# Patient Record
Sex: MALE | Race: BLACK OR AFRICAN AMERICAN | NOT HISPANIC OR LATINO | Employment: OTHER | URBAN - METROPOLITAN AREA
[De-identification: names, ages, dates, MRNs, and addresses within clinical notes are randomized per-mention and may not be internally consistent; named-entity substitution may affect disease eponyms.]

---

## 2017-01-10 ENCOUNTER — OFFICE VISIT (OUTPATIENT)
Dept: PALLIATIVE MEDICINE | Facility: CLINIC | Age: 57
End: 2017-01-10
Payer: COMMERCIAL

## 2017-01-10 VITALS
DIASTOLIC BLOOD PRESSURE: 82 MMHG | OXYGEN SATURATION: 98 % | RESPIRATION RATE: 16 BRPM | SYSTOLIC BLOOD PRESSURE: 111 MMHG | HEART RATE: 60 BPM

## 2017-01-10 DIAGNOSIS — G89.4 CHRONIC PAIN SYNDROME: Primary | Chronic | ICD-10-CM

## 2017-01-10 DIAGNOSIS — M70.62 TROCHANTERIC BURSITIS OF LEFT HIP: ICD-10-CM

## 2017-01-10 DIAGNOSIS — M79.10 MYALGIA: ICD-10-CM

## 2017-01-10 DIAGNOSIS — M79.18 MYOFASCIAL PAIN: ICD-10-CM

## 2017-01-10 PROCEDURE — 20552 NJX 1/MLT TRIGGER POINT 1/2: CPT | Mod: 59 | Performed by: NURSE PRACTITIONER

## 2017-01-10 PROCEDURE — 20610 DRAIN/INJ JOINT/BURSA W/O US: CPT | Mod: LT | Performed by: NURSE PRACTITIONER

## 2017-01-10 ASSESSMENT — PAIN SCALES - GENERAL: PAINLEVEL: MODERATE PAIN (5)

## 2017-01-10 NOTE — MR AVS SNAPSHOT
After Visit Summary   1/10/2017    Jeanmarie Mclean    MRN: [id number]           Patient Information     Date Of Birth          1960        Visit Information        Provider Department      1/10/2017 9:30 AM Maureen Avendaño APRN CNP [facility]        Today's Diagnoses     Chronic pain syndrome    -  1     Myalgia         Myofascial pain         Trochanteric bursitis of left hip           Care Instructions    [facility]   Post Procedure Instructions    Today you had:  trigger point injections to left gluteus yolis    Medications used:  lidocaine   bupivicaine      Bursal injection left hip  Medication used DepoMedrol Lidocaine/ Bupivacaine     Go to the emergency room if you develop any shortness of breath    Monitor the injection sites for signs and symptoms of infection-fever, chills, redness, swelling, warmth, or drainage to areas.    You may have soreness at injection sites for up to 24 hours.    You may apply ice to the painful areas to help minimize the discomfort of the needle pokes.    Do not apply heat to sites for at least 12 hours.    You may use anti-inflammatory medications or Tylenol for pain control if necessary    Nurse line: [phone]  After hours provider line: [phone]  Appointment line: [phone]    Follow up:  Repeat TPI's 2-4 weeks prn   Recommend  Physical Therapy to address deconditioning (hip extensors, buttocks). Discussed HEP   Await results of bursal injection to consider repeat S I joint injection vs hip imaging   reviewed HEP         Nurse Triage line:  [phone]   Call this number with any questions or concerns. You may leave a detailed message anytime. Calls are typically returned Monday through Friday between 8 AM and 4:30 PM. We usually get back to you within 2 business days depending on the issue/request.       Medication refills:    For non-narcotic medications, call your pharmacy directly to  request a refill. The pharmacy will contact the [facility] for authorization. Please allow 3-4 days for these refills to be processed.     For narcotic refills, call the nurse triage line or send a [email or web address] message. Please contact us 7-10 days before your refill is due. The message MUST include the name of the specific medication(s) requested and how you would like to receive the prescription(s). The options are as follows:    Pain Clinic staff can mail the prescription to your pharmacy. Please tell us the name of the pharmacy.    You may pick the prescription up at the Pain Clinic (tell us the location) or during a clinic visit with your pain provider    Pain Clinic staff can deliver the prescription to the [place] pharmacy in the clinic building. Please tell us the location.      Scheduling number: [phone].  Call this number to schedule or change appointments.    We believe regular attendance is key to your success in our program.    Any time you are unable to keep your appointment we ask that you call us at least 24 hours in advance to let us know. This will allow us to offer the appointment time to another patient.             Follow-ups after your visit        Who to contact     If you have questions or need follow up information about today's clinic visit or your schedule please contact [facility] directly at [phone].  Normal or non-critical lab and imaging results will be communicated to you by MyChart, letter or phone within 4 business days after the clinic has received the results. If you do not hear from us within 7 days, please contact the clinic through [employer] or phone. If you have a critical or abnormal lab result, we will notify you by phone as soon as possible.  Submit refill requests through [email or web address] or call your pharmacy and they will forward the refill request to us. Please allow 3 business days for your refill to be completed.          Additional  Information About Your Visit        [employer] Information     [employer] gives you secure access to your electronic health record. If you see a primary care provider, you can also send messages to your care team and make appointments. If you have questions, please call your primary care clinic.  If you do not have a primary care provider, please call [phone] and they will assist you.        Care EveryWhere ID     This is your Care EveryWhere ID. This could be used by other organizations to access your [facility] medical records  [id number]        Your Vitals Were     Pulse Respirations Pulse Oximetry             60 16 98%          Blood Pressure from Last 3 Encounters:   01/10/17 111/82   12/13/16 140/78   11/17/16 118/74    Weight from Last 3 Encounters:   10/11/16 68.584 kg (151 lb 3.2 oz)   09/21/16 69.4 kg (153 lb)   05/31/16 67.132 kg (148 lb)              We Performed the Following     DRAIN/INJECT LARGE JOINT/BURSA     INJECTION SNGL/MULT TRIGGER POINT, 1 OR 2 MUSCLES     NO CHARGE LOS        Primary Care Provider Office Phone # Fax #    Milton Iglesias [phone]       [facility] [address]  [place] [address]        Thank you!     Thank you for choosing [facility]  for your care. Our goal is always to provide you with excellent care. Hearing back from our patients is one way we can continue to improve our services. Please take a few minutes to complete the written survey that you may receive in the mail after your visit with us. Thank you!             Your Updated Medication List - Protect others around you: Learn how to safely use, store and throw away your medicines at www.disposemymeds.org.          This list is accurate as of: 1/10/17 10:38 AM.  Always use your most recent med list.                   Brand Name Dispense Instructions for use    aspirin 81 MG tablet      Take by mouth daily       cycloSPORINE 0.05 % ophthalmic emulsion    RESTASIS     1 Box    Place 1 drop into both eyes 2 times daily       IBU-200 PO      Take 2 tablets by mouth as needed       menthol 5 % Pads    ICY HOT    30 patch    Apply 1 patch topically every 8 hours as needed for muscle soreness May cut to fit       oxyCODONE 5 MG IR tablet    ROXICODONE     Take 1 tablet by mouth 3 times daily.       pregabalin 75 MG capsule    LYRICA    270 capsule    Take 1 capsule (75 mg) by mouth 3 times daily for fibromyalgia, or as directed by your pain clinic/previous prescriber.       QUEtiapine 200 MG tablet    SEROquel     Take 100 mg by mouth daily. weening       sildenafil 100 MG cap/tab    REVATIO/VIAGRA    10 tablet    Take 1/4 - 1 tablet, as directed, 1-3 hours before intimacy. Maximum 1 dose per 24 hours.       valACYclovir 500 MG tablet    VALTREX    6 tablet    Take 1 tablet (500 mg) by mouth 2 times daily for 3 days for outbreak.       vitamin D 1000 UNITS capsule      Take 1 capsule by mouth 2 times daily.       XANAX PO

## 2017-01-10 NOTE — NURSING NOTE
"Chief Complaint   Patient presents with     Pain       Initial /82 mmHg  Pulse 60  Resp 16  SpO2 98% Estimated body mass index is 25.94 kg/(m^2) as calculated from the following:    Height as of 10/11/16: 1.626 m (5' 4\").    Weight as of 10/11/16: 68.584 kg (151 lb 3.2 oz).  BP completed using cuff size: regular    Ernie Alexis MA  [facility]      "

## 2017-01-10 NOTE — PATIENT INSTRUCTIONS
[facility]   Post Procedure Instructions    Today you had:  trigger point injections to left gluteus yolis    Medications used:  lidocaine   bupivicaine      Bursal injection left hip  Medication used DepoMedrol Lidocaine/ Bupivacaine     Go to the emergency room if you develop any shortness of breath    Monitor the injection sites for signs and symptoms of infection-fever, chills, redness, swelling, warmth, or drainage to areas.    You may have soreness at injection sites for up to 24 hours.    You may apply ice to the painful areas to help minimize the discomfort of the needle pokes.    Do not apply heat to sites for at least 12 hours.    You may use anti-inflammatory medications or Tylenol for pain control if necessary    Nurse line: [phone]  After hours provider line: [phone]  Appointment line: [phone]    Follow up:  Repeat TPI's 2-4 weeks prn   Recommend  Physical Therapy to address deconditioning (hip extensors, buttocks). Discussed HEP   Await results of bursal injection to consider repeat S I joint injection vs hip imaging   reviewed HEP         Nurse Triage line:  [phone]   Call this number with any questions or concerns. You may leave a detailed message anytime. Calls are typically returned Monday through Friday between 8 AM and 4:30 PM. We usually get back to you within 2 business days depending on the issue/request.       Medication refills:    For non-narcotic medications, call your pharmacy directly to request a refill. The pharmacy will contact the [facility] for authorization. Please allow 3-4 days for these refills to be processed.     For narcotic refills, call the nurse triage line or send a [employer] message. Please contact us 7-10 days before your refill is due. The message MUST include the name of the specific medication(s) requested and how you would like to receive the prescription(s). The options are as follows:    Pain Clinic staff can  mail the prescription to your pharmacy. Please tell us the name of the pharmacy.    You may pick the prescription up at the Pain Clinic (tell us the location) or during a clinic visit with your pain provider    Pain Clinic staff can deliver the prescription to the [place] pharmacy in the clinic building. Please tell us the location.      Scheduling number: [phone].  Call this number to schedule or change appointments.    We believe regular attendance is key to your success in our program.    Any time you are unable to keep your appointment we ask that you call us at least 24 hours in advance to let us know. This will allow us to offer the appointment time to another patient.

## 2017-01-10 NOTE — PROGRESS NOTES
"Pre-Procedure:  Patient's Name and Date of Birth verified:  YES  Verified By:   [facility] (initials)  Diagnosis:     Chronic pain syndrome  Myalgia  Myofascial pain  Trochanteric bursitis of left hip  Interval History:  Here for TPi's low back, bilaterally, left hip pain.  C/o pain left gluteal and left hip  Walking more and this aggravates Pain . Son here today. Unable to do Physical Therapy as disabled son does not have day care and can not be unattended during Physical Therapy. He is too distruptive for patient to have son in room.   S/p bilateral S I joint  4/18/16 with 85 % relief of pain in low back.   Pain is tender, aching, gnawing, penetrating.   Rating 5/10   Aggravating factors: walking too much,cold   Relieving factors: TPI's, rest,  S I joint  Injections ( 09/26/2016 and 04/18/16), hip bursal injection  Quality of life:  Increase stress with disabled son's behavior and other\" stress\"  7/28/15 pelvic xrays mild superior joint space narrowing, bilateral joint space left>R S I joint.   PE: /82 mmHg  Pulse 60  Resp 16  SpO2 98%  MSK:  TTP left gluteus yolis at posterior hip insertion, along sacral ridge  TTP over left hip bursa  Gait normal   Psyche: pleasant cooperative and alert some  cognitive slowing, mood/ affect depressed   Complications and/or Adverse Effects of Last Treatment:  None , last visit 12/13/16   Site Marking and Verification:  Site selection (based on symptoms and condition:  YES  Verified by: [facility] (initials)  Patient identification verified by provider: YES  Verified by: [facility]  (initials)  Pause for the Cause:  YES  Verified by: [facility] (initials)   Procedure Note:  Discussion of the procedure, risks, benefits and alternatives was held.  Informed consent was given by patient:  YES.    Type of Procedure Performed: Trigger Points, joint injections, gluteus yolis at posterior insertion and sacral ridge  Procedure Description:      Note:  Position prone   Cleansed  gluteus yolis   with " ChloraPrep 2% with 70 % alcohol    Trigger points marked ( 8) total      Trigger point injection:  Medication: bupivacaine 0.25 % 5 cc lidocaine 1% 5 cc   Volume each site 1-2 cc, Total volume 10 cc     Bursal injection left hip   bupivacaine 0.25 % 4 ccDepoMedrol 1 cc 40 mg  Total volume 5 cc  Pre pain score: 5/10, Post procedure pain score 2/10  Maureen Avendaño R.N.,B.C., C.A.N.P.      Post-Procedure:  Complication/Adverse Effects: none     Management:   See avs,   Repeat TPI's 2-4 weeks prn   Declined Physical Therapy to address deconditioning (hip extensors ASIS pain and gluteal ). Discussed HEP   Await results of bursal injection to consider repeat S I joint injection vs hip imaging   reviewed HEP   **OK to overbook TPI's **  Signature:  Maureen Nescopeck-Ceferino, CNP    [facility]      Time spent: 15 minute procedure related care  10 minutes with chart/medication  review

## 2017-01-11 ENCOUNTER — TELEPHONE (OUTPATIENT)
Dept: FAMILY MEDICINE | Facility: CLINIC | Age: 57
End: 2017-01-11

## 2017-01-11 NOTE — TELEPHONE ENCOUNTER
Reason for Call:  Other prescription    Detailed comments: Patient needs to sign the Advanced Directives and wants to do this as soon as possible.    Phone Number Patient can be reached at: Home number on file [phone] (home)    Best Time: any    Can we leave a detailed message on this number? YES    Call taken on 1/11/2017 at 3:47 PM by Brandi Love

## 2017-01-12 ENCOUNTER — TELEPHONE (OUTPATIENT)
Dept: FAMILY MEDICINE | Facility: CLINIC | Age: 57
End: 2017-01-12

## 2017-01-12 NOTE — TELEPHONE ENCOUNTER
Fe, Care Coordinator for [facility], [phone], [id number] called to make appointment with Radha for Notary for Health Care Directive.    Please call Fe today.    Thank you,    Fartun Ramos

## 2017-01-12 NOTE — TELEPHONE ENCOUNTER
Called [facility] and left a msg that yesterday I was not in the clinic in the afternoon. Today I am leaving in 2 hrs & tomorrow I will not in the clinic.  Elyse,CMA

## 2017-01-12 NOTE — TELEPHONE ENCOUNTER
Patient calling asking if he could make an appointment on Thursday the 19th, please call as soon as possible, him to make and appointment. Patient wants a call, and can leave a message for a time on Thursday the 19th, but need a call back as soon as possible to set this up.    Please call and if necessary leave a detailed message on what time, [phone], if he does not answer.

## 2017-01-26 ENCOUNTER — ALLIED HEALTH/NURSE VISIT (OUTPATIENT)
Dept: NURSING | Facility: CLINIC | Age: 57
End: 2017-01-26
Payer: COMMERCIAL

## 2017-01-26 DIAGNOSIS — Z71.89 ADVANCE CARE PLANNING: Primary | ICD-10-CM

## 2017-01-26 PROCEDURE — 99207 ZZC NO CHARGE LOS: CPT

## 2017-02-02 ENCOUNTER — TELEPHONE (OUTPATIENT)
Dept: FAMILY MEDICINE | Facility: CLINIC | Age: 57
End: 2017-02-02

## 2017-02-02 ENCOUNTER — ALLIED HEALTH/NURSE VISIT (OUTPATIENT)
Dept: NURSING | Facility: CLINIC | Age: 57
End: 2017-02-02
Payer: COMMERCIAL

## 2017-02-02 DIAGNOSIS — Z71.89 ADVANCE CARE PLANNING: Primary | ICD-10-CM

## 2017-02-02 PROCEDURE — 99207 ZZC NO CHARGE NURSE ONLY: CPT

## 2017-02-02 NOTE — TELEPHONE ENCOUNTER
Pt has checked in today to be seen by Advance care directive facilitator at 1pm.   However staff cannot find the pt in the clinic.  Left a msg for pt be on the 2nd floor or inform .  An,CMA

## 2017-02-13 ENCOUNTER — MYC MEDICAL ADVICE (OUTPATIENT)
Dept: FAMILY MEDICINE | Facility: CLINIC | Age: 57
End: 2017-02-13

## 2017-02-14 ENCOUNTER — MYC MEDICAL ADVICE (OUTPATIENT)
Dept: FAMILY MEDICINE | Facility: CLINIC | Age: 57
End: 2017-02-14

## 2017-02-14 NOTE — TELEPHONE ENCOUNTER
Jeanmarie Mclean to MD Dr. Harris Bardales,     I'm rescinding my request that I made to you, to help me get a copy of all of my medical visits since 2014. I realized I could glean them from my chart myself. Thank you anyway for your anticipatory cooperation.

## 2017-02-14 NOTE — TELEPHONE ENCOUNTER
Other encounter closed per patient request.    Debbi Milian RN, [facility] Triage

## 2017-03-02 ENCOUNTER — MEDICAL CORRESPONDENCE (OUTPATIENT)
Dept: HEALTH INFORMATION MANAGEMENT | Facility: CLINIC | Age: 57
End: 2017-03-02

## 2017-03-21 ENCOUNTER — TELEPHONE (OUTPATIENT)
Dept: PALLIATIVE MEDICINE | Facility: CLINIC | Age: 57
End: 2017-03-21

## 2017-03-21 ENCOUNTER — OFFICE VISIT (OUTPATIENT)
Dept: PALLIATIVE MEDICINE | Facility: CLINIC | Age: 57
End: 2017-03-21
Payer: COMMERCIAL

## 2017-03-21 VITALS — HEART RATE: 50 BPM | SYSTOLIC BLOOD PRESSURE: 117 MMHG | DIASTOLIC BLOOD PRESSURE: 69 MMHG | OXYGEN SATURATION: 99 %

## 2017-03-21 DIAGNOSIS — M53.3 SI (SACROILIAC) JOINT DYSFUNCTION: ICD-10-CM

## 2017-03-21 DIAGNOSIS — G89.4 CHRONIC PAIN SYNDROME: Chronic | ICD-10-CM

## 2017-03-21 DIAGNOSIS — M79.18 MYOFASCIAL PAIN: ICD-10-CM

## 2017-03-21 DIAGNOSIS — M79.10 MYALGIA: Primary | ICD-10-CM

## 2017-03-21 PROCEDURE — 20553 NJX 1/MLT TRIGGER POINTS 3/>: CPT | Performed by: NURSE PRACTITIONER

## 2017-03-21 ASSESSMENT — PAIN SCALES - GENERAL: PAINLEVEL: SEVERE PAIN (7)

## 2017-03-21 NOTE — TELEPHONE ENCOUNTER
Pre-screening Questions for Radiology Injections:    Injection to be done at which interventional clinic site? [facility]    Procedure ordered by Dr. Avendaño    Procedure ordered? Bilateral SI Joint Injection    What insurance would patient like us to bill for this procedure? [employer]      Worker's comp-Any injection DO NOT SCHEDULE and route to Kym Fisher.      [employer] insurance - For SI joint injections, DO NOT SCHEDULE and route Kym Fisher.      [employer]- MBB's must be scheduled at LEAST two weeks apart      [employer] - Any injection besides hip/shoulder/knee joint DO NOT SCHEDULE and route to Kym Fisher. She will obtain PA and call pt back to schedule procedure or notify pt of denial.     Any chance of pregnancy? NO   If YES, do NOT schedule and route to RN pool    Is an  needed? No     Patient has a drive home? (mandatory) YES: OK     Is patient taking any blood thinners (plavix, coumadin, jantoven, warfarin, heparin, pradaxa or dabigatran )? No   If hold needed, do NOT schedule, route to RN pool     Is patient taking any aspirin products? Yes - Pt takes 81 mg daily; instructed to hold 0 day(s) prior to procedure.      If more than 325mg/day do NOT schedule; route to RN pool     For CERVICAL procedures, hold all aspirin products for 6 days.      Does the patient have a bleeding or clotting disorder? No     If yes, okay to schedule AND route to RN nurse pool    **For any patients with platelet count <100, must be forwarded to provider**    Is patient diabetic?  No  If YES, have them bring their glucometer.    Does patient have an active infection or treated for one within the past week? No     Is patient currently taking any antibiotics?  No     For patients on chronic, preventative, or prophylactic antibiotics, procedures may be scheduled.     For patients on antibiotics for active or recent infection:    Griselda Luna, Darrion King-antibiotic course must  have been completed for 4 days    Deng Burden-antibiotic course must have been completed for 7 days    Is patient currently taking any steroid medications? (i.e. Prednisone, Medrol)  No     For patients on steroid medications:    Griselda Luna [place], [place]-steroid course must have been completed for 4 days    Deng Burden-steroid course must have been completed for 7 days    Reviewed with patient:  If you are started on any steroids or antibiotics between now and your appointment, you must contact us because it may affect our ability to perform your procedure.  Yes    Is patient actively being treated for cancer or immunocompromised, including the spleen having been removed? No    If YES, do NOT schedule and route to RN pool     **For Dr. Henry patients without spleens should have the chart sent to her**    Are you able to get on and off an exam table with minimal or no assistance? Yes  If NO, do NOT schedule and route to RN pool    Are you able to roll over and lay on your stomach with minimal or no assistance? Yes  If NO, do NOT schedule and route to RN pool     Any allergies to contrast dye, iodine, shellfish, or numbing and steroid medications? No  If YES, route to RN pool AND add allergy information to appointment notes    Allergies: Risperidone; Effexor [venlafaxine hydrochloride]; Ambien; Ambien [zolpidem tartrate]; Buspirone; Celexa [citalopram]; Duloxetine; Septra [bactrim]; Seroquel [quetiapine]; and Ultram [tramadol hcl]        Has the patient had a flu shot or any other vaccinations within 7 days before or after the procedure.  No       Does patient have an MRI/CT?  YES: 9/2016  (SI joint, hip injections, lumbar sympathetic blocks, and stellate ganglion blocks do not require an MRI)    Was the MRI done w/in the last 3 years?  Yes    Was MRI done at [facility]? Yes      If not, where was it done? N/A       If MRI was not done at [facility], [facility] or [facility] do NOT  schedule and route to nursing.  If pt has an imaging disc, the injection may be scheduled but pt has to bring disc to appt. If they show up w/out disc the injection cannot be done    Reminders (please tell patient if applicable):       Instructed pt to arrive 30 minutes early for IV start if this is for a cervical procedure, ALL sympathetic (stellate ganglion, hypogastric, or lumbar sympathetic block) and all sedation procedures (RFA, spinal cord stimulation trials).  Not Applicable    -IVs are not routinely placed for [place] and [place] cervical case       If NPO for sedation, informed patient that it is okay to take medications with sips of water (except if they are to hold blood thinners).  Not Applicable   *DO take blood pressure medication if it is prescribed*      If this is for a cervical JOSE R, informed patient that aspirin needs to be held for 6 days.   Not Applicable      For all patients not having spinal cord stimulator (SCS) trials or radiofrequency ablations (RFAs), informed patient:  IV sedation is not provided for this procedure.  If you feel that an oral anti-anxiety medication is needed, you can discuss this further with your referring provider or primary care provider.  The Pain Clinic provider will discuss specifics of what the procedure includes at your appointment.  Most procedures last 10-20 minutes.  We use numbing medications to help with any discomfort during the procedure.  NO      Do not schedule procedures requiring IV placement in the first appointment of the day or first appointment after lunch.       For patients 85 or older we recommend having an adult stay w/ them for the remainder of the day.       Does the patient have any questions?  NO  Margot Samaniego  [facility]

## 2017-03-21 NOTE — NURSING NOTE
"No chief complaint on file.      Initial /69  Pulse 50  SpO2 99% Estimated body mass index is 25.95 kg/(m^2) as calculated from the following:    Height as of 10/11/16: 1.626 m (5' 4\").    Weight as of 10/11/16: 68.6 kg (151 lb 3.2 oz).  Medication Reconciliation: complete    "

## 2017-03-21 NOTE — PROGRESS NOTES
"Pre-Procedure:  Patient's Name and Date of Birth verified:  YES  Verified By:   [facility] (initials)  Diagnosis:     Myalgia  Myofascial pain  SI (sacroiliac) joint dysfunction  Interval History:  Here for TPi's low back, hips bilaterally.  Wondering if he can get injections in [place] as these really help.  Walking running aggravates pain . Son here today.   Unable to do Physical Therapy as disabled son does not have day care and can not be unattended during Physical Therapy.   He is too distruptive for patient to have son in room. Working on adult day care for him.  S/p bilateral S I joint  4/18/16 and 9/26/16 with 85 % relief of pain in low back.   [facility] , on 1/2 dose of prescribed Seroquel as higher doses produce metal taste in mouth and headache.  Still reports auditory hallucinations, but better on antipsychotic.  He denies suicidal thoughts and feel safe at home.   Stated to nurse care team for son is working on Adult day care options.    Pain is tender, aching, gnawing, penetrating.   Rating 7/10   Aggravating factors: walking too much,cold   Relieving factors: TPI's, rest,  S I joint  Injections, hip bursal injection  Quality of life:  Increase stress with disabled son's behavior and other\" stress\"  7/28/15 pelvic xrays mild superior joint space narrowing, bilateral joint space left>R S I joint.     PE: /69  Pulse 50  SpO2 99%  MSK:  TTP left gluteus mredius at posterior hip insertion, lattisimus dorsi   TTP over bilateral  hip bursa  Gait normal   Psyche: pleasant cooperative and alert some  cognitive slowing, mood/ affect depressed, smiling more today.    Complications and/or Adverse Effects of Last Treatment:  None , last visit 01/10/17   Site Marking and Verification:  Site selection (based on symptoms and condition:  YES  Verified by: [facility] (initials)  Patient identification verified by provider: " "YES  Verified by: [facility]  (initials)  Pause for the Cause:  YES  Verified by: [facility] (initials)   Procedure Note:  Discussion of the procedure, risks, benefits and alternatives was held.  Informed consent was given by patient:  YES.    Type of Procedure Performed: Trigger Points, joint injections, gluteus yolis at posterior insertion and sacral ridge  Procedure Description:      Note:  Position prone   Cleansed  gluteus medius and lumbar latissimus dorsi    with ChloraPrep 2% with 70 % alcohol    Trigger points marked ( 5) total      Trigger point injection:  Medication: bupivacaine 0.25 % 5 cc lidocaine 1% 5 cc   Volume each site 1-2 cc, Total volume 10 cc  Pain pre procedure 7/10, post procedure  7/10 \" but feeling better   Maureen Avendaño R.N.,B.C., C.A.N.P.      Post-Procedure:  Complication/Adverse Effects: none     Management:   See avs,   Repeat TPI's 2-4 weeks prn    Discussed HEP   Avoid running    repeat S I joint injection bilaterally   reviewed HEP   **OK to overbook TPI's **    Signature:  Maureen Avendaño CNP    [facility]        Time spent: 15 minute procedure related care  10 minutes with chart/medication  review     "

## 2017-03-21 NOTE — PATIENT INSTRUCTIONS
Repeat TPI's 2-4 weeks prn    Discussed HEP   Avoid running    repeat S I joint injection bilateral    [facility]   Post Procedure Instructions    Today you had:  trigger point injections      Medications used:  lidocaine   bupivicaine   Low back and buttocks        Go to the emergency room if you develop any shortness of breath    Monitor the injection sites for signs and symptoms of infection-fever, chills, redness, swelling, warmth, or drainage to areas.    You may have soreness at injection sites for up to 24 hours.    You may apply ice to the painful areas to help minimize the discomfort of the needle pokes.    Do not apply heat to sites for at least 12 hours.    You may use anti-inflammatory medications or Tylenol for pain control if necessary  Nurse line: [phone]  After hours provider line: [phone]  Appointment line: [phone]

## 2017-03-21 NOTE — MR AVS SNAPSHOT
After Visit Summary   3/21/2017    Jeanmarie Mclean    MRN: [id number]           Patient Information     Date Of Birth          1960        Visit Information        Provider Department      3/21/2017 9:30 AM Maureen Avendaño APRN CNP [facility]        Today's Diagnoses     Myalgia    -  1    Myofascial pain        SI (sacroiliac) joint dysfunction          Care Instructions    Repeat TPI's 2-4 weeks prn    Discussed HEP   Avoid running    repeat S I joint injection bilateral    [facility]   Post Procedure Instructions    Today you had:  trigger point injections      Medications used:  lidocaine   bupivicaine   Low back and buttocks        Go to the emergency room if you develop any shortness of breath    Monitor the injection sites for signs and symptoms of infection-fever, chills, redness, swelling, warmth, or drainage to areas.    You may have soreness at injection sites for up to 24 hours.    You may apply ice to the painful areas to help minimize the discomfort of the needle pokes.    Do not apply heat to sites for at least 12 hours.    You may use anti-inflammatory medications or Tylenol for pain control if necessary  Nurse line: [phone]  After hours provider line: [phone]  Appointment line: [phone]          Follow-ups after your visit        Additional Services     PAIN INJECTION EVAL/TREAT/FOLLOW UP                 Who to contact     If you have questions or need follow up information about today's clinic visit or your schedule please contact [facility] directly at [phone].  Normal or non-critical lab and imaging results will be communicated to you by MyChart, letter or phone within 4 business days after the clinic has received the results. If you do not hear from us within 7 days, please contact the clinic through MyChart or phone. If you have a critical or abnormal lab result, we will notify  you by phone as soon as possible.  Submit refill requests through [employer] or call your pharmacy and they will forward the refill request to us. Please allow 3 business days for your refill to be completed.          Additional Information About Your Visit        [employer] Information     [employer] gives you secure access to your electronic health record. If you see a primary care provider, you can also send messages to your care team and make appointments. If you have questions, please call your primary care clinic.  If you do not have a primary care provider, please call [phone] and they will assist you.        Care EveryWhere ID     This is your Care EveryWhere ID. This could be used by other organizations to access your [facility] medical records  [id number]        Your Vitals Were     Pulse Pulse Oximetry                50 99%           Blood Pressure from Last 3 Encounters:   03/21/17 117/69   01/10/17 111/82   12/13/16 140/78    Weight from Last 3 Encounters:   10/11/16 68.6 kg (151 lb 3.2 oz)   09/21/16 69.4 kg (153 lb)   05/31/16 67.1 kg (148 lb)              We Performed the Following     INJECTION SNGL/MULT TRIGGER POINT, 1 OR 2 MUSCLES     PAIN INJECTION EVAL/TREAT/FOLLOW UP        Primary Care Provider Office Phone # Fax #    Milton Iglesias [phone]       [facility] [address] [address] [address]        Thank you!     Thank you for choosing [facility]  for your care. Our goal is always to provide you with excellent care. Hearing back from our patients is one way we can continue to improve our services. Please take a few minutes to complete the written survey that you may receive in the mail after your visit with us. Thank you!             Your Updated Medication List - Protect others around you: Learn how to safely use, store and throw away your medicines at www.disposemymeds.org.          This list is accurate as of: 3/21/17 10:10 AM.  Always  use your most recent med list.                   Brand Name Dispense Instructions for use    aspirin 81 MG tablet      Take by mouth daily       cycloSPORINE 0.05 % ophthalmic emulsion    RESTASIS    1 Box    Place 1 drop into both eyes 2 times daily       IBU-200 PO      Take 2 tablets by mouth as needed       menthol 5 % Pads    ICY HOT    30 patch    Apply 1 patch topically every 8 hours as needed for muscle soreness May cut to fit       oxyCODONE 5 MG IR tablet    ROXICODONE     Take 1 tablet by mouth 3 times daily.       pregabalin 75 MG capsule    LYRICA    270 capsule    Take 1 capsule (75 mg) by mouth 3 times daily for fibromyalgia, or as directed by your pain clinic/previous prescriber.       QUEtiapine 200 MG tablet    SEROquel     Take 100 mg by mouth daily. weening       sildenafil 100 MG cap/tab    REVATIO/VIAGRA    10 tablet    Take 1/4 - 1 tablet, as directed, 1-3 hours before intimacy. Maximum 1 dose per 24 hours.       valACYclovir 500 MG tablet    VALTREX    6 tablet    Take 1 tablet (500 mg) by mouth 2 times daily for 3 days for outbreak.       vitamin D 1000 UNITS capsule      Take 1 capsule by mouth 2 times daily.

## 2017-04-12 ENCOUNTER — RADIOLOGY INJECTION OFFICE VISIT (OUTPATIENT)
Dept: PALLIATIVE MEDICINE | Facility: CLINIC | Age: 57
End: 2017-04-12
Payer: COMMERCIAL

## 2017-04-12 ENCOUNTER — RADIANT APPOINTMENT (OUTPATIENT)
Dept: RADIOLOGY | Facility: CLINIC | Age: 57
End: 2017-04-12
Attending: PSYCHIATRY & NEUROLOGY
Payer: COMMERCIAL

## 2017-04-12 VITALS — SYSTOLIC BLOOD PRESSURE: 126 MMHG | DIASTOLIC BLOOD PRESSURE: 80 MMHG | OXYGEN SATURATION: 98 % | HEART RATE: 61 BPM

## 2017-04-12 DIAGNOSIS — M53.3 SACROILIAC JOINT DYSFUNCTION: ICD-10-CM

## 2017-04-12 DIAGNOSIS — M53.3 SI (SACROILIAC) JOINT DYSFUNCTION: Primary | ICD-10-CM

## 2017-04-12 PROCEDURE — 27096 INJECT SACROILIAC JOINT: CPT | Mod: 50 | Performed by: PSYCHIATRY & NEUROLOGY

## 2017-04-12 ASSESSMENT — PAIN SCALES - GENERAL
PAINLEVEL: EXTREME PAIN (8)
PAINLEVEL: NO PAIN (0)

## 2017-04-12 NOTE — MR AVS SNAPSHOT
After Visit Summary   4/12/2017    Jeanmarie Mclean    MRN: [id number]           Patient Information     Date Of Birth          1960        Visit Information        Provider Department      4/12/2017 8:15 AM Angélica King MD [facility]        Care Instructions    [facility]   Procedure Discharge Instructions    Today you saw:  Dr. Angélica King     You had an: sacro-iliac joint injection     Medications used:  Lidocaine  Omnipaque  Ropivicaine   Kenalog              Be cautious when walking. Numbness and/or weakness in the lower extremities may occur up to 6-8 hours after the procedure due to effect of the local anesthetic    Do not drive for 6 hours. The effect of the local anesthetic could slow your reflexes.     You may resume your regular activities after 24 hours    Avoid strenuous activity for the first 24 hours    You may shower, however avoid swimming, tub baths or hot tubs for 24 hours following your procedure    You may have a mild to moderate increase in pain for several days following the injection.    It may take up to 14 days for the steroid medication to start working although you may feel the effect as early as a few days after the procedure.       You may use ice packs for 10-15 minutes, 3 to 4 times a day at the injection site for comfort    Do not use heat to painful areas for 6 to 8 hours. This will give the local anesthetic time to wear off and prevent you from accidentally burning your skin.     You may use anti-inflammatory medications (such as Ibuprofen or Aleve or Advil) or Tylenol for pain control if necessary    If you experience any of the following, call the pain center nursing line during work hours at [phone] or the after hours provider line at [phone]:  -Fever over 100 degree F  -Swelling, bleeding, redness, drainage, warmth at the injection site  -Progressive weakness or numbness in your legs  -Loss of bowel or  bladder function  -Unusual new onset of pain that is not improving      Phone #s:  Appointment line: [phone];  Nurse line: [phone]            Follow-ups after your visit        Your next 10 appointments already scheduled     Apr 18, 2017 10:00 AM CDT   PROCEDURE with RAJINDER Fitzgerald CNP   [facility] ([facility])    [address]  [facility] [address] [phone]              Who to contact     If you have questions or need follow up information about today's clinic visit or your schedule please contact [facility] directly at [phone].  Normal or non-critical lab and imaging results will be communicated to you by MyChart, letter or phone within 4 business days after the clinic has received the results. If you do not hear from us within 7 days, please contact the clinic through [employer] or phone. If you have a critical or abnormal lab result, we will notify you by phone as soon as possible.  Submit refill requests through [employer] or call your pharmacy and they will forward the refill request to us. Please allow 3 business days for your refill to be completed.          Additional Information About Your Visit        MyChart Information     [employer] gives you secure access to your electronic health record. If you see a primary care provider, you can also send messages to your care team and make appointments. If you have questions, please call your primary care clinic.  If you do not have a primary care provider, please call [phone] and they will assist you.        Care EveryWhere ID     This is your Care EveryWhere ID. This could be used by other organizations to access your [facility] medical records  [id number]        Your Vitals Were     Pulse Pulse Oximetry                61 98%           Blood Pressure from Last 3 Encounters:   04/12/17 126/80   03/21/17 117/69   01/10/17 111/82    Weight from Last 3 Encounters:    10/11/16 68.6 kg (151 lb 3.2 oz)   09/21/16 69.4 kg (153 lb)   05/31/16 67.1 kg (148 lb)              Today, you had the following     No orders found for display       Primary Care Provider Office Phone # Fax #    Milton Iglesias [phone]       [facility] [address]  [facility] [address]        Thank you!     Thank you for choosing [facility]  for your care. Our goal is always to provide you with excellent care. Hearing back from our patients is one way we can continue to improve our services. Please take a few minutes to complete the written survey that you may receive in the mail after your visit with us. Thank you!             Your Updated Medication List - Protect others around you: Learn how to safely use, store and throw away your medicines at www.disposemymeds.org.          This list is accurate as of: 4/12/17  8:58 AM.  Always use your most recent med list.                   Brand Name Dispense Instructions for use    aspirin 81 MG tablet      Take by mouth daily       cycloSPORINE 0.05 % ophthalmic emulsion    RESTASIS    1 Box    Place 1 drop into both eyes 2 times daily       IBU-200 PO      Take 2 tablets by mouth as needed       menthol 5 % Pads    ICY HOT    30 patch    Apply 1 patch topically every 8 hours as needed for muscle soreness May cut to fit       oxyCODONE 5 MG IR tablet    ROXICODONE     Take 1 tablet by mouth 3 times daily.       pregabalin 75 MG capsule    LYRICA    270 capsule    Take 1 capsule (75 mg) by mouth 3 times daily for fibromyalgia, or as directed by your pain clinic/previous prescriber.       QUEtiapine 200 MG tablet    SEROquel     Take 100 mg by mouth daily. weening       sildenafil 100 MG cap/tab    REVATIO/VIAGRA    10 tablet    Take 1/4 - 1 tablet, as directed, 1-3 hours before intimacy. Maximum 1 dose per 24 hours.       valACYclovir 500 MG tablet    VALTREX    6 tablet    Take 1 tablet (500 mg) by mouth 2 times daily  for 3 days for outbreak.       vitamin D 1000 UNITS capsule      Take 1 capsule by mouth 2 times daily.

## 2017-04-12 NOTE — PATIENT INSTRUCTIONS
[facility]   Procedure Discharge Instructions    Today you saw:  Dr. Angélica King     You had an: sacro-iliac joint injection     Medications used:  Lidocaine  Omnipaque  Ropivicaine   Kenalog              Be cautious when walking. Numbness and/or weakness in the lower extremities may occur up to 6-8 hours after the procedure due to effect of the local anesthetic    Do not drive for 6 hours. The effect of the local anesthetic could slow your reflexes.     You may resume your regular activities after 24 hours    Avoid strenuous activity for the first 24 hours    You may shower, however avoid swimming, tub baths or hot tubs for 24 hours following your procedure    You may have a mild to moderate increase in pain for several days following the injection.    It may take up to 14 days for the steroid medication to start working although you may feel the effect as early as a few days after the procedure.       You may use ice packs for 10-15 minutes, 3 to 4 times a day at the injection site for comfort    Do not use heat to painful areas for 6 to 8 hours. This will give the local anesthetic time to wear off and prevent you from accidentally burning your skin.     You may use anti-inflammatory medications (such as Ibuprofen or Aleve or Advil) or Tylenol for pain control if necessary    If you experience any of the following, call the pain center nursing line during work hours at [phone] or the after hours provider line at [phone]:  -Fever over 100 degree F  -Swelling, bleeding, redness, drainage, warmth at the injection site  -Progressive weakness or numbness in your legs  -Loss of bowel or bladder function  -Unusual new onset of pain that is not improving      Phone #s:  Appointment line: [phone];  Nurse line: [phone]

## 2017-04-12 NOTE — PROGRESS NOTES
Pre procedure Diagnosis: SI joint dysfunction    Post procedure Diagnosis: Same  Procedure performed: Bilateral SI joint injections  Anesthesia: none  Complications: none  Operators: Cally King MD, Crystal Vinson DO       Indications:   Jeanmarie Mclean is a 57 year old male was sent by Maureen Avendaño NP for SI joint injection.  They have a history of low back pain and SI joint dysfunction.  Exam shows positive tenderness at the PSIS bilaterally.  He had great success from previous injections.  He has tried conservative treatment including PT and medications.    Options/alternatives, benefits and risks were discussed with the patient including bleeding, infection, tissue trauma, exposure to radiation, reaction to medications including seizure, nerve injury, weakness, and numbness.  Questions were answered to his satisfaction and he agrees to proceed. Voluntary informed consent was obtained and signed.     Vitals were reviewed: Yes  Allergies were reviewed:  Yes   Medications were reviewed:  Yes   Pre-procedure pain score: 7/10    Procedure:  After getting informed consent, patient was brought into the procedure suite and was placed in a prone position on the procedure table.   A Pause for the Cause was performed.  Patient was prepped and draped in sterile fashion.     After identifying the bilateral SI joints, the C-arm was rotated to a obliquely to obtain the best view of the inferior angle of the joint.  A total of 4 ml of Lidocaine 1%  was used to anesthetize the skin at a skin entry site coaxial with the fluoroscopy beam at this location.  A 22gauge 3.5 inch needle was advanced under intermittent fluoroscopy until it was felt to enter the SI joint.    A total of 2ml of Omnipaque-300 was injected, confirming appropriate position, with spread into the intraarticular space, with no intravascular uptake noted. 8ml was wasted.  Location was verified in lateral view.    3 ml of 0.2% ropivacaine with 80mg of  kenalog was injected, divided equally between the two sides.  The needle was flushed with lidocaine and removed.     Hemostasis was achieved, the area was cleaned, and bandaids were placed when appropriate.  The patient tolerated the procedure well, and was taken to the recovery room.    Images were saved to PACS.    Post-procedure pain score: 0/10  Follow-up includes:   -f/u phone call in one week  -f/u with PAYAM Graves MD  [facility]

## 2017-04-12 NOTE — NURSING NOTE
"Chief Complaint   Patient presents with     Pain       Initial /77  Pulse (!) 49 Estimated body mass index is 25.95 kg/(m^2) as calculated from the following:    Height as of 10/11/16: 1.626 m (5' 4\").    Weight as of 10/11/16: 68.6 kg (151 lb 3.2 oz).  Medication Reconciliation: complete     Injection intake:    If this procedure is requiring IV sedation has patient been NPO for 6  Hours? NA    Is patient on coumadin, plavix or other prescribed blood thinner?   No    If patient is on coumadin was it held for 5 days?   NA    If patient is on plavix was it held for 7 days?    NA     Does patient take aspirin?  YES-ASA- 81 mg    If this is for a cervical procedure and patient is on aspirin has it been held for 6 days?   NA    Any allergies to contrast dye, iodine, steroid and/or numbing medications?  NO    Is patient currently taking antibiotics or have an active infection?  NO    Does patient have a ? Yes       Is patient pregnant or breastfeeding?  Not Applicable    Are the vital signs normal?  Yes    Emma Breaux CMA (AAMA)        "

## 2017-04-12 NOTE — NURSING NOTE
Discharge Information    IV Discontiued Time:  NA    Amount of Fluid Infused:  NA    Discharge Criteria = When patient returns to baseline or as per MD order    Consciousness:  Pt is fully awake    Circulation:  BP +/- 20% of pre-procedure level    Respiration:  Patient is able to breathe deeply    O2 Sat:  Patient is able to maintain O2 Sat >92% on room air    Activity:  Moves 4 extremities on command    Ambulation:  Patient is able to stand and walk or stand and pivot into wheelchair    Dressing:  Clean/dry or No Dressing    Notes:   Discharge instructions and AVS given to patient    Patient meets criteria for discharge?  YES    Admitted to PCU?  No    Responsible adult present to accompany patient home?  Yes    Signature/Title:    Leti Carrillo RN Care Coordinator  [facility]

## 2017-04-18 ENCOUNTER — OFFICE VISIT (OUTPATIENT)
Dept: PALLIATIVE MEDICINE | Facility: CLINIC | Age: 57
End: 2017-04-18
Payer: COMMERCIAL

## 2017-04-18 VITALS — SYSTOLIC BLOOD PRESSURE: 133 MMHG | DIASTOLIC BLOOD PRESSURE: 80 MMHG | HEART RATE: 53 BPM

## 2017-04-18 DIAGNOSIS — G89.4 CHRONIC PAIN SYNDROME: ICD-10-CM

## 2017-04-18 DIAGNOSIS — M79.10 MYALGIA: Primary | ICD-10-CM

## 2017-04-18 PROCEDURE — 20552 NJX 1/MLT TRIGGER POINT 1/2: CPT | Performed by: NURSE PRACTITIONER

## 2017-04-18 ASSESSMENT — PAIN SCALES - GENERAL: PAINLEVEL: EXTREME PAIN (8)

## 2017-04-18 NOTE — MR AVS SNAPSHOT
After Visit Summary   4/18/2017    Jeanmarie Mclean    MRN: [id number]           Patient Information     Date Of Birth          1960        Visit Information        Provider Department      4/18/2017 10:00 AM Maureen Avendaño APRN CNP [facility]      Today's Diagnoses     Myalgia    -  1    Chronic pain syndrome          Care Instructions    [facility]   Post Procedure Instructions    Today you had:  trigger point injections both buttocks     Medications used:  lidocaine   bupivicaine            Go to the emergency room if you develop any shortness of breath    Monitor the injection sites for signs and symptoms of infection-fever, chills, redness, swelling, warmth, or drainage to areas.    You may have soreness at injection sites for up to 24 hours.    You may apply ice to the painful areas to help minimize the discomfort of the needle pokes.    Do not apply heat to sites for at least 12 hours.    You may use anti-inflammatory medications or Tylenol for pain control if necessary  Nurse line: [phone]  After hours provider line: [phone]  Appointment line: [phone]            Follow-ups after your visit        Follow-up notes from your care team     Discussed this visit Return in about 2 weeks (around 5/2/2017) for TPI.      Your next 10 appointments already scheduled     May 16, 2017  9:30 AM CDT   PROCEDURE with RAJINDER Fitzgerald CNP   [facility] ([facility])    [address] [place] [id number] [phone]              Who to contact     If you have questions or need follow up information about today's clinic visit or your schedule please contact [facility] directly at [phone].  Normal or non-critical lab and imaging results will be communicated to you by MyChart, letter or phone within 4 business days after the clinic has  received the results. If you do not hear from us within 7 days, please contact the clinic through [employer] or phone. If you have a critical or abnormal lab result, we will notify you by phone as soon as possible.  Submit refill requests through [employer] or call your pharmacy and they will forward the refill request to us. Please allow 3 business days for your refill to be completed.          Additional Information About Your Visit        [employer] Information     [employer] gives you secure access to your electronic health record. If you see a primary care provider, you can also send messages to your care team and make appointments. If you have questions, please call your primary care clinic.  If you do not have a primary care provider, please call [phone] and they will assist you.        Care EveryWhere ID     This is your Care EveryWhere ID. This could be used by other organizations to access your [place] medical records  [id number]        Your Vitals Were     Pulse                   53            Blood Pressure from Last 3 Encounters:   04/18/17 133/80   04/12/17 126/80   03/21/17 117/69    Weight from Last 3 Encounters:   10/11/16 68.6 kg (151 lb 3.2 oz)   09/21/16 69.4 kg (153 lb)   05/31/16 67.1 kg (148 lb)              We Performed the Following     INJECTION SNGL/MULT TRIGGER POINT, 1 OR 2 MUSCLES     NO CHARGE LOS        Primary Care Provider Office Phone # Fax #    Milton Iglesias [phone]       [facility] [address] [facility] [address]        Thank you!     Thank you for choosing [facility]  for your care. Our goal is always to provide you with excellent care. Hearing back from our patients is one way we can continue to improve our services. Please take a few minutes to complete the written survey that you may receive in the mail after your visit with us. Thank you!             Your Updated Medication List - Protect others around you: Learn how to  safely use, store and throw away your medicines at www.disposemymeds.org.          This list is accurate as of: 4/18/17 10:46 AM.  Always use your most recent med list.                   Brand Name Dispense Instructions for use    aspirin 81 MG tablet      Take by mouth daily       cycloSPORINE 0.05 % ophthalmic emulsion    RESTASIS    1 Box    Place 1 drop into both eyes 2 times daily       IBU-200 PO      Take 2 tablets by mouth as needed       menthol 5 % Pads    ICY HOT    30 patch    Apply 1 patch topically every 8 hours as needed for muscle soreness May cut to fit       oxyCODONE 5 MG IR tablet    ROXICODONE     Take 1 tablet by mouth 3 times daily.       pregabalin 75 MG capsule    LYRICA    270 capsule    Take 1 capsule (75 mg) by mouth 3 times daily for fibromyalgia, or as directed by your pain clinic/previous prescriber.       QUEtiapine 200 MG tablet    SEROquel     Take 100 mg by mouth daily. weening       sildenafil 100 MG cap/tab    REVATIO/VIAGRA    10 tablet    Take 1/4 - 1 tablet, as directed, 1-3 hours before intimacy. Maximum 1 dose per 24 hours.       valACYclovir 500 MG tablet    VALTREX    6 tablet    Take 1 tablet (500 mg) by mouth 2 times daily for 3 days for outbreak.       vitamin D 1000 UNITS capsule      Take 1 capsule by mouth 2 times daily.

## 2017-04-18 NOTE — NURSING NOTE
"Chief Complaint   Patient presents with     Pain       Initial /80  Pulse 53 Estimated body mass index is 25.95 kg/(m^2) as calculated from the following:    Height as of 10/11/16: 1.626 m (5' 4\").    Weight as of 10/11/16: 68.6 kg (151 lb 3.2 oz).  Medication Reconciliation: unable or not appropriate to perform    "

## 2017-04-18 NOTE — PROGRESS NOTES
Note reviewed. Thank you for participating in this aspect of the patient 's care .   Maureen Avendaño CNP    [facility]

## 2017-04-18 NOTE — PATIENT INSTRUCTIONS
[facility]   Post Procedure Instructions    Today you had:  trigger point injections both buttocks     Medications used:  lidocaine   bupivicaine            Go to the emergency room if you develop any shortness of breath    Monitor the injection sites for signs and symptoms of infection-fever, chills, redness, swelling, warmth, or drainage to areas.    You may have soreness at injection sites for up to 24 hours.    You may apply ice to the painful areas to help minimize the discomfort of the needle pokes.    Do not apply heat to sites for at least 12 hours.    You may use anti-inflammatory medications or Tylenol for pain control if necessary  Nurse line: [phone]  After hours provider line: [phone]  Appointment line: [phone]

## 2017-04-18 NOTE — PROGRESS NOTES
"Pre-Procedure:  Patient's Name and Date of Birth verified:  YES  Verified By:   TARIQ (initials)  04/18/2017    Diagnosis:     Chronic pain syndrome  Myalgia  Interval History:  Here for TPi's  Bilaterally along upper gluteal and posterior insertion of hip at each buttocks. Son here today.   Unable to do Physical Therapy as disabled son does not have day care and can not be unattended during Physical Therapy. Has respite for son with autism coming up for 6 days. Working on adult day care for him, but prospects for this is not looking good.  S/p bilateral S I joint  4/18/16 and 9/26/16 with 85 % relief of pain in low back.   [facility]  1/2 dose of prescribed Seroquel as higher doses produce metal taste in mouth and headache.  He denies suicidal thoughts and feel safe at home.   7/28/15 pelvic xrays mild superior joint space narrowing, bilateral joint space left>R S I joint.      Pain is tender, aching, gnawing, penetrating.   Rating 8/10   Aggravating factors: walking too much,cold   Relieving factors: TPI's, rest,  S I joint  Injections, hip bursal injection  Quality of life:  Increase stress with disabled son's behavior and other\" stress\"       PE: /80  Pulse 53  MSK:  TTP left gluteus mredius at posterior hip insertion, lattisimus dorsi   TTP over bilateral  hip bursa  Gait normal   Psyche: pleasant cooperative and alert some  cognitive slowing, mood/ affect depressed, smiling more today.    Complications and/or Adverse Effects of Last Treatment:  None , last visit 01/10/17   Site Marking and Verification:  Site selection (based on symptoms and condition:  YES  Verified by: TARIQ (initials)  Patient identification verified by provider: YES  Verified by: MICHAEL  (initials)  Pause for the Cause:  YES  Verified by: TARIQ (initials)   Procedure Note:  Discussion of the procedure, risks, benefits and alternatives " "was held.  Informed consent was given by patient:  YES.    Type of Procedure Performed: Trigger Points, joint injections, gluteus yolis at posterior insertion and sacral ridge for a total of 4 injections  Procedure Description:      Note:  Position prone   Cleansed  gluteus medius    with ChloraPrep 2% with 70 % alcohol    Trigger points marked ( 4) total      Trigger point injection:  Medication: bupivacaine 0.25 % 5 cc lidocaine 1% 5 cc   Volume each site 1-2 cc, Total volume 10 cc  Pain pre procedure 8/10, post procedure  4/10 \" but feeling better   Maureen Avendaño R.N.,B.C., C.A.N.P.      Post-Procedure:  Complication/Adverse Effects: none     Management:   See avs,   Repeat TPI's 2-4 weeks prn continue clam shells    Discussed HEP   Avoid running and prolong walking if able.    Left clinic before AVS given, will mail to patient.    repeat S I joint injection bilaterally   reviewed HEP   **OK to overbook TPI's **    Signature:  Maureen Wayne-Ceferino, CNP    [facility]        Time spent: 10 minute procedure related care interval history and  with chart/medication  review     "

## 2017-04-28 ENCOUNTER — DOCUMENTATION ONLY (OUTPATIENT)
Dept: OTHER | Facility: CLINIC | Age: 57
End: 2017-04-28

## 2017-04-28 DIAGNOSIS — Z71.89 ADVANCE CARE PLANNING: Chronic | ICD-10-CM

## 2017-05-16 ENCOUNTER — OFFICE VISIT (OUTPATIENT)
Dept: PALLIATIVE MEDICINE | Facility: CLINIC | Age: 57
End: 2017-05-16
Payer: COMMERCIAL

## 2017-05-16 VITALS
SYSTOLIC BLOOD PRESSURE: 116 MMHG | BODY MASS INDEX: 27.46 KG/M2 | DIASTOLIC BLOOD PRESSURE: 75 MMHG | WEIGHT: 160 LBS | HEART RATE: 55 BPM

## 2017-05-16 DIAGNOSIS — M79.10 MYALGIA: Primary | ICD-10-CM

## 2017-05-16 DIAGNOSIS — M70.62 TROCHANTERIC BURSITIS OF LEFT HIP: ICD-10-CM

## 2017-05-16 DIAGNOSIS — G89.4 CHRONIC PAIN SYNDROME: Chronic | ICD-10-CM

## 2017-05-16 PROCEDURE — 20553 NJX 1/MLT TRIGGER POINTS 3/>: CPT | Mod: 59 | Performed by: NURSE PRACTITIONER

## 2017-05-16 PROCEDURE — 20610 DRAIN/INJ JOINT/BURSA W/O US: CPT | Mod: LT | Performed by: NURSE PRACTITIONER

## 2017-05-16 ASSESSMENT — PAIN SCALES - GENERAL: PAINLEVEL: EXTREME PAIN (8)

## 2017-05-16 NOTE — PROGRESS NOTES
"Pre-Procedure:  Patient's Name and Date of Birth verified:  YES  Verified By:   [facility] (initials)  05/16/2017    Diagnosis:     Myalgia  Chronic pain syndrome  Trochanteric bursitis of left hip     Interval History:  Here for TPi's  Bilaterally along upper gluteal and posterior insertion of hip at each buttocks. Left hip more problem some today. Would like bursa injection in hip.  Son here today.   Unable to do Physical Therapy as disabled son does not have day care and can not be unattended during Physical Therapy.   Has respite for son with autism starting   for 6 days. Working on adult day care for him, but prospects for this is not looking good. Advised to get Physical Therapy order.   He is on limited amounts of [facility] 2-3/[facility]. [facility] ,continues on  1/2 dose of prescribed Seroquel as higher doses produce metal taste in mouth and headache.  He denies suicidal thoughts and feel safe at home.  See scanned note 3/2/17   S/p bilateral S I joint 4/12/17 4/18/16 and 9/26/16, 4/12/17 with 85 % relief of pain in low back.   7/28/15 pelvic xrays mild superior joint space narrowing, bilateral joint space left>R S I joint.    Pain is tender, aching, gnawing, penetrating.   Rating 8/10   Aggravating factors: walking too much,cold   Relieving factors: TPI's, rest,  S I joint  Injections, hip bursal injection, TPI's Oxycodone   Quality of life:  Increase stress with disabled son's behavior and other\" stress\"    Imaging : relavant imaging and labs reviewed.  Current medications:  Reviewed in EPIC.    ROS: 10 point review negative other than noted in interval history.  Changes in health since last visit: No   Mood changes:No suicidal: No     PE: /75  Pulse 55  Wt 72.6 kg (160 lb)  BMI 27.46 kg/m2  MSK:  TTP left gluteus mredius at posterior hip insertion, lattisimus dorsi   TTP over bilateral  hip bursas Left > right.   Gait normal "   Psyche: pleasant cooperative and alert some  cognitive slowing, mood/ affect depressed, smiling more today.    Complications and/or Adverse Effects of Last Treatment:  None,  last visit 04/18/17   Site Marking and Verification:  Site selection (based on symptoms and condition:  YES  Verified by: TARIQ (initials)  Patient identification verified by provider: YES  Verified by: MICHAEL  (initials)  Pause for the Cause:  YES  Verified by: [facility] (initials)   Procedure Note:  Discussion of the procedure, risks, benefits and alternatives was held.  Informed consent was given by patient:  YES.    Type of Procedure Performed: Trigger Points, joint injections, gluteus yolis at posterior insertion and sacral ridge for a total of 4 injections    Procedure Description:     Trigger point injection:  Position prone. Cleansed  gluteus medius, bilaterally over each hip bursa  with ChloraPrep 2% with 70 % alcohol    Trigger points marked( 4) total,      Medication: bupivacaine 0.25 % 5 cc lidocaine 1% 5 cc   Volume each site 1-2 cc, Total volume 8 cc  Pain pre procedure 8/10, post procedure  3/10 but feeling better   Maureen Avendaño R.N.,B.C., C.A.N.P.    Bursa injection left hip   Position left hip cleansed with ChloraPrep 2% with 70 % alcohol   Needle type:25G 1.5 inch needle used to inject  0.25% bupivacaine 2 cc/1% lidocaine 2 cc/ DepoMedol 40 mg/ 1 cc 10 mg  injected into left hip bursa site.  Total volume 5ml injected without complication.     Pain level pre 8, post 6/10 procedure.    Maureen Avendaño R.N.,B.C. C.A.N.P.      Post-Procedure:  Complication/Adverse Effects: none     Management:   See avs.  Repeat TPI's 2-4 weeks prn continue clam shells, Discussed HEP   Avoid running and prolong walking if able.  [facility]  [facility] or [facility]     **OK to overbook TPI's **    Pain transition plan to see pain provider skilled in TPI injections.  Patient is aware he will receive injection care only and all other pain  ongoing care through [facility] Harris [facility]ON or [facility]  KIERA signed by [facility] to [facility].     Signature:  Maureen Avendaño CNP    [facility]        Time spent: 15 minute procedure related care interval history and  with chart/medication review

## 2017-05-16 NOTE — MR AVS SNAPSHOT
After Visit Summary   5/16/2017    Jeanmarie Mclean    MRN: [id number]           Patient Information     Date Of Birth          1960        Visit Information        Provider Department      5/16/2017 9:30 AM Maureen Avendaño APRN CNP [facility]      Today's Diagnoses     Myalgia    -  1    Chronic pain syndrome        Trochanteric bursitis of left hip          Care Instructions    [facility] Injection instructions  You had trigger point injections/bursa injection left hip  Meds used:  Lidocaine/bupivicaine/dexamethasone left hip       Lidocaine/bupivicaine/buttocks and low back   Nurse line:  [phone]  Appointment line:  [phone]      Go to the emergency room if you develop any shortness of breath    Monitor the injection sites for signs and symptoms of infection-fever, chills, redness, swelling, warmth, or drainage to areas.    Okay to use ice to the areas.    Do not apply heat to sites for at least 12 hours.    You may have soreness at injection sites for up to 24 hours.    If you are able to use anti-inflammatory medications or Tylenol for pain control if necessary, you can take these as directed.  After hours doctor line:  [phone]  Return visit 3- 4 weeks for TPI's   [facility]          Follow-ups after your visit        Follow-up notes from your care team     Discussed this visit Return in about 4 weeks (around 6/13/2017) for TPI.      Who to contact     If you have questions or need follow up information about today's clinic visit or your schedule please contact [facility] directly at [phone].  Normal or non-critical lab and imaging results will be communicated to you by MyChart, letter or phone within 4 business days after the clinic has received the results. If you do not hear from us within 7 days, please contact the clinic through MyChart or phone. If you have a critical or abnormal  lab result, we will notify you by phone as soon as possible.  Submit refill requests through [employer] or call your pharmacy and they will forward the refill request to us. Please allow 3 business days for your refill to be completed.          Additional Information About Your Visit        [employer] Information     [employer] gives you secure access to your electronic health record. If you see a primary care provider, you can also send messages to your care team and make appointments. If you have questions, please call your primary care clinic.  If you do not have a primary care provider, please call [phone] and they will assist you.        Care EveryWhere ID     This is your Care EveryWhere ID. This could be used by other organizations to access your [place] medical records  [id number]        Your Vitals Were     Pulse BMI (Body Mass Index)                55 27.46 kg/m2           Blood Pressure from Last 3 Encounters:   05/16/17 116/75   04/18/17 133/80   04/12/17 126/80    Weight from Last 3 Encounters:   05/16/17 72.6 kg (160 lb)   10/11/16 68.6 kg (151 lb 3.2 oz)   09/21/16 69.4 kg (153 lb)              We Performed the Following     DRAIN/INJECT LARGE JOINT/BURSA     INJECTION SNGL/MULT TRIGGER POINT, >3 MUSCLES     NO CHARGE LOS          Today's Medication Changes          These changes are accurate as of: 5/16/17 10:15 AM.  If you have any questions, ask your nurse or doctor.               These medicines have changed or have updated prescriptions.        Dose/Directions    cycloSPORINE 0.05 % ophthalmic emulsion   Commonly known as:  RESTASIS   This may have changed:    - when to take this  - reasons to take this   Used for:  Dry eyes, Rosacea        Dose:  1 drop   Place 1 drop into both eyes 2 times daily   Quantity:  1 Box   Refills:  11       pregabalin 75 MG capsule   Commonly known as:  LYRICA   This may have changed:    - when to take this  - additional instructions   Used for:  Fibromyalgia         Dose:  75 mg   Take 1 capsule (75 mg) by mouth 3 times daily for fibromyalgia, or as directed by your pain clinic/previous prescriber.   Quantity:  270 capsule   Refills:  1                Primary Care Provider Office Phone # Fax #    Milton Iglesias [phone]       [facility] [address]  [facility] [address]        Thank you!     Thank you for choosing [facility]  for your care. Our goal is always to provide you with excellent care. Hearing back from our patients is one way we can continue to improve our services. Please take a few minutes to complete the written survey that you may receive in the mail after your visit with us. Thank you!             Your Updated Medication List - Protect others around you: Learn how to safely use, store and throw away your medicines at www.disposemymeds.org.          This list is accurate as of: 5/16/17 10:15 AM.  Always use your most recent med list.                   Brand Name Dispense Instructions for use    aspirin 81 MG tablet      Take by mouth daily       cycloSPORINE 0.05 % ophthalmic emulsion    RESTASIS    1 Box    Place 1 drop into both eyes 2 times daily       IBU-200 PO      Take 2 tablets by mouth as needed       menthol 5 % Pads    ICY HOT    30 patch    Apply 1 patch topically every 8 hours as needed for muscle soreness May cut to fit       oxyCODONE 5 MG IR tablet    ROXICODONE     Take 1 tablet by mouth 2 times daily       pregabalin 75 MG capsule    LYRICA    270 capsule    Take 1 capsule (75 mg) by mouth 3 times daily for fibromyalgia, or as directed by your pain clinic/previous prescriber.       QUEtiapine 200 MG tablet    SEROquel     100 mg daily as needed Take 100 mg by mouth daily. weening       sildenafil 100 MG cap/tab    REVATIO/VIAGRA    10 tablet    Take 1/4 - 1 tablet, as directed, 1-3 hours before intimacy. Maximum 1 dose per 24 hours.       valACYclovir 500 MG tablet    VALTREX    6 tablet     Take 1 tablet (500 mg) by mouth 2 times daily for 3 days for outbreak.       vitamin D 1000 UNITS capsule      Take 1 capsule by mouth 2 times daily.

## 2017-05-30 ENCOUNTER — OFFICE VISIT (OUTPATIENT)
Dept: PALLIATIVE MEDICINE | Facility: CLINIC | Age: 57
End: 2017-05-30
Payer: COMMERCIAL

## 2017-05-30 VITALS — RESPIRATION RATE: 16 BRPM | HEART RATE: 56 BPM | DIASTOLIC BLOOD PRESSURE: 78 MMHG | SYSTOLIC BLOOD PRESSURE: 136 MMHG

## 2017-05-30 DIAGNOSIS — M79.10 MYALGIA: ICD-10-CM

## 2017-05-30 DIAGNOSIS — G89.29 OTHER CHRONIC PAIN: Primary | Chronic | ICD-10-CM

## 2017-05-30 PROCEDURE — 20553 NJX 1/MLT TRIGGER POINTS 3/>: CPT | Performed by: NURSE PRACTITIONER

## 2017-05-30 ASSESSMENT — PAIN SCALES - GENERAL: PAINLEVEL: SEVERE PAIN (7)

## 2017-05-30 NOTE — MR AVS SNAPSHOT
After Visit Summary   5/30/2017    Jeanmarie Mclean    MRN: [id number]           Patient Information     Date Of Birth          1960        Visit Information        Provider Department      5/30/2017 9:30 AM Maureen Avendaño APRN CNP [facility]        Care Instructions        ----------------------------------------------------------------  Nurse Triage line:  [phone]   Call this number with any questions or concerns. You may leave a detailed message anytime. Calls are typically returned Monday through Friday between 8 AM and 4:30 PM. We usually get back to you within 2 business days depending on the issue/request.       Medication refills:    For non-narcotic medications, call your pharmacy directly to request a refill. The pharmacy will contact the [facility] for authorization. Please allow 3-4 days for these refills to be processed.     For narcotic refills, call the nurse triage line or send a [employer] message. Please contact us 7-10 days before your refill is due. The message MUST include the name of the specific medication(s) requested and how you would like to receive the prescription(s). The options are as follows:    Pain Clinic staff can mail the prescription to your pharmacy. Please tell us the name of the pharmacy.    You may pick the prescription up at the Pain Clinic (tell us the location) or during a clinic visit with your pain provider    Pain Clinic staff can deliver the prescription to the [place] pharmacy in the clinic building. Please tell us the location.      Scheduling number: [phone].  Call this number to schedule or change appointments.    We believe regular attendance is key to your success in our program.    Any time you are unable to keep your appointment we ask that you call us at least 24 hours in advance to let us know. This will allow us to offer the appointment time to another patient.               Follow-ups  after your visit        Your next 10 appointments already scheduled     Jun 09, 2017  8:45 AM CDT   LAB with [facility]   [facility] ([facility])    [address]  [place] [address] [phone]           Patient must bring picture ID.  Patient should be prepared to give a urine specimen  Please do not eat 10-12 hours before your appointment if you are coming in fasting for labs on lipids, cholesterol, or glucose (sugar).  Pregnant women should follow their Care Team instructions. Water with medications is okay. Do not drink coffee or other fluids.   If you have concerns about taking  your medications, please ask at office or if scheduling via Causes, send a message by clicking on Secure Messaging, Message Your Care Team.            Jun 16, 2017 10:45 AM CDT   New Visit with Devon Miles MD   [facility] ([facility])    [address]  [place] [address] [phone]              Who to contact     If you have questions or need follow up information about today's clinic visit or your schedule please contact [facility] directly at [phone].  Normal or non-critical lab and imaging results will be communicated to you by MyChart, letter or phone within 4 business days after the clinic has received the results. If you do not hear from us within 7 days, please contact the clinic through [employer] or phone. If you have a critical or abnormal lab result, we will notify you by phone as soon as possible.  Submit refill requests through Causes or call your pharmacy and they will forward the refill request to us. Please allow 3 business days for your refill to be completed.          Additional Information About Your Visit        [employer] Information     Causes gives you secure access to your electronic health record. If you see a primary care provider, you can also send messages to your care team and make appointments. If  you have questions, please call your primary care clinic.  If you do not have a primary care provider, please call [phone] and they will assist you.        Care EveryWhere ID     This is your Care EveryWhere ID. This could be used by other organizations to access your [place] medical records  [id number]        Your Vitals Were     Pulse Respirations                56 16           Blood Pressure from Last 3 Encounters:   05/30/17 136/78   05/16/17 116/75   04/18/17 133/80    Weight from Last 3 Encounters:   05/16/17 72.6 kg (160 lb)   10/11/16 68.6 kg (151 lb 3.2 oz)   09/21/16 69.4 kg (153 lb)              Today, you had the following     No orders found for display         Today's Medication Changes          These changes are accurate as of: 5/30/17  9:45 AM.  If you have any questions, ask your nurse or doctor.               These medicines have changed or have updated prescriptions.        Dose/Directions    pregabalin 75 MG capsule   Commonly known as:  LYRICA   This may have changed:    - when to take this  - additional instructions   Used for:  Fibromyalgia        Dose:  75 mg   Take 1 capsule (75 mg) by mouth 3 times daily for fibromyalgia, or as directed by your pain clinic/previous prescriber.   Quantity:  270 capsule   Refills:  1                Primary Care Provider Office Phone # Fax #    Milton Iglesias [phone]       [facility] [address] [facility] [address]        Thank you!     Thank you for choosing [facility]  for your care. Our goal is always to provide you with excellent care. Hearing back from our patients is one way we can continue to improve our services. Please take a few minutes to complete the written survey that you may receive in the mail after your visit with us. Thank you!             Your Updated Medication List - Protect others around you: Learn how to safely use, store and throw away your medicines at  www.disposemymeds.org.          This list is accurate as of: 5/30/17  9:45 AM.  Always use your most recent med list.                   Brand Name Dispense Instructions for use    aspirin 81 MG tablet      Take by mouth daily       cycloSPORINE 0.05 % ophthalmic emulsion    RESTASIS    1 Box    Place 1 drop into both eyes 2 times daily       IBU-200 PO      Take 2 tablets by mouth as needed       menthol 5 % Pads    ICY HOT    30 patch    Apply 1 patch topically every 8 hours as needed for muscle soreness May cut to fit       oxyCODONE 5 MG IR tablet    ROXICODONE     Take 1 tablet by mouth 2 times daily       pregabalin 75 MG capsule    LYRICA    270 capsule    Take 1 capsule (75 mg) by mouth 3 times daily for fibromyalgia, or as directed by your pain clinic/previous prescriber.       QUEtiapine 200 MG tablet    SEROquel     100 mg daily as needed Take 100 mg by mouth daily. weening       sildenafil 100 MG cap/tab    REVATIO/VIAGRA    10 tablet    Take 1/4 - 1 tablet, as directed, 1-3 hours before intimacy. Maximum 1 dose per 24 hours.       valACYclovir 500 MG tablet    VALTREX    6 tablet    Take 1 tablet (500 mg) by mouth 2 times daily for 3 days for outbreak.       vitamin D 1000 UNITS capsule      Take 1 capsule by mouth 2 times daily.

## 2017-05-30 NOTE — PROGRESS NOTES
Pre-Procedure:  Patient's Name and Date of Birth verified:  YES  Verified By:   [facility] (initials)  05/30/2017 today date   Last visit date: 04/18/17  PCP: NUZHAT Kirby.    Diagnosis:     Other chronic pain  Myalgia     Interval History:  Here for TPi's  Bilaterally along upper gluteal and posterior insertion of hip at each buttocks. Left hip more problem better since bursal injection.   Son in respite today. Also c/o left sub scapula pain   Unable to do Physical Therapy as disabled son does not have day care and can not be unattended during Physical Therapy.   Advised last visit  [facility].   He is on limited amounts of Oxycodone 2-3/day [facility]. [facility] is [facility]  [facility] ,continues on  1/2 dose of prescribed Seroquel as higher doses produce metal taste in mouth and headache.  He denies suicidal thoughts and feel safe at home.  See scanned note 3/2/17   Steroid injection history  bilateral S I joint 4/12/17 4/18/16 and 9/26/16, 4/12/17 with 85 % relief of pain in low back. S/P left hip bursal injection 4/18/17.    Imaging:  relavant imaging and labs reviewed.   7/28/15 Pelvic xrays mild superior joint space narrowing, bilateral joint space left>R S I joint.  06/04/15 MRI Lumbar: early Degenerative Disc Disease L3-4   L4-5, L5-S1 mild Degenerative Disc Disease mild bilateral stenosis at these level  Left >right at L5-S1.     Pain is tender throbbing   Rating 7/10   Aggravating factors: walking too much, cold   Relieving factors: TPI's, rest,  S I joint Injections, hip bursal injection, Oxycodone   Quality of life:  Better  with disabled son's in respite able to pace more  Imaging : relavant imaging and labs reviewed.  Current medications:  Reviewed in EPIC.    ROS: 10 point review negative other than noted in interval history.  Changes in health since last visit: No   Mood changes:No suicidal: No     [place] prescription  monitoring:  reviewed as expected without evidence of abuse, misuse or diversion.    PE: /78 (BP Location: Right arm, Patient Position: Chair, Cuff Size: Adult Small)  Pulse 56  Resp 16   GENERAL:  Alert, NAD, speech clear fluent appropriate.   MSK:  TTP left gluteus medius at posterior hip insertion and medial sacral ridge   Gait normal   Psyche: pleasant cooperative and alert some  cognitive slowing, mood/ affect depressed, smiling more today.      Complications and/or Adverse Effects of Last Treatment:  None,  last visit 04/18/17   Site Marking and Verification:  Site selection (based on symptoms and condition:  YES  Verified by: [facility] (initials)  Patient identification verified by provider: YES  Verified by: [facility]  (initials)  Pause for the Cause:  YES  Verified by: [facility] (initials)   Procedure Note:  Discussion of the procedure, risks, benefits and alternatives was held.  Informed consent was given by patient:  YES.    Type of Procedure Performed: Trigger Points, joint injections, gluteus yolis at posterior insertion and sacral ridge for a total of 5 injection sites    Procedure Description:     Trigger point injection:  Position prone. Cleansed  gluteus medius, bilaterally at posterior hip insertion and left subscapularis with ChloraPrep 2% with 70 % alcohol    Trigger points marked( 5) total,      Medication: bupivacaine 0.25 % 5 cc lidocaine 1% 5 cc   Volume each site 2 cc, Total volume 10 cc  Pain pre procedure 7/10, post procedure  3/10 but feeling better   Maureen Avendaño R.N.,B.C., C.A.N.P.      Post-Procedure:  Complication/Adverse Effects: none     Management:   See avs.  Pain transition plan   Repeat TPI's 2 weeks with Maureen Avendaño CNP. Then schedule with Cally King MD for TPI only care  continue clam shells,   all other pain ongoing care through history of Dr. Harris CALDERON or [facility] provider.  KIERA signed by patient last visit  for records to [facility]. These records have not  arrived.  Discussed HEP   Avoid running and prolong walking if able.  Physical Therapy through PCP or [facility]     **OK to overbook TPI's **    Signature:  RAJINDER Graves, CNP    [facility]        Time spent: 15 minute procedure related care interval history, pain transition planning  and  with chart/medication review.

## 2017-05-30 NOTE — NURSING NOTE
"Chief Complaint   Patient presents with     Pain       Initial /78 (BP Location: Right arm, Patient Position: Chair, Cuff Size: Adult Small)  Pulse 56  Resp 16 Estimated body mass index is 27.46 kg/(m^2) as calculated from the following:    Height as of 10/11/16: 1.626 m (5' 4\").    Weight as of 5/16/17: 72.6 kg (160 lb).  Medication Reconciliation: complete       Ernie Alexis MA  [facility]      "

## 2017-05-30 NOTE — PATIENT INSTRUCTIONS
Schedule follow up with Maureen Avendaño CNP in 2 weeks and then Cally King MD 4-6 weeks for TPI only      ----------------------------------------------------------------  Nurse Triage line:  [phone]   Call this number with any questions or concerns. You may leave a detailed message anytime. Calls are typically returned Monday through Friday between 8 AM and 4:30 PM. We usually get back to you within 2 business days depending on the issue/request.       Medication refills:    For non-narcotic medications, call your pharmacy directly to request a refill. The pharmacy will contact the [facility] for authorization. Please allow 3-4 days for these refills to be processed.     For narcotic refills, call the nurse triage line or send a [employer] message. Please contact us 7-10 days before your refill is due. The message MUST include the name of the specific medication(s) requested and how you would like to receive the prescription(s). The options are as follows:    Pain Clinic staff can mail the prescription to your pharmacy. Please tell us the name of the pharmacy.    You may pick the prescription up at the Pain Clinic (tell us the location) or during a clinic visit with your pain provider    Pain Clinic staff can deliver the prescription to the [place] pharmacy in the clinic building. Please tell us the location.      Scheduling number: [phone].  Call this number to schedule or change appointments.    We believe regular attendance is key to your success in our program.    Any time you are unable to keep your appointment we ask that you call us at least 24 hours in advance to let us know. This will allow us to offer the appointment time to another patient.

## 2017-06-07 ENCOUNTER — TELEPHONE (OUTPATIENT)
Dept: FAMILY MEDICINE | Facility: CLINIC | Age: 57
End: 2017-06-07

## 2017-06-07 NOTE — TELEPHONE ENCOUNTER
Jeanmarie called to Thank whomever called this morning to wake him up/see previous note form RN  Thank you  I Janeen  [facility]

## 2017-06-09 DIAGNOSIS — C61 MALIGNANT NEOPLASM OF PROSTATE (H): ICD-10-CM

## 2017-06-09 LAB — PSA SERPL-MCNC: 0.02 UG/L (ref 0–4)

## 2017-06-09 PROCEDURE — 36415 COLL VENOUS BLD VENIPUNCTURE: CPT | Performed by: UROLOGY

## 2017-06-09 PROCEDURE — 84153 ASSAY OF PSA TOTAL: CPT | Performed by: UROLOGY

## 2017-06-13 ENCOUNTER — OFFICE VISIT (OUTPATIENT)
Dept: PALLIATIVE MEDICINE | Facility: CLINIC | Age: 57
End: 2017-06-13
Payer: COMMERCIAL

## 2017-06-13 VITALS
RESPIRATION RATE: 16 BRPM | OXYGEN SATURATION: 98 % | SYSTOLIC BLOOD PRESSURE: 116 MMHG | DIASTOLIC BLOOD PRESSURE: 77 MMHG | HEART RATE: 84 BPM

## 2017-06-13 DIAGNOSIS — M53.3 SI (SACROILIAC) JOINT DYSFUNCTION: ICD-10-CM

## 2017-06-13 DIAGNOSIS — M54.50 LOW BACK PAIN WITHOUT SCIATICA, UNSPECIFIED BACK PAIN LATERALITY, UNSPECIFIED CHRONICITY: Chronic | ICD-10-CM

## 2017-06-13 DIAGNOSIS — M79.10 MYALGIA: Primary | ICD-10-CM

## 2017-06-13 PROCEDURE — 20553 NJX 1/MLT TRIGGER POINTS 3/>: CPT | Performed by: NURSE PRACTITIONER

## 2017-06-13 ASSESSMENT — PAIN SCALES - GENERAL: PAINLEVEL: EXTREME PAIN (8)

## 2017-06-13 NOTE — MR AVS SNAPSHOT
After Visit Summary   6/13/2017    Jeanmarie Mclean    MRN: [id number]           Patient Information     Date Of Birth          1960        Visit Information        Provider Department      6/13/2017 9:30 AM Maureen Avendaño APRN CNP [facility]        Today's Diagnoses     Myalgia    -  1    Low back pain without sciatica, unspecified back pain laterality, unspecified chronicity        SI (sacroiliac) joint dysfunction          Care Instructions    [facility]   Post Procedure Instructions    Today you had:  trigger point injections to bilateral gluteus medius     Medications used:  lidocaine   bupivicaine            Go to the emergency room if you develop any shortness of breath    Monitor the injection sites for signs and symptoms of infection-fever, chills, redness, swelling, warmth, or drainage to areas.    You may have soreness at injection sites for up to 24 hours.    You may apply ice to the painful areas to help minimize the discomfort of the needle pokes.    Do not apply heat to sites for at least 12 hours.    You may use anti-inflammatory medications or Tylenol for pain control if necessary  Nurse line: [phone]  After hours provider line: [phone]  Appointment line: [phone]    Sign release of information to discuss verbal with    Sign [facility]  Increase with food Ibuprofen 600 mg every 6 hrs as needed  [place] as needed and continue TENs   [facility] Harrell [facility] ask about increase Lyrica to 3 times per day  Avoid running and prolong walking if able.  Physical Therapy through PCP or [facility]                  Follow-ups after your visit        Your next 10 appointments already scheduled     Jun 16, 2017 10:45 AM CDT   New Visit with Devon Miles MD   [facility] [place] ([facility])    [address] [place] [address]  [place] [address]    [phone]            Jul 07, 2017  9:30 AM CDT   PROCEDURE with Angélica King MD   [facility] ([facility])    [address] [address] [phone]              Who to contact     If you have questions or need follow up information about today's clinic visit or your schedule please contact [facility] directly at [phone].  Normal or non-critical lab and imaging results will be communicated to you by [employer], letter or phone within 4 business days after the clinic has received the results. If you do not hear from us within 7 days, please contact the clinic through [employer] or phone. If you have a critical or abnormal lab result, we will notify you by phone as soon as possible.  Submit refill requests through [email or web address] or call your pharmacy and they will forward the refill request to us. Please allow 3 business days for your refill to be completed.          Additional Information About Your Visit        [email or web address] Information     [email or web address] gives you secure access to your electronic health record. If you see a primary care provider, you can also send messages to your care team and make appointments. If you have questions, please call your primary care clinic.  If you do not have a primary care provider, please call [phone] and they will assist you.        Care EveryWhere ID     This is your Care EveryWhere ID. This could be used by other organizations to access your [place] medical records  [id number]        Your Vitals Were     Pulse Respirations Pulse Oximetry             84 16 98%          Blood Pressure from Last 3 Encounters:   06/13/17 116/77   05/30/17 136/78   05/16/17 116/75    Weight from Last 3 Encounters:   05/16/17 72.6 kg (160 lb)   10/11/16 68.6 kg (151 lb 3.2 oz)   09/21/16 69.4 kg (153 lb)              We Performed the Following     INJECTION SNGL/MULT TRIGGER POINT, 1 OR 2 MUSCLES     NO CHARGE LOS           Today's Medication Changes          These changes are accurate as of: 6/13/17 10:11 AM.  If you have any questions, ask your nurse or doctor.               These medicines have changed or have updated prescriptions.        Dose/Directions    pregabalin 75 MG capsule   Commonly known as:  LYRICA   This may have changed:    - when to take this  - additional instructions   Used for:  Fibromyalgia        Dose:  75 mg   Take 1 capsule (75 mg) by mouth 3 times daily for fibromyalgia, or as directed by your pain clinic/previous prescriber.   Quantity:  270 capsule   Refills:  1                Primary Care Provider Office Phone # Fax #    Milton Iglesias [phone]       [facility] [address] [facility] [address]        Thank you!     Thank you for choosing [facility]  for your care. Our goal is always to provide you with excellent care. Hearing back from our patients is one way we can continue to improve our services. Please take a few minutes to complete the written survey that you may receive in the mail after your visit with us. Thank you!             Your Updated Medication List - Protect others around you: Learn how to safely use, store and throw away your medicines at www.disposemymeds.org.          This list is accurate as of: 6/13/17 10:11 AM.  Always use your most recent med list.                   Brand Name Dispense Instructions for use    aspirin 81 MG tablet      Take by mouth daily       cycloSPORINE 0.05 % ophthalmic emulsion    RESTASIS    1 Box    Place 1 drop into both eyes 2 times daily       IBU-200 PO      Take 2 tablets by mouth as needed       menthol 5 % Pads    ICY HOT    30 patch    Apply 1 patch topically every 8 hours as needed for muscle soreness May cut to fit       oxyCODONE 5 MG IR tablet    ROXICODONE     Take 1 tablet by mouth 2 times daily       pregabalin 75 MG capsule    LYRICA    270 capsule    Take 1 capsule (75 mg) by mouth  3 times daily for fibromyalgia, or as directed by your pain clinic/previous prescriber.       QUEtiapine 200 MG tablet    SEROquel     100 mg daily as needed Take 100 mg by mouth daily. weening       sildenafil 100 MG cap/tab    REVATIO/VIAGRA    10 tablet    Take 1/4 - 1 tablet, as directed, 1-3 hours before intimacy. Maximum 1 dose per 24 hours.       valACYclovir 500 MG tablet    VALTREX    6 tablet    Take 1 tablet (500 mg) by mouth 2 times daily for 3 days for outbreak.       vitamin D 1000 UNITS capsule      Take 1 capsule by mouth 2 times daily.

## 2017-06-13 NOTE — PROGRESS NOTES
Pre-Procedure:  Patient's Name and Date of Birth verified:  YES  Verified By:   [facility] (initials)  06/13/2017 today date   Last visit date: 04/18/17  PCP: NUZHAT Kirby.    Diagnosis:     Myalgia  Low back pain without sciatica, unspecified back pain laterality, unspecified chronicity  SI (sacroiliac) joint dysfunction     Interval History:  Here for TPi's  Bilaterally along upper gluteal and posterior insertion of hip at each buttocks. Left hip more problem better since bursal injection.   Son in respite today. Also c/o left sub scapula pain   Unable to do Physical Therapy as disabled son does not have day care and can not be unattended during Physical Therapy.   Advised last visit  [facility].   He is on limited amounts of Oxycodone 2-3/day [facility]. He is [facility]  [facility] ,continues on  1/2 dose of prescribed Seroquel as higher doses produce metal taste in mouth and headache.  He denies suicidal thoughts and feel safe at home.  See scanned note 3/2/17   Steroid injection history  bilateral S I joint 4/12/17 4/18/16 and 9/26/16, 4/12/17 with 85 % relief of pain in low back. S/P left hip bursal injection 4/18/17.    Imaging:  relavant imaging and labs reviewed.   7/28/15 Pelvic xrays mild superior joint space narrowing, bilateral joint space left>R S I joint.  06/04/15 MRI Lumbar: early Degenerative Disc Disease L3-4   L4-5, L5-S1 mild Degenerative Disc Disease mild bilateral stenosis at these level  Left >right at L5-S1.     Pain is tender throbbing   Rating 7/10   Aggravating factors: walking too much, cold   Relieving factors: TPI's, rest,  S I joint Injections, hip bursal injection, Oxycodone   Quality of life:  Better  with disabled son's in respite able to pace more  Imaging : relavant imaging and labs reviewed.  Current medications:  Reviewed in EPIC.    ROS: 10 point review negative other than noted in interval  history.  Changes in health since last visit: No   Mood changes:No suicidal: No     [place] prescription monitoring:  reviewed as expected without evidence of abuse, misuse or diversion.    PE: /77 (BP Location: Left arm, Patient Position: Chair, Cuff Size: Adult Small)  Pulse 84  Resp 16  SpO2 98%   GENERAL:  Alert, NAD, speech clear fluent appropriate.   MSK:  TTP left gluteus medius at posterior hip insertion and medial sacral ridge   Gait normal   Psyche: pleasant cooperative and alert some  cognitive slowing, mood/ affect depressed, smiling more today.      Complications and/or Adverse Effects of Last Treatment:  None,  last visit 04/18/17   Site Marking and Verification:  Site selection (based on symptoms and condition:  YES  Verified by: [facility] (initials)  Patient identification verified by provider: YES  Verified by: [facility]  (initials)  Pause for the Cause:  YES  Verified by: [facility] (initials)   Procedure Note:  Discussion of the procedure, risks, benefits and alternatives was held.  Informed consent was given by patient:  YES.    Type of Procedure Performed: Trigger Points, joint injections, gluteus yolis at posterior insertion and sacral ridge for a total of 5 injection sites.    Procedure Description:     Trigger point injection:  Position prone. Cleansed  gluteus medius, bilaterally at posterior hip insertion and across upper gluteal  with ChloraPrep 2% with 70 % alcohol    Trigger points marked( 5) total,      Medication: bupivacaine 0.25 % 5 cc lidocaine 1% 5 cc   Volume each site 3-4 cc, Total volume 10 cc  Pain pre procedure 8/10, post procedure  6/10 but feeling better   Maureen Avendaño R.N.,B.C., C.A.N.P.      Post-Procedure:  Complication/Adverse Effects: none     Management:   See avs.  Pain transition plan   Repeat TPI's 2 weeks with Maureen Avendaño CNP. Then schedule with Cally King MD for TPI only care  continue clam shells,   all other pain ongoing care through history  of Dr. Harris CALDERON or [facility] provider.  KIERA signed by patient last visit  for records to [facility]. These records have not arrived.  Discussed HEP   Avoid running and prolong walking if able.  Physical Therapy through PCP or [facility]   Sign release of information to discuss verbal with    Sign release of information for records to [facility]  Increase with food Ibuprofen 600 mg every 6 hrs as needed  Salon Pas as needed and continue TENs   [facility]rell at [facility] ask about increase Lyrica to 3 times per day.  **OK to overbook TPI's **    Signature:  RAJINDER Graves, CNP    [facility]        Time spent: 15 minute procedure related care interval history, pain transition planning  and  with chart/medication review.

## 2017-06-13 NOTE — PATIENT INSTRUCTIONS
[facility]   Post Procedure Instructions    Today you had:  trigger point injections to bilateral gluteus medius     Medications used:  lidocaine   bupivicaine            Go to the emergency room if you develop any shortness of breath    Monitor the injection sites for signs and symptoms of infection-fever, chills, redness, swelling, warmth, or drainage to areas.    You may have soreness at injection sites for up to 24 hours.    You may apply ice to the painful areas to help minimize the discomfort of the needle pokes.    Do not apply heat to sites for at least 12 hours.    You may use anti-inflammatory medications or Tylenol for pain control if necessary  Nurse line: [phone]  After hours provider line: [phone]  Appointment line: [phone]    Sign release of information to discuss verbal with    Sign release of information for records to [facility]  Increase with food Ibuprofen 600 mg every 6 hrs as needed  Salon Pas as needed and continue TENs   [facility] ask about increase Lyrica to 3 times per day  Avoid running and prolong walking if able.  Physical Therapy through PCP or [facility]

## 2017-06-16 ENCOUNTER — OFFICE VISIT (OUTPATIENT)
Dept: UROLOGY | Facility: CLINIC | Age: 57
End: 2017-06-16
Payer: COMMERCIAL

## 2017-06-16 VITALS — DIASTOLIC BLOOD PRESSURE: 68 MMHG | SYSTOLIC BLOOD PRESSURE: 97 MMHG | RESPIRATION RATE: 16 BRPM | HEART RATE: 72 BPM

## 2017-06-16 DIAGNOSIS — N52.9 ERECTILE DYSFUNCTION, UNSPECIFIED ERECTILE DYSFUNCTION TYPE: ICD-10-CM

## 2017-06-16 DIAGNOSIS — C61 MALIGNANT NEOPLASM OF PROSTATE (H): Primary | ICD-10-CM

## 2017-06-16 PROCEDURE — 99214 OFFICE O/P EST MOD 30 MIN: CPT | Performed by: UROLOGY

## 2017-06-16 NOTE — PROGRESS NOTES
Chief Complaint   Patient presents with     PSA Recheck     psa       Jeanmarie Mclean is a 57 year old male who presents today for follow up of   Chief Complaint   Patient presents with     PSA Recheck     psa    Jeanmarie Mclean is a 57-year-old gentleman who is here for prostate cancer followup.  The patient has seen Dr. Muñoz in the past.  He underwent radical prostatectomy in the year 2004 when he was only 44 years old.  He is sexually active.  With Viagra, he has a very good erection and very active sexual life with his steady girlfriend.  His recent psa is 0.2.  He also had some stress incontinence.  After he started Kegel exercise, the stress incontinence is much improved.  He now does not need to wear a pad anymore.     Current Outpatient Prescriptions   Medication Sig Dispense Refill     cycloSPORINE (RESTASIS) 0.05 % ophthalmic emulsion Place 1 drop into both eyes 2 times daily 1 Box 11     aspirin 81 MG tablet Take by mouth daily       pregabalin (LYRICA) 75 MG capsule Take 1 capsule (75 mg) by mouth 3 times daily for fibromyalgia, or as directed by your pain clinic/previous prescriber. (Patient taking differently: Take 75 mg by mouth 2 times daily for fibromyalgia, or as directed by your pain clinic/previous prescriber.) 270 capsule 1     QUEtiapine (SEROQUEL) 200 MG tablet 100 mg daily as needed Take 100 mg by mouth daily. weening       menthol (ICY HOT) 5 % PADS Apply 1 patch topically every 8 hours as needed for muscle soreness May cut to fit 30 patch 3     sildenafil (VIAGRA) 100 MG tablet Take 1/4 - 1 tablet, as directed, 1-3 hours before intimacy. Maximum 1 dose per 24 hours. 10 tablet 5     Ibuprofen (IBU-200 PO) Take 2 tablets by mouth as needed       Cholecalciferol (VITAMIN D) 1000 UNIT capsule Take 1 capsule by mouth 2 times daily.       OXYCODONE HCL 5 MG OR TABS Take 1 tablet by mouth 2 times daily        valACYclovir (VALTREX) 500 MG tablet Take 1 tablet (500 mg) by mouth 2 times daily for 3 days  for outbreak. 6 tablet 5     Allergies   Allergen Reactions     Risperidone Other (See Comments)     Serve numbness      Effexor [Venlafaxine Hydrochloride] Other (See Comments)     Headache, Painful scrotum and drainage from the penis     Ambien Other (See Comments)     headaches     Ambien [Zolpidem Tartrate] Nausea     Buspirone Nausea     Dizziness, headache     Celexa [Citalopram] Other (See Comments)     Headache     Duloxetine Other (See Comments)     Headache     Septra [Bactrim] Itching     Seroquel [Quetiapine] Other (See Comments)     Headache, N, V     Ultram [Tramadol Hcl] Nausea and Vomiting      Past Medical History:   Diagnosis Date     Alcoholism (H)      Asperger's syndrome     Dr. Owens, [facility]. 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 Muñoz ([place])     CYSTOSCOPY  10/98    for renal stones     HC KNEE SCOPE,MED/LAT MENISECTOMY  6/24/11    Left, medial ([place])     HERNIA REPAIR, INGUINAL RT/LT  5/92    [facility]     Family History   Problem Relation Age of Onset     Prostate Cancer Father      40s     CANCER Father      CANCER Maternal Grandmother      lung     Glaucoma Maternal Grandmother      Eye Disorder Maternal  Grandmother      glaucoma     DIABETES Mother       45     Blood Disease Sister      sickle trait     Hypertension Sister      Blood Disease Paternal Uncle      sickle     Blood Disease Paternal Aunt      sickle     Alzheimer Disease Paternal Grandfather      70s     Macular Degeneration Paternal Grandfather      Psychotic Disorder Son      autism     CEREBROVASCULAR DISEASE No family hx of      Thyroid Disease No family hx of      Myocardial Infarction No family hx of      C.A.D. 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 [facility] dependency treatment in      Drug use: No      Comment: crack (last used 10/08?)     Sexual activity: Not Currently     Partners: Female     Other Topics Concern     None     Social History Narrative    [employer] ([id number] [place] 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       REVIEW OF SYSTEMS  =================  C: NEGATIVE for fever, chills, change in weight  I: NEGATIVE for worrisome rashes, moles or lesions  E/M: NEGATIVE for ear, mouth and throat problems  R: NEGATIVE for significant cough or SHORTNESS OF BREATH,   CV: NEGATIVE for chest pain, palpitations or peripheral edema  GI: NEGATIVE for nausea, abdominal pain, heartburn, or change in bowel habits  NEURO: NEGATIVE any motor/sensory changes  PSYCH: NEGATIVE for recent mood disorder    Physical Exam:  BP 97/68 (BP Location: Right arm, Patient Position: Chair, Cuff Size: Adult Regular)  Pulse 72  Resp 16   Patient is pleasant, in no acute distress, good general condition.  Lung:  no evidence of respiratory distress    Abdomen: Soft, nondistended, non tender. No masses. No rebound or guarding.   Exam: no cva tenderness  Skin: Warm and dry.  No redness.  Psych: normal mood and affect  Neuro: alert and oriented    Assessment/Plan:   (C61) Malignant neoplasm of prostate (H)  (primary encounter diagnosis)  Comment:    Plan: PSA tumor marker        In one year    (N52.9) Erectile dysfunction, unspecified erectile dysfunction type  Comment:    Plan:  Cont with viagra

## 2017-06-16 NOTE — MR AVS SNAPSHOT
After Visit Summary   6/16/2017    Jeanmarie Mclean    MRN: [id number]           Patient Information     Date Of Birth          1960        Visit Information        Provider Department      6/16/2017 10:45 AM Devon Miles MD [facility]        Today's Diagnoses     Malignant neoplasm of prostate (H)    -  1    Erectile dysfunction, unspecified erectile dysfunction type           Follow-ups after your visit        Your next 10 appointments already scheduled     Jul 07, 2017  9:30 AM CDT   PROCEDURE with Angélica King MD   [facility] ([facility]    [address]  [address]  [facility] [id number] [phone]              Future tests that were ordered for you today     Open Future Orders        Priority Expected Expires Ordered    PSA tumor marker Routine 6/17/2018 6/17/2018 6/16/2017            Who to contact     If you have questions or need follow up information about today's clinic visit or your schedule please contact [facility] directly at [phone].  Normal or non-critical lab and imaging results will be communicated to you by MyChart, letter or phone within 4 business days after the clinic has received the results. If you do not hear from us within 7 days, please contact the clinic through [facility] or phone. If you have a critical or abnormal lab result, we will notify you by phone as soon as possible.  Submit refill requests through [facility] or call your pharmacy and they will forward the refill request to us. Please allow 3 business days for your refill to be completed.          Additional Information About Your Visit        [facility] Information     [facility] gives you secure access to your electronic health record. If you see a primary care provider, you can also send messages to your care team and make appointments. If you have questions, please call your primary care clinic.  If you do not have a primary care  provider, please call [phone] and they will assist you.        Care EveryWhere ID     This is your Care EveryWhere ID. This could be used by other organizations to access your [facility] medical records  [id number]        Your Vitals Were     Pulse Respirations                72 16           Blood Pressure from Last 3 Encounters:   06/16/17 97/68   06/13/17 116/77   05/30/17 136/78    Weight from Last 3 Encounters:   05/16/17 72.6 kg (160 lb)   10/11/16 68.6 kg (151 lb 3.2 oz)   09/21/16 69.4 kg (153 lb)                 Today's Medication Changes          These changes are accurate as of: 6/16/17 10:53 AM.  If you have any questions, ask your nurse or doctor.               These medicines have changed or have updated prescriptions.        Dose/Directions    pregabalin 75 MG capsule   Commonly known as:  LYRICA   This may have changed:    - when to take this  - additional instructions   Used for:  Fibromyalgia        Dose:  75 mg   Take 1 capsule (75 mg) by mouth 3 times daily for fibromyalgia, or as directed by your pain clinic/previous prescriber.   Quantity:  270 capsule   Refills:  1                Primary Care Provider Office Phone # Fax #    Milton Iglesias [phone]       [facility] [address] [facility] [address]        Thank you!     Thank you for choosing [facility]  for your care. Our goal is always to provide you with excellent care. Hearing back from our patients is one way we can continue to improve our services. Please take a few minutes to complete the written survey that you may receive in the mail after your visit with us. Thank you!             Your Updated Medication List - Protect others around you: Learn how to safely use, store and throw away your medicines at www.disposemymeds.org.          This list is accurate as of: 6/16/17 10:53 AM.  Always use your most recent med list.                   Brand Name Dispense Instructions for use     aspirin 81 MG tablet      Take by mouth daily       cycloSPORINE 0.05 % ophthalmic emulsion    RESTASIS    1 Box    Place 1 drop into both eyes 2 times daily       IBU-200 PO      Take 2 tablets by mouth as needed       menthol 5 % Pads    ICY HOT    30 patch    Apply 1 patch topically every 8 hours as needed for muscle soreness May cut to fit       oxyCODONE 5 MG IR tablet    ROXICODONE     Take 1 tablet by mouth 2 times daily       pregabalin 75 MG capsule    LYRICA    270 capsule    Take 1 capsule (75 mg) by mouth 3 times daily for fibromyalgia, or as directed by your pain clinic/previous prescriber.       QUEtiapine 200 MG tablet    SEROquel     100 mg daily as needed Take 100 mg by mouth daily. weening       sildenafil 100 MG cap/tab    REVATIO/VIAGRA    10 tablet    Take 1/4 - 1 tablet, as directed, 1-3 hours before intimacy. Maximum 1 dose per 24 hours.       valACYclovir 500 MG tablet    VALTREX    6 tablet    Take 1 tablet (500 mg) by mouth 2 times daily for 3 days for outbreak.       vitamin D 1000 UNITS capsule      Take 1 capsule by mouth 2 times daily.

## 2017-06-16 NOTE — NURSING NOTE
"Chief Complaint   Patient presents with     PSA Recheck     psa       Initial BP 97/68 (BP Location: Right arm, Patient Position: Chair, Cuff Size: Adult Regular)  Pulse 72  Resp 16 Estimated body mass index is 27.46 kg/(m^2) as calculated from the following:    Height as of 10/11/16: 1.626 m (5' 4\").    Weight as of 5/16/17: 72.6 kg (160 lb).  Medication Reconciliation: complete   Naa Ross CMA      "

## 2017-07-07 ENCOUNTER — OFFICE VISIT (OUTPATIENT)
Dept: PALLIATIVE MEDICINE | Facility: CLINIC | Age: 57
End: 2017-07-07
Payer: COMMERCIAL

## 2017-07-07 ENCOUNTER — TELEPHONE (OUTPATIENT)
Dept: PALLIATIVE MEDICINE | Facility: CLINIC | Age: 57
End: 2017-07-07

## 2017-07-07 VITALS — DIASTOLIC BLOOD PRESSURE: 81 MMHG | HEART RATE: 68 BPM | SYSTOLIC BLOOD PRESSURE: 139 MMHG

## 2017-07-07 DIAGNOSIS — M53.3 SI (SACROILIAC) JOINT DYSFUNCTION: Primary | ICD-10-CM

## 2017-07-07 DIAGNOSIS — M79.18 MYOFASCIAL PAIN: ICD-10-CM

## 2017-07-07 PROCEDURE — 20553 NJX 1/MLT TRIGGER POINTS 3/>: CPT | Performed by: PSYCHIATRY & NEUROLOGY

## 2017-07-07 ASSESSMENT — PAIN SCALES - GENERAL: PAINLEVEL: SEVERE PAIN (7)

## 2017-07-07 NOTE — NURSING NOTE
"Chief Complaint   Patient presents with     Pain       Initial /81 (BP Location: Right arm, Patient Position: Chair, Cuff Size: Adult Regular)  Pulse 68 Estimated body mass index is 27.46 kg/(m^2) as calculated from the following:    Height as of 10/11/16: 1.626 m (5' 4\").    Weight as of 5/16/17: 72.6 kg (160 lb).  Medication Reconciliation: complete       Ernie Alexis MA  [facility]      "

## 2017-07-07 NOTE — PATIENT INSTRUCTIONS
[facility]   Post Procedure Instructions    Today you had:  trigger point injections       Medications used:  lidocaine   bupivicaine           Go to the emergency room if you develop any shortness of breath    Monitor the injection sites for signs and symptoms of infection-fever, chills, redness, swelling, warmth, or drainage to areas.    You may have soreness at injection sites for up to 24 hours.    You may apply ice to the painful areas to help minimize the discomfort of the needle pokes.    Do not apply heat to sites for at least 12 hours.    You may use anti-inflammatory medications or Tylenol for pain control if necessary  Nurse line: [phone]  After hours provider line: [phone]  Appointment line: [phone]        [address]  [address] [place] [address]    [email or web address]    Hours- Walk-in  Wed: 5:30 p.m.-9:30 p.m.  Sat: 8:30 a.m.-12:30 p.m.  Contact Information  [phone] -For cancellations only

## 2017-07-07 NOTE — TELEPHONE ENCOUNTER
Left voicemail for patient to schedule SI joint injections.     Margot HOOPER    [facility] [place]

## 2017-07-07 NOTE — MR AVS SNAPSHOT
After Visit Summary   7/7/2017    Jeanmarie Mclean    MRN: [id number]           Patient Information     Date Of Birth          1960        Visit Information        Provider Department      7/7/2017 9:30 AM Angélica King MD [facility]        Care Instructions    [facility]   Post Procedure Instructions    Today you had:  trigger point injections       Medications used:  lidocaine   bupivicaine           Go to the emergency room if you develop any shortness of breath    Monitor the injection sites for signs and symptoms of infection-fever, chills, redness, swelling, warmth, or drainage to areas.    You may have soreness at injection sites for up to 24 hours.    You may apply ice to the painful areas to help minimize the discomfort of the needle pokes.    Do not apply heat to sites for at least 12 hours.    You may use anti-inflammatory medications or Tylenol for pain control if necessary  Nurse line: [phone]  After hours provider line: [phone]  Appointment line: [phone]        [address]  3rd [place] [address]    [email or web address]    Hours- Walk-in  Wed: 5:30 p.m.-9:30 p.m.  Sat: 8:30 a.m.-12:30 p.m.  Contact Information  [phone] -For cancellations only                   Follow-ups after your visit        Your next 10 appointments already scheduled     Jun 11, 2018 10:00 AM CDT   LAB with FK LAB   [facility]    [address] [place] [id number] [phone]           Patient must bring picture ID.  Patient should be prepared to give a urine specimen  Please do not eat 10-12 hours before your appointment if you are coming in fasting for labs on lipids, cholesterol, or glucose (sugar).  Pregnant women should follow their Care Team instructions. Water with medications is okay. Do not drink coffee or other fluids.   If  you have concerns about taking  your medications, please ask at office or if scheduling via [employer], send a message by clicking on Secure Messaging, Message Your Care Team.            José Manuel 15, 2018 10:00 AM CDT   Return Visit with Devon [place] MD   [facility] [place] ([facility] [place])    [address] [place] [address]  [place] [id number] [phone]              Who to contact     If you have questions or need follow up information about today's clinic visit or your schedule please contact [facility] directly at [phone].  Normal or non-critical lab and imaging results will be communicated to you by MyChart, letter or phone within 4 business days after the clinic has received the results. If you do not hear from us within 7 days, please contact the clinic through [employer] or phone. If you have a critical or abnormal lab result, we will notify you by phone as soon as possible.  Submit refill requests through [employer] or call your pharmacy and they will forward the refill request to us. Please allow 3 business days for your refill to be completed.          Additional Information About Your Visit        [facility] Information     [employer] gives you secure access to your electronic health record. If you see a primary care provider, you can also send messages to your care team and make appointments. If you have questions, please call your primary care clinic.  If you do not have a primary care provider, please call [phone] and they will assist you.        Care EveryWhere ID     This is your Care EveryWhere ID. This could be used by other organizations to access your [place] medical records  [id number]        Your Vitals Were     Pulse                   68            Blood Pressure from Last 3 Encounters:   07/07/17 139/81   06/16/17 97/68   06/13/17 116/77    Weight from Last 3 Encounters:   05/16/17 72.6 kg (160 lb)   10/11/16 68.6 kg (151 lb 3.2 oz)   09/21/16 69.4 kg (153  lb)              Today, you had the following     No orders found for display         Today's Medication Changes          These changes are accurate as of: 7/7/17  9:57 AM.  If you have any questions, ask your nurse or doctor.               These medicines have changed or have updated prescriptions.        Dose/Directions    pregabalin 75 MG capsule   Commonly known as:  LYRICA   This may have changed:    - when to take this  - additional instructions   Used for:  Fibromyalgia        Dose:  75 mg   Take 1 capsule (75 mg) by mouth 3 times daily for fibromyalgia, or as directed by your pain clinic/previous prescriber.   Quantity:  270 capsule   Refills:  1                Primary Care Provider Office Phone # Fax #    Milton Iglesias [phone]       [facility] [address]  [facility] [address]        Equal Access to Services     HOSSEIN GUAJARDO : Hadii aad ku hadasho Soomaali, waaxda luqadaha, qaybta kaalmada adeegyada, srinivasan yin . So [facility] [phone].    ATENCIÓN: Si habla español, tiene a mosley disposición servicios gratuitos de asistencia lingüística. Llame al [phone].    We comply with applicable federal civil rights laws and [place] laws. We do not discriminate on the basis of race, color, national origin, age, disability sex, sexual orientation or gender identity.            Thank you!     Thank you for choosing [facility] [place]  for your care. Our goal is always to provide you with excellent care. Hearing back from our patients is one way we can continue to improve our services. Please take a few minutes to complete the written survey that you may receive in the mail after your visit with us. Thank you!             Your Updated Medication List - Protect others around you: Learn how to safely use, store and throw away your medicines at www.disposemymeds.org.          This list is accurate as of: 7/7/17  9:57 AM.  Always use your most  recent med list.                   Brand Name Dispense Instructions for use Diagnosis    aspirin 81 MG tablet      Take by mouth daily        cycloSPORINE 0.05 % ophthalmic emulsion    RESTASIS    1 Box    Place 1 drop into both eyes 2 times daily    Dry eyes, Rosacea       IBU-200 PO      Take 2 tablets by mouth as needed        menthol 5 % Pads    ICY HOT    30 patch    Apply 1 patch topically every 8 hours as needed for muscle soreness May cut to fit    Myalgia and myositis, Fibromyalgia syndrome       oxyCODONE 5 MG IR tablet    ROXICODONE     Take 1 tablet by mouth 2 times daily        pregabalin 75 MG capsule    LYRICA    270 capsule    Take 1 capsule (75 mg) by mouth 3 times daily for fibromyalgia, or as directed by your pain clinic/previous prescriber.    Fibromyalgia       QUEtiapine 200 MG tablet    SEROquel     100 mg daily as needed Take 100 mg by mouth daily. weening        sildenafil 100 MG cap/tab    REVATIO/VIAGRA    10 tablet    Take 1/4 - 1 tablet, as directed, 1-3 hours before intimacy. Maximum 1 dose per 24 hours.    Erectile dysfunction following radical prostatectomy       valACYclovir 500 MG tablet    VALTREX    6 tablet    Take 1 tablet (500 mg) by mouth 2 times daily for 3 days for outbreak.    Recurrent genital herpes       vitamin D 1000 UNITS capsule      Take 1 capsule by mouth 2 times daily.

## 2017-07-07 NOTE — TELEPHONE ENCOUNTER
Pre-screening Questions for Radiology Injections:    Injection to be done at which interventional clinic site? [facility]    Procedure ordered by Cally King    Procedure ordered? Bilateral SI Joint Injections    What insurance would patient like us to bill for this procedure? [employer]      Worker's comp-Any injection DO NOT SCHEDULE and route to Kym Fisher.      [employer] insurance - For SI joint injections, DO NOT SCHEDULE and route Kym Fisher.      [employer]- MBB's must be scheduled at LEAST two weeks apart      [employer] - Any injection besides hip/shoulder/knee joint DO NOT SCHEDULE and route to Kym Fisher. She will obtain PA and call pt back to schedule procedure or notify pt of denial.     HP [employer]-PA REQUIRED FOR NON-JOSE R OR Joint injections    Any chance of pregnancy? Not Applicable   If YES, do NOT schedule and route to RN pool    Is an  needed? No     Patient has a drive home? (mandatory) YES: Informed    Is patient taking any blood thinners (plavix, coumadin, jantoven, warfarin, heparin, pradaxa or dabigatran )? No   If hold needed, do NOT schedule, route to RN pool     Is patient taking any aspirin products? Yes - Pt takes 81 mg daily; instructed to hold 0 day(s) prior to procedure.      If more than 325mg/day do NOT schedule; route to RN pool     For CERVICAL procedures, hold all aspirin products for 6 days.      Does the patient have a bleeding or clotting disorder? No     If yes, okay to schedule AND route to RN nurse pool    **For any patients with platelet count <100, must be forwarded to provider**    Is patient diabetic?  No  If YES, have them bring their glucometer.    Does patient have an active infection or treated for one within the past week? No     Is patient currently taking any antibiotics?  No     For patients on chronic, preventative, or prophylactic antibiotics, procedures may be scheduled.     For patients on antibiotics for  active or recent infection:    Griselda Luna [place], [place]-antibiotic course must have been completed for 4 days    Deng Burden-antibiotic course must have been completed for 7 days    Is patient currently taking any steroid medications? (i.e. Prednisone, Medrol)  No     For patients on steroid medications:    Griselda Luna [place], [place]-steroid course must have been completed for 4 days    Deng Burden-steroid course must have been completed for 7 days    Reviewed with patient:  If you are started on any steroids or antibiotics between now and your appointment, you must contact us because it may affect our ability to perform your procedure.  Yes    Is patient actively being treated for cancer or immunocompromised, including the spleen having been removed? No    If YES, do NOT schedule and route to RN pool     **For Dr. Henry patients without spleens should have the chart sent to her**    Are you able to get on and off an exam table with minimal or no assistance? Yes  If NO, do NOT schedule and route to RN pool    Are you able to roll over and lay on your stomach with minimal or no assistance? Yes  If NO, do NOT schedule and route to RN pool     Any allergies to contrast dye, iodine, shellfish, or numbing and steroid medications? No  If YES, route to RN pool AND add allergy information to appointment notes    Allergies: Risperidone; Effexor [venlafaxine hydrochloride]; Ambien; Ambien [zolpidem tartrate]; Buspirone; Celexa [citalopram]; Duloxetine; Septra [bactrim]; Seroquel [quetiapine]; and Ultram [tramadol hcl]        Has the patient had a flu shot or any other vaccinations within 7 days before or after the procedure.  No       Does patient have an MRI/CT?  Not Applicable  (SI joint, hip injections, lumbar sympathetic blocks, and stellate ganglion blocks do not require an MRI)    Was the MRI done w/in the last 3 years?  NA    Was MRI done at [facility]? No      If not, where  was it done? N/A       If MRI was not done at [facility], [facility] do NOT schedule and route to nursing.  If pt has an imaging disc, the injection may be scheduled but pt has to bring disc to appt. If they show up w/out disc the injection cannot be done    Reminders (please tell patient if applicable):       Instructed pt to arrive 30 minutes early for IV start if this is for a cervical procedure, ALL sympathetic (stellate ganglion, hypogastric, or lumbar sympathetic block) and all sedation procedures (RFA, spinal cord stimulation trials).  Not Applicable    -IVs are not routinely placed for Villalobos and [place] cervical case       If NPO for sedation, informed patient that it is okay to take medications with sips of water (except if they are to hold blood thinners).  Not Applicable   *DO take blood pressure medication if it is prescribed*      If this is for a cervical JOSE R, informed patient that aspirin needs to be held for 6 days.   Not Applicable      For all patients not having spinal cord stimulator (SCS) trials or radiofrequency ablations (RFAs), informed patient:  IV sedation is not provided for this procedure.  If you feel that an oral anti-anxiety medication is needed, you can discuss this further with your referring provider or primary care provider.  The Pain Clinic provider will discuss specifics of what the procedure includes at your appointment.  Most procedures last 10-20 minutes.  We use numbing medications to help with any discomfort during the procedure.  NO      Do not schedule procedures requiring IV placement in the first appointment of the day or first appointment after lunch.       For patients 85 or older we recommend having an adult stay w/ them for the remainder of the day.       Does the patient have any questions?  NO  Margot Samaniego  [facility]

## 2017-07-07 NOTE — PROGRESS NOTES
Pre procedure Diagnosis: myofascial pain   Post procedure Diagnosis: Same  Procedure performed: trigger point injections  Anesthesia: none  Complications: none  Operators: Cally King MD     Indications:   Jeanmarie Mclean is a 57 year old male with a history of low back, shoulder, and left hip pain.  Exam shows myofascial pain of the muscle groups below and they have tried conservative treatment including acupuncture, PT, massage, pain clinic    Options/alternatives, benefits and risks were discussed with the patient including bleeding, infection, tissue trauma and pnuemothorax.  Questions were answered to his satisfaction and he agrees to proceed. Voluntary informed consent was obtained and signed.     Vitals were reviewed: Yes  Allergies were reviewed:  Yes   Medications were reviewed:  Yes   Pre-procedure pain score: 7/10    Procedure:  After getting informed consent, a Pause for the Cause was performed.    Trigger points were identified by patient, and marked when appropriate.  The area was prepped with Chloroprep.    Using clean technique, injections were completed using a 25G, 1.5 inch needle.  After negative aspiration, injection was completed.  A total of 8 locations were injected.  When possible, tissue was retracted from the chest wall to avoid lung injury.    Muscle groups injected:  Bilateral quadratus lumborum   Left levator scapulae  Bilateral lumbar paraspinals    Injection solution contained:  5ml of 1% lidocaine and 5ml of 0.5% bupivacaine.    Hemostasis was achieved, the area was cleaned, and bandaids were placed when appropriate.  The patient tolerated the procedure well.  Breath sounds were normal.      Post-procedure pain score: improved/10  Follow-up includes:   -[facility]  -repeat prn, ok to double book   -SI joint injection  -will review chart re: discussion    Cally King MD  [facility]

## 2017-08-09 ENCOUNTER — RADIOLOGY INJECTION OFFICE VISIT (OUTPATIENT)
Dept: PALLIATIVE MEDICINE | Facility: CLINIC | Age: 57
End: 2017-08-09
Payer: COMMERCIAL

## 2017-08-09 ENCOUNTER — RADIANT APPOINTMENT (OUTPATIENT)
Dept: RADIOLOGY | Facility: CLINIC | Age: 57
End: 2017-08-09
Attending: PSYCHIATRY & NEUROLOGY

## 2017-08-09 VITALS — HEART RATE: 51 BPM | OXYGEN SATURATION: 97 % | SYSTOLIC BLOOD PRESSURE: 136 MMHG | DIASTOLIC BLOOD PRESSURE: 83 MMHG

## 2017-08-09 DIAGNOSIS — M53.3 SI (SACROILIAC) JOINT DYSFUNCTION: ICD-10-CM

## 2017-08-09 DIAGNOSIS — M53.3 SI (SACROILIAC) JOINT DYSFUNCTION: Primary | ICD-10-CM

## 2017-08-09 PROCEDURE — 27096 INJECT SACROILIAC JOINT: CPT | Mod: 50 | Performed by: PSYCHIATRY & NEUROLOGY

## 2017-08-09 ASSESSMENT — PAIN SCALES - GENERAL
PAINLEVEL: SEVERE PAIN (7)
PAINLEVEL: NO PAIN (0)

## 2017-08-09 NOTE — MR AVS SNAPSHOT
After Visit Summary   8/9/2017    Jeanmarie Mclean    MRN: [id number]           Patient Information     Date Of Birth          1960        Visit Information        Provider Department      8/9/2017 9:45 AM Angélica King MD [facility]        Care Instructions    [facility]   Procedure Discharge Instructions    Today you saw: Dr. Angélica King     You had an:  sacro-iliac joint injection      Medications used:  Lidocaine  Omnipaque  Ropivicaine   Kenalog      Fax #: [phone]          Be cautious when walking. Numbness and/or weakness in the lower extremities may occur for up to 6-8 hours after the procedure due to effect of the local anesthetic    Do not drive for 6 hours. The effect of the local anesthetic could slow your reflexes.     You may resume your regular activities after 24 hours    Avoid strenuous activity for the first 24 hours    You may shower, however avoid swimming, tub baths or hot tubs for 24 hours following your procedure    You may have a mild to moderate increase in pain for several days following the injection.    It may take up to 14 days for the steroid medication to start working although you may feel the effect as early as a few days after the procedure.       You may use ice packs for 10-15 minutes, 3 to 4 times a day at the injection site for comfort    Do not use heat to painful areas for 6 to 8 hours. This will give the local anesthetic time to wear off and prevent you from accidentally burning your skin.     You may use anti-inflammatory medications (such as Ibuprofen or Aleve or Advil) or Tylenol for pain control if necessary    If you experience any of the following, call the pain center nursing line during work hours at [phone] or the after hours provider line at [phone]:  -Fever over 100 degree F  -Swelling, bleeding, redness, drainage, warmth at the injection site  -Progressive weakness or numbness in your  legs   -Loss of bowel or bladder function  -Unusual new onset of pain that is not improving      Phone #s:  Appointment line: [phone];  Nurse line: [phone]              Follow-ups after your visit        Your next 10 appointments already scheduled     Jun 11, 2018 10:00 AM CDT   LAB with FK LAB   [facility] [place] ([facility])    [address] [place] [address]  [place] [address] [phone]           Patient must bring picture ID. Patient should be prepared to give a urine specimen  Please do not eat 10-12 hours before your appointment if you are coming in fasting for labs on lipids, cholesterol, or glucose (sugar). Pregnant women should follow their Care Team instructions. Water with medications is okay. Do not drink coffee or other fluids. If you have concerns about taking  your medications, please ask at office or if scheduling via [employer], send a message by clicking on Secure Messaging, Message Your Care Team.            José Manuel 15, 2018 10:00 AM CDT   [facility] with Devon Miles MD   [facility]    [address] [place] [address]  [place] [address] [phone]              Who to contact     If you have questions or need follow up information about today's clinic visit or your schedule please contact [facility] directly at [phone].  Normal or non-critical lab and imaging results will be communicated to you by MyChart, letter or phone within 4 business days after the clinic has received the results. If you do not hear from us within 7 days, please contact the clinic through MyChart or phone. If you have a critical or abnormal lab result, we will notify you by phone as soon as possible.  Submit refill requests through [employer] or call your pharmacy and they will forward the refill request to us. Please allow 3 business days for your refill to be completed.          Additional Information About Your Visit        [employer]  Information     [employer] gives you secure access to your electronic health record. If you see a primary care provider, you can also send messages to your care team and make appointments. If you have questions, please call your primary care clinic.  If you do not have a primary care provider, please call [phone] and they will assist you.        Care EveryWhere ID     This is your Care EveryWhere ID. This could be used by other organizations to access your [place] medical records  [id number]        Your Vitals Were     Pulse                   52            Blood Pressure from Last 3 Encounters:   08/09/17 (!) 155/91   07/07/17 139/81   06/16/17 97/68    Weight from Last 3 Encounters:   05/16/17 72.6 kg (160 lb)   10/11/16 68.6 kg (151 lb 3.2 oz)   09/21/16 69.4 kg (153 lb)              Today, you had the following     No orders found for display         Today's Medication Changes          These changes are accurate as of: 8/9/17  9:58 AM.  If you have any questions, ask your nurse or doctor.               These medicines have changed or have updated prescriptions.        Dose/Directions    pregabalin 75 MG capsule   Commonly known as:  LYRICA   This may have changed:    - when to take this  - additional instructions   Used for:  Fibromyalgia        Dose:  75 mg   Take 1 capsule (75 mg) by mouth 3 times daily for fibromyalgia, or as directed by your pain clinic/previous prescriber.   Quantity:  270 capsule   Refills:  1                Primary Care Provider Office Phone # Fax #    Milton Iglesias [phone]       [address]  [facility] [address]        Equal Access to Services     [facility] : Hadii marjan wolff [place], waaxda luqadaha, qaybta kaalmada lilliam, srinivasan beatty. So [facility] [phone].    ATENCIÓN: Si habla español, tiene a mosley disposición servicios gratuitos de asistencia lingüística. Llame al [phone].    We comply with applicable federal  civil rights laws and [place] laws. We do not discriminate on the basis of race, color, national origin, age, disability sex, sexual orientation or gender identity.            Thank you!     Thank you for choosing [facility]  for your care. Our goal is always to provide you with excellent care. Hearing back from our patients is one way we can continue to improve our services. Please take a few minutes to complete the written survey that you may receive in the mail after your visit with us. Thank you!             Your Updated Medication List - Protect others around you: Learn how to safely use, store and throw away your medicines at www.disposemymeds.org.          This list is accurate as of: 8/9/17  9:58 AM.  Always use your most recent med list.                   Brand Name Dispense Instructions for use Diagnosis    aspirin 81 MG tablet      Take by mouth daily        cycloSPORINE 0.05 % ophthalmic emulsion    RESTASIS    1 Box    Place 1 drop into both eyes 2 times daily    Dry eyes, Rosacea       IBU-200 PO      Take 2 tablets by mouth as needed        menthol 5 % Pads    ICY HOT    30 patch    Apply 1 patch topically every 8 hours as needed for muscle soreness May cut to fit    Myalgia and myositis, Fibromyalgia syndrome       oxyCODONE 5 MG IR tablet    ROXICODONE     Take 1 tablet by mouth 2 times daily        pregabalin 75 MG capsule    LYRICA    270 capsule    Take 1 capsule (75 mg) by mouth 3 times daily for fibromyalgia, or as directed by your pain clinic/previous prescriber.    Fibromyalgia       QUEtiapine 200 MG tablet    SEROquel     100 mg daily as needed Take 100 mg by mouth daily. weening        sildenafil 100 MG cap/tab    REVATIO/VIAGRA    10 tablet    Take 1/4 - 1 tablet, as directed, 1-3 hours before intimacy. Maximum 1 dose per 24 hours.    Erectile dysfunction following radical prostatectomy       valACYclovir 500 MG tablet    VALTREX    6 tablet    Take 1 tablet (500 mg) by  mouth 2 times daily for 3 days for outbreak.    Recurrent genital herpes       vitamin D 1000 UNITS capsule      Take 1 capsule by mouth 2 times daily.

## 2017-08-09 NOTE — PATIENT INSTRUCTIONS
[facility]   Procedure Discharge Instructions    Today you saw: Dr. Angélica King     You had an:  sacro-iliac joint injection      Medications used:  Lidocaine  Omnipaque  Ropivicaine   Kenalog      Fax #: [phone]          Be cautious when walking. Numbness and/or weakness in the lower extremities may occur for up to 6-8 hours after the procedure due to effect of the local anesthetic    Do not drive for 6 hours. The effect of the local anesthetic could slow your reflexes.     You may resume your regular activities after 24 hours    Avoid strenuous activity for the first 24 hours    You may shower, however avoid swimming, tub baths or hot tubs for 24 hours following your procedure    You may have a mild to moderate increase in pain for several days following the injection.    It may take up to 14 days for the steroid medication to start working although you may feel the effect as early as a few days after the procedure.       You may use ice packs for 10-15 minutes, 3 to 4 times a day at the injection site for comfort    Do not use heat to painful areas for 6 to 8 hours. This will give the local anesthetic time to wear off and prevent you from accidentally burning your skin.     You may use anti-inflammatory medications (such as Ibuprofen or Aleve or Advil) or Tylenol for pain control if necessary    If you experience any of the following, call the pain center nursing line during work hours at [phone] or the after hours provider line at [phone]:  -Fever over 100 degree F  -Swelling, bleeding, redness, drainage, warmth at the injection site  -Progressive weakness or numbness in your legs   -Loss of bowel or bladder function  -Unusual new onset of pain that is not improving      Phone #s:  Appointment line: [phone];  Nurse line: [phone]

## 2017-08-09 NOTE — NURSING NOTE
"Chief Complaint   Patient presents with     Pain       Initial BP (!) 155/91  Pulse 52 Estimated body mass index is 27.46 kg/(m^2) as calculated from the following:    Height as of 10/11/16: 1.626 m (5' 4\").    Weight as of 5/16/17: 72.6 kg (160 lb).  Medication Reconciliation: complete     Injection intake:    If this procedure is requiring IV sedation has patient been NPO for 6  Hours? NA    Is patient on coumadin, plavix or other prescribed blood thinner?   No    If patient is on coumadin was it held for 5 days?   NA    If patient is on plavix was it held for 7 days?    NA     Does patient take aspirin?  Yes  If this is for a cervical procedure and patient is on aspirin has it been held for 6 days?   NA    Any allergies to contrast dye, iodine, steroid and/or numbing medications?  NO    Is patient currently taking antibiotics or have an active infection?  NO    Does patient have a ? Yes     Is patient pregnant or breastfeeding?  Not Applicable    Are the vital signs normal?  Yes    Jesus Valdez MA      "

## 2017-08-09 NOTE — PROGRESS NOTES
Pre procedure Diagnosis: SI joint dysfunction    Post procedure Diagnosis: Same  Procedure performed: Bilateral SI joint injections  Anesthesia: none  Complications: none  Operators: Cally King MD      Indications:   Jeanmarie Mclean is a 57 year old male seen by me in clinic and previous for SI joint injection.  They have a history of low back pain and SI joint dysfunction.  Exam shows positive tenderness at the PSIS bilaterally.  He had great success from previous injections.  He has tried conservative treatment including PT and medications.    Options/alternatives, benefits and risks were discussed with the patient including bleeding, infection, tissue trauma, exposure to radiation, reaction to medications including seizure, nerve injury, weakness, and numbness.  Questions were answered to his satisfaction and he agrees to proceed. Voluntary informed consent was obtained and signed.     Vitals were reviewed: Yes  Allergies were reviewed:  Yes   Medications were reviewed:  Yes   Pre-procedure pain score: 7/10    Procedure:  After getting informed consent, patient was brought into the procedure suite and was placed in a prone position on the procedure table.   A Pause for the Cause was performed.  Patient was prepped and draped in sterile fashion.     After identifying the bilateral SI joints, the C-arm was rotated to a obliquely to obtain the best view of the inferior angle of the joint.  A total of 4 ml of Lidocaine 1%  was used to anesthetize the skin at a skin entry site coaxial with the fluoroscopy beam at this location.  A 22gauge 3.5 inch needle was advanced under intermittent fluoroscopy until it was felt to enter the SI joint.    A total of 3ml of Omnipaque-300 was injected, confirming appropriate position, with spread into the intraarticular space, with no intravascular uptake noted. 7ml was wasted.  Location was verified in lateral view.    3 ml of 0.2% ropivacaine with 80mg of kenalog was injected,  divided equally between the two sides.  The needle was flushed with lidocaine and removed.     Hemostasis was achieved, the area was cleaned, and bandaids were placed when appropriate.  The patient tolerated the procedure well, and was taken to the recovery room.    Images were saved to PACS.    Post-procedure pain score: 0/10  Follow-up includes:   -f/u phone call in one week  -f/u with me in clinic      Cally King MD  [facility]

## 2017-08-16 ENCOUNTER — TELEPHONE (OUTPATIENT)
Dept: RADIOLOGY | Facility: CLINIC | Age: 57
End: 2017-08-16

## 2017-08-16 NOTE — TELEPHONE ENCOUNTER
Patient had a Bilateral SI joint injections on 8/9/17.  Called patient for an update.      Pt reported the following details:  He has noticed significant improvement in his pain. He states before his injection his pain was a 7/10 and after 0/10. His pain is currently 2/10. He feels the SI joint injections are more helpful than the trigger point injections. Told patient that the information will be forwarded to her provider.  Also explained that, if a steroid medication was used, it could take up to 14 days to feel the full effect and if pt has any further questions or concerns pt should call the nurse line at [phone].

## 2017-08-18 ENCOUNTER — TELEPHONE (OUTPATIENT)
Dept: PALLIATIVE MEDICINE | Facility: CLINIC | Age: 57
End: 2017-08-18

## 2017-08-18 NOTE — TELEPHONE ENCOUNTER
Walk-in. Friday 8- at 8:48am. In-person at [facility] site    Reason for walk-in:  Form  Our goal is to have forms completed within 72 hours, however some forms may require a visit or additional information.     Who is the form from? Patient  Where did the form come from? Patient or family brought in     What clinic location was the form placed at? [facility] in-box  Where was the form placed? 's Box  What number is listed as a contact on the form? Patient's home number    Phone call message - patient request for a letter, form or note:     Date needed: as soon as possible  Please mail to patient  Has the patient signed a consent form for release of information? YES    Additional comments: Per patient, he included int he envelope a note/direction for the provider. Patient stated his former pain provider, Maureen Avendaño, had filled this out before and noted that something needed to be corrected or updated.    Type of letter, form or note: medical      Phone number to reach patient:  Home number on file [phone] (home)    Best Time:  anytime    Can we leave a detailed message on this number?  YES    Jackie [place]    [facility] [place]

## 2017-08-18 NOTE — LETTER
Date: 8/23/17                  Employee Name: Jeanmarie Mclean         YOB: 1960  Medical Record Number: [id number]   Soc.Sec.No: [id number]  Employer: [employer]              Date of Injury:  Unknown   Managed Care Organization / Insurance Company Name: [employer]    Diagnosis:   SI joint dysfunction  Myofascial pain  Chronic low back pain     Work Related: no    Permanent  Disability(PPD) likely: YES      IS Patient  ABLE TO WORK: No      To Whom It May Concern:       Mr. Mclean has been under the care of the [facility] since 10/30/2013, and has recently transitioned his care to me, as his previous provider has left her practice.  He is seen for the treatment of his chronic pain.  He has not been gainfully employed since 1997.   He has a history of chronic pain for many years.  The patient requires frequent rest periods, more than the standard time allowed if he were employed, during the day to manage his pain.  Additionally, sustained or prolong activity, also required by most employers, increase his pain.  These activities include, but are not limited to, concentration to detail, standing, sitting, reach, pulling stooping and bending.    It is unclear, but doubtful, if  could return to work at living sustainable wage.       Thank you for your thoughtful consideration. If further information is required to address this patient's workability I would suggest a formal functional work capacity.     Sincerely,        Cally King MD  [facility]

## 2017-08-23 NOTE — TELEPHONE ENCOUNTER
Letter completed.  Patient wants it mailed to home  Given to MA at [place].    Cally King MD  [facility]

## 2017-09-02 ENCOUNTER — MYC MEDICAL ADVICE (OUTPATIENT)
Dept: FAMILY MEDICINE | Facility: CLINIC | Age: 57
End: 2017-09-02

## 2017-09-08 ENCOUNTER — MYC MEDICAL ADVICE (OUTPATIENT)
Dept: FAMILY MEDICINE | Facility: CLINIC | Age: 57
End: 2017-09-08

## 2017-10-13 ENCOUNTER — OFFICE VISIT (OUTPATIENT)
Dept: FAMILY MEDICINE | Facility: CLINIC | Age: 57
End: 2017-10-13
Payer: COMMERCIAL

## 2017-10-13 ENCOUNTER — RADIANT APPOINTMENT (OUTPATIENT)
Dept: GENERAL RADIOLOGY | Facility: CLINIC | Age: 57
End: 2017-10-13
Attending: FAMILY MEDICINE
Payer: COMMERCIAL

## 2017-10-13 VITALS
HEIGHT: 64 IN | OXYGEN SATURATION: 98 % | WEIGHT: 163 LBS | BODY MASS INDEX: 27.83 KG/M2 | DIASTOLIC BLOOD PRESSURE: 71 MMHG | HEART RATE: 68 BPM | TEMPERATURE: 98.3 F | SYSTOLIC BLOOD PRESSURE: 116 MMHG

## 2017-10-13 DIAGNOSIS — R63.5 WEIGHT GAIN: ICD-10-CM

## 2017-10-13 DIAGNOSIS — M79.672 LEFT FOOT PAIN: ICD-10-CM

## 2017-10-13 DIAGNOSIS — M79.672 LEFT FOOT PAIN: Primary | ICD-10-CM

## 2017-10-13 LAB — TSH SERPL DL<=0.005 MIU/L-ACNC: 0.4 MU/L (ref 0.4–4)

## 2017-10-13 PROCEDURE — 73610 X-RAY EXAM OF ANKLE: CPT | Mod: LT

## 2017-10-13 PROCEDURE — 99213 OFFICE O/P EST LOW 20 MIN: CPT | Performed by: FAMILY MEDICINE

## 2017-10-13 PROCEDURE — 84443 ASSAY THYROID STIM HORMONE: CPT | Performed by: FAMILY MEDICINE

## 2017-10-13 PROCEDURE — 73630 X-RAY EXAM OF FOOT: CPT | Mod: LT

## 2017-10-13 PROCEDURE — 36415 COLL VENOUS BLD VENIPUNCTURE: CPT | Performed by: FAMILY MEDICINE

## 2017-10-13 ASSESSMENT — PATIENT HEALTH QUESTIONNAIRE - PHQ9: SUM OF ALL RESPONSES TO PHQ QUESTIONS 1-9: 15

## 2017-10-13 NOTE — NURSING NOTE
"Chief Complaint   Patient presents with     Musculoskeletal Problem     left foot and ankle     Edema     edema in jaw     Medication Request     medical marijuana      Blood Draw     wants to check arthritis       Initial /71 (BP Location: Left arm, Patient Position: Chair, Cuff Size: Adult Regular)  Pulse 68  Temp 98.3  F (36.8  C) (Oral)  Ht 5' 4\" (1.626 m)  Wt 163 lb (73.9 kg)  SpO2 98%  BMI 27.98 kg/m2 Estimated body mass index is 27.98 kg/(m^2) as calculated from the following:    Height as of this encounter: 5' 4\" (1.626 m).    Weight as of this encounter: 163 lb (73.9 kg).  Medication Reconciliation: complete     Zach Montes MA      "

## 2017-10-13 NOTE — PATIENT INSTRUCTIONS
This summary includes the important diagnoses, test, medications and other important parts of your medical history.  Below are a few good we sites you can use to learn more about these.     Www.[facility].org : Up to date and easily searchable information on multiple topics.  Www.FedBid.org/Pharmacy/c_539084.asp : [facility] $4.99 medications  Www.medlineplus.gov : medication info, interactive tutorials, watch real surgeries online  Www.familydoctor.org : good info from the Academy of Family Physicians  Www.Mir Vrachainic.Handshake : good info from the [facility]  Www.cdc.gov : public health info, travel advisories, epidemics (H1N1)  Www.aap.org : children's health info, normal development, vaccinations  Www.health.[facility].[place].us : [place] dept of heat, public health issues in [place], N1N1    Based on your medical history and these are the current health maintenance or preventive care services that you are due for (some may have been done at this visit:)  Health Maintenance Due   Topic Date Due     URINE DRUG SCREEN Q1 YR  02/11/1976     PHQ-9 Q6 MONTHS  04/26/2017     INFLUENZA VACCINE (SYSTEM ASSIGNED)  09/01/2017     DEPRESSION ACTION PLAN Q1 YR  09/21/2017     =================================================================================  Normal Values   Blood pressure  <140/90 for most adults    <130/80 for some chronic diseases (ask your care team about yours)    BMI (body mass index)  18.5-25 kg/m2 (based on height and weight)     Thank you for visiting [facility]    Normal or non-critical lab and imaging results will be communicated to you by MyChart, letter or phone within 7 days.  If you do not hear from us within 10 days, please call the clinic. If you have a critical or abnormal lab result, we will notify you by phone as soon as possible.     If you have any questions regarding your visit please contact:     Team Comfort:   Clinic Hours Telephone Number   Dr. Barry Yu Dr.  Leanne Gaming [place]   7am-5pm  Monday - Friday [phone]  [facility] ESTRELLA   Pharmacy 8am-8pm Monday-Thursday      8am-6pm Friday  9am-5pm Saturday-Sunday [phone]   Urgent Care 11am-8pm Monday-Friday        9am-5pm Saturday-Sunday [phone]     After hours, weekend or if you need to make an appointment with your primary provider please call [phone].   After Hours nurse advise: call [facility]: [phone]    Medication Refills:  Call your pharmacy and they will forward the refill to us. Please allow 3 business days for your refills to be completed.    Use NexPlanar (secure email communication and access to your chart) to send your primary care provider a message or make an appointment. Ask someone on your Team how to sign up for NexPlanar. To log on to Trampoline or for more information in AppointmentCity please visit the website at [email or web address].  As of October 8, 2013, all password changes, disabled accounts, or ID changes in NexPlanar/MyHealth will be done by our Access Services Department.   If you need help with your account or password, call: [phone]. Clinic staff no longer has the ability to change passwords.

## 2017-10-13 NOTE — MR AVS SNAPSHOT
After Visit Summary   10/13/2017    Jeanmarie Mclean    MRN: [id number]           Patient Information     Date Of Birth          1960        Visit Information        Provider Department      10/13/2017 9:20 AM Milton Iglesias MD [facility]        Today's Diagnoses     Left foot pain    -  1    Weight gain          Care Instructions    This summary includes the important diagnoses, test, medications and other important parts of your medical history.  Below are a few good we sites you can use to learn more about these.     Www.YumZing.org : Up to date and easily searchable information on multiple topics.  Www.[facility].Lightside Games/Pharmacy/c_539084.asp : [facility] $4.99 medications  Www.Ajaline.gov : medication info, interactive tutorials, watch real surgeries online  Www.familydoctor.org : good info from the Academy of Family Physicians  Www.[employer] : good info from the [facility]  Www.cdc.gov : public health info, travel advisories, epidemics (H1N1)  Www.aap.org : children's health info, normal development, vaccinations  Www.health.[facility].[place].us : [place] dept of heatlh, public health issues in [place], N1N1    Based on your medical history and these are the current health maintenance or preventive care services that you are due for (some may have been done at this visit:)  Health Maintenance Due   Topic Date Due     URINE DRUG SCREEN Q1 YR  02/11/1976     PHQ-9 Q6 MONTHS  04/26/2017     INFLUENZA VACCINE (SYSTEM ASSIGNED)  09/01/2017     DEPRESSION ACTION PLAN Q1 YR  09/21/2017     =================================================================================  Normal Values   Blood pressure  <140/90 for most adults    <130/80 for some chronic diseases (ask your care team about yours)    BMI (body mass index)  18.5-25 kg/m2 (based on height and weight)     Thank you for visiting [facility]    Normal or non-critical lab and imaging results will be communicated  to you by MyChart, letter or phone within 7 days.  If you do not hear from us within 10 days, please call the clinic. If you have a critical or abnormal lab result, we will notify you by phone as soon as possible.     If you have any questions regarding your visit please contact:     Team Comfort:   Clinic Hours Telephone Number   Dr. Barry Linder   7am-5pm  Monday - Friday [phone]  Ryder RN   Pharmacy 8am-8pm Monday-Thursday      8am-6pm Friday  9am-5pm Saturday-Sunday [phone]   Urgent Care 11am-8pm Monday-Friday        9am-5pm Saturday-Sunday [phone]     After hours, weekend or if you need to make an appointment with your primary provider please call [phone].   After Hours nurse advise: call [place] [facility]: [phone]    Medication Refills:  Call your pharmacy and they will forward the refill to us. Please allow 3 business days for your refills to be completed.    Use NatureBoxt (secure email communication and access to your chart) to send your primary care provider a message or make an appointment. Ask someone on your Team how to sign up for Ameri-tech 3D. To log on to [facility] [employer] or for more information in Videology please visit the website at [email or web address].  As of October 8, 2013, all password changes, disabled accounts, or ID changes in Ameri-tech 3D/MyHealth will be done by our Access Services Department.   If you need help with your account or password, call: [phone]. Clinic staff no longer has the ability to change passwords.             Follow-ups after your visit        Your next 10 appointments already scheduled     Jun 11, 2018 10:00 AM CDT   LAB with FK LAB   [facility] ([facility]    [address] [place], [place] [address] [address] [phone]           Patient must bring picture ID. Patient should be prepared to give a urine specimen  Please do not eat 10-12 hours  before your appointment if you are coming in fasting for labs on lipids, cholesterol, or glucose (sugar). Pregnant women should follow their Care Team instructions. Water with medications is okay. Do not drink coffee or other fluids. If you have concerns about taking  your medications, please ask at office or if scheduling via InsuranceLibrary.com, send a message by clicking on Secure Messaging, Message Your Care Team.            José Manuel 15, 2018 10:00 AM CDT   [facility]   [facility] [place] [place] [id number] [phone]              Who to contact     If you have questions or need follow up information about today's clinic visit or your schedule please contact [facility] RICHARDSON MARCELO directly at [phone].  Normal or non-critical lab and imaging results will be communicated to you by VoIP Logichart, letter or phone within 4 business days after the clinic has received the results. If you do not hear from us within 7 days, please contact the clinic through VoIP Logichart or phone. If you have a critical or abnormal lab result, we will notify you by phone as soon as possible.  Submit refill requests through [email or web address] or call your pharmacy and they will forward the refill request to us. Please allow 3 business days for your refill to be completed.          Additional Information About Your Visit        InsuranceLibrary.com Information     InsuranceLibrary.com gives you secure access to your electronic health record. If you see a primary care provider, you can also send messages to your care team and make appointments. If you have questions, please call your primary care clinic.  If you do not have a primary care provider, please call [phone] and they will assist you.        Care EveryWhere ID     This is your Care EveryWhere ID. This could be used by other organizations to access your [place] medical records  [id number]        Your Vitals Were     Pulse Temperature  "Height Pulse Oximetry BMI (Body Mass Index)       68 98.3  F (36.8  C) (Oral) 5' 4\" (1.626 m) 98% 27.98 kg/m2        Blood Pressure from Last 3 Encounters:   10/13/17 116/71   08/09/17 136/83   07/07/17 139/81    Weight from Last 3 Encounters:   10/13/17 163 lb (73.9 kg)   05/16/17 160 lb (72.6 kg)   10/11/16 151 lb 3.2 oz (68.6 kg)              We Performed the Following     TSH with free T4 reflex        Primary Care Provider Office Phone # Fax #    Milton Iglesias [phone]       [address] [place]  [facility] [address]        Equal Access to Services     HOSSEIN GUAJARDO : Hadii aad ku hadasho Soomaali, waaxda luqadaha, qaybta kaalmada adeegyada, waxramona choi hayjustina yni . So [facility] [phone].    ATENCIÓN: Si habla español, tiene a mosley disposición servicios gratuitos de asistencia lingüística. Llame al [phone].    We comply with applicable federal civil rights laws and [place] laws. We do not discriminate on the basis of race, color, national origin, age, disability, sex, sexual orientation, or gender identity.            Thank you!     Thank you for choosing [facility]  for your care. Our goal is always to provide you with excellent care. Hearing back from our patients is one way we can continue to improve our services. Please take a few minutes to complete the written survey that you may receive in the mail after your visit with us. Thank you!             Your Updated Medication List - Protect others around you: Learn how to safely use, store and throw away your medicines at www.disposemymeds.org.          This list is accurate as of: 10/13/17  9:47 AM.  Always use your most recent med list.                   Brand Name Dispense Instructions for use Diagnosis    aspirin 81 MG tablet      Take by mouth daily        IBU-200 PO      Take 2 tablets by mouth as needed        menthol 5 % Pads    ICY HOT    30 patch    Apply 1 patch topically every 8 hours as needed " for muscle soreness May cut to fit    Myalgia and myositis, Fibromyalgia syndrome       oxyCODONE 5 MG IR tablet    ROXICODONE     Take 1 tablet by mouth 2 times daily        QUEtiapine 200 MG tablet    SEROquel     100 mg daily as needed Take 100 mg by mouth daily. weening        sildenafil 100 MG tablet    VIAGRA    10 tablet    Take 1/4 - 1 tablet, as directed, 1-3 hours before intimacy. Maximum 1 dose per 24 hours.    Erectile dysfunction following radical prostatectomy       vitamin D 1000 UNITS capsule      Take 1 capsule by mouth 2 times daily.

## 2017-10-13 NOTE — PROGRESS NOTES
SUBJECTIVE:   Jeanmarie Mclean is a 57 year old male who presents to clinic today for the following health issues:  Chief Complaint   Patient presents with     Musculoskeletal Problem     left foot and ankle     Edema     edema in jaw     Medication Request     medical marijuana      Blood Draw     wants to check arthritis       Joint Pain    Onset: 2 days    Description:   Location: left foot and ankle  Character: Sharp    Intensity: moderate to severe    Progression of Symptoms: intermittent    Accompanying Signs & Symptoms:  Other symptoms: unexplainable    History:   Previous similar pain: YES      Precipitating factors:   Trauma or overuse: YES- after jogging on 10-11-17    Alleviating factors:  Improved by:     Therapies Tried and outcome: oxycodone and ibuprofen, helps a lot      Problem list and histories reviewed & adjusted, as indicated.  Additional history: as documented    Patient Active Problem List   Diagnosis     Malignant neoplasm of prostate (H)     CARDIOVASCULAR SCREENING; LDL GOAL LESS THAN 130     GERD (gastroesophageal reflux disease)     Overweight (BMI 25.0-29.9)     Advance care planning     Fibromyalgia syndrome     Posttraumatic stress disorder     Low back pain     Inadequate material resources     Anxiety state     History of Asperger's syndrome     Pervasive developmental disorder, active     Depressive disorder     Delusional disorder (H)     TBI (traumatic brain injury) (H)     Insomnia     Chronic pain     Myalgia     male stress incontinence     Major depressive disorder, recurrent episode, moderate (H)     Low back pain without sciatica, unspecified back pain laterality, unspecified chronicity     Past Surgical History:   Procedure Laterality Date     C HEP B VAC ADULT 3 DOSE IM  1995    received all 3 vaccines     C REMV PROSTATE,RETROPUB,RADICAL  10/13/04    [place] Muñoz [place]     CYSTOSCOPY  10/98    for renal stones     HC KNEE SCOPE,MED/LAT MENISECTOMY  6/24/11    [place]  "    HERNIA REPAIR, INGUINAL RT/LT      [facility]       Social History   Substance Use Topics     Smoking status: Former Smoker     Packs/day: 0.50     Years: 10.00     Types: Cigarettes     Quit date: 1997     Smokeless tobacco: Never Used     Alcohol use No      Comment: hx alcoholism quit . [facility] dependency treatment in      Family History   Problem Relation Age of Onset     Psychotic Disorder Son      autism     Prostate Cancer Father      40s     CANCER Father      CANCER Maternal Grandmother      lung     Glaucoma Maternal Grandmother      Eye Disorder Maternal Grandmother      glaucoma     DIABETES Mother       45     Blood Disease Sister      sickle trait     Hypertension Sister      Blood Disease Paternal Uncle      sickle     Blood Disease Paternal Aunt      sickle     Alzheimer Disease Paternal Grandfather      70s     Macular Degeneration Paternal Grandfather      CEREBROVASCULAR DISEASE No family hx of      Thyroid Disease No family hx of      Myocardial Infarction No family hx of      C.A.D. No family hx of              Reviewed and updated as needed this visit by clinical staff     Reviewed and updated as needed this visit by Provider         ROS:  Constitutional, HEENT, cardiovascular, pulmonary, GI, , musculoskeletal, neuro, skin, endocrine and psych systems are negative, except as otherwise noted.      OBJECTIVE:   /71 (BP Location: Left arm, Patient Position: Chair, Cuff Size: Adult Regular)  Pulse 68  Temp 98.3  F (36.8  C) (Oral)  Ht 5' 4\" (1.626 m)  Wt 163 lb (73.9 kg)  SpO2 98%  BMI 27.98 kg/m2  Body mass index is 27.98 kg/(m^2).  GENERAL: healthy, alert and no distress  NECK: no adenopathy, no asymmetry, masses, or scars and thyroid normal to palpation  RESP: lungs clear to auscultation - no rales, rhonchi or wheezes  CV: regular rate and rhythm, normal S1 S2, no S3 or S4, no murmur, click or rub, no peripheral edema and peripheral pulses " strong  ABDOMEN: soft, nontender, no hepatosplenomegaly, no masses and bowel sounds normal  MS: mild tenderness left lateral malleolus and left base of 5th metatarsal. No deformities.  Diagnostic Test Results:  none     ASSESSMENT/PLAN:       1. Left foot pain  No signs of acute fx or misalignments. Likely tendonitis. Patient will do low-impact exercises.  - XR Ankle Left G/E 3 Views; Future  - XR Foot Left G/E 3 Views; Future    2. Weight gain  Check tft's.  - TSH with free T4 reflex    See Patient Instructions    Milton Iglesias MD, MD  [facility]

## 2017-11-10 ENCOUNTER — OFFICE VISIT (OUTPATIENT)
Dept: FAMILY MEDICINE | Facility: CLINIC | Age: 57
End: 2017-11-10
Payer: COMMERCIAL

## 2017-11-10 VITALS
SYSTOLIC BLOOD PRESSURE: 121 MMHG | HEART RATE: 72 BPM | OXYGEN SATURATION: 100 % | WEIGHT: 164 LBS | DIASTOLIC BLOOD PRESSURE: 82 MMHG | HEIGHT: 64 IN | TEMPERATURE: 97.7 F | BODY MASS INDEX: 28 KG/M2

## 2017-11-10 DIAGNOSIS — R63.2 APPETITE INCREASE: Primary | ICD-10-CM

## 2017-11-10 DIAGNOSIS — Z13.1 SCREENING FOR DIABETES MELLITUS: ICD-10-CM

## 2017-11-10 DIAGNOSIS — R25.3 TWITCHING: ICD-10-CM

## 2017-11-10 DIAGNOSIS — C61 MALIGNANT NEOPLASM OF PROSTATE (H): ICD-10-CM

## 2017-11-10 LAB
ALBUMIN SERPL-MCNC: 4 G/DL (ref 3.4–5)
ALP SERPL-CCNC: 79 U/L (ref 40–150)
ALT SERPL W P-5'-P-CCNC: 34 U/L (ref 0–70)
ANION GAP SERPL CALCULATED.3IONS-SCNC: 9 MMOL/L (ref 3–14)
AST SERPL W P-5'-P-CCNC: 30 U/L (ref 0–45)
BILIRUB SERPL-MCNC: 1.6 MG/DL (ref 0.2–1.3)
BUN SERPL-MCNC: 14 MG/DL (ref 7–30)
CALCIUM SERPL-MCNC: 9.3 MG/DL (ref 8.5–10.1)
CHLORIDE SERPL-SCNC: 104 MMOL/L (ref 94–109)
CO2 SERPL-SCNC: 25 MMOL/L (ref 20–32)
CREAT SERPL-MCNC: 0.83 MG/DL (ref 0.66–1.25)
DEPRECATED CALCIDIOL+CALCIFEROL SERPL-MC: 42 UG/L (ref 20–75)
ERYTHROCYTE [DISTWIDTH] IN BLOOD BY AUTOMATED COUNT: 13.3 % (ref 10–15)
GFR SERPL CREATININE-BSD FRML MDRD: >90 ML/MIN/1.7M2
GLUCOSE SERPL-MCNC: 125 MG/DL (ref 70–99)
HBA1C MFR BLD: 5.7 % (ref 4.3–6)
HCT VFR BLD AUTO: 43.3 % (ref 40–53)
HGB BLD-MCNC: 14.6 G/DL (ref 13.3–17.7)
MCH RBC QN AUTO: 29.3 PG (ref 26.5–33)
MCHC RBC AUTO-ENTMCNC: 33.7 G/DL (ref 31.5–36.5)
MCV RBC AUTO: 87 FL (ref 78–100)
PLATELET # BLD AUTO: 242 10E9/L (ref 150–450)
POTASSIUM SERPL-SCNC: 3.4 MMOL/L (ref 3.4–5.3)
PROT SERPL-MCNC: 8.2 G/DL (ref 6.8–8.8)
RBC # BLD AUTO: 4.98 10E12/L (ref 4.4–5.9)
SODIUM SERPL-SCNC: 138 MMOL/L (ref 133–144)
VIT B12 SERPL-MCNC: 948 PG/ML (ref 193–986)
WBC # BLD AUTO: 7.8 10E9/L (ref 4–11)

## 2017-11-10 PROCEDURE — 83036 HEMOGLOBIN GLYCOSYLATED A1C: CPT | Performed by: PREVENTIVE MEDICINE

## 2017-11-10 PROCEDURE — 36415 COLL VENOUS BLD VENIPUNCTURE: CPT | Performed by: PREVENTIVE MEDICINE

## 2017-11-10 PROCEDURE — 82607 VITAMIN B-12: CPT | Performed by: PREVENTIVE MEDICINE

## 2017-11-10 PROCEDURE — 85027 COMPLETE CBC AUTOMATED: CPT | Performed by: PREVENTIVE MEDICINE

## 2017-11-10 PROCEDURE — 99214 OFFICE O/P EST MOD 30 MIN: CPT | Performed by: PREVENTIVE MEDICINE

## 2017-11-10 PROCEDURE — 82306 VITAMIN D 25 HYDROXY: CPT | Performed by: PREVENTIVE MEDICINE

## 2017-11-10 PROCEDURE — 80053 COMPREHEN METABOLIC PANEL: CPT | Performed by: PREVENTIVE MEDICINE

## 2017-11-10 ASSESSMENT — ANXIETY QUESTIONNAIRES
7. FEELING AFRAID AS IF SOMETHING AWFUL MIGHT HAPPEN: NEARLY EVERY DAY
5. BEING SO RESTLESS THAT IT IS HARD TO SIT STILL: SEVERAL DAYS
6. BECOMING EASILY ANNOYED OR IRRITABLE: SEVERAL DAYS
2. NOT BEING ABLE TO STOP OR CONTROL WORRYING: NEARLY EVERY DAY
GAD7 TOTAL SCORE: 15
3. WORRYING TOO MUCH ABOUT DIFFERENT THINGS: NEARLY EVERY DAY
1. FEELING NERVOUS, ANXIOUS, OR ON EDGE: NEARLY EVERY DAY

## 2017-11-10 ASSESSMENT — PATIENT HEALTH QUESTIONNAIRE - PHQ9: 5. POOR APPETITE OR OVEREATING: SEVERAL DAYS

## 2017-11-10 ASSESSMENT — PAIN SCALES - GENERAL: PAINLEVEL: NO PAIN (0)

## 2017-11-10 NOTE — PATIENT INSTRUCTIONS
At [facility], we strive to deliver an exceptional experience to you, every time we see you.  If you receive a survey in the mail, please send us back your thoughts. We really do value your feedback.    Based on your medical history, these are the current health maintenance/preventive care services that you are due for (some may have been done at this visit.)  Health Maintenance Due   Topic Date Due     URINE DRUG SCREEN Q1 YR  02/11/1976     DEPRESSION ACTION PLAN Q1 YR  09/21/2017     STEFANI QUESTIONNAIRE 1 YEAR  10/26/2017         Suggested websites for health information:  Www.Allthetopbananas.com.TopOPPS : Up to date and easily searchable information on multiple topics.  Www.[employer].gov : medication info, interactive tutorials, watch real surgeries online  Www.familydoctor.org : good info from the Academy of Family Physicians  Www.cdc.gov : public health info, travel advisories, epidemics (H1N1)  Www.aap.org : children's health info, normal development, vaccinations  Www.health.[facility].[place].us : [place] dept of health, public health issues in [place], N1N1    Your care team:                            Family Medicine Internal Medicine   MD Aaron Schulte MD Shantel Branch-Fleming, MD Katya Georgiev PA-C Nam Ho, MD Pediatrics   LIZ Uriostegui, MD Lisa Noguera CNP, MD Deborah Mielke, MD Kim Thein, APRN CNP      Clinic hours: Monday - Thursday 7 am-7 pm; Fridays 7 am-5 pm.   Urgent care: Monday - Friday 11 am-9 pm; Saturday and Sunday 9 am-5 pm.  Pharmacy : Monday -Thursday 8 am-8 pm; Friday 8 am-6 pm; Saturday and Sunday 9 am-5 pm.     Clinic: [phone]   Pharmacy: [phone]

## 2017-11-10 NOTE — MR AVS SNAPSHOT
After Visit Summary   11/10/2017    Jeanmarie Mclean    MRN: [id number]           Patient Information     Date Of Birth          1960        Visit Information        Provider Department      11/10/2017 8:40 AM Pilar Han MD [facility]        Today's Diagnoses     Appetite increase    -  1    Twitching        Malignant neoplasm of prostate (H)        Screening for diabetes mellitus          Care Instructions    At [facility], we strive to deliver an exceptional experience to you, every time we see you.  If you receive a survey in the mail, please send us back your thoughts. We really do value your feedback.    Based on your medical history, these are the current health maintenance/preventive care services that you are due for (some may have been done at this visit.)  Health Maintenance Due   Topic Date Due     URINE DRUG SCREEN Q1 YR  02/11/1976     DEPRESSION ACTION PLAN Q1 YR  09/21/2017     STEFANI QUESTIONNAIRE 1 YEAR  10/26/2017         Suggested websites for health information:  Www.[employer] : Up to date and easily searchable information on multiple topics.  Www.medlineplus.gov : medication info, interactive tutorials, watch real surgeries online  Www.familydoctor.org : good info from the Academy of Family Physicians  Www.cdc.gov : public health info, travel advisories, epidemics (H1N1)  Www.aap.org : children's health info, normal development, vaccinations  Www.health.state[place] : [place] dept of health, public health issues in [place], N1N1    Your care team:                            Family Medicine Internal Medicine   MD Aaron Schulte MD Shantel Branch-Fleming, MD Katya Georgiev PA-C Nam Ho, MD Pediatrics   LIZ Uriostegui, MD Lisa Noguera CNP, MD Deborah Mielke, MD Kim Thein, RAJINDER CNP      Clinic hours: Monday - Thursday 7 am-7 pm; Fridays 7 am-5 pm.   Urgent  care: Monday - Friday 11 am-9 pm; Saturday and Sunday 9 am-5 pm.  Pharmacy : Monday -Thursday 8 am-8 pm; Friday 8 am-6 pm; Saturday and Sunday 9 am-5 pm.     Clinic: [phone]   Pharmacy: [phone]            Follow-ups after your visit        Follow-up notes from your care team     Return if symptoms worsen or fail to improve.      Your next 10 appointments already scheduled     Nov 17, 2017  8:30 AM CST   PROCEDURE with Angélica King MD   [facility] ([facility])    [address] [place] [address]  [address]  [facility] [address] [phone]            Jun 11, 2018 10:00 AM CDT   LAB with  LAB   [facility] ([facility])    [address] [place] [id number] [phone]           Please do not eat 10-12 hours before your appointment if you are coming in fasting for labs on lipids, cholesterol, or glucose (sugar). This does not apply to pregnant women. Water, hot tea and black coffee (with nothing added) are okay. Do not drink other fluids, diet soda or chew gum.            José Manuel 15, 2018 10:00 AM CDT   Return Visit with Devon Miles MD   [facility] ([facility])    [address] [place] [id number] [phone]              Future tests that were ordered for you today     Open Future Orders        Priority Expected Expires Ordered    Enteric Bacteria and Virus Panel by [facility] Stool Routine  11/10/2018 11/10/2017            Who to contact     If you have questions or need follow up information about today's clinic visit or your schedule please contact [facility]] RICHARDSON MARCELO directly at [phone].  Normal or non-critical lab and imaging results will be communicated to you by MyChart, letter or phone within 4 business days after the clinic has received the results. If you do not hear from us within 7 days, please contact the clinic through MyChart or phone. If you have a critical or  "abnormal lab result, we will notify you by phone as soon as possible.  Submit refill requests through [employer] or call your pharmacy and they will forward the refill request to us. Please allow 3 business days for your refill to be completed.          Additional Information About Your Visit        Tutorspreehart Information     [employer] gives you secure access to your electronic health record. If you see a primary care provider, you can also send messages to your care team and make appointments. If you have questions, please call your primary care clinic.  If you do not have a primary care provider, please call [phone] and they will assist you.        Care EveryWhere ID     This is your Care EveryWhere ID. This could be used by other organizations to access your [facility] medical records  [id number]        Your Vitals Were     Pulse Temperature Height Pulse Oximetry BMI (Body Mass Index)       72 97.7  F (36.5  C) (Oral) 5' 4\" (1.626 m) 100% 28.15 kg/m2        Blood Pressure from Last 3 Encounters:   11/10/17 121/82   10/13/17 116/71   08/09/17 136/83    Weight from Last 3 Encounters:   11/10/17 164 lb (74.4 kg)   10/13/17 163 lb (73.9 kg)   05/16/17 160 lb (72.6 kg)              We Performed the Following     CBC with platelets     Comprehensive metabolic panel     Hemoglobin A1c     Vitamin B12     Vitamin D Deficiency        Primary Care Provider Office Phone # Fax #    Milton Iglesias [phone]       [address]  [facility] [address]        Equal Access to Services     [facility]: Hadii aad ku hadasho Soomaali, waaxda luqadaha, qaybta kaalmada adeegyada, waxay idiin becca yin . So [facility] [phone].    ATENCIÓN: Si habla español, tiene a mosley disposición servicios gratuitos de asistencia lingüística. Llame al [phone].    We comply with applicable federal civil rights laws and [place] laws. We do not discriminate on the basis of race, color, national origin, age, " disability, sex, sexual orientation, or gender identity.            Thank you!     Thank you for choosing [facility]  for your care. Our goal is always to provide you with excellent care. Hearing back from our patients is one way we can continue to improve our services. Please take a few minutes to complete the written survey that you may receive in the mail after your visit with us. Thank you!             Your Updated Medication List - Protect others around you: Learn how to safely use, store and throw away your medicines at www.disposemymeds.org.          This list is accurate as of: 11/10/17 10:17 AM.  Always use your most recent med list.                   Brand Name Dispense Instructions for use Diagnosis    aspirin 81 MG tablet      Take by mouth daily        IBU-200 PO      Take 2 tablets by mouth as needed        menthol 5 % Pads    ICY HOT    30 patch    Apply 1 patch topically every 8 hours as needed for muscle soreness May cut to fit    Myalgia and myositis, Fibromyalgia syndrome       oxyCODONE IR 5 MG tablet    ROXICODONE     Take 1 tablet by mouth 2 times daily        QUEtiapine 200 MG tablet    SEROquel     100 mg daily as needed Take 100 mg by mouth daily. weening        sildenafil 100 MG tablet    VIAGRA    10 tablet    Take 1/4 - 1 tablet, as directed, 1-3 hours before intimacy. Maximum 1 dose per 24 hours.    Erectile dysfunction following radical prostatectomy       vitamin D 1000 UNITS capsule      Take 1 capsule by mouth 2 times daily.

## 2017-11-10 NOTE — NURSING NOTE
"Chief Complaint   Patient presents with     worms     twitching     both hands       Initial /82  Pulse 72  Temp 97.7  F (36.5  C) (Oral)  Ht 5' 4\" (1.626 m)  Wt 164 lb (74.4 kg)  SpO2 100%  BMI 28.15 kg/m2 Estimated body mass index is 28.15 kg/(m^2) as calculated from the following:    Height as of this encounter: 5' 4\" (1.626 m).    Weight as of this encounter: 164 lb (74.4 kg).  Medication Reconciliation: complete   Miko GIORDANO        "

## 2017-11-10 NOTE — PROGRESS NOTES
SUBJECTIVE:   Jeanmarie Mclean is a 57 year old male who presents to clinic today for the following health issues:      Musculoskeletal problem/pain      Duration: x 2 weeks     Description  Location: both hands    Intensity:  mild, moderate comes and goes    Accompanying signs and symptoms: twitching    History  Previous similar problem: YES  Previous evaluation:  none and EMG    Precipitating or alleviating factors:  Trauma or overuse: head injury 1985  Aggravating factors include: none    Therapies tried and outcome: nothing    Has had for some time, had EMG done in 2015  Lost balance 3 days ago  No tremor.  Occasional twitching and fingers may lock  Right side for some time, now also on left side  Sleep impaired  Takes Seroquel as needed for sleep   Wants to rule out Parkinson's  No family history   No recent trauma  Symptoms are not more at night  Fluctuates in severity  No aggravating or relieving factors   Better with physical activity       Check for worms  -Has gained a lot of weight  -Appetite increased  -No bowel changes  -No melena or rectal bleeding  -Slight abdominal cramping  -No changes in consistency of stool  -No travel  -Some rectal itching  -No sick contacts   -Quit Lyrica 3 months ago as concerned about increased appetite  -No other medication changes   -Has been drinking more water and more thirsty  -Wakes up at night to urinate       History of prostate cancer:  -Followed by Urology      Problem list and histories reviewed & adjusted, as indicated.  Additional history: as documented    Patient Active Problem List   Diagnosis     Malignant neoplasm of prostate (H)     CARDIOVASCULAR SCREENING; LDL GOAL LESS THAN 130     GERD (gastroesophageal reflux disease)     Overweight (BMI 25.0-29.9)     Advance care planning     Fibromyalgia syndrome     Posttraumatic stress disorder     Low back pain     Inadequate material resources     Anxiety state     History of Asperger's syndrome     Pervasive  developmental disorder, active     Depressive disorder     Delusional disorder (H)     TBI (traumatic brain injury) (H)     Insomnia     Chronic pain     Myalgia     male stress incontinence     Major depressive disorder, recurrent episode, moderate (H)     Low back pain without sciatica, unspecified back pain laterality, unspecified chronicity     Past Surgical History:   Procedure Laterality Date     C HEP B VAC ADULT 3 DOSE IM      received all 3 vaccines     C REMV PROSTATE,RETROPUB,RADICAL  10/13/04    [place]. Muñoz ([place]     CYSTOSCOPY  10/98    for renal stones     HC KNEE SCOPE,MED/LAT MENISECTOMY  11    [place] medial ([place]     HERNIA REPAIR, INGUINAL RT/LT      [facility] ([facility]       Social History   Substance Use Topics     Smoking status: Former Smoker     Packs/day: 0.50     Years: 10.00     Types: Cigarettes     Quit date: 1997     Smokeless tobacco: Never Used     Alcohol use No      Comment: hx alcoholism quit . [facility] dependency treatment in      Family History   Problem Relation Age of Onset     Psychotic Disorder Son      autism     Prostate Cancer Father      40s     CANCER Father      CANCER Maternal Grandmother      lung     Glaucoma Maternal Grandmother      Eye Disorder Maternal Grandmother      glaucoma     DIABETES Mother       45     Blood Disease Sister      sickle trait     Hypertension Sister      Blood Disease Paternal Uncle      sickle     Blood Disease Paternal Aunt      sickle     Alzheimer Disease Paternal Grandfather      70s     Macular Degeneration Paternal Grandfather      CEREBROVASCULAR DISEASE No family hx of      Thyroid Disease No family hx of      Myocardial Infarction No family hx of      C.A.D. No family hx of          Current Outpatient Prescriptions   Medication Sig Dispense Refill     aspirin 81 MG tablet Take by mouth daily       QUEtiapine (SEROQUEL) 200 MG tablet 100 mg daily as needed Take 100 mg by mouth daily. weening       menthol  "(ICY HOT) 5 % PADS Apply 1 patch topically every 8 hours as needed for muscle soreness May cut to fit 30 patch 3     sildenafil (VIAGRA) 100 MG tablet Take 1/4 - 1 tablet, as directed, 1-3 hours before intimacy. Maximum 1 dose per 24 hours. 10 tablet 5     Ibuprofen (IBU-200 PO) Take 2 tablets by mouth as needed       Cholecalciferol (VITAMIN D) 1000 UNIT capsule Take 1 capsule by mouth 2 times daily.       OXYCODONE HCL 5 MG OR TABS Take 1 tablet by mouth 2 times daily        Allergies   Allergen Reactions     Risperidone Other (See Comments)     Serve numbness      Effexor [Venlafaxine Hydrochloride] Other (See Comments)     Headache, Painful scrotum and drainage from the penis     Ambien Other (See Comments)     headaches     Ambien [Zolpidem Tartrate] Nausea     Buspirone Nausea     Dizziness, headache     Celexa [Citalopram] Other (See Comments)     Headache     Duloxetine Other (See Comments)     Headache     Septra [Bactrim] Itching     Seroquel [Quetiapine] Other (See Comments)     Headache, N, V     Ultram [Tramadol Hcl] Nausea and Vomiting     BP Readings from Last 3 Encounters:   11/10/17 121/82   10/13/17 116/71   08/09/17 136/83    Wt Readings from Last 3 Encounters:   11/10/17 164 lb (74.4 kg)   10/13/17 163 lb (73.9 kg)   05/16/17 160 lb (72.6 kg)              Reviewed and updated as needed this visit by clinical staffTobacco  Allergies  Meds       Reviewed and updated as needed this visit by Provider         ROS:  Constitutional, neuro, ENT, endocrine, pulmonary, cardiac, gastrointestinal, genitourinary, musculoskeletal, integument and psychiatric systems are negative, except as otherwise noted.      OBJECTIVE:                                                    /82  Pulse 72  Temp 97.7  F (36.5  C) (Oral)  Ht 5' 4\" (1.626 m)  Wt 164 lb (74.4 kg)  SpO2 100%  BMI 28.15 kg/m2  Body mass index is 28.15 kg/(m^2).  GENERAL APPEARANCE: healthy, alert and no distress  EYES: Eyes grossly normal " to inspection and conjunctivae and sclerae normal  NECK: no adenopathy and no asymmetry, masses, or scars  RESP: lungs clear to auscultation - no rales, rhonchi or wheezes  CV: regular rates and rhythm, normal S1 S2, no S3 or S4 and no murmur, click or rub  ABDOMEN: soft, non-tender  MS: extremities normal- no gross deformities noted and peripheral pulses normal  SKIN: no suspicious lesions or rashes  NEURO: Normal strength and tone, mentation intact, speech normal, DTR symmetrically normal in lower extremities, gait normal including heel/toe/tandem walking, cranial nerves 2-12 intact, Romberg negative and normal strength throughout  PSYCH: mentation appears normal    Diagnostic test results:  Diagnostic Test Results:  Results for orders placed or performed in visit on 11/10/17 (from the past 24 hour(s))   CBC with platelets   Result Value Ref Range    WBC 7.8 4.0 - 11.0 10e9/L    RBC Count 4.98 4.4 - 5.9 10e12/L    Hemoglobin 14.6 13.3 - 17.7 g/dL    Hematocrit 43.3 40.0 - 53.0 %    MCV 87 78 - 100 fl    MCH 29.3 26.5 - 33.0 pg    MCHC 33.7 31.5 - 36.5 g/dL    RDW 13.3 10.0 - 15.0 %    Platelet Count 242 150 - 450 10e9/L   Hemoglobin A1c   Result Value Ref Range    Hemoglobin A1C 5.7 4.3 - 6.0 %          ASSESSMENT/PLAN:                                                    1. Appetite increase  -Await labs  -Does not have diabetes  -Wants to be on an appetite suppressant, do not feel it is indicated at this time   - Vitamin B12  - CBC with platelets  - Comprehensive metabolic panel  - Enteric Bacteria and Virus Panel by [facility] Stool; Future  -Declined Nutrition referral   -Just a 4 pound weight gain since 08/17  -Wants to be checked for worms, thinks this may be causing his increased appetite based on his reading online  -Explained very low risk for parasitic infestations     2. Twitching  -Long standing  -Clinically does not look like Parkinson's, which is what he was concerned about   - Vitamin D Deficiency  - Vitamin  B12  - CBC with platelets  - Comprehensive metabolic panel  -EMG Done in the past  -if continues would recommend Neurology evaluation     3. Malignant neoplasm of prostate (H)  -Followed by urology     4. Screening for diabetes mellitus  -family history of diabetes+  - Hemoglobin A1c      Follow up with Provider - will contact with labs when available otherwise follow up with PCP as scheduled       Pilar Han MD MPH    [facility]

## 2017-11-11 ASSESSMENT — ANXIETY QUESTIONNAIRES: GAD7 TOTAL SCORE: 15

## 2017-11-11 NOTE — PROGRESS NOTES
Jeanmarie,     Basic blood count did not show anemia or infection in the blood.  Electrolytes (salts in your body), non fasting glucose, liver and kidney function are in the normal range.  You do not have diabetes  Vitamin D and Vitamin B 12 levels also look good.     Please do not hesitate to call us at [phone] if you have any questions or concerns.    Thank you,    Pilar Han MD MPH

## 2017-11-17 ENCOUNTER — OFFICE VISIT (OUTPATIENT)
Dept: PALLIATIVE MEDICINE | Facility: CLINIC | Age: 57
End: 2017-11-17
Payer: COMMERCIAL

## 2017-11-17 ENCOUNTER — TELEPHONE (OUTPATIENT)
Dept: PALLIATIVE MEDICINE | Facility: CLINIC | Age: 57
End: 2017-11-17

## 2017-11-17 VITALS — OXYGEN SATURATION: 100 % | HEART RATE: 54 BPM | SYSTOLIC BLOOD PRESSURE: 126 MMHG | DIASTOLIC BLOOD PRESSURE: 87 MMHG

## 2017-11-17 DIAGNOSIS — M79.18 MYOFASCIAL PAIN: Primary | ICD-10-CM

## 2017-11-17 DIAGNOSIS — M53.3 SI (SACROILIAC) JOINT DYSFUNCTION: ICD-10-CM

## 2017-11-17 PROCEDURE — 20553 NJX 1/MLT TRIGGER POINTS 3/>: CPT | Performed by: PSYCHIATRY & NEUROLOGY

## 2017-11-17 ASSESSMENT — PAIN SCALES - GENERAL
PAINLEVEL: SEVERE PAIN (6)
PAINLEVEL: NO PAIN (0)

## 2017-11-17 NOTE — PROGRESS NOTES
Pre procedure Diagnosis: myofascial pain   Post procedure Diagnosis: Same  Procedure performed: trigger point injections  Anesthesia: none  Complications: none  Operators: Cally King MD     Indications:   Jeanmarie Mclean is a 57 year old male with a history of low back, shoulder, and left hip pain.  Exam shows myofascial pain of the muscle groups below and they have tried conservative treatment including acupuncture, PT, massage, pain clinic    Options/alternatives, benefits and risks were discussed with the patient including bleeding, infection, tissue trauma and pnuemothorax.  Questions were answered to his satisfaction and he agrees to proceed. Voluntary informed consent was obtained and signed.     Vitals were reviewed: Yes  Allergies were reviewed:  Yes   Medications were reviewed:  Yes   Pre-procedure pain score: 5/10    Procedure:  After getting informed consent, a Pause for the Cause was performed.    Trigger points were identified by patient, and marked when appropriate.  The area was prepped with Chloroprep.    Using clean technique, injections were completed using a 25G, 1.5 inch needle.  After negative aspiration, injection was completed.  A total of 8 locations were injected.  When possible, tissue was retracted from the chest wall to avoid lung injury.    Muscle groups injected:  Bilateral quadratus lumborum   Left levator scapulae  Bilateral lumbar paraspinals    Injection solution contained:  5ml of 1% lidocaine and 5ml of 0.5% bupivacaine.    Hemostasis was achieved, the area was cleaned, and bandaids were placed when appropriate.  The patient tolerated the procedure well.  Breath sounds were normal.      Post-procedure pain score: improved/10  Follow-up includes:   [facility]  -repeat prn, ok to double book   -SI joint injection      Cally King MD  [facility]

## 2017-11-17 NOTE — TELEPHONE ENCOUNTER
Pre-screening Questions for Radiology Injections:    Injection to be done at which interventional clinic site? [facility]    Procedure ordered by Cally King    Procedure ordered? Bilateral SI Joint Injection    What insurance would patient like us to bill for this procedure? [employer]      Worker's comp or MVA (motor vehicle accident) -Any injection DO NOT SCHEDULE and route to Yumiko Vásquez.      [employer] insurance - For SI joint injections, DO NOT SCHEDULE and route Kym Fisher.      [employer]- MBB's must be scheduled at LEAST two weeks apart      [employer] - Any injection besides hip/shoulder/knee joint DO NOT SCHEDULE and route to Kym Fisher. She will obtain PA and call pt back to schedule procedure or notify pt of denial.       HP [employer]-PA REQUIRED FOR NON-JOSE R OR Joint injections    Any chance of pregnancy? Not Applicable   If YES, do NOT schedule and route to RN pool    Is an  needed? No     Patient has a drive home? (mandatory) YES:     Is patient taking any blood thinners (plavix, coumadin, jantoven, warfarin, heparin, pradaxa or dabigatran )? No   If hold needed, do NOT schedule, route to RN pool     Is patient taking any aspirin products? Yes - Pt takes 81 mg daily; instructed to hold 0 day(s) prior to procedure.      If more than 325mg/day do NOT schedule; route to RN pool     For CERVICAL procedures, hold all aspirin products for 6 days.      Does the patient have a bleeding or clotting disorder? No     If YES, okay to schedule AND route to RN nurse pool    **For any patients with platelet count <100, must be forwarded to provider**    Is patient diabetic?  No  If YES, have them bring their glucometer.    Does patient have an active infection or treated for one within the past week? No     Is patient currently taking any antibiotics?  No     For patients on chronic, preventative, or prophylactic antibiotics, procedures may be scheduled.     For patients  on antibiotics for active or recent infection:    Christine Luna Burton, Snitzer-antibiotic course must have been completed for 4 days    Dr. Girard-antibiotic course must have been completed for 7 days    Is patient currently taking any steroid medications? (i.e. Prednisone, Medrol)  No     For patients on steroid medications:    Christine Luna Burton, Snitzer-steroid course must have been completed for 4 days    -steroid course must have been completed for 7 days    Reviewed with patient:  If you are started on any steroids or antibiotics between now and your appointment, you must contact us because it may affect our ability to perform your procedure.  Yes    Is patient actively being treated for cancer or immunocompromised? No  If YES, do NOT schedule and route to RN pool     Are you able to get on and off an exam table with minimal or no assistance? Yes  If NO, do NOT schedule and route to RN pool    Are you able to roll over and lay on your stomach with minimal or no assistance? Yes  If NO, do NOT schedule and route to RN pool     Any allergies to contrast dye, iodine, shellfish, or numbing and steroid medications? No  If YES, route to RN pool AND add allergy information to appointment notes    Allergies: Risperidone; Effexor [venlafaxine hydrochloride]; Ambien; Ambien [zolpidem tartrate]; Buspirone; Celexa [citalopram]; Duloxetine; Septra [bactrim]; Seroquel [quetiapine]; and Ultram [tramadol hcl]      Has the patient had a flu shot or any other vaccinations within 7 days before or after the procedure.  No     Does patient have an MRI/CT?  Not Applicable  (SI joint, hip injections, lumbar sympathetic blocks, and stellate ganglion blocks do not require an MRI)    Was the MRI done w/in the last 3 years?  NA    Was MRI done at [facility]? Yes      If not, where was it done? N/A       If MRI was not done at [facility], [facility] do NOT schedule and route to nursing.  If pt has an  imaging disc, the injection may be scheduled but pt has to bring disc to appt. If they show up w/out disc the injection cannot be done    Reminders (please tell patient if applicable):       Instructed pt to arrive 30 minutes early for IV start if this is for a cervical procedure, ALL sympathetic (stellate ganglion, hypogastric, or lumbar sympathetic block) and all sedation procedures (RFA, spinal cord stimulation trials).  Not Applicable   -IVs are not routinely placed for Dr. Maier cervical cases   -Dr. Castellanos: IVs for cervical ESIs and cervical TBDs (not CMBBs/facet inj)      If NPO for sedation, informed patient that it is okay to take medications with sips of water (except if they are to hold blood thinners).  Not Applicable   *DO take blood pressure medication if it is prescribed*      If this is for a cervical JOSE R, informed patient that aspirin needs to be held for 6 days.   Not Applicable      For all patients not having spinal cord stimulator (SCS) trials or radiofrequency ablations (RFAs), informed patient:    IV sedation is not provided for this procedure.  If you feel that an oral anti-anxiety medication is needed, you can discuss this further with your referring provider or primary care provider.  The Pain Clinic provider will discuss specifics of what the procedure includes at your appointment.  Most procedures last 10-20 minutes.  We use numbing medications to help with any discomfort during the procedure.  NO      Do not schedule procedures requiring IV placement in the first appointment of the day or first appointment after lunch.       For patients 85 or older we recommend having an adult stay w/ them for the remainder of the day.       Does the patient have any questions?  NO  Margot Samaniego  [facility]

## 2017-11-17 NOTE — PATIENT INSTRUCTIONS
[facility]   Post Procedure Instructions    Today you had:  trigger point injections       Medications used:  lidocaine   bupivicaine           Go to the emergency room if you develop any shortness of breath    Monitor the injection sites for signs and symptoms of infection-fever, chills, redness, swelling, warmth, or drainage to areas.    You may have soreness at injection sites for up to 24 hours.    You may apply ice to the painful areas to help minimize the discomfort of the needle pokes.    Do not apply heat to sites for at least 12 hours.    You may use anti-inflammatory medications or Tylenol for pain control if necessary  Nurse line: [phone]  After hours provider line: [phone]  Appointment line: [phone]

## 2017-11-17 NOTE — NURSING NOTE
Pt here for TPI injection. Pain in the lower back, buttocks.     SONNY RoseN, RN-BC  Patient Care Supervisor/Care Coordinator  [facility]

## 2017-11-17 NOTE — MR AVS SNAPSHOT
After Visit Summary   11/17/2017    Jeanmarie Mclean    MRN: [id number]           Patient Information     Date Of Birth          1960        Visit Information        Provider Department      11/17/2017 8:30 AM Angélica King MD [facility]        Care Instructions    [facility]   Post Procedure Instructions    Today you had:  trigger point injections       Medications used:  lidocaine   bupivicaine           Go to the emergency room if you develop any shortness of breath    Monitor the injection sites for signs and symptoms of infection-fever, chills, redness, swelling, warmth, or drainage to areas.    You may have soreness at injection sites for up to 24 hours.    You may apply ice to the painful areas to help minimize the discomfort of the needle pokes.    Do not apply heat to sites for at least 12 hours.    You may use anti-inflammatory medications or Tylenol for pain control if necessary  Nurse line: [phone]  After hours provider line: [phone]  Appointment line: [phone]              Follow-ups after your visit        Your next 10 appointments already scheduled     Jun 11, 2018 10:00 AM CDT   LAB with FK LAB   [facility] ([address] [address] [phone]           Please do not eat 10-12 hours before your appointment if you are coming in fasting for labs on lipids, cholesterol, or glucose (sugar). This does not apply to pregnant women. Water, hot tea and black coffee (with nothing added) are okay. Do not drink other fluids, diet soda or chew gum.            José Manuel 15, 2018 10:00 AM CDT   Return Visit with Devon Miles MD   [address] [address]   [phone]              Who to contact     If you have questions or need follow up information about today's clinic visit or your schedule  please contact [facility] directly at [phone].  Normal or non-critical lab and imaging results will be communicated to you by MyChart, letter or phone within 4 business days after the clinic has received the results. If you do not hear from us within 7 days, please contact the clinic through MyChart or phone. If you have a critical or abnormal lab result, we will notify you by phone as soon as possible.  Submit refill requests through [employer] or call your pharmacy and they will forward the refill request to us. Please allow 3 business days for your refill to be completed.          Additional Information About Your Visit        [employer] Business[employer]Luvs Information     [employer] gives you secure access to your electronic health record. If you see a primary care provider, you can also send messages to your care team and make appointments. If you have questions, please call your primary care clinic.  If you do not have a primary care provider, please call [phone] and they will assist you.        Care EveryWhere ID     This is your Care EveryWhere ID. This could be used by other organizations to access your [facility] medical records  [id number]        Your Vitals Were     Pulse Pulse Oximetry                54 100%           Blood Pressure from Last 3 Encounters:   11/17/17 126/87   11/10/17 121/82   10/13/17 116/71    Weight from Last 3 Encounters:   11/10/17 74.4 kg (164 lb)   10/13/17 73.9 kg (163 lb)   05/16/17 72.6 kg (160 lb)              Today, you had the following     No orders found for display       Primary Care Provider Office Phone # Fax #    Milton Iglesias [phone]       [address]  [facility] [address]        Equal Access to Services     [facility] Center[facility] : Hadii marjan Zafar, waaxda luqadaha, qaybta kaalmasrinivasan huntley. So [facility] [phone].    ATENCIÓN: Si habla español, tiene a mosley disposición servicios gratuitos de  asistencia lingüística. Margaret al [phone].    We comply with applicable federal civil rights laws and [place] laws. We do not discriminate on the basis of race, color, national origin, age, disability, sex, sexual orientation, or gender identity.            Thank you!     Thank you for choosing [facility]  for your care. Our goal is always to provide you with excellent care. Hearing back from our patients is one way we can continue to improve our services. Please take a few minutes to complete the written survey that you may receive in the mail after your visit with us. Thank you!             Your Updated Medication List - Protect others around you: Learn how to safely use, store and throw away your medicines at www.disposemymeds.org.          This list is accurate as of: 11/17/17  8:39 AM.  Always use your most recent med list.                   Brand Name Dispense Instructions for use Diagnosis    aspirin 81 MG tablet      Take by mouth daily        IBU-200 PO      Take 2 tablets by mouth as needed        menthol 5 % Pads    ICY HOT    30 patch    Apply 1 patch topically every 8 hours as needed for muscle soreness May cut to fit    Myalgia and myositis, Fibromyalgia syndrome       oxyCODONE IR 5 MG tablet    ROXICODONE     Take 1 tablet by mouth 2 times daily        QUEtiapine 200 MG tablet    SEROquel     100 mg daily as needed Take 100 mg by mouth daily. weening        sildenafil 100 MG tablet    VIAGRA    10 tablet    Take 1/4 - 1 tablet, as directed, 1-3 hours before intimacy. Maximum 1 dose per 24 hours.    Erectile dysfunction following radical prostatectomy       vitamin D 1000 UNITS capsule      Take 1 capsule by mouth 2 times daily.

## 2017-12-07 ENCOUNTER — RADIOLOGY INJECTION OFFICE VISIT (OUTPATIENT)
Dept: PALLIATIVE MEDICINE | Facility: CLINIC | Age: 57
End: 2017-12-07
Payer: COMMERCIAL

## 2017-12-07 ENCOUNTER — RADIANT APPOINTMENT (OUTPATIENT)
Dept: RADIOLOGY | Facility: CLINIC | Age: 57
End: 2017-12-07
Attending: PSYCHIATRY & NEUROLOGY

## 2017-12-07 VITALS — DIASTOLIC BLOOD PRESSURE: 81 MMHG | OXYGEN SATURATION: 100 % | SYSTOLIC BLOOD PRESSURE: 140 MMHG | HEART RATE: 49 BPM

## 2017-12-07 DIAGNOSIS — M53.3 SI (SACROILIAC) JOINT DYSFUNCTION: Primary | ICD-10-CM

## 2017-12-07 DIAGNOSIS — M53.3 SI (SACROILIAC) JOINT DYSFUNCTION: ICD-10-CM

## 2017-12-07 PROCEDURE — 27096 INJECT SACROILIAC JOINT: CPT | Mod: 50 | Performed by: PSYCHIATRY & NEUROLOGY

## 2017-12-07 ASSESSMENT — PAIN SCALES - GENERAL: PAINLEVEL: EXTREME PAIN (8)

## 2017-12-07 NOTE — PROGRESS NOTES
Pre procedure Diagnosis: SI joint dysfunction    Post procedure Diagnosis: Same  Procedure performed: Bilateral SI joint injections  Anesthesia: none  Complications: none  Operators: Cally King MD and Fatimah Hong MD      Indications:   Jeanmarie Mclean is a 57 year old male seen by Dr. King in clinic and previous for SI joint injection, last August 2017. He presents with similar pain today. Last SI joint injections lasted for 2 months. He has a history of low back pain and SI joint dysfunction.  Exam shows positive tenderness at the PSIS bilaterally again today.  He had great success from previous injections.  He has tried conservative treatment including PT and medications.    Options/alternatives, benefits and risks were discussed with the patient including bleeding, infection, tissue trauma, exposure to radiation, reaction to medications including seizure, nerve injury, weakness, and numbness.  Questions were answered to his satisfaction and he agrees to proceed. Voluntary informed consent was obtained and signed.     Vitals were reviewed: Yes  Allergies were reviewed:  Yes   Medications were reviewed:  Yes   Pre-procedure pain score: 8/10    Procedure:  After getting informed consent, patient was brought into the procedure suite and was placed in a prone position on the procedure table.   A Pause for the Cause was performed.  Patient was prepped and draped in sterile fashion.     After identifying the bilateral SI joints, the C-arm was rotated to a obliquely to obtain the best view of the inferior angle of the joint.  A total of 4 ml of Lidocaine 1%  was used to anesthetize the skin at a skin entry site coaxial with the fluoroscopy beam at this location.  A 22gauge 3.5 inch needle was advanced under intermittent fluoroscopy until it was felt to enter the SI joint.    A total of 4.5 ml of Omnipaque-300 was injected, confirming appropriate position, with spread into the intraarticular space, with no  intravascular uptake noted. 5.5ml was wasted.  Location was verified in lateral view.    3 ml of 0.2% ropivacaine with 80mg of kenalog was injected, divided equally between the two sides.  The needle was flushed with lidocaine and removed.      Hemostasis was achieved, the area was cleaned, and bandaids were placed when appropriate.  The patient tolerated the procedure well, and was taken to the recovery room.    Images were saved to PACS.    Post-procedure pain score: 0/10  Follow-up includes:   -f/u phone call in one week  -f/u with Dr. King in clinic

## 2017-12-07 NOTE — MR AVS SNAPSHOT
After Visit Summary   12/7/2017    Jeanmarie Mclean    MRN: [id number]           Patient Information     Date Of Birth          1960        Visit Information        Provider Department      12/7/2017 8:15 AM Angélica King MD [facility]        Care Instructions    [facility]   Procedure Discharge Instructions    Today you saw:    Dr. Angélica King      You had an:  bilateral  sacro-iliac joint injection      Medications used:  Lidocaine   Bupivacaine   Omnipaque  Ropivicaine   Kenalog             Be cautious when walking. Numbness and/or weakness in the lower extremities may occur for up to 6-8 hours after the procedure due to effect of the local anesthetic    Do not drive for 6 hours. The effect of the local anesthetic could slow your reflexes.     You may resume your regular activities after 24 hours    Avoid strenuous activity for the first 24 hours    You may shower, however avoid swimming, tub baths or hot tubs for 24 hours following your procedure    You may have a mild to moderate increase in pain for several days following the injection.    It may take up to 14 days for the steroid medication to start working although you may feel the effect as early as a few days after the procedure.       You may use ice packs for 10-15 minutes, 3 to 4 times a day at the injection site for comfort    Do not use heat to painful areas for 6 to 8 hours. This will give the local anesthetic time to wear off and prevent you from accidentally burning your skin.     You may use anti-inflammatory medications (such as Ibuprofen or Aleve or Advil) or Tylenol for pain control if necessary    If you were fasting, you may resume your normal diet and medications after the procedure    If you have diabetes, check your blood sugar more frequently than usual as your blood sugar may be higher than normal for 10-14 days following a steroid injection. Contact your doctor who manages  your diabetes if your blood sugar is higher than usual    If you experience any of the following, call the pain center nursing line during work hours at [phone] or the after hours provider line at [phone]:  -Fever over 100 degree F  -Swelling, bleeding, redness, drainage, warmth at the injection site  -Progressive weakness or numbness in your legs or arms  -Loss of bowel or bladder function  -Unusual headache that is not relieved by Tylenol  -Unusual new onset of pain that is not improving      Phone #s:  Appointment line: [phone];  Nurse line: [phone]              Follow-ups after your visit        Your next 10 appointments already scheduled     Jun 11, 2018 10:00 AM CDT   LAB with FK LAB   [facility] ([facility] [place])    [address]  [place] [address] [phone]           Please do not eat 10-12 hours before your appointment if you are coming in fasting for labs on lipids, cholesterol, or glucose (sugar). This does not apply to pregnant women. Water, hot tea and black coffee (with nothing added) are okay. Do not drink other fluids, diet soda or chew gum.            José Manuel 15, 2018 10:00 AM CDT   Return Visit with Devon Miles MD   [facility] ([facility] [place])    [address] [place] [address]  [place] [address] [phone]              Who to contact     If you have questions or need follow up information about today's clinic visit or your schedule please contact [facility] directly at [phone].  Normal or non-critical lab and imaging results will be communicated to you by MyChart, letter or phone within 4 business days after the clinic has received the results. If you do not hear from us within 7 days, please contact the clinic through MyChart or phone. If you have a critical or abnormal lab result, we will notify you by phone as soon as possible.  Submit refill requests through Youbetme or call your pharmacy and  they will forward the refill request to us. Please allow 3 business days for your refill to be completed.          Additional Information About Your Visit        NewsMavenhart Information     [employer] gives you secure access to your electronic health record. If you see a primary care provider, you can also send messages to your care team and make appointments. If you have questions, please call your primary care clinic.  If you do not have a primary care provider, please call [phone] and they will assist you.        Care EveryWhere ID     This is your Care EveryWhere ID. This could be used by other organizations to access your [facility] medical records  [id number]        Your Vitals Were     Pulse                   53            Blood Pressure from Last 3 Encounters:   12/07/17 141/81   11/17/17 126/87   11/10/17 121/82    Weight from Last 3 Encounters:   11/10/17 74.4 kg (164 lb)   10/13/17 73.9 kg (163 lb)   05/16/17 72.6 kg (160 lb)              Today, you had the following     No orders found for display       Primary Care Provider Office Phone # Fax #    Milton Iglesias [phone]       [address] [place]  [facility] [address]        Equal Access to Services     [facility] : Hadii aad ku hadasho Soomaali, waaxda luqadaha, qaybta kaalmada adeegyada, srinivasan beatty. So [facility] [phone].    ATENCIÓN: Si habla español, tiene a mosley disposición servicios gratuitos de asistencia lingüística. LlSelect Medical [facility], [facility] [phone].    We comply with applicable federal civil rights laws and [place] laws. We do not discriminate on the basis of race, color, national origin, age, disability, sex, sexual orientation, or gender identity.            Thank you!     Thank you for choosing [facility]  for your care. Our goal is always to provide you with excellent care. Hearing back from our patients is one way we can continue to improve our services. Please take a few minutes to complete  the written survey that you may receive in the mail after your visit with us. Thank you!             Your Updated Medication List - Protect others around you: Learn how to safely use, store and throw away your medicines at www.disposemymeds.org.          This list is accurate as of: 12/7/17  8:29 AM.  Always use your most recent med list.                   Brand Name Dispense Instructions for use Diagnosis    aspirin 81 MG tablet      Take by mouth daily        IBU-200 PO      Take 2 tablets by mouth as needed        menthol 5 % Pads    ICY HOT    30 patch    Apply 1 patch topically every 8 hours as needed for muscle soreness May cut to fit    Myalgia and myositis, Fibromyalgia syndrome       oxyCODONE IR 5 MG tablet    ROXICODONE     Take 1 tablet by mouth 2 times daily        QUEtiapine 200 MG tablet    SEROquel     100 mg daily as needed Take 100 mg by mouth daily. weening        sildenafil 100 MG tablet    VIAGRA    10 tablet    Take 1/4 - 1 tablet, as directed, 1-3 hours before intimacy. Maximum 1 dose per 24 hours.    Erectile dysfunction following radical prostatectomy       vitamin D 1000 UNITS capsule      Take 1 capsule by mouth 2 times daily.

## 2017-12-07 NOTE — PATIENT INSTRUCTIONS
[facility]   Procedure Discharge Instructions    Today you saw:    Dr. Angélica King      You had an:  bilateral  sacro-iliac joint injection      Medications used:  Lidocaine   Bupivacaine   Omnipaque  Ropivicaine   Kenalog             Be cautious when walking. Numbness and/or weakness in the lower extremities may occur for up to 6-8 hours after the procedure due to effect of the local anesthetic    Do not drive for 6 hours. The effect of the local anesthetic could slow your reflexes.     You may resume your regular activities after 24 hours    Avoid strenuous activity for the first 24 hours    You may shower, however avoid swimming, tub baths or hot tubs for 24 hours following your procedure    You may have a mild to moderate increase in pain for several days following the injection.    It may take up to 14 days for the steroid medication to start working although you may feel the effect as early as a few days after the procedure.       You may use ice packs for 10-15 minutes, 3 to 4 times a day at the injection site for comfort    Do not use heat to painful areas for 6 to 8 hours. This will give the local anesthetic time to wear off and prevent you from accidentally burning your skin.     You may use anti-inflammatory medications (such as Ibuprofen or Aleve or Advil) or Tylenol for pain control if necessary    If you were fasting, you may resume your normal diet and medications after the procedure    If you have diabetes, check your blood sugar more frequently than usual as your blood sugar may be higher than normal for 10-14 days following a steroid injection. Contact your doctor who manages your diabetes if your blood sugar is higher than usual    If you experience any of the following, call the pain center nursing line during work hours at [phone] or the after hours provider line at [phone]:  -Fever over 100 degree F  -Swelling, bleeding, redness, drainage, warmth at the  injection site  -Progressive weakness or numbness in your legs or arms  -Loss of bowel or bladder function  -Unusual headache that is not relieved by Tylenol  -Unusual new onset of pain that is not improving      Phone #s:  Appointment line: [phone];  Nurse line: [phone]

## 2017-12-07 NOTE — NURSING NOTE
Discharge Information    IV Discontiued Time:  NA    Amount of Fluid Infused:  NA    Discharge Criteria = When patient returns to baseline or as per MD order    Consciousness:  Pt is fully awake    Circulation:  BP +/- 20% of pre-procedure level    Respiration:  Patient is able to breathe deeply    O2 Sat:  Patient is able to maintain O2 Sat >92% on room air    Activity:  Moves 4 extremities on command    Ambulation:  Patient is able to stand and walk or stand and pivot into wheelchair    Dressing:  Clean/dry or No Dressing    Notes:   Discharge instructions and AVS given to patient    Patient meets criteria for discharge?  YES    Admitted to PCU?  No    Responsible adult present to accompany patient home?  Yes    Signature/Title:    tyler moore RN Care Coordinator  [facility]

## 2017-12-07 NOTE — NURSING NOTE
"Chief Complaint   Patient presents with     Pain       Initial /81  Pulse 53 Estimated body mass index is 28.15 kg/(m^2) as calculated from the following:    Height as of 11/10/17: 1.626 m (5' 4\").    Weight as of 11/10/17: 74.4 kg (164 lb).  Medication Reconciliation: complete     Pre-procedure Intake    Have you been fasting? NA    If yes, for how long? NA    Are you taking a prescribed blood thinner such as coumadin, Plavix, Xarelto?    No    If yes, when did you take your last dose? NA    Do you take aspirin?  Yes -   ASA- 81mg    If cervical procedure, have you held aspirin for 6 days?   NA    Do you have any allergies to contrast dye, iodine, steroid and/or numbing medications?  NO    Are you currently taking antibiotics or have an active infection?  NO    Have you had a fever/elevated temperature within the past week? NO    Are you currently taking oral steroids? NO    Do you have a ? Yes       Are you pregnant or breastfeeding?  Not Applicable    Are the vital signs normal?  Yes    Emma Breaux CMA (AAMA)          "

## 2017-12-08 ENCOUNTER — TELEPHONE (OUTPATIENT)
Dept: PALLIATIVE MEDICINE | Facility: CLINIC | Age: 57
End: 2017-12-08

## 2017-12-08 NOTE — TELEPHONE ENCOUNTER
Will ask DELORES Goyal to help me with supporting materials for this for pain procedure appointments (which are more infrequent).    Cally King MD  [facility]

## 2017-12-08 NOTE — TELEPHONE ENCOUNTER
Patient called requesting a letter from Dr. King for his medical rides. He would like to be able to submit this to [employer].     Yumiko Vásquez    [facility]

## 2017-12-08 NOTE — TELEPHONE ENCOUNTER
..Reason for Call:  Other     Detailed comments: Patient called said he need a note from Dr. Iglesias stating he need special transportation not a cab. He need this in writing    Phone Number Patient can be reached at: Home number on file [phone] (home)    Best Time: anytime    Can we leave a detailed message on this number? YES    Call taken on 12/8/2017 at 1:28 PM by Katia Morales

## 2017-12-12 NOTE — TELEPHONE ENCOUNTER
Called and spoke to patient. He stated that he need a note from Dr. King for special transportation due to DDD, arthritis and cervical issues. He stated he also has PTSD, schizoaffective disorder and major depressive disorder and they affect his ability to take normal transportation.     Upon further conversation he stated that he takes a lot of cabs and a lot of cab drivers are hostile, they refuse to give him curb to curb services and once they yelled at his autistic son. He stated because of this he is afraid for his life.     Called [employer] and they confirmed that his transportation is through [employer]. They stated that they Cab and Bus service is not required to do door to door service, in order for that to happen he would need a Certificate of Need filled out on [employer]. They are going to mail him out a list of companies that do that service.     Will send to provider to determine further.     Jyotsna Carrillo RN, [facility]  Pain Clinic Care Coordinator

## 2017-12-12 NOTE — TELEPHONE ENCOUNTER
Attempted to call pt on home phone- no answer. Called on cell phone and pt stated he was busy and would call back. Told him he had to specifically ask for Jyotsna.     Jyotsna Carrillo RN, [facility]  Pain Clinic Care Coordinator

## 2017-12-12 NOTE — TELEPHONE ENCOUNTER
Spoke to patient and he agreed that his diagnosis of PTSD is the main diagnosis that is causing him problems taking a cab as he feels stressed out, he stated that the drivers tend to argue with him and when they get angry he fears for his life.     Jyotsna Carrillo RN, [facility]  Pain Clinic Care Coordinator

## 2017-12-14 ENCOUNTER — TELEPHONE (OUTPATIENT)
Dept: FAMILY MEDICINE | Facility: CLINIC | Age: 57
End: 2017-12-14

## 2017-12-14 NOTE — TELEPHONE ENCOUNTER
Is this through [employer]? Please clarify with patient. I think there is a specific form that needs to be filled out.    Milton Iglesias MD

## 2017-12-14 NOTE — LETTER
[facility]  [address] [facility] [address] [facility] [address]  Phone: [phone]    December 15, 2017        Jeanmarie Mclean  [address]   [place] [address]          To Whom It May Concern;        Phi Mclean is a patient of mine who suffers from severe Autism Spectrum Disorder. He frequently runs off and is difficult to keep safe when out in public. Phi would be safest if his father and himself had medical cab for transportation as needed for his appointments, rather than walking or relying on public transportation. Please provide his father, Jeanmarie Mclean and Phi Mclean, with a medical cab for transportation.              Sincerely,           Milton Iglesias MD

## 2017-12-14 NOTE — TELEPHONE ENCOUNTER
Reason for Call:  Other     Detailed comments: Patient wants a letter to approve special transportation would not specify further.     Phone Number Patient can be reached at: Home number on file [phone] (home)    Best Time: any    Can we leave a detailed message on this number? YES    Call taken on 12/14/2017 at 3:09 PM by Lori Watt

## 2017-12-15 NOTE — TELEPHONE ENCOUNTER
Bringing to the  by 1:00 pm today.  Called and spoke to patient regarding letter  . He is requesting that this be mailed to his  Home address. I confirmed address in chart and   This will go out in tomorrow's mail.  Sheila Kam MA/  [employer]

## 2017-12-15 NOTE — TELEPHONE ENCOUNTER
"Spoke to patient and he states that this is not for [employer].  He states that his son received a letter form his provider, that  Stated \"To whom it may concern, needs special transportation  Because of disability, Autism\". Patient states that he would like to be  Able to take a medical cab ride vs a regular cab ride. Had a bad  Experience with reg cab for him and his son.  Please advise.  Sheila Kam MA/  [employer]    "

## 2017-12-19 NOTE — TELEPHONE ENCOUNTER
Paperwork that pt faxed over re his diagnosis was placed in providers basket.     Jyotsna Carrillo RN, [facility]  Pain Clinic Care Coordinator

## 2018-01-02 NOTE — TELEPHONE ENCOUNTER
..Reason for Call:  Other     Detailed comments: Patient called said he need a note staing he need special transportation. He said the note he received was for his son but his son has special transportation, the patient need it for mental and physical reasons.    Phone Number Patient can be reached at: Home number on file [phone] (home)    Best Time: anytime    Can we leave a detailed message on this number? YES    Call taken on 1/2/2018 at 11:23 AM by Katia Morales

## 2018-01-03 ENCOUNTER — TRANSFERRED RECORDS (OUTPATIENT)
Dept: HEALTH INFORMATION MANAGEMENT | Facility: CLINIC | Age: 58
End: 2018-01-03

## 2018-01-03 NOTE — TELEPHONE ENCOUNTER
I see patient for trigger points and SI joint injections.  As his reasons for needing transportation are related to mental health reasons only, I am going to ask that his PCP supply this information.  [facility]to [facility] may [facility] from his psychiatrist) Dr. Kaci Griffiths, stating patient has delusional disorder, schizoaffective, mood disorder secondar to TBI with paranoia, autism spectrum disorder, and alcohol use.  The letter is from march 2017.  Will ask PCP to use this information as appropriate for transportation.    Will scan this information to media    Spoke with RN from PCP's clinic, [facility], who will help facilitate this.    Cally King MD  [facility]

## 2018-01-05 ENCOUNTER — OFFICE VISIT (OUTPATIENT)
Dept: FAMILY MEDICINE | Facility: CLINIC | Age: 58
End: 2018-01-05
Payer: COMMERCIAL

## 2018-01-05 VITALS
HEIGHT: 64 IN | SYSTOLIC BLOOD PRESSURE: 150 MMHG | TEMPERATURE: 97.9 F | DIASTOLIC BLOOD PRESSURE: 90 MMHG | WEIGHT: 167 LBS | BODY MASS INDEX: 28.51 KG/M2 | OXYGEN SATURATION: 98 % | HEART RATE: 63 BPM

## 2018-01-05 DIAGNOSIS — M54.12 CERVICAL RADICULOPATHY: Primary | ICD-10-CM

## 2018-01-05 PROBLEM — F10.21 ALCOHOL DEPENDENCE IN REMISSION (H): Status: ACTIVE | Noted: 2018-01-05

## 2018-01-05 PROCEDURE — 99213 OFFICE O/P EST LOW 20 MIN: CPT | Performed by: FAMILY MEDICINE

## 2018-01-05 RX ORDER — METHOCARBAMOL 500 MG/1
500 TABLET, FILM COATED ORAL
COMMUNITY
Start: 2018-01-03 | End: 2018-10-02

## 2018-01-05 RX ORDER — METHYLPREDNISOLONE 4 MG
4 TABLET, DOSE PACK ORAL
COMMUNITY
Start: 2018-01-03 | End: 2018-01-08

## 2018-01-05 RX ORDER — OXYCODONE HYDROCHLORIDE 5 MG/1
5 TABLET ORAL
COMMUNITY
Start: 2018-01-03 | End: 2022-03-03

## 2018-01-05 ASSESSMENT — PAIN SCALES - GENERAL: PAINLEVEL: SEVERE PAIN (6)

## 2018-01-05 NOTE — PROGRESS NOTES
SUBJECTIVE:   Jeanmarie Mclean is a 57 year old male who presents to clinic today for the following health issues:    ED/UC Followup:    Facility:  [facility]  Date of visit: 1/3/2018  Reason for visit: Chest Pains  Current Status: left shoulder, left arm tingling       Problem list and histories reviewed & adjusted, as indicated.  Additional history: as documented    Patient Active Problem List   Diagnosis     Malignant neoplasm of prostate (H)     CARDIOVASCULAR SCREENING; LDL GOAL LESS THAN 130     GERD (gastroesophageal reflux disease)     Overweight (BMI 25.0-29.9)     Advance care planning     Fibromyalgia syndrome     Posttraumatic stress disorder     Low back pain     Inadequate material resources     Anxiety state     History of Asperger's syndrome     Pervasive developmental disorder, active     Depressive disorder     Delusional disorder (H)     TBI (traumatic brain injury) (H)     Insomnia     Chronic pain     Myalgia     male stress incontinence     Major depressive disorder, recurrent episode, moderate (H)     Low back pain without sciatica, unspecified back pain laterality, unspecified chronicity     Alcohol dependence in remission (H)     Past Surgical History:   Procedure Laterality Date     C HEP B VAC ADULT 3 DOSE IM  1995    received all 3 vaccines     C REMV PROSTATE,RETROPUB,RADICAL  10/13/04    Dr. Muñoz ([place]     CYSTOSCOPY  10/98    for renal stones     HC KNEE SCOPE,MED/LAT MENISECTOMY  6/24/11    [place] ([place]     HERNIA REPAIR, INGUINAL RT/LT  5/[facility]    [facility]       Social History   Substance Use Topics     Smoking status: Former Smoker     Packs/day: 0.50     Years: 10.00     Types: Cigarettes     Quit date: 12/31/1997     Smokeless tobacco: Never Used     Alcohol use No      Comment: hx alcoholism quit 2007. [facility] dependency treatment in 1986     Family History   Problem Relation Age of Onset     Psychotic Disorder Son      autism     Prostate Cancer  "Father      40s     CANCER Father      CANCER Maternal Grandmother      lung     Glaucoma Maternal Grandmother      Eye Disorder Maternal Grandmother      glaucoma     DIABETES Mother       45     Blood Disease Sister      sickle trait     Hypertension Sister      Blood Disease Paternal Uncle      sickle     Blood Disease Paternal Aunt      sickle     Alzheimer Disease Paternal Grandfather      70s     Macular Degeneration Paternal Grandfather      CEREBROVASCULAR DISEASE No family hx of      Thyroid Disease No family hx of      Myocardial Infarction No family hx of      C.A.D. No family hx of              Reviewed and updated as needed this visit by clinical staff       Reviewed and updated as needed this visit by Provider         ROS:  Constitutional, HEENT, cardiovascular, pulmonary, GI, , musculoskeletal, neuro, skin, endocrine and psych systems are negative, except as otherwise noted.      OBJECTIVE:   /90 (BP Location: Left arm, Patient Position: Chair, Cuff Size: Adult Large)  Pulse 63  Temp 97.9  F (36.6  C) (Oral)  Ht 5' 4\" (1.626 m)  Wt 167 lb (75.8 kg)  SpO2 98%  BMI 28.67 kg/m2  Body mass index is 28.67 kg/(m^2).  GENERAL: healthy, alert and no distress  NECK: no adenopathy, no asymmetry, masses, or scars and thyroid normal to palpation  RESP: lungs clear to auscultation - no rales, rhonchi or wheezes  CV: regular rate and rhythm, normal S1 S2, no S3 or S4, no murmur, click or rub, no peripheral edema and peripheral pulses strong  ABDOMEN: soft, nontender, no hepatosplenomegaly, no masses and bowel sounds normal  MS: no gross musculoskeletal defects noted, no edema    Diagnostic Test Results:  none     ASSESSMENT/PLAN:       1. Cervical radiculopathy  Agree with plan to try oral steroidal treatment per Medrol dose pack given in ER. Patient will try this. If no improvements, will order MRI cervical spine.      See Patient Instructions    Milton Iglesias MD, MD  [facility]  "

## 2018-01-05 NOTE — MR AVS SNAPSHOT
After Visit Summary   1/5/2018    Jeanmarie Mclean    MRN: [id number]           Patient Information     Date Of Birth          1960        Visit Information        Provider Department      1/5/2018 9:20 AM Milton Iglesias MD [facility]        Today's Diagnoses     Cervical radiculopathy    -  1       Follow-ups after your visit        Your next 10 appointments already scheduled     Feb 16, 2018  8:40 AM CST   [facility]    [address] [place] [address]   [phone]            Jun 11, 2018 10:00 AM CDT   LAB with [facility]   [facility]    [address] [place] [address]   [phone]           Please do not eat 10-12 hours before your appointment if you are coming in fasting for labs on lipids, cholesterol, or glucose (sugar). This does not apply to pregnant women. Water, hot tea and black coffee (with nothing added) are okay. Do not drink other fluids, diet soda or chew gum.            José Manuel 15, 2018 10:00 AM CDT   Return Visit with Devno iMles MD   [facility]    [address] [place] [address]   [phone]              Who to contact     If you have questions or need follow up information about today's clinic visit or your schedule please contact [facility] directly at [phone].  Normal or non-critical lab and imaging results will be communicated to you by MyChart, letter or phone within 4 business days after the clinic has received the results. If you do not hear from us within 7 days, please contact the clinic through MyChart or phone. If you have a critical or abnormal lab result, we will notify you by phone as soon as possible.  Submit refill requests through "[employer]" or call your pharmacy and they will forward the refill request to  "us. Please allow 3 business days for your refill to be completed.          Additional Information About Your Visit        MyChart Information     [employer] gives you secure access to your electronic health record. If you see a primary care provider, you can also send messages to your care team and make appointments. If you have questions, please call your primary care clinic.  If you do not have a primary care provider, please call [phone] and they will assist you.        Care EveryWhere ID     This is your Care EveryWhere ID. This could be used by other organizations to access your [facility] medical records  [id number]        Your Vitals Were     Pulse Temperature Height Pulse Oximetry BMI (Body Mass Index)       63 97.9  F (36.6  C) (Oral) 5' 4\" (1.626 m) 98% 28.67 kg/m2        Blood Pressure from Last 3 Encounters:   01/05/18 150/90   12/07/17 140/81   11/17/17 126/87    Weight from Last 3 Encounters:   01/05/18 167 lb (75.8 kg)   11/10/17 164 lb (74.4 kg)   10/13/17 163 lb (73.9 kg)              Today, you had the following     No orders found for display       Primary Care Provider Office Phone # Fax #    Milton Iglesias [phone]       [address]  [facility] [address]        Equal Access to Services     [facility] Hospital[facility]ANELLE : Hadii aad ku hadasho Soomaali, waaxda luqadaha, qaybta kaalmada adeegyada, waxay jimbo hayjustina beatty. So [facility] [phone].    ATENCIÓN: Si habla español, tiene a mosley disposición servicios gratuitos de asistencia lingüística. Llame al [phone].    We comply with applicable federal civil rights laws and [place] laws. We do not discriminate on the basis of race, color, national origin, age, disability, sex, sexual orientation, or gender identity.            Thank you!     Thank you for choosing [facility]  for your care. Our goal is always to provide you with excellent care. Hearing back from our patients is one way we can continue " to improve our services. Please take a few minutes to complete the written survey that you may receive in the mail after your visit with us. Thank you!             Your Updated Medication List - Protect others around you: Learn how to safely use, store and throw away your medicines at www.disposemymeds.org.          This list is accurate as of: 1/5/18  9:48 AM.  Always use your most recent med list.                   Brand Name Dispense Instructions for use Diagnosis    aspirin 81 MG tablet      Take by mouth daily        Aspirin-Calcium Carbonate  MG Tabs      Take by mouth daily        IBU-200 PO      Take 2 tablets by mouth as needed        menthol 5 % Pads    ICY HOT    30 patch    Apply 1 patch topically every 8 hours as needed for muscle soreness May cut to fit    Myalgia and myositis, Fibromyalgia syndrome       methocarbamol 500 MG tablet    ROBAXIN     Take 500 mg by mouth        methylPREDNISolone 4 MG tablet    MEDROL DOSEPAK     Take 4 mg by mouth        * oxyCODONE IR 5 MG tablet    ROXICODONE     Take 1 tablet by mouth 2 times daily        * oxyCODONE IR 5 MG tablet    ROXICODONE     Take 5 mg by mouth        QUEtiapine 200 MG tablet    SEROquel     100 mg daily as needed Take 100 mg by mouth daily. weening        sildenafil 100 MG tablet    VIAGRA    10 tablet    Take 1/4 - 1 tablet, as directed, 1-3 hours before intimacy. Maximum 1 dose per 24 hours.    Erectile dysfunction following radical prostatectomy       vitamin D 1000 UNITS capsule      Take 1 capsule by mouth 2 times daily.        * Notice:  This list has 2 medication(s) that are the same as other medications prescribed for you. Read the directions carefully, and ask your doctor or other care provider to review them with you.

## 2018-01-08 ENCOUNTER — OFFICE VISIT (OUTPATIENT)
Dept: PALLIATIVE MEDICINE | Facility: CLINIC | Age: 58
End: 2018-01-08
Payer: COMMERCIAL

## 2018-01-08 VITALS
SYSTOLIC BLOOD PRESSURE: 143 MMHG | BODY MASS INDEX: 28.84 KG/M2 | HEART RATE: 62 BPM | WEIGHT: 168 LBS | DIASTOLIC BLOOD PRESSURE: 89 MMHG

## 2018-01-08 DIAGNOSIS — F40.240 CLAUSTROPHOBIA: ICD-10-CM

## 2018-01-08 DIAGNOSIS — M54.12 CERVICAL RADICULOPATHY: Primary | ICD-10-CM

## 2018-01-08 PROCEDURE — 99215 OFFICE O/P EST HI 40 MIN: CPT | Performed by: PSYCHIATRY & NEUROLOGY

## 2018-01-08 RX ORDER — LORAZEPAM 0.5 MG/1
0.5 TABLET ORAL
Qty: 2 TABLET | Refills: 0 | Status: SHIPPED | OUTPATIENT
Start: 2018-01-08 | End: 2018-10-02

## 2018-01-08 RX ORDER — GABAPENTIN 100 MG/1
CAPSULE ORAL
Qty: 180 CAPSULE | Refills: 3 | Status: SHIPPED | OUTPATIENT
Start: 2018-01-08 | End: 2019-09-13

## 2018-01-08 ASSESSMENT — PAIN SCALES - GENERAL: PAINLEVEL: EXTREME PAIN (8)

## 2018-01-08 NOTE — Clinical Note
I have not seen him in clinic before, only for procedures.  Cally King MD [facility]

## 2018-01-08 NOTE — MR AVS SNAPSHOT
After Visit Summary   1/8/2018    Jeanmarie Mclean    MRN: [id number]           Patient Information     Date Of Birth          1960        Visit Information        Provider Department      1/8/2018 9:30 AM Angélica King MD [facility]        Today's Diagnoses     Cervical radiculopathy    -  1    Claustrophobia          Care Instructions    1. I do not recommend using the Medrol dosepak  2. Get MRI at [facility].  [facility]- Please call to schedule- [phone]   Lorazepam has been given to you for this.  I will call you when that comes back.    3. You will start gabapentin    Gabapentin 1 tab= 100mg   AM   PM   Bedtime   0   0   100mg (1 tab). If tolerating, after 1-3 days, increase as tolerated to next line   100mg (1 tab)      100mg (1 tab)       100mg (1 tab). If tolerating, after 2-3 days, increase as tolerated to next line   200mg (2 tabs)  200mg (2 tabs)  200mg (2 tabs). If tolerating, after 2-3 days, increase as tolerated to next line   300mg (3 tabs)  300mg (3 tabs)  300mg (3 tabs). Call us when you are at this dose or with any concerns.     Don't drive on this medication until you know how it effects you.    Common side effects are drowsiness and dizziness    You can go slower if you need to.  For example, you can start to increase by just going up on the bedtime dose.    This medication cannot be stopped abruptly once higher doses are achieved.  Seek medical advised on how to stop this medication if needed.      ----------------------------------------------------------------  Nurse Triage line:  [phone]   Call this number with any questions or concerns. You may leave a detailed message anytime. Calls are typically returned Monday through Friday between 8 AM and 4:30 PM. We usually get back to you within 2 business days depending on the issue/request.       Medication refills:    For non-narcotic medications, call your pharmacy directly to request a  refill. The pharmacy will contact the [facility] for authorization. Please allow 3-4 days for these refills to be processed.     For narcotic refills, call the nurse triage line or send a [employer] message. Please contact us 7-10 days before your refill is due. The message MUST include the name of the specific medication(s) requested and how you would like to receive the prescription(s). The options are as follows:    Pain Clinic staff can mail the prescription to your pharmacy. Please tell us the name of the pharmacy.    You may pick the prescription up at the Pain Clinic (tell us the location) or during a clinic visit with your pain provider    Pain Clinic staff can deliver the prescription to the [facility] in the clinic building. Please tell us the location.      Scheduling number: [phone].  Call this number to schedule or change appointments.    We believe regular attendance is key to your success in our program.    Any time you are unable to keep your appointment we ask that you call us at least 24 hours in advance to let us know. This will allow us to offer the appointment time to another patient.               Follow-ups after your visit        Your next 10 appointments already scheduled     Feb 16, 2018  8:40 AM CST   New Visit with Audelia Stearns, KATHY   [facility]    [address] [place] [id number]   [phone]            Jun 11, 2018 10:00 AM CDT   LAB with  LAB   [facility] ([facility])    [address] [place] [id number]   [phone]           Please do not eat 10-12 hours before your appointment if you are coming in fasting for labs on lipids, cholesterol, or glucose (sugar). This does not apply to pregnant women. Water, hot tea and black coffee (with nothing added) are okay. Do not drink other fluids, diet soda or chew gum.            José Manuel 15, 2018 10:00 AM CDT    Return Visit with Devon Miles MD   [facility] [place] ([facility] [place])    [address] [place] [address]  [place] [address] [phone]              Future tests that were ordered for you today     Open Future Orders        Priority Expected Expires Ordered    MR Cervical Spine w/o Contrast Routine  1/8/2019 1/8/2018            Who to contact     If you have questions or need follow up information about today's clinic visit or your schedule please contact [facility] directly at [phone].  Normal or non-critical lab and imaging results will be communicated to you by [employer], letter or phone within 4 business days after the clinic has received the results. If you do not hear from us within 7 days, please contact the clinic through [employer] or phone. If you have a critical or abnormal lab result, we will notify you by phone as soon as possible.  Submit refill requests through [employer] or call your pharmacy and they will forward the refill request to us. Please allow 3 business days for your refill to be completed.          Additional Information About Your Visit        [employer] Information     [employer] gives you secure access to your electronic health record. If you see a primary care provider, you can also send messages to your care team and make appointments. If you have questions, please call your primary care clinic.  If you do not have a primary care provider, please call [phone] and they will assist you.        Care EveryWhere ID     This is your Care EveryWhere ID. This could be used by other organizations to access your [facility] medical records  [id number]        Your Vitals Were     Pulse BMI (Body Mass Index)                62 28.84 kg/m2           Blood Pressure from Last 3 Encounters:   01/08/18 143/89   01/05/18 150/90   12/07/17 140/81    Weight from Last 3 Encounters:   01/08/18 76.2 kg (168 lb)   01/05/18 75.8 kg (167 lb)   11/10/17 74.4 kg (164 lb)                  Today's Medication Changes          These changes are accurate as of: 1/8/18 10:17 AM.  If you have any questions, ask your nurse or doctor.               Start taking these medicines.        Dose/Directions    gabapentin 100 MG capsule   Commonly known as:  NEURONTIN   Used for:  Cervical radiculopathy        Take 100mg (1 capsule) by mouth at bedtime.  Increase as instructed in clinic to goal dose of 300mg (3 capsules) 3 times daily.   Quantity:  180 capsule   Refills:  3       LORazepam 0.5 MG tablet   Commonly known as:  ATIVAN   Used for:  Claustrophobia        Dose:  0.5 mg   Take 1 tablet (0.5 mg) by mouth once as needed for anxiety . Take 30 min prior to MRI.  Bring 2nd tab with you to appointment.  Do not drive for 24 hours after taking   Quantity:  2 tablet   Refills:  0         Stop taking these medicines if you haven't already. Please contact your care team if you have questions.     methylPREDNISolone 4 MG tablet   Commonly known as:  MEDROL DOSEPAK                Where to get your medicines      These medications were sent to [facility] - [address] [facility]  [address] [facility] [place] [address]     Phone:  [phone]     gabapentin 100 MG capsule         Some of these will need a paper prescription and others can be bought over the counter.  Ask your nurse if you have questions.     Bring a paper prescription for each of these medications     LORazepam 0.5 MG tablet                Primary Care Provider Office Phone # Fax #    Milton Iglesias [phone]       [address]  [facility] [address]        Equal Access to Services     [facility]: Hadii marjan ku hadasho Soomaali, waaxda luqadaha, qaybta kaalmada adeegyada, srinivasan yin . So [facility] [phone].    ATENCIÓN: Si habla español, tiene a mosley disposición servicios gratuitos de asistencia lingüística. Llame al [phone].    We comply with applicable federal  civil rights laws and [place] laws. We do not discriminate on the basis of race, color, national origin, age, disability, sex, sexual orientation, or gender identity.            Thank you!     Thank you for choosing [facility]  for your care. Our goal is always to provide you with excellent care. Hearing back from our patients is one way we can continue to improve our services. Please take a few minutes to complete the written survey that you may receive in the mail after your visit with us. Thank you!             Your Updated Medication List - Protect others around you: Learn how to safely use, store and throw away your medicines at www.disposemymeds.org.          This list is accurate as of: 1/8/18 10:17 AM.  Always use your most recent med list.                   Brand Name Dispense Instructions for use Diagnosis    aspirin 81 MG tablet      Take by mouth daily        Aspirin-Calcium Carbonate  MG Tabs      Take by mouth daily        gabapentin 100 MG capsule    NEURONTIN    180 capsule    Take 100mg (1 capsule) by mouth at bedtime.  Increase as instructed in clinic to goal dose of 300mg (3 capsules) 3 times daily.    Cervical radiculopathy       IBU-200 PO      Take 2 tablets by mouth as needed        LORazepam 0.5 MG tablet    ATIVAN    2 tablet    Take 1 tablet (0.5 mg) by mouth once as needed for anxiety . Take 30 min prior to MRI.  Bring 2nd tab with you to appointment.  Do not drive for 24 hours after taking    Claustrophobia       menthol 5 % Pads    ICY HOT    30 patch    Apply 1 patch topically every 8 hours as needed for muscle soreness May cut to fit    Myalgia and myositis, Fibromyalgia syndrome       methocarbamol 500 MG tablet    ROBAXIN     Take 500 mg by mouth        * oxyCODONE IR 5 MG tablet    ROXICODONE     Take 1 tablet by mouth 2 times daily        * oxyCODONE IR 5 MG tablet    ROXICODONE     Take 5 mg by mouth        QUEtiapine 200 MG tablet    SEROquel     100 mg daily  as needed Take 100 mg by mouth daily. weening        sildenafil 100 MG tablet    VIAGRA    10 tablet    Take 1/4 - 1 tablet, as directed, 1-3 hours before intimacy. Maximum 1 dose per 24 hours.    Erectile dysfunction following radical prostatectomy       vitamin D 1000 UNITS capsule      Take 1 capsule by mouth 2 times daily.        * Notice:  This list has 2 medication(s) that are the same as other medications prescribed for you. Read the directions carefully, and ask your doctor or other care provider to review them with you.

## 2018-01-08 NOTE — PROGRESS NOTES
[facility] Consultation    Date of visit: 1/8/2018    Reason for consultation:    Jeanmarie Mclean is a 57 year old male who is seen in consultation today at the request of his provider, Milton Cummings.  This patient is not one of my clinic patients, but has been seen by me for injections.  He previously was under the care of Maureen Avendaño NP     Primary Care Provider is Milton Iglesias.  [facility]    Please see the [facility] health questionnaire which the patient completed and reviewed with me in detail.    Chief Complaint:    Chief Complaint   Patient presents with     Pain       Pain history:  Jeanmarie Mclean is a 57 year old male who first started having problems with pain in 2013. He states that he has had pain from his neck radiating down his left arm intermittently for years.    He has had a flare in his neck pain with pain radiating down his left arm for the past 3 weeks. He states that there is a center of pain in his left palm. All the fingers in his left hand are affected. He has some paresthesias in his left arm and this has been occasionally happening in his right. He denies any upper or lower extremity weakness other than a general feeling of feeling weak. He denies bowel or bladder incontinence, balance difficulties, fevers, chills, night sweats.    He was evaluated at the [facility] emergency room and was given a medrol dose pack for a suspected cervical radiculitis. He hasn't taken this medication yet because he is worried about the side effects. He was also prescribed methocarbamol and oxycodone which have not been helping with his pain.    He was previously on lyrica but stopped it because he suspected that he was developing edema in his face/jaw from that.    He doesn't recall any specific positions that make his symptoms worse.    Pain rating: intensity  ranges from 3/10 to 10/10, and Averages 7/10 on a 0-10 scale.        Current treatments include:  Oxycodone 2/day- was given in ER, not regular med  Methocarbamol-was given in ER, not regular med    Previous medication treatments included:  Lyrica- facial weight gain vs swelling    Other treatments have included:  Jeanmarie Mclean has been seen at a pain clinic in the past.  He has been evaluated and treated for fibromyalgia, low back pain, SIJ dysfunction and trigger points.  [facility] Hasn't done PT for the neck issues.  Acupuncture: None  TENs Unit: Not currently  Injections: None for the neck.    Past Medical History:  Past Medical History:   Diagnosis Date     Alcoholism (H)      Asperger's syndrome     Dr. Owens, [facility]. 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    ultrasound     Patient Active Problem List    Diagnosis Date Noted     Alcohol dependence in remission (H) 01/05/2018     Priority: Medium     Low back pain without sciatica, unspecified back pain laterality, unspecified chronicity 06/13/2017     Priority: Medium     Major depressive disorder, recurrent episode, moderate (H) 09/26/2016     Priority: Medium     male stress incontinence 04/14/2016     Priority: Medium     Myalgia 04/05/2016     Priority: Medium     mid thorax, left subscapularis, latisimus dorsi Bilateral along iliac crest 4/15/16 done at [facility]        Chronic pain 08/19/2015     Priority: Medium     Chronic pain low back, S I joint, fibromyalgia.  [facility]  Clinic for procedural care only ( S I joint and TPI)   Last S I joint  09/26/16  Not on opioids for chronic pain .  [facility]  DIRE Score   DIRE Score for ongoing opioid management is calculated as follows:    Diagnosis = 2    Intractability = 2    Risk: Psych = 1  Chem Hlth = 2  Reliability = 2  Social = 2    Efficacy = 2    Total DIRE Score = 13 (14 or higher predicts good candidate for ongoing opioid management; 13 or lower predicts poor candidate for opioid management)   [place] reviewed 3/21/17 and with each pain visit     Maureen Avendaño CNP         Insomnia 10/13/2014     Priority: Medium     Problem list name updated by automated process. Provider to review       TBI (traumatic brain injury) (H) 10/12/2014     Priority: Medium     Anxiety state 08/01/2014     Priority: Medium     Problem list name updated by automated process. Provider to review       Inadequate material resources 06/27/2014     Priority: Medium     food       Low back pain 05/06/2014     Priority: Medium     Diagnosis updated by automated process. Provider to review and confirm.       Posttraumatic stress disorder 04/07/2014     Priority: Medium     Fibromyalgia syndrome 09/05/2013     Priority: Medium     Advance care planning 12/21/2012     Priority: Medium     Advance Care Planning 4/28/2017: Receipt of ACP document:  Received: Health Care Directive which was witnessed or notarized on 1/26/17.  Document previously scanned on 2/3/17.  Validation form completed and scanned.  Also received Health Care Directive dated 12/18/12.  Code Status reflects choices in most recent ACP document.  Confirmed/documented designated decision maker(s).  Recommend verifying with Mr. Mclean that his chosen Health Care Agents have agreed to serve in this capacity.  They are both  members of health care systems.  Added by Fatimah Zuniga   Advance Care Planning Liaison  Advance Care Planning  12/21/2012: Health Care Directive received, reviewed for clarity and legality, and will be scanned into the patient's EMR chart. Kizzy Perkins CMA             Overweight (BMI 25.0-29.9) 08/16/2012     Priority: Medium     GERD (gastroesophageal reflux disease)      Priority: Medium     EGD 2003 OK       CARDIOVASCULAR SCREENING; LDL GOAL LESS THAN 130 10/31/2010     Priority: Medium     [place] Risk Score: 4% (8 Total Points)     2+ risk factors.       Malignant neoplasm of prostate (H) 05/06/2010     Priority: Medium     Pervasive developmental disorder, active 04/01/2009     Priority: Medium     Depressive disorder 04/01/2009     Priority: Medium     Delusional disorder (H) 04/01/2009     Priority: Medium     Problem list name updated by automated process. Provider to review       History of Asperger's syndrome 10/10/2014     Priority: Low       Past Surgical History:  Past Surgical History:   Procedure Laterality Date     C HEP B VAC ADULT 3 DOSE IM  1995    received all 3 vaccines     C REMV PROSTATE,RETROPUB,RADICAL  10/13/04    Dr. Muñoz ([place])     CYSTOSCOPY  10/98    for renal stones     HC KNEE SCOPE,MED/LAT MENISECTOMY  6/24/11    [place], medial ([place])     HERNIA REPAIR, INGUINAL RT/LT  5/92    [facility]     Medications:  Current Outpatient Prescriptions   Medication Sig Dispense Refill     Aspirin-Calcium Carbonate  MG TABS Take by mouth daily       methocarbamol (ROBAXIN) 500 MG tablet Take 500 mg by mouth       oxyCODONE IR (ROXICODONE) 5 MG tablet Take 5 mg by mouth       aspirin 81 MG tablet Take by mouth daily       QUEtiapine (SEROQUEL) 200 MG tablet 100 mg daily as needed Take 100 mg by mouth daily. weening       menthol (ICY HOT) 5 % PADS Apply 1 patch topically every 8 hours as needed for muscle soreness May cut to fit 30 patch 3     sildenafil (VIAGRA) 100 MG  tablet Take 1/4 - 1 tablet, as directed, 1-3 hours before intimacy. Maximum 1 dose per 24 hours. 10 tablet 5     Ibuprofen (IBU-200 PO) Take 2 tablets by mouth as needed       Cholecalciferol (VITAMIN D) 1000 UNIT capsule Take 1 capsule by mouth 2 times daily.       OXYCODONE HCL 5 MG OR TABS Take 1 tablet by mouth 2 times daily        methylPREDNISolone (MEDROL DOSEPAK) 4 MG tablet Take 4 mg by mouth       Allergies:     Allergies   Allergen Reactions     Risperidone Other (See Comments)     Serve numbness      Effexor [Venlafaxine Hydrochloride] Other (See Comments)     Headache, Painful scrotum and drainage from the penis     Ambien Other (See Comments)     headaches     Ambien [Zolpidem Tartrate] Nausea     Buspirone Nausea     Dizziness, headache     Celexa [Citalopram] Other (See Comments)     Headache     Duloxetine Other (See Comments)     Headache     Septra [Bactrim] Itching     Seroquel [Quetiapine] Other (See Comments)     Headache, N, V     Sulfa Drugs Itching     Tramadol Nausea     Ultram [Tramadol Hcl] Nausea and Vomiting     Social History:  Home situation: Lives with Son. Son has Asperger's. Patient has limited income and is on disability.  Occupation/Schooling: Currently on disability.  Tobacco use: None.  Alcohol use: Heavy use in the past, decreased in . Occasional use now.  Drug use: Cocaine use in the past, quit in .    Family history:  Family History   Problem Relation Age of Onset     Psychotic Disorder Son      autism     Prostate Cancer Father      40s     CANCER Father      CANCER Maternal Grandmother      lung     Glaucoma Maternal Grandmother      Eye Disorder Maternal Grandmother      glaucoma     DIABETES Mother       45     Blood Disease Sister      sickle trait     Hypertension Sister      Blood Disease Paternal Uncle      sickle     Blood Disease Paternal Aunt      sickle     Alzheimer Disease Paternal Grandfather      70s     Macular Degeneration Paternal Grandfather   "    CEREBROVASCULAR DISEASE No family hx of      Thyroid Disease No family hx of      Myocardial Infarction No family hx of      C.A.D. No family hx of          Review of Systems:  As in HPI. Patient did not self-report in form.    Physical Exam:  Vitals:    01/08/18 0929   BP: 143/89   Pulse: 62   Weight: 76.2 kg (168 lb)     Exam:  Constitutional: healthy, alert, mild distress, anxious and cooperative  Head: normocephalic. Atraumatic.   Eyes: no redness or jaundice noted   ENT: oropharnx normal.  MMM.   Cardiovascular: RRR, no edema  Respiratory: Non-labored on room air  Gastrointestinal: soft, non-tender  : deferred  Skin: no suspicious lesions or rashes  Psychiatric: affect flat, anxious and judgment and insight intact    Musculoskeletal exam:  Gait/Station/Posture: Gait is normal, no loss of balance noted  Cervical spine: Mild tenderness to palpation in the lower cervical parspinals  Cervical Spine ROM is wnl in flexion and extension. Limited to 40 degrees to the left in rotation.    Spurling's was positive on the left.    Thoracic spine:  Normal     Lumbar spine: ROM is wnl and pain free.    Neurologic exam:  CN:  Cranial nerves 2-12 are normal  Motor:  5/5 UE and LE strength  Reflexes:     Biceps:     R:  1/4 L: 1/4   Brachioradialis   R:  2/4 L: 2/4   Triceps:  R:  1/4 L: 1/4   Patella:  R:  1/4 L: 1/4   Achilles:  R:  1/4 L: 1/4  Other reflexes: Prieto's is negative  Sensory:  (upper and lower extremities):   Light touch: normal    Allodynia: absent    Dysethesia: absent    Hyperalgesia: absent     Reviewed labs- normal kidney function    Diagnostic tests:  MRI of C-Spine was completed on 3/4/2014 showing:  \"IMPRESSION:  1. Multilevel degenerative change as described, most severe at C5-C6  where there is moderate bilateral foraminal stenosis, worse on the  left.  2. No focal disc protrusion.  3. No intrinsic cord abnormality through T2.\"    Personally reviewed imaging: Consistent with mild degenerative " changes. Mild to moderate forminal stenosis at C4-5 and C5-6, most significant at C5-6 on the left.    [place] Prescription Monitoring Program reviewed    Outside records reviewed: ED records from [facility] were reviewed. Workup for cardiac etiology for his symptoms was negative.      Screening tools:  DIRE Score for ongoing opioid management is calculated as follows:    Diagnosis = 2    Intractability = 2    Risk: Psych = 2  Chem Hlth = 2  Reliability = 3  Social = 1    Efficacy = 2    Total DIRE Score = 14 (14 or higher predicts good candidate for ongoing opioid management; 13 or lower predicts poor candidate for opioid management)         Assessment:  1. Cervical Radiculopathy  2. Myofascial Pain  3. Fibromyalgia  4. Anxiety  5. Depression  6. TBI  7. PTSD  8. Delusional Disorder    Jeanmarie Mclean is a 57 year old male who presents with the complaints of acute on chronic neck and left arm pain. His pain is neuropathic in nature and differential at this time includes cervical radiculopathy and also brachial plexopathy, ulnar neuropathy. There is also likely an overlying myofascial component to his pain. In addition it should be noted that his anxiety surrounding these symptoms serves to exacerbate his overall pain condition.    Plan:  Diagnosis reviewed, treatment option addressed, and risk/benefits discussed.  Self-care instructions given.  I am recommending a multidisciplinary treatment plan to help this patient better manage his pain.      1. Physical Therapy: Has participated in PT in the past, would likely be of benefit if his symptoms persist.  2. Pain Psychologist to address issues of relaxation, behavioral change, coping style, and other factors important to improvement: To follow with his primary mental health team for now  3. Diagnostic Studies: Will order an MRI of his C-spine. If MRI is unrevealing or there is continued concern for multiple peripheral nerve etiology for his symptoms, could consider an  upper extremity EMG/NCS.  4. Medication Management:   1. Will order gabapentin 100mg HS with a slow taper up to 300mg TID given his history of side effects with medications.  2. Discussed use of medrol dose pack, he is hesitant to use this because of side effects and we agreed that he could not take this medication.  3. Encouraged him to stop using oxycodone as it hasn't been helpful  4. OK to continue PRN use of methocarbamol for muscle spasms.  5. Further procedures recommended: Not at this time. If MRI is revealing of radicular pathology, could consider cervical JOSE R in the future.  6. Recommendations/follow-up for PCP:  none  7. Release of information: none  8. Follow up: 2.5 months    I saw and examined the patient with the Pain Fellow/Resident. I have reviewed and agree with the resident's note and plan of care and made changes and corrections directly to the body of the note.    TIME SPENT:  BY FELLOW/RESIDENT ALONE 25 MIN  BY MYSELF AND FELLOW/RESIDENT TOGETHER 25 MIN  BY MYSELF WITHOUT THE FELLOW/RESIDENT 10 MIN    These times included 25 minutes I spent counseling him about his diagnosis and treatment options and coordination of care with the primary team    Cally King MD  [facility]

## 2018-01-08 NOTE — PATIENT INSTRUCTIONS
1. I do not recommend using the Medrol dosepak  2. Get MRI at [facility].  [facility]- Please call to schedule- [phone]   Lorazepam has been given to you for this.  I will call you when that comes back.    3. You will start gabapentin    Gabapentin 1 tab= 100mg   AM   PM   Bedtime   0   0   100mg (1 tab). If tolerating, after 1-3 days, increase as tolerated to next line   100mg (1 tab)      100mg (1 tab)       100mg (1 tab). If tolerating, after 2-3 days, increase as tolerated to next line   200mg (2 tabs)  200mg (2 tabs)  200mg (2 tabs). If tolerating, after 2-3 days, increase as tolerated to next line   300mg (3 tabs)  300mg (3 tabs)  300mg (3 tabs). Call us when you are at this dose or with any concerns.     Don't drive on this medication until you know how it effects you.    Common side effects are drowsiness and dizziness    You can go slower if you need to.  For example, you can start to increase by just going up on the bedtime dose.    This medication cannot be stopped abruptly once higher doses are achieved.  Seek medical advised on how to stop this medication if needed.      ----------------------------------------------------------------  Nurse Triage line:  [phone]   Call this number with any questions or concerns. You may leave a detailed message anytime. Calls are typically returned Monday through Friday between 8 AM and 4:30 PM. We usually get back to you within 2 business days depending on the issue/request.       Medication refills:    For non-narcotic medications, call your pharmacy directly to request a refill. The pharmacy will contact the [facility] for authorization. Please allow 3-4 days for these refills to be processed.     For narcotic refills, call the nurse triage line or send a [facility] message. Please contact us 7-10 days before your refill is due. The message MUST include the name of the specific medication(s) requested and how you would like to receive  the prescription(s). The options are as follows:    Pain Clinic staff can mail the prescription to your pharmacy. Please tell us the name of the pharmacy.    You may pick the prescription up at the Pain Clinic (tell us the location) or during a clinic visit with your pain provider    Pain Clinic staff can deliver the prescription to the [facility] in the clinic building. Please tell us the location.      Scheduling number: [phone].  Call this number to schedule or change appointments.    We believe regular attendance is key to your success in our program.    Any time you are unable to keep your appointment we ask that you call us at least 24 hours in advance to let us know. This will allow us to offer the appointment time to another patient.

## 2018-01-15 ENCOUNTER — RADIANT APPOINTMENT (OUTPATIENT)
Dept: MRI IMAGING | Facility: CLINIC | Age: 58
End: 2018-01-15
Attending: PSYCHIATRY & NEUROLOGY
Payer: COMMERCIAL

## 2018-01-15 DIAGNOSIS — M54.12 CERVICAL RADICULOPATHY: ICD-10-CM

## 2018-01-15 PROCEDURE — 72141 MRI NECK SPINE W/O DYE: CPT | Performed by: RADIOLOGY

## 2018-02-16 ENCOUNTER — OFFICE VISIT (OUTPATIENT)
Dept: OPTOMETRY | Facility: CLINIC | Age: 58
End: 2018-02-16
Payer: COMMERCIAL

## 2018-02-16 DIAGNOSIS — H52.4 MYOPIA OF BOTH EYES WITH ASTIGMATISM AND PRESBYOPIA: Primary | ICD-10-CM

## 2018-02-16 DIAGNOSIS — H52.203 MYOPIA OF BOTH EYES WITH ASTIGMATISM AND PRESBYOPIA: Primary | ICD-10-CM

## 2018-02-16 DIAGNOSIS — H52.13 MYOPIA OF BOTH EYES WITH ASTIGMATISM AND PRESBYOPIA: Primary | ICD-10-CM

## 2018-02-16 PROCEDURE — 92015 DETERMINE REFRACTIVE STATE: CPT | Performed by: OPTOMETRIST

## 2018-02-16 PROCEDURE — 92014 COMPRE OPH EXAM EST PT 1/>: CPT | Performed by: OPTOMETRIST

## 2018-02-16 ASSESSMENT — SLIT LAMP EXAM - LIDS
COMMENTS: NORMAL
COMMENTS: NORMAL

## 2018-02-16 ASSESSMENT — REFRACTION_MANIFEST
OD_SPHERE: -1.50
OS_AXIS: 157
OD_AXIS: 023
OS_ADD: +1.50
OS_AXIS: 163
OD_ADD: +1.50
OD_CYLINDER: +0.75
OD_CYLINDER: +0.25
METHOD_AUTOREFRACTION: 1
OS_SPHERE: -1.50
OS_CYLINDER: +0.25
OD_AXIS: 030
OD_SPHERE: -1.50
OS_CYLINDER: +0.50
OS_SPHERE: -1.50

## 2018-02-16 ASSESSMENT — REFRACTION_WEARINGRX
OS_CYLINDER: +0.50
OD_CYLINDER: +0.50
OD_SPHERE: -1.50
OD_AXIS: 47
SPECS_TYPE: SVL
OS_AXIS: 150
OS_SPHERE: -1.25

## 2018-02-16 ASSESSMENT — VISUAL ACUITY
OS_SC: 20/30
CORRECTION_TYPE: GLASSES
METHOD: SNELLEN - LINEAR
OS_SC: 20/50
OD_SC: 20/60
OD_SC: 20/30
OS_CC: 20/25
OD_CC: 20/25
OD_CC+: -2
OD_SC+: -2

## 2018-02-16 ASSESSMENT — CUP TO DISC RATIO
OD_RATIO: 0.4
OS_RATIO: 0.35

## 2018-02-16 ASSESSMENT — EXTERNAL EXAM - LEFT EYE: OS_EXAM: NORMAL

## 2018-02-16 ASSESSMENT — TONOMETRY
OD_IOP_MMHG: 15
IOP_METHOD: APPLANATION
OS_IOP_MMHG: 14

## 2018-02-16 ASSESSMENT — CONF VISUAL FIELD
OD_NORMAL: 1
METHOD: COUNTING FINGERS
OS_NORMAL: 1

## 2018-02-16 ASSESSMENT — EXTERNAL EXAM - RIGHT EYE: OD_EXAM: NORMAL

## 2018-02-16 NOTE — LETTER
2/16/2018         RE: Jeanmarie Mclean  [address]   [facility] [place] [address]        Dear Colleague,    Thank you for referring your patient, Jeanmarie Mclean, to the [facility]. Please see a copy of my visit note below.    Chief Complaint   Patient presents with     COMPREHENSIVE EYE EXAM         Last Eye Exam: 12/2016  Dilated Previously: Yes    What are you currently using to see?  Glasses to drive       Distance Vision Acuity: Noticed gradual change in both eyes    Near Vision Acuity: Not satisfied     Eye Comfort: dry, itchy and burns  Do you use eye drops? : Yes: Systane 1-2 times a week.  Jeanmarie says he has tried restasis but did not have a good reaction - irritated eyes  Occupation or Hobbies: read books    Imelda Vinson  [employer]            Medical, surgical and family histories reviewed and updated 2/16/2018.       OBJECTIVE: See Ophthalmology exam    ASSESSMENT:    ICD-10-CM    1. Myopia of both eyes with astigmatism and presbyopia H52.13 EYE EXAM (SIMPLE-NONBILLABLE)    H52.203 REFRACTION    H52.4       PLAN:     Patient Instructions   The glasses prescription is very similar to the one you are wearing.    Your eyes may be blurry at near and sensitive to light for several hours from the dilating drops.    Yearly eye exams recommended.    Thank you!         Again, thank you for allowing me to participate in the care of your patient.        Sincerely,        Audelia Stearns OD

## 2018-02-16 NOTE — MR AVS SNAPSHOT
After Visit Summary   2/16/2018    Jeanmarie Mclean    MRN: [id number]           Patient Information     Date Of Birth          1960        Visit Information        Provider Department      2/16/2018 8:40 AM Audelia Stearns OD [facility]        Today's Diagnoses     Myopia of both eyes with astigmatism and presbyopia    -  1      Care Instructions    The glasses prescription is very similar to the one you are wearing.    Your eyes may be blurry at near and sensitive to light for several hours from the dilating drops.    Yearly eye exams recommended.    Thank you!          Follow-ups after your visit        Follow-up notes from your care team     Return in about 1 year (around 2/16/2019) for comprehensive eye exam.      Your next 10 appointments already scheduled     Jun 11, 2018 10:00 AM CDT   LAB with FK LAB   [facility] [place] ([facility])    [address] [place] [address]  [place] [address] [phone]           Please do not eat 10-12 hours before your appointment if you are coming in fasting for labs on lipids, cholesterol, or glucose (sugar). This does not apply to pregnant women. Water, hot tea and black coffee (with nothing added) are okay. Do not drink other fluids, diet soda or chew gum.            José Manuel 15, 2018 10:00 AM CDT   Return Visit with Devon Miles MD   [facility] [place] ([facility])    [address] [place] [address]  [place] [address] [phone]              Who to contact     If you have questions or need follow up information about today's clinic visit or your schedule please contact [facility] directly at [phone].  Normal or non-critical lab and imaging results will be communicated to you by MyChart, letter or phone within 4 business days after the clinic has received the results. If you do not hear from us within 7 days, please contact the clinic through MyChart or phone. If you have a  critical or abnormal lab result, we will notify you by phone as soon as possible.  Submit refill requests through [employer] It Better or call your pharmacy and they will forward the refill request to us. Please allow 3 business days for your refill to be completed.          Additional Information About Your Visit        Primeloophart Information     Use It Better gives you secure access to your electronic health record. If you see a primary care provider, you can also send messages to your care team and make appointments. If you have questions, please call your primary care clinic.  If you do not have a primary care provider, please call [phone] and they will assist you.        Care EveryWhere ID     This is your Care EveryWhere ID. This could be used by other organizations to access your [place] medical records  [id number]         Blood Pressure from Last 3 Encounters:   01/08/18 143/89   01/05/18 150/90   12/07/17 140/81    Weight from Last 3 Encounters:   01/08/18 76.2 kg (168 lb)   01/05/18 75.8 kg (167 lb)   11/10/17 74.4 kg (164 lb)              We Performed the Following     EYE EXAM (SIMPLE-NONBILLABLE)     REFRACTION        Primary Care Provider Office Phone # Fax #    Milton Iglesias [phone]       [address]  [facility] [address]        Equal Access to Services     HOSSEIN GUAJARDO : Hadii aad ku hadasho Soomaali, waaxda luqadaha, qaybta kaalmada adeegyada, waxay idiin hayaan jorge khros yin . So [facility] [phone].    ATENCIÓN: Si habla español, tiene a mosley disposición servicios gratuitos de asistencia lingüística. Llame al [phone].    We comply with applicable federal civil rights laws and [place] laws. We do not discriminate on the basis of race, color, national origin, age, disability, sex, sexual orientation, or gender identity.            Thank you!     Thank you for choosing [facility]  for your care. Our goal is always to provide you with excellent care. Hearing  back from our patients is one way we can continue to improve our services. Please take a few minutes to complete the written survey that you may receive in the mail after your visit with us. Thank you!             Your Updated Medication List - Protect others around you: Learn how to safely use, store and throw away your medicines at www.disposemymeds.org.          This list is accurate as of 2/16/18  1:41 PM.  Always use your most recent med list.                   Brand Name Dispense Instructions for use Diagnosis    aspirin 81 MG tablet      Take by mouth daily        Aspirin-Calcium Carbonate  MG Tabs      Take by mouth daily        gabapentin 100 MG capsule    NEURONTIN    180 capsule    Take 100mg (1 capsule) by mouth at bedtime.  Increase as instructed in clinic to goal dose of 300mg (3 capsules) 3 times daily.    Cervical radiculopathy       IBU-200 PO      Take 2 tablets by mouth as needed        LORazepam 0.5 MG tablet    ATIVAN    2 tablet    Take 1 tablet (0.5 mg) by mouth once as needed for anxiety . Take 30 min prior to MRI.  Bring 2nd tab with you to appointment.  Do not drive for 24 hours after taking    Claustrophobia       menthol 5 % Pads    ICY HOT    30 patch    Apply 1 patch topically every 8 hours as needed for muscle soreness May cut to fit    Myalgia and myositis, Fibromyalgia syndrome       methocarbamol 500 MG tablet    ROBAXIN     Take 500 mg by mouth        * oxyCODONE IR 5 MG tablet    ROXICODONE     Take 1 tablet by mouth 2 times daily        * oxyCODONE IR 5 MG tablet    ROXICODONE     Take 5 mg by mouth        QUEtiapine 200 MG tablet    SEROquel     100 mg daily as needed Take 100 mg by mouth daily. weening        sildenafil 100 MG tablet    VIAGRA    10 tablet    Take 1/4 - 1 tablet, as directed, 1-3 hours before intimacy. Maximum 1 dose per 24 hours.    Erectile dysfunction following radical prostatectomy       vitamin D 1000 UNITS capsule      Take 1 capsule by mouth 2  times daily.        * Notice:  This list has 2 medication(s) that are the same as other medications prescribed for you. Read the directions carefully, and ask your doctor or other care provider to review them with you.

## 2018-02-16 NOTE — PATIENT INSTRUCTIONS
The glasses prescription is very similar to the one you are wearing.    Your eyes may be blurry at near and sensitive to light for several hours from the dilating drops.    Yearly eye exams recommended.    Thank you!

## 2018-02-16 NOTE — PROGRESS NOTES
Chief Complaint   Patient presents with     COMPREHENSIVE EYE EXAM         Last Eye Exam: 12/2016  Dilated Previously: Yes    What are you currently using to see?  Glasses to drive       Distance Vision Acuity: Noticed gradual change in both eyes    Near Vision Acuity: Not satisfied     Eye Comfort: dry, itchy and burns  Do you use eye drops? : Yes: Systane 1-2 times a week.  Jeanmarie says he has tried restasis but did not have a good reaction - irritated eyes  Occupation or Hobbies: read books    Imelda Vinson  [facility]            Medical, surgical and family histories reviewed and updated 2/16/2018.       OBJECTIVE: See Ophthalmology exam    ASSESSMENT:    ICD-10-CM    1. Myopia of both eyes with astigmatism and presbyopia H52.13 EYE EXAM (SIMPLE-NONBILLABLE)    H52.203 REFRACTION    H52.4       PLAN:     Patient Instructions   The glasses prescription is very similar to the one you are wearing.    Your eyes may be blurry at near and sensitive to light for several hours from the dilating drops.    Yearly eye exams recommended.    Thank you!

## 2018-04-23 ENCOUNTER — TELEPHONE (OUTPATIENT)
Dept: FAMILY MEDICINE | Facility: CLINIC | Age: 58
End: 2018-04-23

## 2018-04-23 NOTE — TELEPHONE ENCOUNTER
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    Phone Number Patient can be reached at: Home number on file [phone] (home)    Best Time: any    Can we leave a detailed message on this number? YES    Call taken on 4/23/2018 at 4:28 PM by Brandi Love

## 2018-04-23 NOTE — LETTER
[address] [address]  Phone: [phone]    April 24, 2018        Jeanmarie Mclean  [address] [place] [address] [place] [address]        To whom it may concern:    RE: Jeanmarie Mclean    Patient is a patient of mine. Patient has following diagnoses:    Patient Active Problem List   Diagnosis     Malignant neoplasm of prostate (H)     CARDIOVASCULAR SCREENING; LDL GOAL LESS THAN 130     GERD (gastroesophageal reflux disease)     Overweight (BMI 25.0-29.9)     Advance care planning     Fibromyalgia syndrome     Posttraumatic stress disorder     Low back pain     Inadequate material resources     Anxiety state     History of Asperger's syndrome     Pervasive developmental disorder, active     Depressive disorder     Delusional disorder (H)     TBI (traumatic brain injury) (H)     Insomnia     Chronic pain     Myalgia     male stress incontinence     Major depressive disorder, recurrent episode, moderate (H)     Low back pain without sciatica, unspecified back pain laterality, unspecified chronicity     Alcohol dependence in remission (H)         Please contact me for questions or concerns.      Sincerely,        Milton Iglesias MD, MD

## 2018-04-24 NOTE — TELEPHONE ENCOUNTER
Letter will be deliver to the  this afternoon for pickup after 3p.  Pepe Lim,  [facility]    Please call patient to let them know the above info.  And remind the person who is picking up to bring their photo ID.  Pepe Lim,  [facility]     NOTE: If patient/gaurdian calls the clinic before the care teams gets a chance to call them, please let pt know the above information regarding pickup times, remind them to bring a photo ID and close the encounter.  Pepe Lim,  [facility]    FYI:  Anything completed after 2:00p will not be delivered until the next business day after 11a.   Pepe Lim,  [facility].

## 2018-04-24 NOTE — TELEPHONE ENCOUNTER
Retrieved the letter from the . Called and confirmed with  Patient the address this is to be mailed to. Patient states that he would  Like it documented that they need this letter because they need to show it  To the police when they get stopped frequently. This will go out in tomorrow's  Mail.  Sheila Kam MA/  [facility]

## 2018-04-24 NOTE — TELEPHONE ENCOUNTER
Reason for Call:  Other     Detailed comments: Patient called back be sure this mailed out to my home address - needs confirmation call back.    Phone Number Patient can be reached at: [phone]    Best Time: any    Can we leave a detailed message on this number? YES    Call taken on 4/24/2018 at 3:59 PM by Lori Watt

## 2018-05-07 NOTE — NURSING NOTE
Pt has physical on 6/7/18 - please send orders for yearly labs to [facility]. [facility]   Post Procedure Instructions    Today you had:  trigger point injections       Medications used:  lidocaine   bupivicaine           Go to the emergency room if you develop any shortness of breath    Monitor the injection sites for signs and symptoms of infection-fever, chills, redness, swelling, warmth, or drainage to areas.    You may have soreness at injection sites for up to 24 hours.    You may apply ice to the painful areas to help minimize the discomfort of the needle pokes.    Do not apply heat to sites for at least 12 hours.    You may use anti-inflammatory medications or Tylenol for pain control if necessary  Nurse line: [phone]  After hours provider line: [phone]  Appointment line: [phone]

## 2018-05-11 ENCOUNTER — OFFICE VISIT (OUTPATIENT)
Dept: PALLIATIVE MEDICINE | Facility: CLINIC | Age: 58
End: 2018-05-11
Payer: COMMERCIAL

## 2018-05-11 DIAGNOSIS — M79.18 MYOFASCIAL PAIN: ICD-10-CM

## 2018-05-11 DIAGNOSIS — M53.3 SI (SACROILIAC) JOINT DYSFUNCTION: Primary | ICD-10-CM

## 2018-05-11 PROCEDURE — 20553 NJX 1/MLT TRIGGER POINTS 3/>: CPT | Performed by: PSYCHIATRY & NEUROLOGY

## 2018-05-11 NOTE — PATIENT INSTRUCTIONS
[facility]   Post Procedure Instructions    Today you had:  trigger point injections       Medications used:  lidocaine   bupivicaine           Go to the emergency room if you develop any shortness of breath    Monitor the injection sites for signs and symptoms of infection-fever, chills, redness, swelling, warmth, or drainage to areas.    You may have soreness at injection sites for up to 24 hours.    You may apply ice to the painful areas to help minimize the discomfort of the needle pokes.    Do not apply heat to sites for at least 12 hours.    You may use anti-inflammatory medications or Tylenol for pain control if necessary    Pain Clinic phone number during work hours Monday-Friday:  [phone]    After hours provider line: [phone]

## 2018-05-11 NOTE — MR AVS SNAPSHOT
After Visit Summary   5/11/2018    Jeanmarie Mclean    MRN: [id number]           Patient Information     Date Of Birth          1960        Visit Information        Provider Department      5/11/2018 8:00 AM Angélica King MD [facility]        Care Instructions    [facility]   Post Procedure Instructions    Today you had:  trigger point injections       Medications used:  lidocaine   bupivicaine           Go to the emergency room if you develop any shortness of breath    Monitor the injection sites for signs and symptoms of infection-fever, chills, redness, swelling, warmth, or drainage to areas.    You may have soreness at injection sites for up to 24 hours.    You may apply ice to the painful areas to help minimize the discomfort of the needle pokes.    Do not apply heat to sites for at least 12 hours.    You may use anti-inflammatory medications or Tylenol for pain control if necessary    Pain Clinic phone number during work hours Monday-Friday:  [phone]    After hours provider line: [phone]                Follow-ups after your visit        Your next 10 appointments already scheduled     Jun 11, 2018 10:00 AM CDT   LAB with FK LAB   [facility] ([address] [address] [phone]           Please do not eat 10-12 hours before your appointment if you are coming in fasting for labs on lipids, cholesterol, or glucose (sugar). This does not apply to pregnant women. Water, hot tea and black coffee (with nothing added) are okay. Do not drink other fluids, diet soda or chew gum.            José Manuel 15, 2018 10:00 AM CDT   Return Visit with Devon Miles MD   [address] [address]   [phone]              Who to contact     If you have questions or need follow up information about today's clinic visit  or your schedule please contact [facility] directly at [phone].  Normal or non-critical lab and imaging results will be communicated to you by MyChart, letter or phone within 4 business days after the clinic has received the results. If you do not hear from us within 7 days, please contact the clinic through Nimbulahart or phone. If you have a critical or abnormal lab result, we will notify you by phone as soon as possible.  Submit refill requests through [employer] or call your pharmacy and they will forward the refill request to us. Please allow 3 business days for your refill to be completed.          Additional Information About Your Visit        [email or web address] Information     [employer] gives you secure access to your electronic health record. If you see a primary care provider, you can also send messages to your care team and make appointments. If you have questions, please call your primary care clinic.  If you do not have a primary care provider, please call [phone] and they will assist you.        Care EveryWhere ID     This is your Care EveryWhere ID. This could be used by other organizations to access your [place] medical records  [id number]         Blood Pressure from Last 3 Encounters:   01/08/18 143/89   01/05/18 150/90   12/07/17 140/81    Weight from Last 3 Encounters:   01/08/18 76.2 kg (168 lb)   01/05/18 75.8 kg (167 lb)   11/10/17 74.4 kg (164 lb)              Today, you had the following     No orders found for display       Primary Care Provider Office Phone # Fax #    Milton Iglesias [phone]       [address]  [facility] [address]        Equal Access to Services     [facility]: Hadii aad ku hadasho Soomaali, waaxda luqadaha, qaybta kaalmada adejane, srinivasan beatty. So [facility] [phone].    ATENCIÓN: Si habla español, tiene a mosley disposición servicios gratuitos de asistencia lingüística. Llame al [phone].    We comply  with applicable federal civil rights laws and [place] laws. We do not discriminate on the basis of race, color, national origin, age, disability, sex, sexual orientation, or gender identity.            Thank you!     Thank you for choosing [facility] [place]  for your care. Our goal is always to provide you with excellent care. Hearing back from our patients is one way we can continue to improve our services. Please take a few minutes to complete the written survey that you may receive in the mail after your visit with us. Thank you!             Your Updated Medication List - Protect others around you: Learn how to safely use, store and throw away your medicines at www.disposemymeds.org.          This list is accurate as of 5/11/18  8:13 AM.  Always use your most recent med list.                   Brand Name Dispense Instructions for use Diagnosis    aspirin 81 MG tablet      Take by mouth daily        Aspirin-Calcium Carbonate  MG Tabs      Take by mouth daily        gabapentin 100 MG capsule    NEURONTIN    180 capsule    Take 100mg (1 capsule) by mouth at bedtime.  Increase as instructed in clinic to goal dose of 300mg (3 capsules) 3 times daily.    Cervical radiculopathy       IBU-200 PO      Take 2 tablets by mouth as needed        LORazepam 0.5 MG tablet    ATIVAN    2 tablet    Take 1 tablet (0.5 mg) by mouth once as needed for anxiety . Take 30 min prior to MRI.  Bring 2nd tab with you to appointment.  Do not drive for 24 hours after taking    Claustrophobia       menthol 5 % Pads    ICY HOT    30 patch    Apply 1 patch topically every 8 hours as needed for muscle soreness May cut to fit    Myalgia and myositis, Fibromyalgia syndrome       methocarbamol 500 MG tablet    ROBAXIN     Take 500 mg by mouth        * oxyCODONE IR 5 MG tablet    ROXICODONE     Take 1 tablet by mouth 2 times daily        * oxyCODONE IR 5 MG tablet    ROXICODONE     Take 5 mg by mouth        QUEtiapine 200 MG  tablet    SEROquel     100 mg daily as needed Take 100 mg by mouth daily. weening        sildenafil 100 MG tablet    VIAGRA    10 tablet    Take 1/4 - 1 tablet, as directed, 1-3 hours before intimacy. Maximum 1 dose per 24 hours.    Erectile dysfunction following radical prostatectomy       vitamin D 1000 units capsule      Take 1 capsule by mouth 2 times daily.        * Notice:  This list has 2 medication(s) that are the same as other medications prescribed for you. Read the directions carefully, and ask your doctor or other care provider to review them with you.

## 2018-05-11 NOTE — PROGRESS NOTES
Pre procedure Diagnosis: myofascial pain   Post procedure Diagnosis: Same  Procedure performed: trigger point injections  Anesthesia: none  Complications: none  Operators: Cally King MD     Indications:   Jeanmarie Mclean is a 58 year old male with a history of low back pain.  Exam shows myofascial pain of the muscle groups listed below and they have tried conservative treatment including medications, PT.    Options/alternatives, benefits and risks were discussed with the patient including bleeding, infection, tissue trauma and pnuemothorax.  Questions were answered to his satisfaction and he agrees to proceed. Voluntary informed consent was obtained and signed.     Vitals were reviewed: Yes  Allergies were reviewed:  Yes   Medications were reviewed:  Yes   Pre-procedure pain score: not recorded    Procedure:  After getting informed consent, a Pause for the Cause was performed.    Trigger points were identified by patient, and marked when appropriate.  The area was prepped with Chloroprep.    Using clean technique, injections were completed using a 25G, 1.5 inch needle.  After negative aspiration, injection was completed.  A total of 10 locations were injected.  When possible, tissue was retracted from the chest wall to avoid lung injury.    Muscle groups injected:  Bilateral quadratus lumborum   Left levator scapulae  Bilateral lumbar paraspinals    Injection solution contained:  5ml of 1% lidocaine and 5ml of 0.5% bupivacaine.    Hemostasis was achieved, the area was cleaned, and bandaids were placed when appropriate.  The patient tolerated the procedure well.  Breath sounds were normal.      Post-procedure pain score: improved  Follow-up includes:   -repeat prn, ok to double book   -order placed for repeat SI joint injections     Cally King MD  [facility]

## 2018-06-11 DIAGNOSIS — C61 MALIGNANT NEOPLASM OF PROSTATE (H): ICD-10-CM

## 2018-06-11 LAB — PSA SERPL-MCNC: 0.03 UG/L (ref 0–4)

## 2018-06-11 PROCEDURE — 84153 ASSAY OF PSA TOTAL: CPT | Performed by: UROLOGY

## 2018-06-11 PROCEDURE — 36415 COLL VENOUS BLD VENIPUNCTURE: CPT | Performed by: UROLOGY

## 2018-06-15 ENCOUNTER — OFFICE VISIT (OUTPATIENT)
Dept: UROLOGY | Facility: CLINIC | Age: 58
End: 2018-06-15
Payer: COMMERCIAL

## 2018-06-15 VITALS — HEART RATE: 54 BPM | DIASTOLIC BLOOD PRESSURE: 82 MMHG | OXYGEN SATURATION: 98 % | SYSTOLIC BLOOD PRESSURE: 140 MMHG

## 2018-06-15 DIAGNOSIS — C61 MALIGNANT NEOPLASM OF PROSTATE (H): Primary | ICD-10-CM

## 2018-06-15 DIAGNOSIS — N52.31 ERECTILE DYSFUNCTION FOLLOWING RADICAL PROSTATECTOMY: ICD-10-CM

## 2018-06-15 PROCEDURE — 99213 OFFICE O/P EST LOW 20 MIN: CPT | Performed by: UROLOGY

## 2018-06-15 RX ORDER — SILDENAFIL 100 MG/1
TABLET, FILM COATED ORAL
Qty: 10 TABLET | Refills: 5 | Status: SHIPPED | OUTPATIENT
Start: 2018-06-15 | End: 2019-08-30

## 2018-06-15 NOTE — PATIENT INSTRUCTIONS
Please call [employer] ([facility]) in 2 business days to register. 1 [phone]. Once you complete payment, they will mail the medication to your preferred address.

## 2018-06-15 NOTE — MR AVS SNAPSHOT
After Visit Summary   6/15/2018    Jeanmarie Mclean    MRN: [id number]           Patient Information     Date Of Birth          1960        Visit Information        Provider Department      6/15/2018 10:00 AM Devon Miles MD [facility]        Today's Diagnoses     Malignant neoplasm of prostate (H)    -  1    Erectile dysfunction following radical prostatectomy          Care Instructions    Please call [email or web address]) in 2 business days to register. 1 [phone]. Once you complete payment, they will mail the medication to your preferred address.            Follow-ups after your visit        Your next 10 appointments already scheduled     José Manuel 15, 2018 10:00 AM CDT   Return Visit with Devon Miles MD   [facility] ([facility] [place])    [address] [place], [place] [address] [id number] [phone]              Future tests that were ordered for you today     Open Future Orders        Priority Expected Expires Ordered    PSA tumor marker Routine 6/14/2019 6/16/2019 6/15/2018            Who to contact     If you have questions or need follow up information about today's clinic visit or your schedule please contact [facility] directly at [phone].  Normal or non-critical lab and imaging results will be communicated to you by MyChart, letter or phone within 4 business days after the clinic has received the results. If you do not hear from us within 7 days, please contact the clinic through MyChart or phone. If you have a critical or abnormal lab result, we will notify you by phone as soon as possible.  Submit refill requests through [employer] or call your pharmacy and they will forward the refill request to us. Please allow 3 business days for your refill to be completed.          Additional Information About Your Visit        MyChart Information     [employer] gives you secure access to your electronic health record. If you see a primary care  provider, you can also send messages to your care team and make appointments. If you have questions, please call your primary care clinic.  If you do not have a primary care provider, please call [phone] and they will assist you.        Care EveryWhere ID     This is your Care EveryWhere ID. This could be used by other organizations to access your [place] medical records  [id number]        Your Vitals Were     Pulse Pulse Oximetry                54 98%           Blood Pressure from Last 3 Encounters:   06/15/18 140/82   01/08/18 143/89   01/05/18 150/90    Weight from Last 3 Encounters:   01/08/18 76.2 kg (168 lb)   01/05/18 75.8 kg (167 lb)   11/10/17 74.4 kg (164 lb)                 Where to get your medicines      Some of these will need a paper prescription and others can be bought over the counter.  Ask your nurse if you have questions.     Bring a paper prescription for each of these medications     sildenafil 100 MG tablet          Primary Care Provider Office Phone # Fax #    Milton Iglesias [phone]       [id number] [address] [facility] [address]        Equal Access to Services     [facility] CenterJANELLE : Hadii marjan ku hadasho Soomaali, waaxda luqadaha, qaybta kaalmada adechapincitoyada, srinivasan yin . So [facility] [phone].    ATENCIÓN: Si habla español, tiene a mosley disposición servicios gratuitos de asistencia lingüística. LlSt. [facility] [phone].    We comply with applicable federal civil rights laws and [place] laws. We do not discriminate on the basis of race, color, national origin, age, disability, sex, sexual orientation, or gender identity.            Thank you!     Thank you for choosing [facility]  for your care. Our goal is always to provide you with excellent care. Hearing back from our patients is one way we can continue to improve our services. Please take a few minutes to complete the written survey that you may receive in the mail after your visit  with us. Thank you!             Your Updated Medication List - Protect others around you: Learn how to safely use, store and throw away your medicines at www.disposemymeds.org.          This list is accurate as of 6/15/18  9:36 AM.  Always use your most recent med list.                   Brand Name Dispense Instructions for use Diagnosis    aspirin 81 MG tablet      Take by mouth daily        Aspirin-Calcium Carbonate  MG Tabs      Take by mouth daily        gabapentin 100 MG capsule    NEURONTIN    180 capsule    Take 100mg (1 capsule) by mouth at bedtime.  Increase as instructed in clinic to goal dose of 300mg (3 capsules) 3 times daily.    Cervical radiculopathy       IBU-200 PO      Take 2 tablets by mouth as needed        LORazepam 0.5 MG tablet    ATIVAN    2 tablet    Take 1 tablet (0.5 mg) by mouth once as needed for anxiety . Take 30 min prior to MRI.  Bring 2nd tab with you to appointment.  Do not drive for 24 hours after taking    Claustrophobia       menthol 5 % Pads    ICY HOT    30 patch    Apply 1 patch topically every 8 hours as needed for muscle soreness May cut to fit    Myalgia and myositis, Fibromyalgia syndrome       methocarbamol 500 MG tablet    ROBAXIN     Take 500 mg by mouth        * oxyCODONE IR 5 MG tablet    ROXICODONE     Take 1 tablet by mouth 2 times daily        * oxyCODONE IR 5 MG tablet    ROXICODONE     Take 5 mg by mouth        QUEtiapine 200 MG tablet    SEROquel     100 mg daily as needed Take 100 mg by mouth daily. weening        sildenafil 100 MG tablet    VIAGRA    10 tablet    Take 1/4 - 1 tablet, as directed, 1-3 hours before intimacy. Maximum 1 dose per 24 hours.    Erectile dysfunction following radical prostatectomy       vitamin D 1000 units capsule      Take 1 capsule by mouth 2 times daily.        * Notice:  This list has 2 medication(s) that are the same as other medications prescribed for you. Read the directions carefully, and ask your doctor or other care  provider to review them with you.

## 2018-06-15 NOTE — PROGRESS NOTES
Chief Complaint   Patient presents with     RECHECK     PSA results       Jeanmarie Mclean is a 58 year old male who presents today for follow up of   Chief Complaint   Patient presents with     RECHECK     PSA results    Jeanmarie Mclean is a 58-year-old gentleman who is here for prostate cancer followup.  The patient has seen Dr. Muñoz in the past.  He underwent radical prostatectomy in the year 2004 when he was only 44 years old.  He is sexually active.  With Viagra, he has a very good erection and very active sexual life with his steady girlfriend.  His recent psa is 0.2.  He also had some stress incontinence.  After he started Kegel exercise, the stress incontinence is much improved.  He now does not need to wear a pad anymore.     Current Outpatient Prescriptions   Medication Sig Dispense Refill     aspirin 81 MG tablet Take by mouth daily       Aspirin-Calcium Carbonate  MG TABS Take by mouth daily       Cholecalciferol (VITAMIN D) 1000 UNIT capsule Take 1 capsule by mouth 2 times daily.       gabapentin (NEURONTIN) 100 MG capsule Take 100mg (1 capsule) by mouth at bedtime.  Increase as instructed in clinic to goal dose of 300mg (3 capsules) 3 times daily. 180 capsule 3     Ibuprofen (IBU-200 PO) Take 2 tablets by mouth as needed       LORazepam (ATIVAN) 0.5 MG tablet Take 1 tablet (0.5 mg) by mouth once as needed for anxiety . Take 30 min prior to MRI.  Bring 2nd tab with you to appointment.  Do not drive for 24 hours after taking 2 tablet 0     menthol (ICY HOT) 5 % PADS Apply 1 patch topically every 8 hours as needed for muscle soreness May cut to fit 30 patch 3     methocarbamol (ROBAXIN) 500 MG tablet Take 500 mg by mouth       OXYCODONE HCL 5 MG OR TABS Take 1 tablet by mouth 2 times daily        oxyCODONE IR (ROXICODONE) 5 MG tablet Take 5 mg by mouth       QUEtiapine (SEROQUEL) 200 MG tablet 100 mg daily as needed Take 100 mg by mouth daily. weening       sildenafil (VIAGRA) 100 MG tablet Take 1/4 - 1  tablet, as directed, 1-3 hours before intimacy. Maximum 1 dose per 24 hours. 10 tablet 5     [DISCONTINUED] sildenafil (VIAGRA) 100 MG tablet Take 1/4 - 1 tablet, as directed, 1-3 hours before intimacy. Maximum 1 dose per 24 hours. 10 tablet 5     Allergies   Allergen Reactions     Risperidone Other (See Comments)     Serve numbness      Effexor [Venlafaxine Hydrochloride] Other (See Comments)     Headache, Painful scrotum and drainage from the penis     Ambien Other (See Comments)     headaches     Ambien [Zolpidem Tartrate] Nausea     Buspirone Nausea     Dizziness, headache     Celexa [Citalopram] Other (See Comments)     Headache     Duloxetine Other (See Comments)     Headache     Septra [Bactrim] Itching     Seroquel [Quetiapine] Other (See Comments)     Headache, N, V     Sulfa Drugs Itching     Tramadol Nausea     Ultram [Tramadol Hcl] Nausea and Vomiting      Past Medical History:   Diagnosis Date     Alcoholism (H)      Arthritis      Asperger's syndrome     Dr. Owens, [facility]. 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 Muñoz ([place])     CYSTOSCOPY  10/98    for renal stones     HC KNEE SCOPE,MED/LAT MENISECTOMY  6/24/11    Left, medial ([place])      HERNIA REPAIR, INGUINAL RT/LT      [facility]     Family History   Problem Relation Age of Onset     Psychotic Disorder Son      autism     Prostate Cancer Father      40s     CANCER Father      CANCER Maternal Grandmother      lung     Glaucoma Maternal Grandmother      Eye Disorder Maternal Grandmother      glaucoma     DIABETES Mother       45     Blood Disease Sister      sickle trait     Hypertension Sister      Blood Disease Paternal Uncle      sickle     Blood Disease Paternal Aunt      sickle     Alzheimer Disease Paternal Grandfather      70s     Macular Degeneration Paternal Grandfather      CEREBROVASCULAR DISEASE No family hx of      Thyroid Disease No family hx of      Myocardial Infarction No family hx of      C.A.D. 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 [facility] dependency treatment in      Drug use: No      Comment: crack (last used 10/08?)     Sexual activity: Not Currently     Partners: Female     Other Topics Concern     None     Social History Narrative    [employer] ([id number] [place] 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       REVIEW OF SYSTEMS  =================  C: NEGATIVE for fever, chills, change in weight  I: NEGATIVE for worrisome rashes, moles or lesions  E/M: NEGATIVE for ear, mouth and throat problems  R: NEGATIVE for significant cough or SHORTNESS OF BREATH,   CV: NEGATIVE for chest pain, palpitations or peripheral edema  GI: NEGATIVE for nausea, abdominal pain, heartburn, or change in bowel habits  NEURO:  NEGATIVE any motor/sensory changes  PSYCH: NEGATIVE for recent mood disorder    Physical Exam:  /82 (BP Location: Right arm, Patient Position: Sitting, Cuff Size: Adult Regular)  Pulse 54  SpO2 98%   Patient is pleasant, in no acute distress, good general condition.  Lung: no evidence of respiratory distress    Abdomen: Soft, nondistended, non tender. No masses. No rebound or guarding.   Exam: no cva tenderness  Skin: Warm and dry.  No redness.  Psych: normal mood and affect  Neuro: alert and oriented    Assessment/Plan:   (C61) Malignant neoplasm of prostate (H)  (primary encounter diagnosis)  Comment:    Plan: PSA tumor marker        In one year    (N52.9) Erectile dysfunction, unspecified erectile dysfunction type  Comment:    Plan:  Cont with viagra

## 2018-06-19 ENCOUNTER — TELEPHONE (OUTPATIENT)
Dept: UROLOGY | Facility: CLINIC | Age: 58
End: 2018-06-19

## 2018-06-19 NOTE — TELEPHONE ENCOUNTER
Patient given fax number for [facility].  Provided pt with phone number.  No further questions.    Alejandro Luna RN....6/19/2018 8:46 AM

## 2018-06-26 ENCOUNTER — MYC MEDICAL ADVICE (OUTPATIENT)
Dept: FAMILY MEDICINE | Facility: CLINIC | Age: 58
End: 2018-06-26

## 2018-07-13 ENCOUNTER — MYC MEDICAL ADVICE (OUTPATIENT)
Dept: FAMILY MEDICINE | Facility: CLINIC | Age: 58
End: 2018-07-13

## 2018-07-13 NOTE — TELEPHONE ENCOUNTER
haider response to father we are awaiting provider and will get back to him with more info  Simon Ladd CMA

## 2018-07-20 ENCOUNTER — TELEPHONE (OUTPATIENT)
Dept: PALLIATIVE MEDICINE | Facility: CLINIC | Age: 58
End: 2018-07-20

## 2018-07-20 NOTE — TELEPHONE ENCOUNTER
Pre-screening Questions for Radiology Injections:    Injection to be done at which interventional clinic site? [facility]    Instruct patient to arrive as directed prior to the scheduled appointment time:    [place] minutes before      [place]: 1 hour before     Procedure ordered by Dr. King    Procedure ordered? Bilateral SI Joint Injection    What insurance would patient like us to bill for this procedure? [employer]      Worker's comp or MVA (motor vehicle accident) -Any injection DO NOT SCHEDULE and route to Margot Samaniego.      [employer] - For SI joint injections, DO NOT SCHEDULE and route Kym Fisher. MONOQI NO PA REQUIRED EFFECTIVE 2017      [employer]- MBB's must be scheduled at LEAST two weeks apart      [employer] - Any injection besides hip/shoulder/knee joint DO NOT SCHEDULE and route to Kym Fisher. She will obtain PA and call pt back to schedule procedure or notify pt of denial.       [employer]-Route to Kym for review    Any chance of pregnancy? NO   If YES, do NOT schedule and route to RN pool    Is an  needed? No     Patient has a drive home? (mandatory) YES: INFORMED    Is patient taking any blood thinners (plavix, coumadin, jantoven, warfarin, heparin, pradaxa or dabigatran )? No   If hold needed, do NOT schedule, route to RN pool     Is patient taking any aspirin products? Yes - Pt takes 81mg daily; instructed to hold 0 day(s) prior to procedure.      If more than 325mg/day do NOT schedule; route to RN pool     For CERVICAL procedures, hold all aspirin products for 6 days.      Does the patient have a bleeding or clotting disorder? No     If YES, okay to schedule AND route to RN nurse pool    **For any patients with platelet count <100, must be forwarded to provider**    Is patient diabetic?  No  If YES, have them bring their glucometer.    Does patient have an active infection or treated for one within the past week?  No     Is patient currently taking any antibiotics?  No     For patients on chronic, preventative, or prophylactic antibiotics, procedures may be scheduled.     For patients on antibiotics for active or recent infection:    Christine Luna Burton, [place]-antibiotic course must have been completed for 4 days    Is patient currently taking any steroid medications? (i.e. Prednisone, Medrol)  No     For patients on steroid medications:    Christine Luna Burton, [place]-steroid course must have been completed for 4 days    Reviewed with patient:  If you are started on any steroids or antibiotics between now and your appointment, you must contact us because it may affect our ability to perform your procedure.  Yes    Is patient actively being treated for cancer or immunocompromised? No  If YES, do NOT schedule and route to RN pool     Are you able to get on and off an exam table with minimal or no assistance? Yes  If NO, do NOT schedule and route to RN pool    Are you able to roll over and lay on your stomach with minimal or no assistance? Yes  If NO, do NOT schedule and route to RN pool     Any allergies to contrast dye, iodine, shellfish, or numbing and steroid medications? No  If YES, route to RN pool AND add allergy information to appointment notes    Allergies: Risperidone; Effexor [venlafaxine hydrochloride]; Ambien; Ambien [zolpidem tartrate]; Buspirone; Celexa [citalopram]; Duloxetine; Septra [bactrim]; Seroquel [quetiapine]; Sulfa drugs; Tramadol; and Ultram [tramadol hcl]      Has the patient had a flu shot or any other vaccinations within 7 days before or after the procedure.  No     Does patient have an MRI/CT?  Not Applicable  (SI joint, hip injections, lumbar sympathetic blocks, and stellate ganglion blocks do not require an MRI)    Was the MRI done w/in the last 3 years?  NA    Was MRI done at [place]? No      If not, where was it done? N/A       If MRI was not done at [place], [facility]  or [facility] do NOT schedule and route to nursing.  If pt has an imaging disc, the injection may be scheduled but pt has to bring disc to appt. If they show up w/out disc the injection cannot be done    Reminders (please tell patient if applicable):       Instructed pt to arrive 30 minutes early for IV start if this is for a cervical procedure, ALL sympathetic (stellate ganglion, hypogastric, or lumbar sympathetic block) and all sedation procedures (RFA, spinal cord stimulation trials).  Not Applicable   -IVs are not routinely placed for Dr. Maier cervical cases   -Dr. Castellanos: IVs for cervical ESIs and cervical TBDs (not CMBBs/facet inj)      If NPO for sedation, informed patient that it is okay to take medications with sips of water (except if they are to hold blood thinners).  Not Applicable   *DO take blood pressure medication if it is prescribed*      If this is for a cervical JOSE R, informed patient that aspirin needs to be held for 6 days.   Not Applicable      For all patients not having spinal cord stimulator (SCS) trials or radiofrequency ablations (RFAs), informed patient:    IV sedation is not provided for this procedure.  If you feel that an oral anti-anxiety medication is needed, you can discuss this further with your referring provider or primary care provider.  The Pain Clinic provider will discuss specifics of what the procedure includes at your appointment.  Most procedures last 10-20 minutes.  We use numbing medications to help with any discomfort during the procedure.  Not Applicable      Do not schedule procedures requiring IV placement in the first appointment of the day or first appointment after lunch. Do NOT schedule at 0745, 0815 or 1245. N/A      For patients 85 or older we recommend having an adult stay w/ them for the remainder of the day.   N/A    Does the patient have any questions?  NO  Raya Amanda  [facility]

## 2018-08-10 ENCOUNTER — TELEPHONE (OUTPATIENT)
Dept: FAMILY MEDICINE | Facility: CLINIC | Age: 58
End: 2018-08-10

## 2018-08-10 NOTE — TELEPHONE ENCOUNTER
Reason for Call: Request for a referral:    Order or referral being requested: Needs referral for facial edema doctor    Date needed: as soon as possible    Has the patient been seen by the PCP for this problem? YES not sure    Additional comments: Please call when approved so he can schedule his exam.    Phone number Patient can be reached at:  Home number on file [phone] (home)    Best Time:  any    Can we leave a detailed message on this number?  YES    Call taken on 8/10/2018 at 3:19 PM by Lori Watt

## 2018-08-13 NOTE — TELEPHONE ENCOUNTER
This writer attempted to contact Jeanmarie on 08/13/18      Reason for call appointment and left detailed message see another provider while has signs and symptoms, walk-in available today as well.      If patient calls back:   Schedule Office Visit appointment asap with any provider, document that pt called and close encounter         Simon Ladd

## 2018-08-13 NOTE — TELEPHONE ENCOUNTER
Patient can make an appointment to see a provider in clinic for evaluation first while he has signs and symptoms.    Milton Iglesias MD

## 2018-08-14 NOTE — TELEPHONE ENCOUNTER
Reason for Call:  Other appointment    Detailed comments: Made appointment closing encounter no need for call back.    Call taken on 8/14/2018 at 1:42 PM by Lori Watt

## 2018-08-14 NOTE — TELEPHONE ENCOUNTER
This writer attempted to contact patient on 08/14/18      Reason for call Patient to schedule an appt with any Dr or the walk in provider while patient is having signs or symptoms to be evaluated before doing referral and left a voicemail message.      If patient calls back:   Schedule office visit or walk in appointment ASAP within 2 business days with, document that pt called and close encounter         Sheila Kam MA

## 2018-08-16 ENCOUNTER — RADIANT APPOINTMENT (OUTPATIENT)
Dept: RADIOLOGY | Facility: CLINIC | Age: 58
End: 2018-08-16
Attending: PSYCHIATRY & NEUROLOGY
Payer: COMMERCIAL

## 2018-08-16 ENCOUNTER — RADIOLOGY INJECTION OFFICE VISIT (OUTPATIENT)
Dept: PALLIATIVE MEDICINE | Facility: CLINIC | Age: 58
End: 2018-08-16
Payer: COMMERCIAL

## 2018-08-16 VITALS
DIASTOLIC BLOOD PRESSURE: 82 MMHG | HEART RATE: 50 BPM | SYSTOLIC BLOOD PRESSURE: 130 MMHG | OXYGEN SATURATION: 99 % | RESPIRATION RATE: 16 BRPM

## 2018-08-16 DIAGNOSIS — M53.3 SACROILIAC JOINT DYSFUNCTION: ICD-10-CM

## 2018-08-16 DIAGNOSIS — M53.3 SI (SACROILIAC) JOINT DYSFUNCTION: Primary | ICD-10-CM

## 2018-08-16 PROCEDURE — 27096 INJECT SACROILIAC JOINT: CPT | Mod: 50 | Performed by: PSYCHIATRY & NEUROLOGY

## 2018-08-16 ASSESSMENT — PAIN SCALES - GENERAL: PAINLEVEL: SEVERE PAIN (7)

## 2018-08-16 NOTE — NURSING NOTE
Pre-procedure Intake    Have you been fasting? NA    If yes, for how long? No     Are you taking a prescribed blood thinner such as coumadin, Plavix, Xarelto?    No    If yes, when did you take your last dose? No     Do you take aspirin? Yes     If cervical procedure, have you held aspirin for 6 days?   No     Do you have any allergies to contrast dye, iodine, steroid and/or numbing medications?  NO    Are you currently taking antibiotics or have an active infection?  NO    Have you had a fever/elevated temperature within the past week? NO    Are you currently taking oral steroids? NO    Do you have a ? Yes       Are you pregnant or breastfeeding?  Not applicable     Are the vital signs normal?  Yes    Jesus Valdez MA

## 2018-08-16 NOTE — PROGRESS NOTES
Pre procedure Diagnosis: SI joint dysfunction    Post procedure Diagnosis: Same  Procedure performed: RIght and left SI joint injections  Anesthesia: none  Complications: none  Operators: Cally King MD. Lester Alcantara DO (Pain Medicine Fellow).    Indications:   Jeanmarie Mclean is a 58 year old male. They have a history of pain across the low back and buttocks. Exam shows tenderness of the lumbosacral region and bilateral SI joints. He has mild lumbar flexion restriction due to pain. They have tried conservative treatment including physical therapy and medications. He uses a TENS unit.    Options/alternatives, benefits and risks were discussed with the patient including bleeding, infection, tissue trauma, exposure to radiation, reaction to medications including seizure, nerve injury, weakness, and numbness.  Questions were answered to his satisfaction and he agrees to proceed. Voluntary informed consent was obtained and signed.     He underwent trigger point injections 5/11/18 and bilateral SI joint injections 12/7/17. The trigger point injections helped for about a month.  The SI joint injections helped for 3-4 months.     Vitals were reviewed: Yes  Allergies were reviewed:  Yes  Medications were reviewed:  Yes  Pre-procedure pain score: 7/10    Procedure:  After getting informed consent, patient was brought into the procedure suite and was placed in a prone position on the procedure table.   A Pause for the Cause was performed.  Patient was prepped and draped in sterile fashion.     After identifying the bilateral SI joint, the C-arm was rotated to a obliquely to obtain the best view of the inferior angle of the joint.  A total of 3 ml of Lidocaine 1% was used to anesthetize the skin at a skin entry site coaxial with the fluoroscopy beam at this location.  A 22gauge 3.5 inch needle was advanced under intermittent fluoroscopy until it was felt to enter the SI joint.    A total of 6ml of Omnipaque-300 was injected,  confirming appropriate position, with spread into the intraarticular space, with no intravascular uptake noted.  4ml was wasted. Location was verified in lateral view.    3 ml of 0.2% ropivacaine with 80mg of kenalog was injected divided between the two sides.  The needle was flushed with lidocaine and removed.     Hemostasis was achieved, the area was cleaned, and bandaids were placed when appropriate.    The patient tolerated the procedure well, and was taken to the recovery room.    Images were saved to PACS.    Post-procedure pain score: 0/10  Follow-up includes:   -f/u with referring provider    Cally King MD  [facility]

## 2018-08-16 NOTE — MR AVS SNAPSHOT
After Visit Summary   8/16/2018    Jeanmarie Mclean    MRN: [id number]           Patient Information     Date Of Birth          1960        Visit Information        Provider Department      8/16/2018 8:45 AM Angélica King MD [facility] [place]        Care Instructions    [facility]   Procedure Discharge Instructions    Today you saw: Dr. Angélica King     You had an:  sacroiliac joint injection      Medications used:  Lidocaine  Omnipaque  Ropivicaine   Kenalog   Normal saline            Be cautious when walking. Numbness and/or weakness in the lower extremities may occur for up to 6-8 hours after the procedure due to effect of the local anesthetic    Do not drive for 6 hours. The effect of the local anesthetic could slow your reflexes.     You may resume your regular activities after 24 hours    Avoid strenuous activity for the first 24 hours    You may shower, however avoid swimming, tub baths or hot tubs for 24 hours following your procedure    You may have a mild to moderate increase in pain for several days following the injection.    It may take up to 14 days for the steroid medication to start working although you may feel the effect as early as a few days after the procedure.       You may use ice packs for 10-15 minutes, 3 to 4 times a day at the injection site for comfort    Do not use heat to painful areas for 6 to 8 hours. This will give the local anesthetic time to wear off and prevent you from accidentally burning your skin.     You may use anti-inflammatory medications (such as Ibuprofen or Aleve or Advil) or Tylenol for pain control if necessary    If you experience any of the following, call the Pain Clinic during work hours at [phone] or the Provider Line after hours at [phone]:  -Fever over 100 degree F  -Swelling, bleeding, redness, drainage, warmth at the injection site  -Progressive weakness or numbness in your legs   -Unusual new  onset of pain that is not improving                Follow-ups after your visit        Your next 10 appointments already scheduled     Aug 16, 2018  8:45 AM CDT   Radiology Injections with Angélica King MD   [facility] ([facility])    [address]  [place] [id number] [phone]            Aug 16, 2018  8:45 AM CDT   XR SACROILIAC THERAPEUTIC INJECTION BILATERAL with BEPAIN1   [facility] [place] [facility] ([facility] [place])    [address]  [place] [id number] [phone]           Stop drinking 1 hour before the exam.  You may take your medicines as usual, except for blood thinners (Coumadin, Plavix, Ticlid, Persantine, Aggrenox, Pletal, Effient, Brilliant). Talk to your doctor if you take these.  Tell your doctor if:   You have ever had an allergic reaction to X-ray dye (contrast fluid).   There is a chance you may be pregnant.  Please bring a list of your current medicines to your exam. Include vitamins, minerals and over-the-counter medicines.  Please call the Imaging Department at your exam site with any questions.            Aug 20, 2018  9:00 AM CDT   Office Visit with RAJINDER Arroyo CNP   [facility]    [address] [place] [address] [phone]           Bring a current list of meds and any records pertaining to this visit. For Physicals, please bring immunization records and any forms needing to be filled out. Please arrive 10 minutes early to complete paperwork.              Who to contact     If you have questions or need follow up information about today's clinic visit or your schedule please contact [facility] directly at [phone].  Normal or non-critical lab and imaging results will be communicated to you by MyChart, letter or phone within 4 business days after the clinic has received the results. If you  do not hear from us within 7 days, please contact the clinic through [employer] or phone. If you have a critical or abnormal lab result, we will notify you by phone as soon as possible.  Submit refill requests through [employer] or call your pharmacy and they will forward the refill request to us. Please allow 3 business days for your refill to be completed.          Additional Information About Your Visit        [employer] Information     [employer] gives you secure access to your electronic health record. If you see a primary care provider, you can also send messages to your care team and make appointments. If you have questions, please call your primary care clinic.  If you do not have a primary care provider, please call [phone] and they will assist you.        Care EveryWhere ID     This is your Care EveryWhere ID. This could be used by other organizations to access your [place] medical records  [id number]        Your Vitals Were     Pulse                   50            Blood Pressure from Last 3 Encounters:   08/16/18 126/84   06/15/18 140/82   01/08/18 143/89    Weight from Last 3 Encounters:   01/08/18 76.2 kg (168 lb)   01/05/18 75.8 kg (167 lb)   11/10/17 74.4 kg (164 lb)              Today, you had the following     No orders found for display       Primary Care Provider Office Phone # Fax #    Milton Iglesisa [phone]       [address] [place]  [facility] [address]        Equal Access to Services     [facility]: Hadii aad ku hadasho Soomaali, waaxda luqadaha, qaybta kaalmada adeegyada, srinivasan yin . So [facility] [phone].    ATENCIÓN: Si habla español, tiene a mosley disposición servicios gratuitos de asistencia lingüística. Llame al [phone].    We comply with applicable federal civil rights laws and [place] laws. We do not discriminate on the basis of race, color, national origin, age, disability, sex, sexual orientation, or gender identity.            Thank  you!     Thank you for choosing [facility]  for your care. Our goal is always to provide you with excellent care. Hearing back from our patients is one way we can continue to improve our services. Please take a few minutes to complete the written survey that you may receive in the mail after your visit with us. Thank you!             Your Updated Medication List - Protect others around you: Learn how to safely use, store and throw away your medicines at www.disposemymeds.org.          This list is accurate as of 8/16/18  8:33 AM.  Always use your most recent med list.                   Brand Name Dispense Instructions for use Diagnosis    aspirin 81 MG tablet      Take by mouth daily        Aspirin-Calcium Carbonate  MG Tabs      Take by mouth daily        gabapentin 100 MG capsule    NEURONTIN    180 capsule    Take 100mg (1 capsule) by mouth at bedtime.  Increase as instructed in clinic to goal dose of 300mg (3 capsules) 3 times daily.    Cervical radiculopathy       IBU-200 PO      Take 2 tablets by mouth as needed        LORazepam 0.5 MG tablet    ATIVAN    2 tablet    Take 1 tablet (0.5 mg) by mouth once as needed for anxiety . Take 30 min prior to MRI.  Bring 2nd tab with you to appointment.  Do not drive for 24 hours after taking    Claustrophobia       menthol 5 % Pads    ICY HOT    30 patch    Apply 1 patch topically every 8 hours as needed for muscle soreness May cut to fit    Myalgia and myositis, Fibromyalgia syndrome       methocarbamol 500 MG tablet    ROBAXIN     Take 500 mg by mouth        * oxyCODONE IR 5 MG tablet    ROXICODONE     Take 1 tablet by mouth 2 times daily        * oxyCODONE IR 5 MG tablet    ROXICODONE     Take 5 mg by mouth        QUEtiapine 200 MG tablet    SEROquel     100 mg daily as needed Take 100 mg by mouth daily. weening        sildenafil 100 MG tablet    VIAGRA    10 tablet    Take 1/4 - 1 tablet, as directed, 1-3 hours before intimacy. Maximum 1 dose per  24 hours.    Erectile dysfunction following radical prostatectomy       vitamin D 1000 units capsule      Take 1 capsule by mouth 2 times daily.        * Notice:  This list has 2 medication(s) that are the same as other medications prescribed for you. Read the directions carefully, and ask your doctor or other care provider to review them with you.

## 2018-08-16 NOTE — PATIENT INSTRUCTIONS
[facility]   Procedure Discharge Instructions    Today you saw: Dr. Angélica King     You had an:  sacroiliac joint injection      Medications used:  Lidocaine  Omnipaque  Ropivicaine   Kenalog   Normal saline            Be cautious when walking. Numbness and/or weakness in the lower extremities may occur for up to 6-8 hours after the procedure due to effect of the local anesthetic    Do not drive for 6 hours. The effect of the local anesthetic could slow your reflexes.     You may resume your regular activities after 24 hours    Avoid strenuous activity for the first 24 hours    You may shower, however avoid swimming, tub baths or hot tubs for 24 hours following your procedure    You may have a mild to moderate increase in pain for several days following the injection.    It may take up to 14 days for the steroid medication to start working although you may feel the effect as early as a few days after the procedure.       You may use ice packs for 10-15 minutes, 3 to 4 times a day at the injection site for comfort    Do not use heat to painful areas for 6 to 8 hours. This will give the local anesthetic time to wear off and prevent you from accidentally burning your skin.     You may use anti-inflammatory medications (such as Ibuprofen or Aleve or Advil) or Tylenol for pain control if necessary    If you experience any of the following, call the Pain Clinic during work hours at [phone] or the Provider Line after hours at [phone]:  -Fever over 100 degree F  -Swelling, bleeding, redness, drainage, warmth at the injection site  -Progressive weakness or numbness in your legs   -Unusual new onset of pain that is not improving

## 2018-08-16 NOTE — NURSING NOTE
Discharge Information    IV Discontiued Time:  NA    Amount of Fluid Infused:  NA    Discharge Criteria = When patient returns to baseline or as per MD order    Consciousness:  Pt is fully awake    Circulation:  BP +/- 20% of pre-procedure level    Respiration:  Patient is able to breathe deeply    O2 Sat:  Patient is able to maintain O2 Sat >92% on room air    Activity:  Moves 4 extremities on command    Ambulation:  Patient is able to stand and walk or stand and pivot into wheelchair    Dressing:  Clean/dry or No Dressing    Notes:   Discharge instructions and AVS given to patient    Patient meets criteria for discharge?  YES    Admitted to PCU?  No    Responsible adult present to accompany patient home?  Yes    Signature/Title:    Enrique Rizo RN Care Coordinator  [facility] [place]

## 2018-09-10 ENCOUNTER — TELEPHONE (OUTPATIENT)
Dept: UROLOGY | Facility: CLINIC | Age: 58
End: 2018-09-10

## 2018-09-10 NOTE — TELEPHONE ENCOUNTER
Reason for Call:  Other call back    Detailed comments: Patient is calling wanting to be seen today noting blood in urine and aching on the left side and in the testicles, patient notes a history of epididymitis. Please call and advise thank you     Phone Number Patient can be reached at: Home number on file [phone] (home)    Best Time: any    Can we leave a detailed message on this number? YES    Call taken on 9/10/2018 at 7:14 AM by Maureen Perez

## 2018-09-10 NOTE — TELEPHONE ENCOUNTER
Called and spoke to patient.   Patient states that he is having a case of epidymidis.   Patient states that his testicles are swollen.   Denies fever, chills, nausea or vomiting.   Patient states that he thinks he got an infection in his testicle from drinking soda from a restaurant.   Patient is requesting a next day appointment.   Kenyatta Ramos RN

## 2018-09-11 ENCOUNTER — OFFICE VISIT (OUTPATIENT)
Dept: UROLOGY | Facility: CLINIC | Age: 58
End: 2018-09-11
Payer: COMMERCIAL

## 2018-09-11 VITALS — DIASTOLIC BLOOD PRESSURE: 67 MMHG | RESPIRATION RATE: 14 BRPM | HEART RATE: 72 BPM | SYSTOLIC BLOOD PRESSURE: 119 MMHG

## 2018-09-11 DIAGNOSIS — R31.29 OTHER MICROSCOPIC HEMATURIA: ICD-10-CM

## 2018-09-11 DIAGNOSIS — R10.30 INGUINAL PAIN, UNSPECIFIED LATERALITY: Primary | ICD-10-CM

## 2018-09-11 LAB
ALBUMIN UR-MCNC: NEGATIVE MG/DL
APPEARANCE UR: CLEAR
BILIRUB UR QL STRIP: NEGATIVE
COLOR UR AUTO: YELLOW
GLUCOSE UR STRIP-MCNC: NEGATIVE MG/DL
HGB UR QL STRIP: ABNORMAL
KETONES UR STRIP-MCNC: ABNORMAL MG/DL
LEUKOCYTE ESTERASE UR QL STRIP: NEGATIVE
MUCOUS THREADS #/AREA URNS LPF: PRESENT /LPF
NITRATE UR QL: NEGATIVE
PH UR STRIP: 6 PH (ref 5–7)
RBC #/AREA URNS AUTO: ABNORMAL /HPF
SOURCE: ABNORMAL
SP GR UR STRIP: >1.03 (ref 1–1.03)
UROBILINOGEN UR STRIP-ACNC: 1 EU/DL (ref 0.2–1)
WBC #/AREA URNS AUTO: ABNORMAL /HPF

## 2018-09-11 PROCEDURE — 81001 URINALYSIS AUTO W/SCOPE: CPT | Performed by: UROLOGY

## 2018-09-11 PROCEDURE — 99214 OFFICE O/P EST MOD 30 MIN: CPT | Performed by: UROLOGY

## 2018-09-11 PROCEDURE — 88112 CYTOPATH CELL ENHANCE TECH: CPT | Performed by: UROLOGY

## 2018-09-11 NOTE — MR AVS SNAPSHOT
After Visit Summary   9/11/2018    Jeanmarie Mclean    MRN: [id number]           Patient Information     Date Of Birth          1960        Visit Information        Provider Department      9/11/2018 1:30 PM Devon Miles MD [facility]        Today's Diagnoses     Inguinal pain, unspecified laterality    -  1    Other microscopic hematuria          Care Instructions    Please call [facility] to schedule your CT scan at [facility]. [phone].  They are located near the intersection of [facility] and [address] [place] [address].      Your cystoscopy with Dr. Miles has been scheduled for 9/24/2018 at 10:15.    If you have any questions or need to reschedule please call         Cystoscopy    Cystoscopy is a procedure that lets your doctor look directly inside your urethra and bladder. It can be used to:    Help diagnose a problem with your urethra, bladder, or kidneys.    Take a sample (biopsy) of bladder or urethral tissue.    Treat certain problems (such as removing kidney stones).    Place a stent to bypass an obstruction.    Take special X-rays of the kidneys.  Based on the findings, your doctor may recommend other tests or treatments.  What is a cystoscope?  A cystoscope is a telescope-like instrument that contains lenses and fiberoptics (small glass wires that make bright light). The cystoscope may be straight and rigid, or flexible to bend around curves in the urethra. The doctor may look directly into the cystoscope, or project the image onto a monitor.  Getting ready    Ask your doctor if you should stop taking any medicines before the procedure.    Ask whether you should avoid eating or drinking anything after midnight before the procedure.    Follow any other instructions your doctor gives you.  Tell your doctor before the exam if you:    Take any medicines, such as aspirin or blood thinners    Have allergies to any medicines    Are pregnant   The  procedure  Cystoscopy is done in the doctor s office, surgery center, or hospital. The doctor and a nurse are present during the procedure. It takes only a few minutes, longer if a biopsy, X-ray, or treatment needs to be done.  During the procedure:    You lie on an exam table on your back, knees bent and legs apart. You are covered with a drape.    Your urethra and the area around it are washed. Anesthetic jelly may be applied to numb the urethra. Other pain medicine is usually not needed. In some cases, you may be offered a mild sedative to help you relax. If a more extensive procedure is to be done, such as a biopsy or kidney stone removal, general anesthesia may be needed.    The cystoscope is inserted. A sterile fluid is put into the bladder to expand it. You may feel pressure from this fluid.    When the procedure is done, the cystoscope is removed.  After the procedure  If you had a sedative, general anesthesia, or spinal anesthesia, you must have someone drive you home. Once you re home:    Drink plenty of fluids.    You may have burning or light bleeding when you urinate--this is normal.    Medicines may be prescribed to ease any discomfort or prevent infection. Take these as directed.    Call your doctor if you have heavy bleeding or blood clots, burning that lasts more than a day, a fever over 100 F  (38  C), or trouble urinating.  Date Last Reviewed: 1/1/2017 2000-2017 [employer]. [address] [place] [address]. All rights reserved. This information is not intended as a substitute for professional medical care. Always follow your healthcare professional's instructions.                Follow-ups after your visit        Your next 10 appointments already scheduled     Sep 24, 2018 10:15 AM CDT   Return Visit with Devon Miles MD, [facility] CYSTO PROC ROOM   [facility]    [address] [place] [id number] [phone]               Future tests that were ordered for you today     Open Future Orders        Priority Expected Expires Ordered    CT Abdomen Pelvis w/o & w Contrast [POL769] Routine  9/11/2019 9/11/2018            Who to contact     If you have questions or need follow up information about today's clinic visit or your schedule please contact [facility] directly at [phone].  Normal or non-critical lab and imaging results will be communicated to you by [employer], letter or phone within 4 business days after the clinic has received the results. If you do not hear from us within 7 days, please contact the clinic through [employer] or phone. If you have a critical or abnormal lab result, we will notify you by phone as soon as possible.  Submit refill requests through [employer] or call your pharmacy and they will forward the refill request to us. Please allow 3 business days for your refill to be completed.          Additional Information About Your Visit        [employer] Information     [employer] gives you secure access to your electronic health record. If you see a primary care provider, you can also send messages to your care team and make appointments. If you have questions, please call your primary care clinic.  If you do not have a primary care provider, please call [phone] and they will assist you.        Care EveryWhere ID     This is your Care EveryWhere ID. This could be used by other organizations to access your [facility] medical records  [id number]        Your Vitals Were     Pulse Respirations                72 14           Blood Pressure from Last 3 Encounters:   09/11/18 119/67   08/16/18 130/82   06/15/18 140/82    Weight from Last 3 Encounters:   01/08/18 76.2 kg (168 lb)   01/05/18 75.8 kg (167 lb)   11/10/17 74.4 kg (164 lb)              We Performed the Following     Cytology non gyn     UA reflex to Microscopic and Culture [[id number]]     Urine Microscopic        Primary Care Provider Office Phone  # Fax #    Milton Iglesias [phone]       [address]  [place] [facility] [address]        Equal Access to Services     HOSSEIN GUAJARDO : Kar bonilla quintonabdirashid Soareli, waskylerda luqadaha, qaybta kaalmada adejane, srinivasan gabriel lasarahkody beatty. So [facility] [phone].    ATENCIÓN: Si habla español, tiene a mosley disposición servicios gratuitos de asistencia lingüística. Llame al [phone].    We comply with applicable federal civil rights laws and [place] laws. We do not discriminate on the basis of race, color, national origin, age, disability, sex, sexual orientation, or gender identity.            Thank you!     [facility] you [facility]  [facility] your care. Our goal is always to provide you with excellent care. Hearing back from our patients is one way we can continue to improve our services. Please take a few minutes to complete the written survey that you may receive in the mail after your visit with us. Thank you!             Your Updated Medication List - Protect others around you: Learn how to safely use, store and throw away your medicines at www.disposemymeds.org.          This list is accurate as of 9/11/18  1:43 PM.  Always use your most recent med list.                   Brand Name Dispense Instructions for use Diagnosis    aspirin 81 MG tablet      Take by mouth daily        Aspirin-Calcium Carbonate  MG Tabs      Take by mouth daily        gabapentin 100 MG capsule    NEURONTIN    180 capsule    Take 100mg (1 capsule) by mouth at bedtime.  Increase as instructed in clinic to goal dose of 300mg (3 capsules) 3 times daily.    Cervical radiculopathy       IBU-200 PO      Take 2 tablets by mouth as needed        LORazepam 0.5 MG tablet    ATIVAN    2 tablet    Take 1 tablet (0.5 mg) by mouth once as needed for anxiety . Take 30 min prior to MRI.  Bring 2nd tab with you to appointment.  Do not drive for 24 hours after taking    Claustrophobia       menthol 5 %  Pads    ICY HOT    30 patch    Apply 1 patch topically every 8 hours as needed for muscle soreness May cut to fit    Myalgia and myositis, Fibromyalgia syndrome       methocarbamol 500 MG tablet    ROBAXIN     Take 500 mg by mouth        * oxyCODONE IR 5 MG tablet    ROXICODONE     Take 1 tablet by mouth 2 times daily        * oxyCODONE IR 5 MG tablet    ROXICODONE     Take 5 mg by mouth        QUEtiapine 200 MG tablet    SEROquel     100 mg daily as needed Take 100 mg by mouth daily. weening        sildenafil 100 MG tablet    VIAGRA    10 tablet    Take 1/4 - 1 tablet, as directed, 1-3 hours before intimacy. Maximum 1 dose per 24 hours.    Erectile dysfunction following radical prostatectomy       vitamin D 1000 units capsule      Take 1 capsule by mouth 2 times daily.        * Notice:  This list has 2 medication(s) that are the same as other medications prescribed for you. Read the directions carefully, and ask your doctor or other care provider to review them with you.

## 2018-09-11 NOTE — PATIENT INSTRUCTIONS
Please call [facility] to schedule your CT scan at [facility]. [phone].  They are located near the intersection of [facility] and [address] [place] [address].      Your cystoscopy with Dr. Miles has been scheduled for 9/24/2018 at 10:15.    If you have any questions or need to reschedule please call         Cystoscopy    Cystoscopy is a procedure that lets your doctor look directly inside your urethra and bladder. It can be used to:    Help diagnose a problem with your urethra, bladder, or kidneys.    Take a sample (biopsy) of bladder or urethral tissue.    Treat certain problems (such as removing kidney stones).    Place a stent to bypass an obstruction.    Take special X-rays of the kidneys.  Based on the findings, your doctor may recommend other tests or treatments.  What is a cystoscope?  A cystoscope is a telescope-like instrument that contains lenses and fiberoptics (small glass wires that make bright light). The cystoscope may be straight and rigid, or flexible to bend around curves in the urethra. The doctor may look directly into the cystoscope, or project the image onto a monitor.  Getting ready    Ask your doctor if you should stop taking any medicines before the procedure.    Ask whether you should avoid eating or drinking anything after midnight before the procedure.    Follow any other instructions your doctor gives you.  Tell your doctor before the exam if you:    Take any medicines, such as aspirin or blood thinners    Have allergies to any medicines    Are pregnant   The procedure  Cystoscopy is done in the doctor s office, surgery center, or hospital. The doctor and a nurse are present during the procedure. It takes only a few minutes, longer if a biopsy, X-ray, or treatment needs to be done.  During the procedure:    You lie on an exam table on your back, knees bent and legs apart. You are covered with a drape.    Your urethra and the area around it are washed. Anesthetic jelly may be  applied to numb the urethra. Other pain medicine is usually not needed. In some cases, you may be offered a mild sedative to help you relax. If a more extensive procedure is to be done, such as a biopsy or kidney stone removal, general anesthesia may be needed.    The cystoscope is inserted. A sterile fluid is put into the bladder to expand it. You may feel pressure from this fluid.    When the procedure is done, the cystoscope is removed.  After the procedure  If you had a sedative, general anesthesia, or spinal anesthesia, you must have someone drive you home. Once you re home:    Drink plenty of fluids.    You may have burning or light bleeding when you urinate--this is normal.    Medicines may be prescribed to ease any discomfort or prevent infection. Take these as directed.    Call your doctor if you have heavy bleeding or blood clots, burning that lasts more than a day, a fever over 100 F  (38  C), or trouble urinating.  Date Last Reviewed: 1/1/2017 2000-2017 [employer]. [address] [place] [address], [place] [address]. All rights reserved. This information is not intended as a substitute for professional medical care. Always follow your healthcare professional's instructions.

## 2018-09-11 NOTE — PROGRESS NOTES
No chief complaint on file.      Jeanmarie Mclean is a 58 year old male who presents today for follow up of No chief complaint on file.   patient c/o left groin followed by right groin pain for the last several weeks.  He had similar problems in the past and was told that he had epididymitis.  He has no urinary complaints.  He has no penile discharge.    Current Outpatient Prescriptions   Medication Sig Dispense Refill     Cholecalciferol (VITAMIN D) 1000 UNIT capsule Take 1 capsule by mouth 2 times daily.       oxyCODONE IR (ROXICODONE) 5 MG tablet Take 5 mg by mouth       QUEtiapine (SEROQUEL) 200 MG tablet 100 mg daily as needed Take 100 mg by mouth daily. weening       sildenafil (VIAGRA) 100 MG tablet Take 1/4 - 1 tablet, as directed, 1-3 hours before intimacy. Maximum 1 dose per 24 hours. 10 tablet 5     aspirin 81 MG tablet Take by mouth daily       Aspirin-Calcium Carbonate  MG TABS Take by mouth daily       gabapentin (NEURONTIN) 100 MG capsule Take 100mg (1 capsule) by mouth at bedtime.  Increase as instructed in clinic to goal dose of 300mg (3 capsules) 3 times daily. 180 capsule 3     Ibuprofen (IBU-200 PO) Take 2 tablets by mouth as needed       LORazepam (ATIVAN) 0.5 MG tablet Take 1 tablet (0.5 mg) by mouth once as needed for anxiety . Take 30 min prior to MRI.  Bring 2nd tab with you to appointment.  Do not drive for 24 hours after taking (Patient not taking: Reported on 8/16/2018) 2 tablet 0     menthol (ICY HOT) 5 % PADS Apply 1 patch topically every 8 hours as needed for muscle soreness May cut to fit 30 patch 3     methocarbamol (ROBAXIN) 500 MG tablet Take 500 mg by mouth       OXYCODONE HCL 5 MG OR TABS Take 1 tablet by mouth 2 times daily        Allergies   Allergen Reactions     Risperidone Other (See Comments)     Serve numbness      Effexor [Venlafaxine Hydrochloride] Other (See Comments)     Headache, Painful scrotum and drainage from the penis     Ambien Other (See Comments)      headaches     Ambien [Zolpidem Tartrate] Nausea     Buspirone Nausea     Dizziness, headache     Celexa [Citalopram] Other (See Comments)     Headache     Duloxetine Other (See Comments)     Headache     Septra [Bactrim] Itching     Seroquel [Quetiapine] Other (See Comments)     Headache, N, V     Sulfa Drugs Itching     Tramadol Nausea     Ultram [Tramadol Hcl] Nausea and Vomiting      Past Medical History:   Diagnosis Date     Alcoholism (H)      Arthritis      Asperger's syndrome     Dr. Owens, [facility]. 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 Muñoz ([place])     CYSTOSCOPY  10/98    for renal stones     HC KNEE SCOPE,MED/LAT MENISECTOMY  11    Left, medial ([place])     HERNIA REPAIR, INGUINAL RT/LT      [facility]     Family History   Problem Relation Age of Onset     Psychotic Disorder Son      autism     Prostate Cancer Father      40s     Cancer Father      Cancer Maternal Grandmother      lung     Glaucoma Maternal Grandmother      Eye Disorder Maternal Grandmother      glaucoma     Diabetes Mother       45     Blood Disease Sister      sickle trait     Hypertension Sister      Blood Disease Paternal Uncle      sickle      Blood Disease Paternal Aunt      sickle     Alzheimer Disease Paternal Grandfather      70s     Macular Degeneration Paternal Grandfather      Cerebrovascular Disease No family hx of      Thyroid Disease No family hx of      Myocardial Infarction No family hx of      C.A.D. 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 [facility] dependency treatment in 1986     Drug use: No      Comment: crack (last used 10/08?)     Sexual activity: Not Currently     Partners: Female     Other Topics Concern     None     Social History Narrative    [employer] ([id number] [place] [employer] [place] 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       REVIEW OF SYSTEMS  =================  C: NEGATIVE for fever, chills, change in weight  I: NEGATIVE for worrisome rashes, moles or lesions  E/M: NEGATIVE for ear, mouth and throat problems  R: NEGATIVE for significant cough or SHORTNESS OF BREATH,   CV: NEGATIVE for chest pain, palpitations or peripheral edema  GI: NEGATIVE for nausea, abdominal pain, heartburn, or change in bowel habits  NEURO: NEGATIVE any motor/sensory changes  PSYCH: NEGATIVE for recent mood disorder    Physical Exam:  /67 (BP Location: Right arm, Patient Position: Chair, Cuff Size: Adult Regular)  Pulse 72  Resp 14   Patient is pleasant, in no acute distress, good general condition.  Lung: no evidence of respiratory distress    Abdomen: Soft, nondistended, non tender. No masses. No rebound or guarding.   Exam: penis no mass.  Testis normal.  Epididymitis normal.  No scrotal skin lesion   Skin:  Warm and dry.  No redness.  Psych: normal mood and affect  Neuro: alert and oriented    Assessment/Plan:   (R10.30) Inguinal pain, unspecified laterality  (primary encounter diagnosis)  Comment: no evidence of epididymitis  Plan: UA 5-10 rbc/hpf    (R31.29) Other microscopic hematuria  Comment:    Plan: will schedule for CT urogram/cysto next.

## 2018-09-12 ENCOUNTER — TELEPHONE (OUTPATIENT)
Dept: UROLOGY | Facility: CLINIC | Age: 58
End: 2018-09-12

## 2018-09-12 NOTE — TELEPHONE ENCOUNTER
Reason for Call:  Other results/pain    Detailed comments: Patient left a urine specimen yesterday. Patient would like to know if it shows any infection as he is in a lot of pain. Please respond via patient's MyChart.    Phone Number Patient can be reached at: Home number on file [phone] (home)    Best Time: na    Can we leave a detailed message on this number? Not Applicable    Call taken on 9/12/2018 at 2:53 PM by Anahi Colby

## 2018-09-12 NOTE — TELEPHONE ENCOUNTER
Called and spoke to patient.   Patient is experiencing pain in the groin and back.   Patient was seen in clinic 9/11/2018.  Advised patient per Dr. Miles to alterate ibuprofen and tylenol.   Ok to ice area 15 minutes on 15 minutes off.   Per Dr. Miles urine is ok, complete CT and follow up for cysto.   Patient agrees with plan.   Kenyatta Ramos RN

## 2018-09-13 LAB — COPATH REPORT: NORMAL

## 2018-09-15 ENCOUNTER — NURSE TRIAGE (OUTPATIENT)
Dept: NURSING | Facility: CLINIC | Age: 58
End: 2018-09-15

## 2018-09-15 NOTE — TELEPHONE ENCOUNTER
Patient asked for the name of the procedure he is having on the 24th of September.  FNA advised cystoscopy.  Patient verbalized understanding.    Additional Information    Negative: [1] Caller is not with the adult (patient) AND [2] reporting urgent symptoms    Negative: Lab result questions    Negative: Medication questions    Negative: Caller cannot be reached by phone    Negative: Caller has already spoken to PCP or another triager    Negative: RN needs further essential information from caller in order to complete triage    Negative: Requesting regular office appointment    Negative: [1] Caller requesting NON-URGENT health information AND [2] PCP's office is the best resource    Health Information question, no triage required and triager able to answer question    Protocols used: INFORMATION ONLY [facility]

## 2018-09-16 ENCOUNTER — NURSE TRIAGE (OUTPATIENT)
Dept: NURSING | Facility: CLINIC | Age: 58
End: 2018-09-16

## 2018-09-24 ENCOUNTER — OFFICE VISIT (OUTPATIENT)
Dept: UROLOGY | Facility: CLINIC | Age: 58
End: 2018-09-24
Payer: COMMERCIAL

## 2018-09-24 DIAGNOSIS — R31.29 OTHER MICROSCOPIC HEMATURIA: Primary | ICD-10-CM

## 2018-09-24 PROCEDURE — 52000 CYSTOURETHROSCOPY: CPT | Performed by: UROLOGY

## 2018-09-24 NOTE — PROGRESS NOTES
S: Jeanmarie Mclean is a 58 year old male returns for hematuria.    Patient is draped and prepped.  Flexible cystoscopy placed under direct vision.      The anterior urethra is normal      In the bladder there is normal mucosa.    Assessment/Plan:  (R31.29) Other microscopic hematuria  (primary encounter diagnosis)  Comment:  Neg CT scan at [facility]  Plan: neg urological evaluation           reassurance

## 2018-09-24 NOTE — MR AVS SNAPSHOT
After Visit Summary   9/24/2018    Jeanmarie Mclean    MRN: [id number]           Patient Information     Date Of Birth          1960        Visit Information        Provider Department      9/24/2018 10:15 AM Devon Miles MD; [facility]        Today's Diagnoses     Other microscopic hematuria    -  1       Follow-ups after your visit        Your next 10 appointments already scheduled     Sep 24, 2018 10:15 AM CDT   Return Visit with Devon Miles MD, [facility]   [facility]    [address] [place] [id number] [phone]              Who to contact     If you have questions or need follow up information about today's clinic visit or your schedule please contact [facility] directly at [phone].  Normal or non-critical lab and imaging results will be communicated to you by MyChart, letter or phone within 4 business days after the clinic has received the results. If you do not hear from us within 7 days, please contact the clinic through MyChart or phone. If you have a critical or abnormal lab result, we will notify you by phone as soon as possible.  Submit refill requests through [employer] or call your pharmacy and they will forward the refill request to us. Please allow 3 business days for your refill to be completed.          Additional Information About Your Visit        MyChart Information     [employer] gives you secure access to your electronic health record. If you see a primary care provider, you can also send messages to your care team and make appointments. If you have questions, please call your primary care clinic.  If you do not have a primary care provider, please call [phone] and they will assist you.        Care EveryWhere ID     This is your Care EveryWhere ID. This could be used by other organizations to access your [facility]  records  [id number]         Blood Pressure from Last 3 Encounters:   09/11/18 119/67   08/16/18 130/82   06/15/18 140/82    Weight from Last 3 Encounters:   01/08/18 76.2 kg (168 lb)   01/05/18 75.8 kg (167 lb)   11/10/17 74.4 kg (164 lb)              We Performed the Following     CYSTOURETHROSCOPY ([id number])        Primary Care Provider Office Phone # Fax #    Milton Iglesias [phone]       [address]  [facility] [address]        Equal Access to Services     [facility]: Hadii aad ku hadasho Soomaali, waaxda luqadaha, qaybta kaalmada adeegyada, waxramona venegasin hayaan adechapincito yin . So [facility] [phone].    ATENCIÓN: Si habla español, tiene a mosley disposición servicios gratuitos de asistencia lingüística. Llame al [phone].    We comply with applicable federal civil rights laws and [place] laws. We do not discriminate on the basis of race, color, national origin, age, disability, sex, sexual orientation, or gender identity.            Thank you!     Thank you for choosing [facility]  for your care. Our goal is always to provide you with excellent care. Hearing back from our patients is one way we can continue to improve our services. Please take a few minutes to complete the written survey that you may receive in the mail after your visit with us. Thank you!             Your Updated Medication List - Protect others around you: Learn how to safely use, store and throw away your medicines at www.disposemymeds.org.          This list is accurate as of 9/24/18 10:13 AM.  Always use your most recent med list.                   Brand Name Dispense Instructions for use Diagnosis    aspirin 81 MG tablet      Take by mouth daily        Aspirin-Calcium Carbonate  MG Tabs      Take by mouth daily        gabapentin 100 MG capsule    NEURONTIN    180 capsule    Take 100mg (1 capsule) by mouth at bedtime.  Increase as instructed in clinic to goal dose of 300mg (3 capsules) 3  times daily.    Cervical radiculopathy       IBU-200 PO      Take 2 tablets by mouth as needed        LORazepam 0.5 MG tablet    ATIVAN    2 tablet    Take 1 tablet (0.5 mg) by mouth once as needed for anxiety . Take 30 min prior to MRI.  Bring 2nd tab with you to appointment.  Do not drive for 24 hours after taking    Claustrophobia       menthol 5 % Pads    ICY HOT    30 patch    Apply 1 patch topically every 8 hours as needed for muscle soreness May cut to fit    Myalgia and myositis, Fibromyalgia syndrome       methocarbamol 500 MG tablet    ROBAXIN     Take 500 mg by mouth        * oxyCODONE IR 5 MG tablet    ROXICODONE     Take 1 tablet by mouth 2 times daily        * oxyCODONE IR 5 MG tablet    ROXICODONE     Take 5 mg by mouth        QUEtiapine 200 MG tablet    SEROquel     100 mg daily as needed Take 100 mg by mouth daily. weening        sildenafil 100 MG tablet    VIAGRA    10 tablet    Take 1/4 - 1 tablet, as directed, 1-3 hours before intimacy. Maximum 1 dose per 24 hours.    Erectile dysfunction following radical prostatectomy       vitamin D 1000 units capsule      Take 1 capsule by mouth 2 times daily.        * Notice:  This list has 2 medication(s) that are the same as other medications prescribed for you. Read the directions carefully, and ask your doctor or other care provider to review them with you.

## 2018-10-02 ENCOUNTER — OFFICE VISIT (OUTPATIENT)
Dept: FAMILY MEDICINE | Facility: CLINIC | Age: 58
End: 2018-10-02
Payer: COMMERCIAL

## 2018-10-02 VITALS
BODY MASS INDEX: 28.51 KG/M2 | DIASTOLIC BLOOD PRESSURE: 80 MMHG | HEIGHT: 64 IN | RESPIRATION RATE: 16 BRPM | TEMPERATURE: 97.9 F | SYSTOLIC BLOOD PRESSURE: 124 MMHG | WEIGHT: 167 LBS | OXYGEN SATURATION: 100 % | HEART RATE: 58 BPM

## 2018-10-02 DIAGNOSIS — R23.8 FACIAL AGING: Primary | ICD-10-CM

## 2018-10-02 PROCEDURE — 99213 OFFICE O/P EST LOW 20 MIN: CPT | Performed by: FAMILY MEDICINE

## 2018-10-02 RX ORDER — PALIPERIDONE 3 MG/1
3 TABLET, EXTENDED RELEASE ORAL
COMMUNITY
End: 2019-09-13

## 2018-10-02 ASSESSMENT — PAIN SCALES - GENERAL: PAINLEVEL: NO PAIN (0)

## 2018-10-02 NOTE — MR AVS SNAPSHOT
After Visit Summary   10/2/2018    Jeanmarie Mclean    MRN: [id number]           Patient Information     Date Of Birth          1960        Visit Information        Provider Department      10/2/2018 11:40 AM Milton Iglesias MD [facility]        Today's Diagnoses     Facial aging    -  1      Care Instructions    At [facility], we strive to deliver an exceptional experience to you, every time we see you.  If you receive a survey in the mail, please send us back your thoughts. We really do value your feedback.    Based on your medical history, these are the current health maintenance/preventive care services that you are due for (some may have been done at this visit.)  Health Maintenance Due   Topic Date Due     URINE DRUG SCREEN Q1 YR  02/11/1976     DEPRESSION ACTION PLAN Q1 YR  09/21/2017     PHQ-9 Q6 MONTHS  04/13/2018     INFLUENZA VACCINE (1) 09/01/2018         Suggested websites for health information:  Www.Who is Undercover Spy : Up to date and easily searchable information on multiple topics.  Www.medlineplus.gov : medication info, interactive tutorials, watch real surgeries online  Www.familydoctor.org : good info from the Academy of Family Physicians  Www.cdc.gov : public health info, travel advisories, epidemics (H1N1)  Www.aap.org : children's health info, normal development, vaccinations  Www.health.state.[place].us : [place] dept of health, public health issues in [place], N1N1    Your care team:                            Family Medicine Internal Medicine   MD Aaron Shculte MD Shantel Branch-Fleming, MD Katya Georgiev PA-C Nam Ho, MD Pediatrics   LIZ Uriostegui, MD Lisa Noguera CNP, MD Deborah Mielke, MD Kim Thein, APRN CNP      Clinic hours: Monday - Thursday 7 am-7 pm; Fridays 7 am-5 pm.   Urgent care: Monday - Friday 11 am-9 pm; Saturday and Sunday 9 am-5 pm.  Pharmacy : Monday  "-Thursday 8 am-8 pm; Friday 8 am-6 pm; Saturday and Sunday 9 am-5 pm.     Clinic: [phone]   Pharmacy: [phone]            Follow-ups after your visit        Who to contact     If you have questions or need follow up information about today's clinic visit or your schedule please contact [facility] directly at [phone].  Normal or non-critical lab and imaging results will be communicated to you by AxelaCarehart, letter or phone within 4 business days after the clinic has received the results. If you do not hear from us within 7 days, please contact the clinic through [employer] or phone. If you have a critical or abnormal lab result, we will notify you by phone as soon as possible.  Submit refill requests through [employer] or call your pharmacy and they will forward the refill request to us. Please allow 3 business days for your refill to be completed.          Additional Information About Your Visit        [employer] Information     [employer] gives you secure access to your electronic health record. If you see a primary care provider, you can also send messages to your care team and make appointments. If you have questions, please call your primary care clinic.  If you do not have a primary care provider, please call [phone] and they will assist you.        Care EveryWhere ID     This is your Care EveryWhere ID. This could be used by other organizations to access your [place] medical records  [id number]        Your Vitals Were     Pulse Temperature Respirations Height Pulse Oximetry BMI (Body Mass Index)    58 97.9  F (36.6  C) (Oral) 16 5' 4\" (1.626 m) 100% 28.67 kg/m2       Blood Pressure from Last 3 Encounters:   10/02/18 124/80   09/11/18 119/67   08/16/18 130/82    Weight from Last 3 Encounters:   10/02/18 167 lb (75.8 kg)   01/08/18 168 lb (76.2 kg)   01/05/18 167 lb (75.8 kg)              We Performed the Following     DEPRESSION ACTION PLAN (DAP)        Primary Care Provider " Office Phone # Fax #    Milton Iglesias [phone]       [address]  [facility] [address]        Equal Access to Services     HOSSEIN GUAJARDO : Kar marjan bonilla quintono Soareli, waaxda luqadaha, qaybta kaalmada adechapincitoyada, srinivasan gabriel laMonicajustina beatty. So [facility] [phone].    ATENCIÓN: Si habla español, tiene a mosley disposición servicios gratuitos de asistencia lingüística. Llame al [phone].    We comply with applicable federal civil rights laws and [place] laws. We do not discriminate on the basis of race, color, national origin, age, disability, sex, sexual orientation, or gender identity.            Thank you!     Thank you for choosing [facility]  for your care. Our goal is always to provide you with excellent care. Hearing back from our patients is one way we can continue to improve our services. Please take a few minutes to complete the written survey that you may receive in the mail after your visit with us. Thank you!             Your Updated Medication List - Protect others around you: Learn how to safely use, store and throw away your medicines at www.disposemymeds.org.          This list is accurate as of 10/2/18 12:13 PM.  Always use your most recent med list.                   Brand Name Dispense Instructions for use Diagnosis    aspirin 81 MG tablet      Take by mouth daily        Aspirin-Calcium Carbonate  MG Tabs      Take by mouth daily        gabapentin 100 MG capsule    NEURONTIN    180 capsule    Take 100mg (1 capsule) by mouth at bedtime.  Increase as instructed in clinic to goal dose of 300mg (3 capsules) 3 times daily.    Cervical radiculopathy       IBU-200 PO      Take 2 tablets by mouth as needed        menthol 5 % Pads    ICY HOT    30 patch    Apply 1 patch topically every 8 hours as needed for muscle soreness May cut to fit    Myalgia and myositis, Fibromyalgia syndrome       * oxyCODONE IR 5 MG tablet    ROXICODONE     Take 1  tablet by mouth 2 times daily        * oxyCODONE IR 5 MG tablet    ROXICODONE     Take 5 mg by mouth        paliperidone 3 MG 24 hr tablet    INVEGA     Take 3 mg by mouth        QUEtiapine 200 MG tablet    SEROquel     100 mg daily as needed Take 100 mg by mouth daily. weening        sildenafil 100 MG tablet    VIAGRA    10 tablet    Take 1/4 - 1 tablet, as directed, 1-3 hours before intimacy. Maximum 1 dose per 24 hours.    Erectile dysfunction following radical prostatectomy       vitamin D 1000 units capsule      Take 1 capsule by mouth 2 times daily.        * Notice:  This list has 2 medication(s) that are the same as other medications prescribed for you. Read the directions carefully, and ask your doctor or other care provider to review them with you.

## 2018-10-02 NOTE — PATIENT INSTRUCTIONS
At [facility], we strive to deliver an exceptional experience to you, every time we see you.  If you receive a survey in the mail, please send us back your thoughts. We really do value your feedback.    Based on your medical history, these are the current health maintenance/preventive care services that you are due for (some may have been done at this visit.)  Health Maintenance Due   Topic Date Due     URINE DRUG SCREEN Q1 YR  02/11/1976     DEPRESSION ACTION PLAN Q1 YR  09/21/2017     PHQ-9 Q6 MONTHS  04/13/2018     INFLUENZA VACCINE (1) 09/01/2018         Suggested websites for health information:  Www.[facility].org : Up to date and easily searchable information on multiple topics.  Www.medlineplus.gov : medication info, interactive tutorials, watch real surgeries online  Www.familydoctor.org : good info from the Academy of Family Physicians  Www.cdc.gov : public health info, travel advisories, epidemics (H1N1)  Www.aap.org : children's health info, normal development, vaccinations  Www.health.[facility].[place].us : [place] dept of health, public health issues in [place], N1N1    Your care team:                            Family Medicine Internal Medicine   MD Aaron Schulte MD Shantel Branch-Fleming, MD Katya Georgiev PA-C Nam Ho, MD Pediatrics   LIZ Uriostegui, MD Lisa Noguera CNP, MD Deborah Mielke, MD Kim Thein, APRN [facility]      Clinic hours: Monday - Thursday 7 am-7 pm; Fridays 7 am-5 pm.   Urgent care: Monday - Friday 11 am-9 pm; Saturday and Sunday 9 am-5 pm.  Pharmacy : Monday -Thursday 8 am-8 pm; Friday 8 am-6 pm; Saturday and Sunday 9 am-5 pm.     Clinic: [phone]   Pharmacy: [phone]

## 2018-10-02 NOTE — LETTER
My Depression Action Plan  Name: Jeanmarie Mclean   Date of Birth 1960  Date: 10/2/2018    My doctor: Milton Iglesias   My clinic: [address] [address] [phone]          GREEN    ZONE   Good Control    What it looks like:     Things are going generally well. You have normal up s and down s. You may even feel depressed from time to time, but bad moods usually last less than a day.   What you need to do:  1. Continue to care for yourself (see self care plan)  2. Check your depression survival kit and update it as needed  3. Follow your physician s recommendations including any medication.  4. Do not stop taking medication unless you consult with your physician first.           YELLOW         ZONE Getting Worse    What it looks like:     Depression is starting to interfere with your life.     It may be hard to get out of bed; you may be starting to isolate yourself from others.    Symptoms of depression are starting to last most all day and this has happened for several days.     You may have suicidal thoughts but they are not constant.   What you need to do:     1. Call your care team, your response to treatment will improve if you keep your care team informed of your progress. Yellow periods are signs an adjustment may need to be made.     2. Continue your self-care, even if you have to fake it!    3. Talk to someone in your support network    4. Open up your depression survival kit           RED    ZONE Medical Alert - Get Help    What it looks like:     Depression is seriously interfering with your life.     You may experience these or other symptoms: You can t get out of bed most days, can t work or engage in other necessary activities, you have trouble taking care of basic hygiene, or basic responsibilities, thoughts of suicide or death that will not go away, self-injurious behavior.     What you need to do:  1. Call your care team and  request a same-day appointment. If they are not available (weekends or after hours) call your local crisis line, emergency room or 911.            Depression Self Care Plan / Survival Kit    Self-Care for Depression  Here s the deal. Your body and mind are really not as separate as most people think.  What you do and think affects how you feel and how you feel influences what you do and think. This means if you do things that people who feel good do, it will help you feel better.  Sometimes this is all it takes.  There is also a place for medication and therapy depending on how severe your depression is, so be sure to consult with your medical provider and/ or Behavioral Health Consultant if your symptoms are worsening or not improving.     In order to better manage my stress, I will:    Exercise  Get some form of exercise, every day. This will help reduce pain and release endorphins, the  feel good  chemicals in your brain. This is almost as good as taking antidepressants!  This is not the same as joining a gym and then never going! (they count on that by the way ) It can be as simple as just going for a walk or doing some gardening, anything that will get you moving.      Hygiene   Maintain good hygiene (Get out of bed in the morning, Make your bed, Brush your teeth, Take a shower, and Get dressed like you were going to work, even if you are unemployed).  If your clothes don't fit try to get ones that do.    Diet  I will strive to eat foods that are good for me, drink plenty of water, and avoid excessive sugar, caffeine, alcohol, and other mood-altering substances.  Some foods that are helpful in depression are: complex carbohydrates, B vitamins, flaxseed, fish or fish oil, fresh fruits and vegetables.    Psychotherapy  I agree to participate in Individual Therapy (if recommended).    Medication  If prescribed medications, I agree to take them.  Missing doses can result in serious side effects.  I understand that  drinking alcohol, or other illicit drug use, may cause potential side effects.  I will not stop my medication abruptly without first discussing it with my provider.    Staying Connected With Others  I will stay in touch with my friends, family members, and my primary care provider/team.    Use your imagination  Be creative.  We all have a creative side; it doesn t matter if it s oil painting, sand castles, or mud pies! This will also kick up the endorphins.    Witness Beauty  (AKA stop and smell the roses) Take a look outside, even in mid-winter. Notice colors, textures. Watch the squirrels and birds.     Service to others  Be of service to others.  There is always someone else in need.  By helping others we can  get out of ourselves  and remember the really important things.  This also provides opportunities for practicing all the other parts of the program.    Humor  Laugh and be silly!  Adjust your TV habits for less news and crime-drama and more comedy.    Control your stress  Try breathing deep, massage therapy, biofeedback, and meditation. Find time to relax each day.     My support system    Clinic Contact:  Phone number:    Contact 1:  Phone number:    Contact 2:  Phone number:    Religion/:  Phone number:    Therapist:  Phone number:    Local crisis center:    Phone number:    Other community support:  Phone number:

## 2018-10-03 ASSESSMENT — PATIENT HEALTH QUESTIONNAIRE - PHQ9: SUM OF ALL RESPONSES TO PHQ QUESTIONS 1-9: 11

## 2018-10-04 ENCOUNTER — TELEPHONE (OUTPATIENT)
Dept: FAMILY MEDICINE | Facility: CLINIC | Age: 58
End: 2018-10-04

## 2018-10-04 NOTE — TELEPHONE ENCOUNTER
Jeanmarie Mclean is a 58 year old male who calls with left sided chest pain, intermittent since starting new medication, Invega, on 9/30/18.    NURSING ASSESSMENT:  Description:  Patient c/o of having intermittent chest pains on left side of chest, aching pain, that started right after starting his anti-psych medication, Invega. [facility]  Onset/duration:  Since 9/30/18. Chest pains are the same as when first started. Have not worsened over time.  Precip. factors:  none  Associated symptoms:  Denies SOB, profuse sweating, cool or moist skin, n/v, heart palpitations, weakness, numbness or tingling, lower extremity swelling, cough. Does report having dizziness but this is unchanged from normal history-takes narcotic and has low BP. Generally dizzy when goes from sitting to standing position. Has pain in left arm and neck and shoulder, but has a pinched nerve on this side and hx of cervical radiculopathy. These symptoms are unchanged from baseline.   Improves/worsens symptoms:  N/A  Pain scale (0-10)   6/10  Last exam/Treatment:  10/2/18 with PCP  Allergies:   Allergies   Allergen Reactions     Risperidone Other (See Comments)     Serve numbness      Effexor [Venlafaxine Hydrochloride] Other (See Comments)     Headache, Painful scrotum and drainage from the penis     Ambien Other (See Comments)     headaches     Ambien [Zolpidem Tartrate] Nausea     Buspirone Nausea     Dizziness, headache     Celexa [Citalopram] Other (See Comments)     Headache     Duloxetine Other (See Comments)     Headache     Septra [Bactrim] Itching     Seroquel [Quetiapine] Other (See Comments)     Headache, N, V     Sulfa Drugs Itching     Tramadol Nausea     Ultram [Tramadol Hcl] Nausea and Vomiting       MEDICATIONS:   Taking medication(s) as prescribed? Yes      NURSING PLAN: Nursing advice to patient Needs to contact prescribing provider office ASAP to notify of AE experiencing on new  medication. RN advised that if having CP, rating 6/10 and unrelieved, should seek evaluation in ER to r/o any other severe problems.    RECOMMENDED DISPOSITION:  To ED for evaluation, especially if worsening symptoms or any combined symptoms as denied above. Medication can cause cardiac related AE and should be monitored. Contact psych provider ASAP to discuss AE of medication and notify CP coincides with beginning medication.   Will comply with recommendation: Yes  If further questions/concerns or if symptoms do not improve, worsen or new symptoms develop, call your PCP or [facility] as soon as possible.      Guideline used:  Telephone Triage Protocols for Nurses, Fifth Edition, Rachelle Francois RN

## 2018-11-09 ENCOUNTER — OFFICE VISIT (OUTPATIENT)
Dept: PALLIATIVE MEDICINE | Facility: CLINIC | Age: 58
End: 2018-11-09
Payer: COMMERCIAL

## 2018-11-09 VITALS — OXYGEN SATURATION: 100 % | DIASTOLIC BLOOD PRESSURE: 84 MMHG | HEART RATE: 61 BPM | SYSTOLIC BLOOD PRESSURE: 138 MMHG

## 2018-11-09 DIAGNOSIS — M53.3 SI (SACROILIAC) JOINT DYSFUNCTION: Primary | ICD-10-CM

## 2018-11-09 DIAGNOSIS — M79.18 MYOFASCIAL PAIN: ICD-10-CM

## 2018-11-09 PROCEDURE — 20552 NJX 1/MLT TRIGGER POINT 1/2: CPT | Performed by: PSYCHIATRY & NEUROLOGY

## 2018-11-09 ASSESSMENT — PAIN SCALES - GENERAL: PAINLEVEL: SEVERE PAIN (7)

## 2018-11-09 NOTE — PATIENT INSTRUCTIONS
[facility]   Post Procedure Instructions    Today you had:  trigger point injections       Medications used:  lidocaine   bupivicaine           Go to the emergency room if you develop any shortness of breath    Monitor the injection sites for signs and symptoms of infection-fever, chills, redness, swelling, warmth, or drainage to areas.    You may have soreness at injection sites for up to 24 hours.    You may apply ice to the painful areas to help minimize the discomfort of the needle pokes.    Do not apply heat to sites for at least 12 hours.    You may use anti-inflammatory medications or Tylenol for pain control if necessary    Pain Clinic phone number during work hours Monday-Friday:  [phone]    After hours provider line: [phone]

## 2018-11-09 NOTE — PROGRESS NOTES
Pre procedure Diagnosis: myofascial pain   Post procedure Diagnosis: Same  Procedure performed: trigger point injections  Anesthesia: none  Complications: none  Operators: Cally Knig MD     Indications:   Jeanmarie Mclean is a 58 year old male with a history of low back pain.  Exam shows myofascial pain of the muscle groups listed below and they have tried conservative treatment including medications, PT.    Options/alternatives, benefits and risks were discussed with the patient including bleeding, infection, tissue trauma and pnuemothorax.  Questions were answered to his satisfaction and he agrees to proceed. Voluntary informed consent was obtained and signed.     Vitals were reviewed: Yes  Allergies were reviewed:  Yes   Medications were reviewed:  Yes   Pre-procedure pain score: 7/10    Procedure:  After getting informed consent, a Pause for the Cause was performed.    Trigger points were identified by patient, and marked when appropriate.  The area was prepped with Chloroprep.    Using clean technique, injections were completed using a 25G, 1.5 inch needle.  After negative aspiration, injection was completed.  A total of 8 locations were injected.  When possible, tissue was retracted from the chest wall to avoid lung injury.    Muscle groups injected:  Bilateral quadratus lumborum   Bilateral lumbar paraspinals    Injection solution contained:  5ml of 1% lidocaine and 8ml of 0.5% bupivacaine.    Hemostasis was achieved, the area was cleaned, and bandaids were placed when appropriate.  The patient tolerated the procedure well.  Breath sounds were normal.      Post-procedure pain score: 3/10  Follow-up includes:   -repeat prn, ok to double book   -order placed for repeat SI joint injections     Cally King MD  [facility]

## 2018-11-09 NOTE — NURSING NOTE
[facility]   Post Procedure Instructions    Today you had:  trigger point injections   occipital nerve block   bursa injection      Medications used:  lidocaine   bupivicaine   kenolog   dexamethasone          Go to the emergency room if you develop any shortness of breath    Monitor the injection sites for signs and symptoms of infection-fever, chills, redness, swelling, warmth, or drainage to areas.    You may have soreness at injection sites for up to 24 hours.    You may apply ice to the painful areas to help minimize the discomfort of the needle pokes.    Do not apply heat to sites for at least 12 hours.    You may use anti-inflammatory medications or Tylenol for pain control if necessary    Pain Clinic phone number during work hours Monday-Friday:  [phone]    After hours provider line: [phone]

## 2018-11-09 NOTE — MR AVS SNAPSHOT
After Visit Summary   11/9/2018    Jeanmarie Mclean    MRN: [id number]           Patient Information     Date Of Birth          1960        Visit Information        Provider Department      11/9/2018 9:30 AM Angélica King MD [facility]        Today's Diagnoses     SI (sacroiliac) joint dysfunction    -  1      Care Instructions    [facility]   Post Procedure Instructions    Today you had:  trigger point injections       Medications used:  lidocaine   bupivicaine           Go to the emergency room if you develop any shortness of breath    Monitor the injection sites for signs and symptoms of infection-fever, chills, redness, swelling, warmth, or drainage to areas.    You may have soreness at injection sites for up to 24 hours.    You may apply ice to the painful areas to help minimize the discomfort of the needle pokes.    Do not apply heat to sites for at least 12 hours.    You may use anti-inflammatory medications or Tylenol for pain control if necessary    Pain Clinic phone number during work hours Monday-Friday:  [phone]    After hours provider line: [phone]               Follow-ups after your visit        Additional Services     PAIN INJECTION EVAL/TREAT/FOLLOW UP                 Who to contact     If you have questions or need follow up information about today's clinic visit or your schedule please contact [facility] directly at [phone].  Normal or non-critical lab and imaging results will be communicated to you by MyChart, letter or phone within 4 business days after the clinic has received the results. If you do not hear from us within 7 days, please contact the clinic through MyChart or phone. If you have a critical or abnormal lab result, we will notify you by phone as soon as possible.  Submit refill requests through [facility] or call your pharmacy and they will forward the refill request to us. Please  allow 3 business days for your refill to be completed.          Additional Information About Your Visit        MyChart Information     [email or web address] gives you secure access to your electronic health record. If you see a primary care provider, you can also send messages to your care team and make appointments. If you have questions, please call your primary care clinic.  If you do not have a primary care provider, please call [phone] and they will assist you.        Care EveryWhere ID     This is your Care EveryWhere ID. This could be used by other organizations to access your [facility] medical records  [id number]        Your Vitals Were     Pulse Pulse Oximetry                61 100%           Blood Pressure from Last 3 Encounters:   11/09/18 138/84   10/02/18 124/80   09/11/18 119/67    Weight from Last 3 Encounters:   10/02/18 75.8 kg (167 lb)   01/08/18 76.2 kg (168 lb)   01/05/18 75.8 kg (167 lb)              We Performed the Following     PAIN INJECTION EVAL/TREAT/FOLLOW UP        Primary Care Provider Office Phone # Fax #    Milton Iglesias [phone]       [id number]  [facility] [address]        Equal Access to Services     [facility]: Hadii aad ku hadasho Soomaali, waaxda luqadaha, qaybta kaalmada adeegyada, srinivasan beatty. So [facility] [phone].    ATENCIÓN: Si habla español, tiene a mosley disposición servicios gratuitos de asistencia lingüística. Gilame al [phone].    We comply with applicable federal civil rights laws and [place] laws. We do not discriminate on the basis of race, color, national origin, age, disability, sex, sexual orientation, or gender identity.            Thank you!     Thank you for choosing [facility]  for your care. Our goal is always to provide you with excellent care. Hearing back from our patients is one way we can continue to improve our services. Please take a few minutes to complete the written survey  that you may receive in the mail after your visit with us. Thank you!             Your Updated Medication List - Protect others around you: Learn how to safely use, store and throw away your medicines at www.disposemymeds.org.          This list is accurate as of 11/9/18 10:14 AM.  Always use your most recent med list.                   Brand Name Dispense Instructions for use Diagnosis    aspirin 81 MG tablet      Take by mouth daily        Aspirin-Calcium Carbonate  MG Tabs      Take by mouth daily        gabapentin 100 MG capsule    NEURONTIN    180 capsule    Take 100mg (1 capsule) by mouth at bedtime.  Increase as instructed in clinic to goal dose of 300mg (3 capsules) 3 times daily.    Cervical radiculopathy       IBU-200 PO      Take 2 tablets by mouth as needed        menthol 5 % Pads    ICY HOT    30 patch    Apply 1 patch topically every 8 hours as needed for muscle soreness May cut to fit    Myalgia and myositis, Fibromyalgia syndrome       * oxyCODONE IR 5 MG tablet    ROXICODONE     Take 1 tablet by mouth 2 times daily        * oxyCODONE IR 5 MG tablet    ROXICODONE     Take 5 mg by mouth        paliperidone 3 MG 24 hr tablet    INVEGA     Take 3 mg by mouth        QUEtiapine 200 MG tablet    SEROquel     100 mg daily as needed Take 100 mg by mouth daily. weening        sildenafil 100 MG tablet    VIAGRA    10 tablet    Take 1/4 - 1 tablet, as directed, 1-3 hours before intimacy. Maximum 1 dose per 24 hours.    Erectile dysfunction following radical prostatectomy       vitamin D 1000 units capsule      Take 1 capsule by mouth 2 times daily.        * Notice:  This list has 2 medication(s) that are the same as other medications prescribed for you. Read the directions carefully, and ask your doctor or other care provider to review them with you.

## 2018-11-13 ENCOUNTER — TELEPHONE (OUTPATIENT)
Dept: PALLIATIVE MEDICINE | Facility: CLINIC | Age: 58
End: 2018-11-13

## 2018-11-13 ENCOUNTER — APPOINTMENT (OUTPATIENT)
Dept: OPTOMETRY | Facility: CLINIC | Age: 58
End: 2018-11-13
Payer: COMMERCIAL

## 2018-11-13 PROCEDURE — 92340 FIT SPECTACLES MONOFOCAL: CPT | Performed by: OPTOMETRIST

## 2018-11-13 NOTE — TELEPHONE ENCOUNTER
Left VM for patient to schedule a SI joint injection.        Kym HARRISON    [facility]

## 2018-11-13 NOTE — TELEPHONE ENCOUNTER
Pre-screening Questions for Radiology Injections:    Injection to be done at which interventional clinic site? [facility] [place]    Instruct patient to arrive as directed prior to the scheduled appointment time:    [place] AND [place]: 30 minutes before      Procedure ordered by AMINA    Procedure ordered?  SI Joint Injection    What insurance would patient like us to bill for this procedure? MEDICA      Worker's comp or MVA (motor vehicle accident) -Any injection DO NOT SCHEDULE and route to Kym Fisher.      [employer] - For SI joint injections, DO NOT SCHEDULE and route Kym Fisher. Network [employer] FREEDOM NO PA REQUIRED EFFECTIVE 11/1/2017      [employer]- MBB's must be scheduled at LEAST two weeks apart      [employer] - Any injection besides hip/shoulder/knee joint DO NOT SCHEDULE and route to Kym Fisher. She will obtain PA and call pt back to schedule procedure or notify pt of denial.       [employer]-Route to Kym for review    Any chance of pregnancy? NO   If YES, do NOT schedule and route to RN pool    Is an  needed? No     Patient has a drive home? (mandatory) YES:     Is patient taking any blood thinners (plavix, coumadin, jantoven, warfarin, heparin, pradaxa or dabigatran )? No   If hold needed, do NOT schedule, route to RN pool     Is patient taking any aspirin products (includes Excedrin and Fiorinal)? Yes - Pt takes 81mg daily; instructed to hold 0 day(s) prior to procedure.      If more than 325mg/day do NOT schedule; route to RN pool     For CERVICAL procedures, hold all aspirin products for 6 days.     Tell pt that if aspirin product is not held for 6 days, the procedure WILL BE cancelled.      Does the patient have a bleeding or clotting disorder? No     If YES, okay to schedule AND route to RN nurse pool    For any patients with platelet count <100, must be forwarded to provider    Is patient diabetic?  No  If YES, have them bring their  glucometer.    Does patient have an active infection or treated for one within the past week? No     Is patient currently taking any antibiotics?  No     For patients on chronic, preventative, or prophylactic antibiotics, procedures may be scheduled.     For patients on antibiotics for active or recent infection:    Christine Luna Burton, Sn[place]-antibiotic course must have been completed for 4 days    Is patient currently taking any steroid medications? (i.e. Prednisone, Medrol)  No     For patients on steroid medications:    Christine Luna Burton, Snitzer-steroid course must have been completed for 4 days    Reviewed with patient:  If you are started on any steroids or antibiotics between now and your appointment, you must contact us because the procedure may need to be cancelled.  Yes    Is patient actively being treated for cancer or immunocompromised? No  If YES, do NOT schedule and route to RN pool     Are you able to get on and off an exam table with minimal or no assistance? Yes  If NO, do NOT schedule and route to RN pool    Are you able to roll over and lay on your stomach with minimal or no assistance? Yes  If NO, do NOT schedule and route to RN pool     Any allergies to contrast dye, iodine, shellfish, or numbing and steroid medications? No  If YES, route to RN pool AND add allergy information to appointment notes    Allergies: Risperidone; Effexor [venlafaxine hydrochloride]; Ambien; Ambien [zolpidem tartrate]; Buspirone; Celexa [citalopram]; Duloxetine; Septra [bactrim]; Seroquel [quetiapine]; Sulfa drugs; Tramadol; and Ultram [tramadol hcl]      Has the patient had a flu shot or any other vaccinations within 7 days before or after the procedure.  No     Does patient have an MRI/CT?  Not Applicable  (SI joint, hip injections, lumbar sympathetic blocks, and stellate ganglion blocks do not require an MRI)    Was the MRI done w/in the last 3 years?  NA    Was MRI done at [place]? No       If not, where was it done? N/A       If MRI was not done at [place], [facility] or [facility] do NOT schedule and route to nursing.  If pt has an imaging disc, the injection may be scheduled but pt has to bring disc to appt. If they show up w/out disc the injection cannot be done    Reminders (please tell patient if applicable):       Instructed pt to arrive 30 minutes early for IV start if this is for a cervical procedure, ALL sympathetic (stellate ganglion, hypogastric, or lumbar sympathetic block) and all sedation procedures (RFA, spinal cord stimulation trials).  Not Applicable   -IVs are not routinely placed for Dr. Maier cervical cases   -Dr. Castellanos: IVs for cervical ESIs and cervical TBDs (not CMBBs/facet inj)      If NPO for sedation, informed patient that it is okay to take medications with sips of water (except if they are to hold blood thinners).  Not Applicable   *DO take blood pressure medication if it is prescribed*      If this is for a cervical JOSE R, informed patient that aspirin needs to be held for 6 days.   Not Applicable      For all patients not having spinal cord stimulator (SCS) trials or radiofrequency ablations (RFAs), informed patient:    IV sedation is not provided for this procedure.  If you feel that an oral anti-anxiety medication is needed, you can discuss this further with your referring provider or primary care provider.  The Pain Clinic provider will discuss specifics of what the procedure includes at your appointment.  Most procedures last 10-20 minutes.  We use numbing medications to help with any discomfort during the procedure.  Not Applicable      Do not schedule procedures requiring IV placement in the first appointment of the day or first appointment after lunch. Do NOT schedule at 0745, 0815 or 1245.       For patients 85 or older we recommend having an adult stay w/ them for the remainder of the day.       Does the patient have any questions?    Kym Salazar  [facility]

## 2018-11-15 ENCOUNTER — TELEPHONE (OUTPATIENT)
Dept: FAMILY MEDICINE | Facility: CLINIC | Age: 58
End: 2018-11-15

## 2018-11-15 NOTE — TELEPHONE ENCOUNTER
Called and spoke with patient, informed him of provider's message. 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 Patient states he want the HPV for his own protection, asked that I relay the message to provider. Informed him to check with his insurance for coverage due to him being over 26 years of age and I will send message to provider.    Forward to provider to advise.    Oralia Girard MA

## 2018-11-15 NOTE — TELEPHONE ENCOUNTER
Dr Iglesias  Patient is wondering if the HPV is appropriate for his age. He want the vaccine.   Blanca Wynn

## 2018-11-15 NOTE — TELEPHONE ENCOUNTER
Please let patient know that this vaccine not indicated at his age. It is recommended for people under age 26.    Milton Iglesias MD

## 2018-11-16 NOTE — TELEPHONE ENCOUNTER
Agree that patient should check with insurance first if this is covered. If it is, we can given it to him.    Milton Iglesias MD

## 2018-12-05 ENCOUNTER — RADIOLOGY INJECTION OFFICE VISIT (OUTPATIENT)
Dept: PALLIATIVE MEDICINE | Facility: CLINIC | Age: 58
End: 2018-12-05
Payer: COMMERCIAL

## 2018-12-05 ENCOUNTER — RADIANT APPOINTMENT (OUTPATIENT)
Dept: RADIOLOGY | Facility: CLINIC | Age: 58
End: 2018-12-05
Attending: PSYCHIATRY & NEUROLOGY
Payer: COMMERCIAL

## 2018-12-05 VITALS — HEART RATE: 54 BPM | SYSTOLIC BLOOD PRESSURE: 106 MMHG | DIASTOLIC BLOOD PRESSURE: 67 MMHG

## 2018-12-05 DIAGNOSIS — M53.3 SACROILIAC JOINT DYSFUNCTION: ICD-10-CM

## 2018-12-05 DIAGNOSIS — M53.3 SI (SACROILIAC) JOINT DYSFUNCTION: Primary | ICD-10-CM

## 2018-12-05 PROCEDURE — 27096 INJECT SACROILIAC JOINT: CPT | Mod: 50 | Performed by: PSYCHIATRY & NEUROLOGY

## 2018-12-05 ASSESSMENT — PAIN SCALES - GENERAL: PAINLEVEL: SEVERE PAIN (6)

## 2018-12-05 NOTE — PATIENT INSTRUCTIONS
[facility]   Procedure Discharge Instructions    Today you saw:    Dr. Angélica King     You had an:  sacroiliac joint injection       Medications used:  Lidocaine     Omnipaque  Ropivicaine   Kenalog             Be cautious when walking. Numbness and/or weakness in the lower extremities may occur for up to 6-8 hours after the procedure due to effect of the local anesthetic    Do not drive for 6 hours. The effect of the local anesthetic could slow your reflexes.     You may resume your regular activities after 24 hours    Avoid strenuous activity for the first 24 hours    You may shower, however avoid swimming, tub baths or hot tubs for 24 hours following your procedure    You may have a mild to moderate increase in pain for several days following the injection.    It may take up to 14 days for the steroid medication to start working although you may feel the effect as early as a few days after the procedure.       You may use ice packs for 10-15 minutes, 3 to 4 times a day at the injection site for comfort    Do not use heat to painful areas for 6 to 8 hours. This will give the local anesthetic time to wear off and prevent you from accidentally burning your skin.     You may use anti-inflammatory medications (such as Ibuprofen or Aleve or Advil) or Tylenol for pain control if necessary    If you were fasting, you may resume your normal diet and medications after the procedure    If you have diabetes, check your blood sugar more frequently than usual as your blood sugar may be higher than normal for 10-14 days following a steroid injection. Contact your doctor who manages your diabetes if your blood sugar is higher than usual    If you experience any of the following, call the Pain Clinic during work hours at [phone] or the Provider Line after hours at [phone]:  -Fever over 100 degree F  -Swelling, bleeding, redness, drainage, warmth at the injection site  -Progressive weakness or  numbness in your legs or arms  -Loss of bowel or bladder function  -Unusual headache that is not relieved by Tylenol or other pain reliever  -Unusual new onset of pain that is not improving

## 2018-12-05 NOTE — NURSING NOTE
Pre-procedure Intake    Have you been fasting? NA    If yes, for how long? NA    Are you taking a prescribed blood thinner such as coumadin, Plavix, Xarelto?    No    If yes, when did you take your last dose? NA    Do you take aspirin?  Yes -   ASA    If cervical procedure, have you held aspirin for 6 days?   NA    Do you have any allergies to contrast dye, iodine, steroid and/or numbing medications?  NO    Are you currently taking antibiotics or have an active infection?  NO    Have you had a fever/elevated temperature within the past week? NO    Are you currently taking oral steroids? NO    Do you have a ? Yes       Are you pregnant or breastfeeding?  NO    Are the vital signs normal?  Yes      Emma Breaux CMA ([facility])

## 2018-12-05 NOTE — NURSING NOTE
Discharge Information    IV Discontiued Time:  NA    Amount of Fluid Infused:  NA    Discharge Criteria = When patient returns to baseline or as per MD order    Consciousness:  Pt is fully awake    Circulation:  BP +/- 20% of pre-procedure level    Respiration:  Patient is able to breathe deeply    O2 Sat:  Patient is able to maintain O2 Sat >92% on room air    Activity:  Moves 4 extremities on command    Ambulation:  Patient is able to stand and walk or stand and pivot into wheelchair    Dressing:  Clean/dry or No Dressing    Notes:   Discharge instructions and AVS given to patient    Patient meets criteria for discharge?  YES    Admitted to PCU?  No    Responsible adult present to accompany patient home?  Yes    Signature/Title:    tyler moore RN Care Coordinator  [facility] [place]

## 2018-12-05 NOTE — PROGRESS NOTES
Pre procedure Diagnosis: SI joint dysfunction    Post procedure Diagnosis: Same  Procedure performed: bilateral SI joint injections  Anesthesia: none  Complications: none  Operators: Cally King MD and Kaylynn Perez DO     Indications:   Jeanmarie Mclean is a 58 year old male, seen by me for trigger points at the pain clinic. They have a history of pain across the low back and buttocks. Exam shows tenderness of the lumbosacral region and bilateral SI joints.They have tried conservative treatment including physical therapy and medications. He uses a TENS unit.  SI joint injections help for ~3 months    Options/alternatives, benefits and risks were discussed with the patient including bleeding, infection, tissue trauma, exposure to radiation, reaction to medications including seizure, nerve injury, weakness, and numbness.  Questions were answered to his satisfaction and he agrees to proceed. Voluntary informed consent was obtained and signed.     Vitals were reviewed: Yes  Allergies were reviewed:  Yes  Medications were reviewed:  Yes  Pre-procedure pain score: 6/10    Procedure:  After getting informed consent, patient was brought into the procedure suite and was placed in a prone position on the procedure table.   A Pause for the Cause was performed.  Patient was prepped and draped in sterile fashion.     After identifying the bilateral SI joint, the C-arm was rotated to a obliquely to obtain the best view of the inferior angle of the joint.  A total of 4ml of Lidocaine 1% was used to anesthetize the skin at a skin entry site coaxial with the fluoroscopy beam at this location.  A 22gauge 3.5 inch needle was advanced under intermittent fluoroscopy until it was felt to enter the SI joint.    A total of 2.5ml of Omnipaque-300 was injected, confirming appropriate position, with spread into the intraarticular space, with no intravascular uptake noted.  2.5ml was wasted. Location was verified in lateral view.    3 ml of  0.2% ropivacaine with 80mg of kenalog was injected divided between the two sides.  The needle was flushed with lidocaine and removed.     Hemostasis was achieved, the area was cleaned, and bandaids were placed when appropriate.    The patient tolerated the procedure well, and was taken to the recovery room.    Images were saved to PACS.    Post-procedure pain score: 0/10  Follow-up includes:   -f/u with referring provider    Cally King MD  [facility]

## 2018-12-05 NOTE — MR AVS SNAPSHOT
After Visit Summary   12/5/2018    Jeanmarie Mclean    MRN: [id number]           Patient Information     Date Of Birth          1960        Visit Information        Provider Department      12/5/2018 9:15 AM Angélica King MD [facility] [place]        Care Instructions    [facility]   Procedure Discharge Instructions    Today you saw:    Dr. Angélica King     You had an:  sacroiliac joint injection       Medications used:  Lidocaine     Omnipaque  Ropivicaine   Kenalog             Be cautious when walking. Numbness and/or weakness in the lower extremities may occur for up to 6-8 hours after the procedure due to effect of the local anesthetic    Do not drive for 6 hours. The effect of the local anesthetic could slow your reflexes.     You may resume your regular activities after 24 hours    Avoid strenuous activity for the first 24 hours    You may shower, however avoid swimming, tub baths or hot tubs for 24 hours following your procedure    You may have a mild to moderate increase in pain for several days following the injection.    It may take up to 14 days for the steroid medication to start working although you may feel the effect as early as a few days after the procedure.       You may use ice packs for 10-15 minutes, 3 to 4 times a day at the injection site for comfort    Do not use heat to painful areas for 6 to 8 hours. This will give the local anesthetic time to wear off and prevent you from accidentally burning your skin.     You may use anti-inflammatory medications (such as Ibuprofen or Aleve or Advil) or Tylenol for pain control if necessary    If you were fasting, you may resume your normal diet and medications after the procedure    If you have diabetes, check your blood sugar more frequently than usual as your blood sugar may be higher than normal for 10-14 days following a steroid injection. Contact your doctor who manages your diabetes if your  blood sugar is higher than usual    If you experience any of the following, call the Pain Clinic during work hours at [phone] or the Provider Line after hours at [phone]:  -Fever over 100 degree F  -Swelling, bleeding, redness, drainage, warmth at the injection site  -Progressive weakness or numbness in your legs or arms  -Loss of bowel or bladder function  -Unusual headache that is not relieved by Tylenol or other pain reliever  -Unusual new onset of pain that is not improving                Follow-ups after your visit        Your next 10 appointments already scheduled     Dec 05, 2018  9:15 AM CST   Radiology Injections with Angélica King MD   [facility] [place] ([facility] [place])    [address]  [place] [address]   [phone]            Dec 05, 2018  9:15 AM CST   XR SACROILIAC THERAPEUTIC INJECTION BILATERAL with BEPAIN1   [facility] [place] [facility] ([facility] [place])    [address]  [address]  [place] [address]   [phone]           How do I prepare for my exam? (Food and drink instructions) Stop drinking 1 hour before the exam.  How do I prepare for my exam? (Other instructions) You may take your medicines as usual, except for blood thinners (Coumadin, Plavix, Ticlid, Persantine, Aggrenox, Pletal, Effient, Brilliant). Talk to your doctor if you take these.  What should I wear: Wear comfortable clothes.  How long does the exam take: The entire exam takes about one hour. If you are having a wrist exam, you may have two shots up to two hours apart. You may leave the hospital between the shots.If your doctor has ordered a CT or MRI scan of this joint, this will take another 30 to 45 minutes.  What should I bring: Please bring a list of your current medicines to your exam. Include vitamins, minerals and over-the-counter medicines.  Do I need a :  No  is needed.  What do I need to tell my doctor: Tell your doctor if: * You  have ever had an allergic reaction to X-ray dye (contrast fluid). * If there s any chance you are pregnant.  What should I do after the exam: Try to rest for the next 12 hours or so. You can go back to normal activities the day after your exam.  What is this test: An arthrogram is an X-ray exam of a joint. We will take a set of X-ray pictures. We will then inject dye (contrast fluid) into the area around the joint. After that, we will take another set of X-ray pictures. The dye helps your doctor see the joint better on the X-rays. With a joint injection, we will also inject a steroid into your joint after we inject the dye.  Who should I call with questions: If you have any questions, please call the Imaging Department where you will have your exam. Directions, parking instructions, and other information is available on our website, [email or web address].              Who to contact     If you have questions or need follow up information about today's clinic visit or your schedule please contact [facility] directly at [phone].  Normal or non-critical lab and imaging results will be communicated to you by [employer], letter or phone within 4 business days after the clinic has received the results. If you do not hear from us within 7 days, please contact the clinic through [employer] or phone. If you have a critical or abnormal lab result, we will notify you by phone as soon as possible.  Submit refill requests through [employer] or call your pharmacy and they will forward the refill request to us. Please allow 3 business days for your refill to be completed.          Additional Information About Your Visit        [employer] Information     [employer] gives you secure access to your electronic health record. If you see a primary care provider, you can also send messages to your care team and make appointments. If you have questions, please call your primary care clinic.  If you do not have a primary care provider,  please call [phone] and they will assist you.        Care EveryWhere ID     This is your Care EveryWhere ID. This could be used by other organizations to access your [facility] medical records  [id number]        Your Vitals Were     Pulse                   54            Blood Pressure from Last 3 Encounters:   12/05/18 106/67   11/09/18 138/84   10/02/18 124/80    Weight from Last 3 Encounters:   10/02/18 75.8 kg (167 lb)   01/08/18 76.2 kg (168 lb)   01/05/18 75.8 kg (167 lb)              Today, you had the following     No orders found for display       Primary Care Provider Office Phone # Fax #    Milton Iglesias [phone]       [address]  [place] [facility] [address]        Equal Access to Services     [facility]: Hadii aad ku hadasho Soomaali, waaxda luqadaha, qaybta kaalmada adeegyada, waxay jimbo hayjustina yin . So [facility] [phone].    ATENCIÓN: Si habla español, tiene a mosley disposición servicios gratuitos de asistencia lingüística. Margaret al [phone].    We comply with applicable federal civil rights laws and [place] laws. We do not discriminate on the basis of race, color, national origin, age, disability, sex, sexual orientation, or gender identity.            Thank you!     Thank you for choosing [facility]  for your care. Our goal is always to provide you with excellent care. Hearing back from our patients is one way we can continue to improve our services. Please take a few minutes to complete the written survey that you may receive in the mail after your visit with us. Thank you!             Your Updated Medication List - Protect others around you: Learn how to safely use, store and throw away your medicines at www.disposemymeds.org.          This list is accurate as of 12/5/18  8:34 AM.  Always use your most recent med list.                   Brand Name Dispense Instructions for use Diagnosis    aspirin 81 MG tablet    ASA     Take by mouth daily         Aspirin-Calcium Carbonate  MG Tabs      Take by mouth daily        gabapentin 100 MG capsule    NEURONTIN    180 capsule    Take 100mg (1 capsule) by mouth at bedtime.  Increase as instructed in clinic to goal dose of 300mg (3 capsules) 3 times daily.    Cervical radiculopathy       IBU-200 PO      Take 2 tablets by mouth as needed        menthol 5 % Pads    ICY HOT    30 patch    Apply 1 patch topically every 8 hours as needed for muscle soreness May cut to fit    Myalgia and myositis, Fibromyalgia syndrome       * oxyCODONE 5 MG tablet    ROXICODONE     Take 1 tablet by mouth 2 times daily        * oxyCODONE 5 MG tablet    ROXICODONE     Take 5 mg by mouth        paliperidone ER 3 MG 24 hr tablet    INVEGA     Take 3 mg by mouth        QUEtiapine 200 MG tablet    SEROquel     100 mg daily as needed Take 100 mg by mouth daily. weening        sildenafil 100 MG tablet    VIAGRA    10 tablet    Take 1/4 - 1 tablet, as directed, 1-3 hours before intimacy. Maximum 1 dose per 24 hours.    Erectile dysfunction following radical prostatectomy       vitamin D 1000 units capsule      Take 1 capsule by mouth 2 times daily.        * Notice:  This list has 2 medication(s) that are the same as other medications prescribed for you. Read the directions carefully, and ask your doctor or other care provider to review them with you.

## 2019-03-04 ENCOUNTER — TRANSFERRED RECORDS (OUTPATIENT)
Dept: HEALTH INFORMATION MANAGEMENT | Facility: CLINIC | Age: 59
End: 2019-03-04

## 2019-03-05 ENCOUNTER — OFFICE VISIT (OUTPATIENT)
Dept: FAMILY MEDICINE | Facility: CLINIC | Age: 59
End: 2019-03-05
Payer: COMMERCIAL

## 2019-03-05 ENCOUNTER — ANCILLARY PROCEDURE (OUTPATIENT)
Dept: GENERAL RADIOLOGY | Facility: CLINIC | Age: 59
End: 2019-03-05
Attending: NURSE PRACTITIONER
Payer: COMMERCIAL

## 2019-03-05 VITALS
BODY MASS INDEX: 28.51 KG/M2 | WEIGHT: 167 LBS | SYSTOLIC BLOOD PRESSURE: 135 MMHG | HEART RATE: 64 BPM | HEIGHT: 64 IN | DIASTOLIC BLOOD PRESSURE: 85 MMHG | RESPIRATION RATE: 18 BRPM | OXYGEN SATURATION: 98 % | TEMPERATURE: 97.7 F

## 2019-03-05 DIAGNOSIS — S69.92XA INJURY OF LEFT THUMB, INITIAL ENCOUNTER: ICD-10-CM

## 2019-03-05 DIAGNOSIS — S69.92XA INJURY OF LEFT THUMB, INITIAL ENCOUNTER: Primary | ICD-10-CM

## 2019-03-05 DIAGNOSIS — R12 HEARTBURN: ICD-10-CM

## 2019-03-05 DIAGNOSIS — M79.645 PAIN OF LEFT THUMB: ICD-10-CM

## 2019-03-05 PROCEDURE — 73110 X-RAY EXAM OF WRIST: CPT | Mod: LT

## 2019-03-05 PROCEDURE — 99214 OFFICE O/P EST MOD 30 MIN: CPT | Performed by: NURSE PRACTITIONER

## 2019-03-05 PROCEDURE — 73140 X-RAY EXAM OF FINGER(S): CPT | Mod: LT

## 2019-03-05 ASSESSMENT — MIFFLIN-ST. JEOR: SCORE: 1488.51

## 2019-03-05 NOTE — PROGRESS NOTES
SUBJECTIVE:   Jeanmarie Mclean is a 59 year old male who presents to clinic today for the following health issues:      Joint Pain    Onset: over a month. Reports hit on a banister and it bent backwards. Had an appt a month ago to have checked but cancelled as he thought he could treat at home with ice    Description:   Location: Left thumb   Character: Pain brought him to his knees at time of injury. Now painful with movement and will start throbbing like nerve damage and goes from base of thumb to nail. Aches all the time    Intensity: Currently 6/10, never goes to a 0.    Progression of Symptoms: worse    Accompanying Signs & Symptoms:  Other symptoms: Stiffness and swelling     Precipitating factors:   Trauma or overuse: YES    Alleviating factors:  Improved by: immobilization    Therapies Tried and outcome: Ice - helped with the swelling, Has tried oxycodone (prescribed for sciatica and fibromyalgia) but does not take often for this pain as he needs for what it is intended for. Has also tried low dose aspirin.   Not taking Ibuprofen due to stomach issues.   He also suffers from heart burn and wondering what he can try. It is worse with citrus and spicy foods.      Problem list and histories reviewed & adjusted, as indicated.  Additional history: as documented    Patient Active Problem List   Diagnosis     Malignant neoplasm of prostate (H)     CARDIOVASCULAR SCREENING; LDL GOAL LESS THAN 130     GERD (gastroesophageal reflux disease)     Overweight (BMI 25.0-29.9)     Advance care planning     Fibromyalgia syndrome     Posttraumatic stress disorder     Low back pain     Inadequate material resources     Anxiety state     History of Asperger's syndrome     Pervasive developmental disorder, active     Depressive disorder     Delusional disorder (H)     TBI (traumatic brain injury) (H)     Insomnia     Chronic pain     Myalgia     male stress incontinence     Major depressive disorder, recurrent episode, moderate  (H)     Low back pain without sciatica, unspecified back pain laterality, unspecified chronicity     Alcohol dependence in remission (H)     Past Surgical History:   Procedure Laterality Date     C HEP B VAC ADULT 3 DOSE IM      received all 3 vaccines     C REMV PROSTATE,RETROPUB,RADICAL  10/13/04    [place] Muñoz ([place]     CYSTOSCOPY  10/98    for renal stones     HC KNEE SCOPE,MED/LAT MENISECTOMY  11    [place] medial [place]     HERNIA REPAIR, INGUINAL RT/LT      [facility]       Social History     Tobacco Use     Smoking status: Former Smoker     Packs/day: 0.50     Years: 10.00     Pack years: 5.00     Types: Cigarettes     Last attempt to quit: 1997     Years since quittin.1     Smokeless tobacco: Never Used   Substance Use Topics     Alcohol use: No     Comment: hx alcoholism quit . [facility] dependency [facility] in      Family History   Problem Relation Age of Onset     Psychotic Disorder Son         autism     Prostate Cancer Father         40s     Cancer Father      Cancer Maternal Grandmother         lung     Glaucoma Maternal Grandmother      Eye Disorder Maternal Grandmother         glaucoma     Diabetes Mother          45     Blood Disease Sister         sickle trait     Hypertension Sister      Blood Disease Paternal Uncle         sickle     Blood Disease Paternal Aunt         sickle     Alzheimer Disease Paternal Grandfather         70s     Macular Degeneration Paternal Grandfather      Cerebrovascular Disease No family hx of      Thyroid Disease No family hx of      Myocardial Infarction No family hx of      C.A.D. No family hx of          Current Outpatient Medications   Medication Sig Dispense Refill     aspirin 81 MG tablet Take by mouth daily       Aspirin-Calcium Carbonate  MG TABS Take by mouth daily       Cholecalciferol (VITAMIN D) 1000 UNIT capsule Take 1 capsule by mouth 2 times daily.       Ibuprofen (IBU-200 PO) Take 2 tablets by mouth as needed        menthol (ICY HOT) 5 % PADS Apply 1 patch topically every 8 hours as needed for muscle soreness May cut to fit 30 patch 3     OXYCODONE HCL 5 MG OR TABS Take 1 tablet by mouth 2 times daily        oxyCODONE IR (ROXICODONE) 5 MG tablet Take 5 mg by mouth       paliperidone (INVEGA) 3 MG 24 hr tablet Take 3 mg by mouth       QUEtiapine (SEROQUEL) 200 MG tablet 100 mg daily as needed Take 100 mg by mouth daily. weening       sildenafil (VIAGRA) 100 MG tablet Take 1/4 - 1 tablet, as directed, 1-3 hours before intimacy. Maximum 1 dose per 24 hours. 10 tablet 5     gabapentin (NEURONTIN) 100 MG capsule Take 100mg (1 capsule) by mouth at bedtime.  Increase as instructed in clinic to goal dose of 300mg (3 capsules) 3 times daily. (Patient not taking: Reported on 12/5/2018) 180 capsule 3     Allergies   Allergen Reactions     Risperidone Other (See Comments)     Serve numbness      Effexor [Venlafaxine Hydrochloride] Other (See Comments)     Headache, Painful scrotum and drainage from the penis     Ambien Other (See Comments)     headaches     Ambien [Zolpidem Tartrate] Nausea     Buspirone Nausea     Dizziness, headache     Celexa [Citalopram] Other (See Comments)     Headache     Duloxetine Other (See Comments)     Headache  headaches     Septra [Bactrim] Itching     Seroquel [Quetiapine] Other (See Comments)     Headache, N, V     Sulfa Drugs Itching     Sulfamethoxazole-Trimethoprim      hives     Tramadol Nausea     Nausea and headache     Ultram [Tramadol Hcl] Nausea and Vomiting     Venlafaxine      Zolpidem      headaches       Reviewed and updated as needed this visit by clinical staff  Tobacco  Allergies  Meds  Med Hx  Surg Hx  Fam Hx  Soc Hx      Reviewed and updated as needed this visit by Provider         ROS:  Constitutional, HEENT, cardiovascular, pulmonary, gi and gu systems are negative, except as otherwise noted.    OBJECTIVE:     /85 (BP Location: Left arm, Patient Position: Sitting, Cuff  "Size: Adult Large)   Pulse 64   Temp 97.7  F (36.5  C) (Oral)   Resp 18   Ht 1.626 m (5' 4\")   Wt 75.8 kg (167 lb)   SpO2 98%   BMI 28.67 kg/m    Body mass index is 28.67 kg/m .  GENERAL: healthy, alert and no distress  EYES: Eyes grossly normal to inspection, PERRL and conjunctivae and sclerae normal  MS: no gross musculoskeletal defects noted, mild swelling noted in left thumb and tenderness along proximal and distal phalanges with palpation.  Able to complete full ROM but did so with pain in extension and flexion.  Left wrist exam normal.    SKIN: no suspicious lesions or rashes. Mild, darker skin discoloration noted on left dorsal side of first metatarsal/proximal phalange joint  PSYCH: mentation appears normal, affect normal/bright    Diagnostic Test Results:  Xray - final read pending    ASSESSMENT/PLAN:     1. Injury of left thumb, initial encounter  Concern for ligamentous injury given ongoing swelling, pain.  Will have patient see ortho.  Treatment as below in meantime.  Thumb/wrist immobilizer  Tylenol and ibuprofen as needed  Ice  - XR Wrist Left G/E 3 Views; Future  - XR Finger Left G/E 2 Views; Future  - Recommended left wrist brace with thumb . Pt will  at local pharmacy due to insurance concerns  - ORTHOPEDICS ADULT REFERRAL    2. Pain of left thumb  As above.   - ORTHOPEDICS ADULT REFERRAL    3. Heartburn  Avoid triggers that cause heartburn such as spicy foods and citrus and trial below.  If not effective, consider trial of PPI.  - ranitidine (ZANTAC) 150 MG tablet; Take 1 tablet (150 mg) by mouth 2 times daily  Dispense: 60 tablet; Refill: 1    I supervised this visit and agree with the above documentation.  Tiffanie Figueroa, RAJINDER CNP       Patient Instructions   For your left thumb pain  Wear immobilizer as much as possible during the day and night  Follow up with ortho, numbers provided.   Use tylenol or ibuprofen as needed for pain    Heartburn  Take Ranitidine 150mg tablet twice " a day. Try this for at least 2 weeks and if it is not helpful we can try something else      RAJINDER Menezes CNP  [facility]

## 2019-03-05 NOTE — PATIENT INSTRUCTIONS
For your left thumb pain  Wear immobilizer as much as possible during the day and night  Follow up with ortho, numbers provided.   Use tylenol or ibuprofen as needed for pain    Heartburn  Take Ranitidine 150mg tablet twice a day. Try this for at least 2 weeks and if it is not helpful we can try something else

## 2019-03-11 NOTE — PROGRESS NOTES
SUBJECTIVE:   Jeanmarie Mclean is a 59 year old male who is seen in consultation at the request of RAJINDER [place] CNP for evaluation of left thumb pain.    Mechanism of injury:   Onset: over a month. Reports hit on a banister and it bent backwards    Present symptoms: hurts to move, hurts to grab, use thumb. Pain diffuse throughout the ulnar side of thumb, swelling.     Treatments tried to this point: NSAIDs    Orthopedic PMH: None    Review of Systems:  Constitutional:  NEGATIVE for fever, chills, change in weight  Integumentary/Skin:  NEGATIVE for worrisome rashes, moles or lesions  Eyes:  NEGATIVE for vision changes or irritation  ENT/Mouth:  NEGATIVE for ear, mouth and throat problems  Resp:  NEGATIVE for significant cough or SOB  Breast:  NEGATIVE for masses, tenderness or discharge  CV:  NEGATIVE for chest pain, palpitations or peripheral edema  GI:  NEGATIVE for nausea, abdominal pain, heartburn, or change in bowel habits  :  Negative   Musculoskeletal:  See HPI above  Neuro:  NEGATIVE for weakness, dizziness or paresthesias  Endocrine:  NEGATIVE for temperature intolerance, skin/hair changes  Heme/allergy/immune:  NEGATIVE for bleeding problems  Psychiatric:  NEGATIVE for changes in mood or affect    Past Medical History:   Past Medical History:   Diagnosis Date     Alcoholism (H)      Arthritis      Asperger's syndrome     Dr. Owens [facility]. 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 Muñoz ([place]     CYSTOSCOPY  10/98    for renal stones     HC KNEE SCOPE,MED/LAT MENISECTOMY  11    Left, medial ([place]     HERNIA REPAIR, INGUINAL RT/LT      [facility]     Family History:   Family History   Problem Relation Age of Onset     Psychotic Disorder Son         autism     Prostate Cancer Father         40s     Cancer Father      Cancer Maternal Grandmother         lung     Glaucoma Maternal Grandmother      Eye Disorder Maternal Grandmother         glaucoma     Diabetes Mother          45     Blood Disease Sister         sickle trait     Hypertension Sister      Blood Disease Paternal Uncle         sickle     Blood Disease Paternal Aunt         sickle     Alzheimer Disease Paternal Grandfather         70s     Macular Degeneration Paternal Grandfather      Cerebrovascular Disease No family hx of      Thyroid Disease No family hx of      Myocardial Infarction No family hx of      C.A.D. No family hx of      Social History:   Social History     Tobacco Use     Smoking status: Former Smoker     Packs/day: 0.50     Years: 10.00     Pack years: 5.00     Types: Cigarettes     Last attempt to quit: 1997     Years since quittin.2     Smokeless tobacco: Never Used   Substance Use Topics     Alcohol use: No     Comment: hx alcoholism quit . [facility] in      OBJECTIVE:  Physical Exam:  BP (!) 167/99 (BP Location: Left arm, Patient Position: Sitting, Cuff Size: Adult Regular)   Pulse 65   Ht 1.626 m (5' 4\")   Wt 75.8 kg (167 lb)   SpO2 95%   BMI 28.67 kg/m    General Appearance: healthy, alert and no distress   Skin: no suspicious lesions or rashes  Neuro: Normal strength and " tone, mentation intact and speech normal  Vascular: good pulses, and cappillary refill   Lymph: no lymphadenopathy   Psych:  mentation appears normal and affect normal/bright  Resp: no increased work of breathing     Left Hand Exam:  Inspection: No malrotation of the digits  ROM: Pain with flexion of the MP joint and of the IP joint. Stiffness of the IP and MP joint flexion.   Tender: Ulnar collateral ligament, MTP joint, radial side of MP joint  Testing of the collateral ligament show good stability, no pain with stress on the collateral ligament   Strength: Good thumb flexion and adduction strength    X-rays:  Obtained 3/5/19 of the Left Finger and Wrist: 2/3-views, reviewed in the office with the patient by myself today and show:  No fractures are seen in the LEFT wrist or thumb. Joints  are preserved and in normal alignment. No erosions. No radiodense  foreign body.     ASSESSMENT:   1. Left thumb sprain.  No collateral ligament tear.    PLAN:   Ordered hand therapy   Splinting would likely lead to more stiffness.  Patient was advised to follow up with his primary care physician regarding high blood pressure.     Return to clinic: if not improving     YUSUF Alarcon MD  Dept. Orthopedic Surgery  [facility]     This document serves as a record of the services and decisions personally performed and made by Dr. YUSUF Alarcon MD. It was created on his behalf by Davida Strange, a trained medical scribe. The creation of this record is based on the provider's personal observations and the statements of the patient. This document has been checked and approved by the attending provider.   Davida Strange March 12, 2019 9:15 AM

## 2019-03-12 ENCOUNTER — TELEPHONE (OUTPATIENT)
Dept: FAMILY MEDICINE | Facility: CLINIC | Age: 59
End: 2019-03-12

## 2019-03-12 ENCOUNTER — OFFICE VISIT (OUTPATIENT)
Dept: ORTHOPEDICS | Facility: CLINIC | Age: 59
End: 2019-03-12
Payer: COMMERCIAL

## 2019-03-12 VITALS
BODY MASS INDEX: 28.51 KG/M2 | SYSTOLIC BLOOD PRESSURE: 167 MMHG | HEIGHT: 64 IN | DIASTOLIC BLOOD PRESSURE: 99 MMHG | HEART RATE: 65 BPM | WEIGHT: 167 LBS | OXYGEN SATURATION: 95 %

## 2019-03-12 DIAGNOSIS — S63.642A SPRAIN OF METACARPOPHALANGEAL (MCP) JOINT OF LEFT THUMB, INITIAL ENCOUNTER: Primary | ICD-10-CM

## 2019-03-12 DIAGNOSIS — S69.92XA INJURY OF LEFT THUMB, INITIAL ENCOUNTER: ICD-10-CM

## 2019-03-12 PROCEDURE — 99242 OFF/OP CONSLTJ NEW/EST SF 20: CPT | Performed by: ORTHOPAEDIC SURGERY

## 2019-03-12 ASSESSMENT — MIFFLIN-ST. JEOR: SCORE: 1488.51

## 2019-03-12 NOTE — TELEPHONE ENCOUNTER
Okay to wait till Thursday to recheck. Past blood pressure has been normal.    Milton Iglesias MD

## 2019-03-12 NOTE — PATIENT INSTRUCTIONS
You should follow up with your primary care doctor regarding your high blood pressure as soon as possible. You can also try taking your blood pressure at the pharmacy, in a more relaxed setting.

## 2019-03-12 NOTE — LETTER
3/12/2019         RE: Jeanmarie Mclean  [address]  [place] [address]        Dear Colleague,    Thank you for referring your patient, Jeanmarie Mclean, to the [facility]. Please see a copy of my visit note below.    SUBJECTIVE:   Jeanmarie Mclean is a 59 year old male who is seen in consultation at the request of RAJINDER Montana CNP for evaluation of left thumb pain.    Mechanism of injury:   Onset: over a month. Reports hit on a banister and it bent backwards    Present symptoms: hurts to move, hurts to grab, use thumb. Pain diffuse throughout the ulnar side of thumb, swelling.     Treatments tried to this point: NSAIDs    Orthopedic PMH: None    Review of Systems:  Constitutional:  NEGATIVE for fever, chills, change in weight  Integumentary/Skin:  NEGATIVE for worrisome rashes, moles or lesions  Eyes:  NEGATIVE for vision changes or irritation  ENT/Mouth:  NEGATIVE for ear, mouth and throat problems  Resp:  NEGATIVE for significant cough or SOB  Breast:  NEGATIVE for masses, tenderness or discharge  CV:  NEGATIVE for chest pain, palpitations or peripheral edema  GI:  NEGATIVE for nausea, abdominal pain, heartburn, or change in bowel habits  :  Negative   Musculoskeletal:  See HPI above  Neuro:  NEGATIVE for weakness, dizziness or paresthesias  Endocrine:  NEGATIVE for temperature intolerance, skin/hair changes  Heme/allergy/immune:  NEGATIVE for bleeding problems  Psychiatric:  NEGATIVE for changes in mood or affect    Past Medical History:   Past Medical History:   Diagnosis Date     Alcoholism (H)      Arthritis      Asperger's syndrome     Dr. Owens, [facility]. 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 Muñoz ([place]     CYSTOSCOPY  10/98    for renal stones     HC KNEE SCOPE,MED/LAT MENISECTOMY  11    Left, medial ([place]     HERNIA REPAIR, INGUINAL RT/LT      [facility] ([facility])     Family History:   Family History   Problem Relation Age of Onset     Psychotic Disorder Son         autism     Prostate Cancer Father         40s     Cancer Father      Cancer Maternal Grandmother         lung     Glaucoma Maternal Grandmother      Eye Disorder Maternal Grandmother         glaucoma     Diabetes Mother          45     Blood Disease Sister         sickle trait     Hypertension Sister      Blood Disease Paternal Uncle         sickle     Blood Disease Paternal Aunt         sickle     Alzheimer Disease Paternal Grandfather         70s     Macular Degeneration Paternal Grandfather      Cerebrovascular Disease No family hx of      Thyroid Disease No family hx of      Myocardial Infarction No family hx of      C.A.D. No family hx of      Social History:   Social History     Tobacco Use     Smoking status: Former Smoker     Packs/day: 0.50     Years: 10.00     Pack years: 5.00     Types: Cigarettes     Last attempt to quit: 1997     Years since quittin.2     Smokeless tobacco: Never Used   Substance Use Topics     Alcohol use: No     Comment: hx alcoholism quit . [facility] in      OBJECTIVE:  Physical Exam:  BP (!) 167/99 (BP Location: Left arm, Patient Position:  "Sitting, Cuff Size: Adult Regular)   Pulse 65   Ht 1.626 m (5' 4\")   Wt 75.8 kg (167 lb)   SpO2 95%   BMI 28.67 kg/m     General Appearance: healthy, alert and no distress   Skin: no suspicious lesions or rashes  Neuro: Normal strength and tone, mentation intact and speech normal  Vascular: good pulses, and cappillary refill   Lymph: no lymphadenopathy   Psych:  mentation appears normal and affect normal/bright  Resp: no increased work of breathing     Left Hand Exam:  Inspection: No malrotation of the digits  ROM: Pain with flexion of the MP joint and of the IP joint. Stiffness of the IP and MP joint flexion.   Tender: Ulnar collateral ligament, MTP joint, radial side of MP joint  Testing of the collateral ligament show good stability, no pain with stress on the collateral ligament   Strength: Good thumb flexion and adduction strength    X-rays:  Obtained 3/5/19 of the Left Finger and Wrist: 2/3-views, reviewed in the office with the patient by myself today and show:  No fractures are seen in the LEFT wrist or thumb. Joints  are preserved and in normal alignment. No erosions. No radiodense  foreign body.     ASSESSMENT:   1. Left thumb sprain.  No collateral ligament tear.    PLAN:   Ordered hand therapy   Splinting would likely lead to more stiffness.  Patient was advised to follow up with his primary care physician regarding high blood pressure.     Return to clinic: if not improving     YUSUF Alarcon MD  Dept. Orthopedic Surgery  [facility]     This document serves as a record of the services and decisions personally performed and made by Dr. YUSUF Alarcon MD. It was created on his behalf by Davida Strange, a trained medical scribe. The creation of this record is based on the provider's personal observations and the statements of the patient. This document has been checked and approved by the attending provider.   Davida Strange March 12, 2019 9:15 AM        Again, thank you for allowing me to " participate in the care of your patient.        Sincerely,        Vaughn Alarcon MD

## 2019-03-12 NOTE — TELEPHONE ENCOUNTER
..Reason for Call:  updating/questions/request for blood pressure medication;     Detailed comments: Patient has appointment on Thurs/10:40 - nervous about waiting that long/last two visits [facility]/detention; blood pressure was taken,  165/99, last two times, it has been high/ usually has had low blood pressure / before you see him, can you prescribe him a blood pressure medication? (per Patient) ; the Dr today stated this could be damaging his kidney/Pt is very concerned    Phone Number Patient can be reached at: Home number on file [phone] (home)    Best Time: anytime    Can we leave a detailed message on this number? YES    Call taken on 3/12/2019 at 10:14 AM by Marisa Vernon

## 2019-03-12 NOTE — TELEPHONE ENCOUNTER
Provider to review if it can wait till Thursday.    Debbi Milian RN, [facility] Triage

## 2019-03-14 ENCOUNTER — OFFICE VISIT (OUTPATIENT)
Dept: FAMILY MEDICINE | Facility: CLINIC | Age: 59
End: 2019-03-14
Payer: COMMERCIAL

## 2019-03-14 VITALS
RESPIRATION RATE: 16 BRPM | TEMPERATURE: 96.9 F | BODY MASS INDEX: 28.34 KG/M2 | OXYGEN SATURATION: 99 % | DIASTOLIC BLOOD PRESSURE: 86 MMHG | HEART RATE: 79 BPM | HEIGHT: 64 IN | SYSTOLIC BLOOD PRESSURE: 138 MMHG | WEIGHT: 166 LBS

## 2019-03-14 DIAGNOSIS — R03.0 ELEVATED BLOOD PRESSURE READING WITHOUT DIAGNOSIS OF HYPERTENSION: Primary | ICD-10-CM

## 2019-03-14 PROCEDURE — 99213 OFFICE O/P EST LOW 20 MIN: CPT | Performed by: FAMILY MEDICINE

## 2019-03-14 ASSESSMENT — PAIN SCALES - GENERAL: PAINLEVEL: SEVERE PAIN (6)

## 2019-03-14 ASSESSMENT — MIFFLIN-ST. JEOR: SCORE: 1483.97

## 2019-03-14 NOTE — PROGRESS NOTES
SUBJECTIVE:   Jeanmarie Mclean is a 59 year old male who presents to clinic today for the following health issues:      Elevated blood pressure readings at last two visits.    Problem list and histories reviewed & adjusted, as indicated.  Additional history: as documented    Patient Active Problem List   Diagnosis     Malignant neoplasm of prostate (H)     CARDIOVASCULAR SCREENING; LDL GOAL LESS THAN 130     GERD (gastroesophageal reflux disease)     Overweight (BMI 25.0-29.9)     Advance care planning     Fibromyalgia syndrome     Posttraumatic stress disorder     Low back pain     Inadequate material resources     Anxiety state     History of Asperger's syndrome     Pervasive developmental disorder, active     Depressive disorder     Delusional disorder (H)     TBI (traumatic brain injury) (H)     Insomnia     Chronic pain     Myalgia     male stress incontinence     Major depressive disorder, recurrent episode, moderate (H)     Low back pain without sciatica, unspecified back pain laterality, unspecified chronicity     Alcohol dependence in remission (H)     Past Surgical History:   Procedure Laterality Date     C HEP B VAC ADULT 3 DOSE IM      received all 3 vaccines     C REMV PROSTATE,RETROPUB,RADICAL  10/13/04    Dr. Muñoz ([place])     CYSTOSCOPY  10/98    for renal stones     HC KNEE SCOPE,MED/LAT MENISECTOMY  11    Left, medial ([place])     HERNIA REPAIR, INGUINAL RT/LT      [facility]       Social History     Tobacco Use     Smoking status: Former Smoker     Packs/day: 0.50     Years: 10.00     Pack years: 5.00     Types: Cigarettes     Last attempt to quit: 1997     Years since quittin.2     Smokeless tobacco: Never Used   Substance Use Topics     Alcohol use: Yes     Comment: hx alcoholism quit . [facility] dependency treatment in      Family History   Problem Relation Age of Onset     Psychotic Disorder Son         autism     Prostate Cancer Father         40s     Cancer  "Father      Cancer Maternal Grandmother         lung     Glaucoma Maternal Grandmother      Eye Disorder Maternal Grandmother         glaucoma     Diabetes Mother          45     Blood Disease Sister         sickle trait     Hypertension Sister      Blood Disease Paternal Uncle         sickle     Blood Disease Paternal Aunt         sickle     Alzheimer Disease Paternal Grandfather         70s     Macular Degeneration Paternal Grandfather      Cerebrovascular Disease No family hx of      Thyroid Disease No family hx of      Myocardial Infarction No family hx of      C.A.D. No family hx of            Reviewed and updated as needed this visit by clinical staff  Tobacco  Allergies  Meds  Med Hx  Surg Hx  Fam Hx  Soc Hx      Reviewed and updated as needed this visit by Provider         ROS:  Constitutional, HEENT, cardiovascular, pulmonary, GI, , musculoskeletal, neuro, skin, endocrine and psych systems are negative, except as otherwise noted.    OBJECTIVE:     /86   Pulse 79   Temp 96.9  F (36.1  C) (Tympanic)   Resp 16   Ht 1.626 m (5' 4\")   Wt 75.3 kg (166 lb)   SpO2 99%   BMI 28.49 kg/m    Body mass index is 28.49 kg/m .  GENERAL: healthy, alert and no distress  NECK: no adenopathy, no asymmetry, masses, or scars and thyroid normal to palpation  RESP: lungs clear to auscultation - no rales, rhonchi or wheezes  CV: regular rate and rhythm, normal S1 S2, no S3 or S4, no murmur, click or rub, no peripheral edema and peripheral pulses strong  ABDOMEN: soft, nontender, no hepatosplenomegaly, no masses and bowel sounds normal  MS: no gross musculoskeletal defects noted, no edema    Diagnostic Test Results:  none     ASSESSMENT/PLAN:       1. Elevated blood pressure reading without diagnosis of hypertension  A few elevated readings but not worrisome. Today normal. Discussed low salt diet and regular exercise. No need for medications at this time. Will monitor.      See Patient Instructions    Milton CALDERON " MD Harris, MD  [facility]

## 2019-03-28 ENCOUNTER — THERAPY VISIT (OUTPATIENT)
Dept: OCCUPATIONAL THERAPY | Facility: CLINIC | Age: 59
End: 2019-03-28
Payer: COMMERCIAL

## 2019-03-28 DIAGNOSIS — S69.92XA INJURY OF LEFT THUMB, INITIAL ENCOUNTER: ICD-10-CM

## 2019-03-28 PROCEDURE — 97110 THERAPEUTIC EXERCISES: CPT | Mod: GO | Performed by: OCCUPATIONAL THERAPIST

## 2019-03-28 PROCEDURE — 97035 APP MDLTY 1+ULTRASOUND EA 15: CPT | Mod: GO | Performed by: OCCUPATIONAL THERAPIST

## 2019-03-28 PROCEDURE — 97140 MANUAL THERAPY 1/> REGIONS: CPT | Mod: GO | Performed by: OCCUPATIONAL THERAPIST

## 2019-03-28 PROCEDURE — 97165 OT EVAL LOW COMPLEX 30 MIN: CPT | Mod: GO | Performed by: OCCUPATIONAL THERAPIST

## 2019-03-28 NOTE — PROGRESS NOTES
Hand Therapy Initial Evaluation    Current Date:  3/28/2019  Referring Physician: Dr. Alarcon    Subjective:  Jeanmarie Mclean is a 59 year old right hand dominant male.    Diagnosis:Left thumb sprain  DOI:  1/2019    Patient reports symptoms of pain, stiffness/loss of motion, weakness/loss of strength, edema and tingling  of the left thumb which occurred due to an injury sustained by snagging his thumb on a banister while walking downstairs. Since onset symptoms are gradually getting worse.  Special tests:  x-rays.  Previous treatment: none.    General health as reported by patient is good.  Pertinent medical history includes:Cancer, Chemical Dependency, Depression, Fibromyalgia, Heart Problems, High Blood Pressure, History of Fractures, Mental Illness, Overweight, Rheumatoid Arthritis, Smoking, Chest Pain  Medical allergies:none.  Surgical history: cancer: protate, orthopedic: knee, heart: murmur.  Medication history: Anti-depressants, Pain.    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)  Barriers include:none  Prior functional level:  no limitations  Mobility: No difficulty  Transportation: drives  Leisure activities/hobbies: walk, lift weights    Upper Extremity Functional Index Score:  SCORE:   Column Totals: /80: 39   (A lower score indicates greater disability.)    Objective:  Pain:    VAS(0-10) 3/28/19   At Rest:  3-7/10   With Use:  8/10   At Worst  8/10     Report of Pain:  Location: left thumb   Description:  Throbbing, Tingling, Electrical shocks  Frequency:  Intermittent  Duration:  Same all the time  Exacerbated by: Lifting, gripping, twisting, pinching, pulling, activity  Relieved by: heat,  rest  Progression: gradually worsening    ROM:    Thumb 3/28/19 3/28/19   AROM(PROM) Right Left   MP /50 /44   IP /62 /52   RAbd /45 /38   PAbd 50 45+++   Retropulsion     Opposition  Kapandji Opposition Scale (0-10/10) 10/10 9/10++     Strength:   Pain free  (Measured in pounds)   3/28/19   Trials Right Left   1  2  3  Av  76  63  75 33  23  29  29++     Lat Pinch 3/28/19   Trials Right Left   1  2  3  Av 10     3 Pt.  Pinch 3/28/19   Trials Right Left   1  2  3  Avg: 15 8+++     Edema:  []         None   [x]         Mild    []         Moderate      []         Severe                  Location: (L) thumb MCP and PIP joint area   Edema - Measured circumferentially in cm  Date 3/28/19        Thumb Right Left Right Left Right Left Right Left Right Left Right Left                   P1 6.5 6.8             PIP 6.3 6.5              P2               DIP                 Color/Temperature:     [x]        Normal  []        Abnormal          Palpation:  []         Normal        [x]       Tender       []      Mild         [x]       Moderate [x]       Severe     Location: (L) thumb UCL joint area    Sensation: [x]                   WNL throughout all nerve distributions; per patient report    []                   Decreased  []        Median    []        Ulnar    []        Radial nerve distribution    Assessment:  Patient presents with symptoms consistent with diagnosis of thumb sprain,  with conservative intervention.     Patient's limitations or Problem List includes:  Pain, Decreased ROM/motion, Increased edema, Weakness, Sensory disturbance, Decreased stability, Decreased  and Decreased pinch of the left thumb which interferes with the patient's ability to perform Self Care Tasks (dressing, bathing), Recreational Activities and Household Chores as compared to previous level of function.    Rehab Potential:  Excellent - Return to full activity, no limitations    Patient will benefit from skilled Occupational Therapy to increase ROM, flexibility, overall strength,  strength, pinch strength and stability of thumb and decrease pain and edema to return to previous activity level and resume normal daily tasks and to reach their rehab potential.    Barriers to  Learning:  No barrier    Communication Issues:  Patient appears to be able to clearly communicate and understand verbal and written communication and follow directions correctly.    Chart Review: Brief history including review of medical and/or therapy records relating to the presenting problem and Simple history review with patient    Identified Performance Deficits: bathing/showering, dressing, home establishment and management, meal preparation and cleanup, work and leisure activities    Assessment of Occupational Performance:  5 or more Performance Deficits    Clinical Decision Making (Complexity): Low complexity    Treatment Explanation:  The following has been discussed with the patient:  RX ordered/plan of care  Anticipated outcomes  Possible risks and side effects    Frequency:  1 X week, once daily  Duration:  for 6 weeks  Treatment Plan:  Modalities:  US  Therapeutic Exercise:  AROM, AAROM, PROM, Blocking and Isotonics  Neuromuscular re-education:  Kinesiotaping  Manual Techniques:  Joint mobilization, Friction massage and Myofascial release  Orthotic Fabrication:  Static orthosis and Hand based orthosis  Discharge Plan:  Achieve all LTG.  Independent in home treatment program.  Reach maximal therapeutic benefit.    Home Exercise Program:  AROM of thumb in flexion and extension only  Massage to thumb  Coban wrap for edema    Next Visit:  US  Assess for thumb spica  MFR to ulnar MCP joint

## 2019-04-16 ENCOUNTER — TELEPHONE (OUTPATIENT)
Dept: PALLIATIVE MEDICINE | Facility: CLINIC | Age: 59
End: 2019-04-16

## 2019-04-16 DIAGNOSIS — M53.3 SI (SACROILIAC) JOINT DYSFUNCTION: Primary | ICD-10-CM

## 2019-04-16 NOTE — TELEPHONE ENCOUNTER
Order pended for review.    12/5/18 Procedure performed: bilateral SI joint injections    AWA Gant, RN  Care Coordinator  [facility] [place]

## 2019-04-16 NOTE — TELEPHONE ENCOUNTER
LM on  for pt to schedule Venita SI Joint Injection.      Maria Elena CALDERON    [facility] [place]

## 2019-04-16 NOTE — TELEPHONE ENCOUNTER
Received call from patient who is requesting a repeat SI Joint Injection. Please review and place order if appropriate.       Raya Amanda    [facility]

## 2019-04-16 NOTE — TELEPHONE ENCOUNTER
Pre-screening Questions for Radiology Injections:    Injection to be done at which interventional clinic site? [facility]    Instruct patient to arrive as directed prior to the scheduled appointment time:    [place] minutes before      [place]: 30 minutes before; if IV needed 1 hour before     Procedure ordered by Dr. King    Procedure ordered? Bilateral SI Joint Injection    What insurance would patient like us to bill for this procedure? [employer]      Worker's comp or MVA (motor vehicle accident) -Any injection DO NOT SCHEDULE and route to Kym Fisher.      [employer] insurance - For SI joint injections, DO NOT SCHEDULE and route Kym Fisher.       [employer] - Any injection besides hip/shoulder/knee joint DO NOT SCHEDULE and route to Kym Fisher. She will obtain PA and call pt back to schedule procedure or notify pt of denial.        [employer]-Route to Kym for review        IF SCHEDULING IN [place] AND NEEDS A PA, IT IS OKAY TO SCHEDULE. [place] HANDLES THEIR OWN PA'S AFTER THE PATIENT IS SCHEDULED. PLEASE SCHEDULE AT LEAST 1 WEEK OUT SO A PA CAN BE OBTAINED.      Any chance of pregnancy? Not Applicable   If YES, do NOT schedule and route to RN pool    Is an  needed? No     Patient has a drive home? (mandatory) YES: INFORMED    Is patient taking any blood thinners (plavix, coumadin, jantoven, warfarin, heparin, pradaxa or dabigatran )? No   If hold needed, do NOT schedule, route to RN pool     Is patient taking any aspirin products (includes Excedrin and Fiorinal)? Yes - Pt takes 81mg daily; instructed to hold 0 day(s) prior to procedure.      If more than 325mg/day do NOT schedule; route to RN pool     For CERVICAL procedures, hold all aspirin products for 6 days.     Tell pt that if aspirin product is not held for 6 days, the procedure WILL BE cancelled.      Does the patient have a bleeding or clotting disorder? No     If YES, okay to schedule AND route to RN  nurse pool    For any patients with platelet count <100, must be forwarded to provider    Is patient diabetic?  No  If YES, have them bring their glucometer.    Does patient have an active infection or treated for one within the past week? No     Is patient currently taking any antibiotics?  No     For patients on chronic, preventative, or prophylactic antibiotics, procedures may be scheduled.     For patients on antibiotics for active or recent infection:antibiotic course must have been completed for 4 days    Is patient currently taking any steroid medications? (i.e. Prednisone, Medrol)  No     For patients on steroid medications, course must have been completed for 4 days    Reviewed with patient:  If you are started on any steroids or antibiotics between now and your appointment, you must contact us because the procedure may need to be cancelled.  Yes    Is patient actively being treated for cancer or immunocompromised? No  If YES, do NOT schedule and route to RN pool     Are you able to get on and off an exam table with minimal or no assistance? Yes  If NO, do NOT schedule and route to RN pool    Are you able to roll over and lay on your stomach with minimal or no assistance? Yes  If NO, do NOT schedule and route to RN pool     Any allergies to contrast dye, iodine, shellfish, or numbing and steroid medications? No  If YES, route to RN pool AND add allergy information to appointment notes    Allergies: Risperidone; Effexor [venlafaxine hydrochloride]; Ambien; Ambien [zolpidem tartrate]; Buspirone; Celexa [citalopram]; Duloxetine; Septra [bactrim]; Seroquel [quetiapine]; Sulfa drugs; Sulfamethoxazole-trimethoprim; Tramadol; Ultram [tramadol hcl]; Venlafaxine; and Zolpidem      Has the patient had a flu shot or any other vaccinations within 7 days before or after the procedure.  No     Does patient have an MRI/CT?  Not Applicable  (SI joint, hip injections, lumbar sympathetic blocks, and stellate ganglion blocks  do not require an MRI)    Was the MRI done w/in the last 3 years?  NA    Was MRI done at [place]? No      If not, where was it done? N/A       If MRI was not done at [place], [facility] or [facility] do NOT schedule and route to nursing.  If pt has an imaging disc, the injection may be scheduled but pt has to bring disc to appt. If they show up w/out disc the injection cannot be done    Reminders (please tell patient if applicable):       Instructed pt to arrive 30 minutes early for IV start if this is for a cervical procedure, ALL sympathetic (stellate ganglion, hypogastric, or lumbar sympathetic block) and all sedation procedures (RFA, spinal cord stimulation trials).  Not Applicable   -IVs are not routinely placed for Dr. Maier cervical cases   -Dr. Castellanos: IVs for cervical ESIs and cervical TBDs (not CMBBs/facet inj)      If NPO for sedation, informed patient that it is okay to take medications with sips of water (except if they are to hold blood thinners).  Not Applicable   *DO take blood pressure medication if it is prescribed*      If this is for a cervical JOSE R, informed patient that aspirin needs to be held for 6 days.   Not Applicable      For all patients not having spinal cord stimulator (SCS) trials or radiofrequency ablations (RFAs), informed patient:    IV sedation is not provided for this procedure.  If you feel that an oral anti-anxiety medication is needed, you can discuss this further with your referring provider or primary care provider.  The Pain Clinic provider will discuss specifics of what the procedure includes at your appointment.  Most procedures last 10-20 minutes.  We use numbing medications to help with any discomfort during the procedure.  Not Applicable      Do not schedule procedures requiring IV placement in the first appointment of the day or first appointment after lunch. Do NOT schedule at 0745, 0815 or 1245. N/A      For patients 85 or older we recommend having an adult stay  w/ them for the remainder of the day.   N/A    Does the patient have any questions?  NO  Raya Amanda  [facility] [place]

## 2019-04-29 ENCOUNTER — TELEPHONE (OUTPATIENT)
Dept: FAMILY MEDICINE | Facility: CLINIC | Age: 59
End: 2019-04-29

## 2019-04-29 NOTE — TELEPHONE ENCOUNTER
Called the patient and he has been having scrotal pain for 4 days. He notes that he will get this at times when he drinks alcohol or fountain drinks. He has peed red in the past but is not at the present time. Notes having epididymitis in the past that has caused the same pain.     He has been taking Advil gel caps, and Naproxen. Also a baby ASA a day for heart health. He is now concerned with his liver function.    Notes some pain under his ribs.    Patient is advised to be seen today. He states he will go to [facility] in the early am. He notes that his son is autistic and feels that will be better since he has to bring him with. Encouraged to go tonight and informed when to call 911 if symptoms worsen.    Debbi Milian RN, [facility] Triage

## 2019-05-02 ENCOUNTER — OFFICE VISIT (OUTPATIENT)
Dept: FAMILY MEDICINE | Facility: CLINIC | Age: 59
End: 2019-05-02
Payer: COMMERCIAL

## 2019-05-02 VITALS
DIASTOLIC BLOOD PRESSURE: 89 MMHG | BODY MASS INDEX: 28.89 KG/M2 | SYSTOLIC BLOOD PRESSURE: 137 MMHG | TEMPERATURE: 97.8 F | HEIGHT: 64 IN | HEART RATE: 54 BPM | WEIGHT: 169.2 LBS | OXYGEN SATURATION: 97 %

## 2019-05-02 DIAGNOSIS — Z85.46 HISTORY OF PROSTATE CANCER: ICD-10-CM

## 2019-05-02 DIAGNOSIS — R10.84 GENERALIZED ABDOMINAL PAIN: ICD-10-CM

## 2019-05-02 DIAGNOSIS — F32.A DEPRESSIVE DISORDER: Chronic | ICD-10-CM

## 2019-05-02 DIAGNOSIS — F22 DELUSIONAL DISORDER (H): Chronic | ICD-10-CM

## 2019-05-02 DIAGNOSIS — N50.82 SCROTAL PAIN: Primary | ICD-10-CM

## 2019-05-02 DIAGNOSIS — D64.9 ANEMIA, UNSPECIFIED TYPE: ICD-10-CM

## 2019-05-02 DIAGNOSIS — G89.4 CHRONIC PAIN SYNDROME: Chronic | ICD-10-CM

## 2019-05-02 LAB
ALBUMIN SERPL-MCNC: 4 G/DL (ref 3.4–5)
ALBUMIN UR-MCNC: NEGATIVE MG/DL
ALP SERPL-CCNC: 75 U/L (ref 40–150)
ALT SERPL W P-5'-P-CCNC: 42 U/L (ref 0–70)
ANION GAP SERPL CALCULATED.3IONS-SCNC: 11 MMOL/L (ref 3–14)
APPEARANCE UR: CLEAR
AST SERPL W P-5'-P-CCNC: 32 U/L (ref 0–45)
BILIRUB DIRECT SERPL-MCNC: 0.2 MG/DL (ref 0–0.2)
BILIRUB SERPL-MCNC: 0.9 MG/DL (ref 0.2–1.3)
BILIRUB UR QL STRIP: NEGATIVE
BUN SERPL-MCNC: 11 MG/DL (ref 7–30)
CALCIUM SERPL-MCNC: 9.1 MG/DL (ref 8.5–10.1)
CHLORIDE SERPL-SCNC: 108 MMOL/L (ref 94–109)
CO2 SERPL-SCNC: 22 MMOL/L (ref 20–32)
COLOR UR AUTO: YELLOW
CREAT SERPL-MCNC: 0.78 MG/DL (ref 0.66–1.25)
ERYTHROCYTE [DISTWIDTH] IN BLOOD BY AUTOMATED COUNT: 13.5 % (ref 10–15)
FERRITIN SERPL-MCNC: 35 NG/ML (ref 26–388)
GFR SERPL CREATININE-BSD FRML MDRD: >90 ML/MIN/{1.73_M2}
GLUCOSE SERPL-MCNC: 87 MG/DL (ref 70–99)
GLUCOSE UR STRIP-MCNC: NEGATIVE MG/DL
HCT VFR BLD AUTO: 37 % (ref 40–53)
HGB BLD-MCNC: 12.6 G/DL (ref 13.3–17.7)
HGB UR QL STRIP: ABNORMAL
IRON SATN MFR SERPL: 26 % (ref 15–46)
IRON SERPL-MCNC: 86 UG/DL (ref 35–180)
KETONES UR STRIP-MCNC: NEGATIVE MG/DL
LEUKOCYTE ESTERASE UR QL STRIP: NEGATIVE
LIPASE SERPL-CCNC: 74 U/L (ref 73–393)
MCH RBC QN AUTO: 29.9 PG (ref 26.5–33)
MCHC RBC AUTO-ENTMCNC: 34.1 G/DL (ref 31.5–36.5)
MCV RBC AUTO: 88 FL (ref 78–100)
NITRATE UR QL: NEGATIVE
PH UR STRIP: 6 PH (ref 5–7)
PLATELET # BLD AUTO: 235 10E9/L (ref 150–450)
POTASSIUM SERPL-SCNC: 3.6 MMOL/L (ref 3.4–5.3)
PROT SERPL-MCNC: 7.3 G/DL (ref 6.8–8.8)
RBC # BLD AUTO: 4.21 10E12/L (ref 4.4–5.9)
RBC #/AREA URNS AUTO: NORMAL /HPF
SODIUM SERPL-SCNC: 141 MMOL/L (ref 133–144)
SOURCE: ABNORMAL
SP GR UR STRIP: <=1.005 (ref 1–1.03)
TIBC SERPL-MCNC: 334 UG/DL (ref 240–430)
UROBILINOGEN UR STRIP-ACNC: 0.2 EU/DL (ref 0.2–1)
VIT B12 SERPL-MCNC: 625 PG/ML (ref 193–986)
WBC # BLD AUTO: 4.7 10E9/L (ref 4–11)
WBC #/AREA URNS AUTO: NORMAL /HPF

## 2019-05-02 PROCEDURE — 36415 COLL VENOUS BLD VENIPUNCTURE: CPT | Performed by: NURSE PRACTITIONER

## 2019-05-02 PROCEDURE — 83550 IRON BINDING TEST: CPT | Performed by: NURSE PRACTITIONER

## 2019-05-02 PROCEDURE — 87591 N.GONORRHOEAE DNA AMP PROB: CPT | Performed by: NURSE PRACTITIONER

## 2019-05-02 PROCEDURE — 80076 HEPATIC FUNCTION PANEL: CPT | Performed by: NURSE PRACTITIONER

## 2019-05-02 PROCEDURE — 82607 VITAMIN B-12: CPT | Performed by: NURSE PRACTITIONER

## 2019-05-02 PROCEDURE — 81001 URINALYSIS AUTO W/SCOPE: CPT | Performed by: NURSE PRACTITIONER

## 2019-05-02 PROCEDURE — 85027 COMPLETE CBC AUTOMATED: CPT | Performed by: NURSE PRACTITIONER

## 2019-05-02 PROCEDURE — 87491 CHLMYD TRACH DNA AMP PROBE: CPT | Performed by: NURSE PRACTITIONER

## 2019-05-02 PROCEDURE — 83690 ASSAY OF LIPASE: CPT | Performed by: NURSE PRACTITIONER

## 2019-05-02 PROCEDURE — 82728 ASSAY OF FERRITIN: CPT | Performed by: NURSE PRACTITIONER

## 2019-05-02 PROCEDURE — 83540 ASSAY OF IRON: CPT | Performed by: NURSE PRACTITIONER

## 2019-05-02 PROCEDURE — 80048 BASIC METABOLIC PNL TOTAL CA: CPT | Performed by: NURSE PRACTITIONER

## 2019-05-02 PROCEDURE — 99214 OFFICE O/P EST MOD 30 MIN: CPT | Performed by: NURSE PRACTITIONER

## 2019-05-02 ASSESSMENT — ANXIETY QUESTIONNAIRES
6. BECOMING EASILY ANNOYED OR IRRITABLE: MORE THAN HALF THE DAYS
1. FEELING NERVOUS, ANXIOUS, OR ON EDGE: NEARLY EVERY DAY
2. NOT BEING ABLE TO STOP OR CONTROL WORRYING: NEARLY EVERY DAY
7. FEELING AFRAID AS IF SOMETHING AWFUL MIGHT HAPPEN: NEARLY EVERY DAY
GAD7 TOTAL SCORE: 14
3. WORRYING TOO MUCH ABOUT DIFFERENT THINGS: SEVERAL DAYS
5. BEING SO RESTLESS THAT IT IS HARD TO SIT STILL: SEVERAL DAYS

## 2019-05-02 ASSESSMENT — PATIENT HEALTH QUESTIONNAIRE - PHQ9
SUM OF ALL RESPONSES TO PHQ QUESTIONS 1-9: 15
5. POOR APPETITE OR OVEREATING: SEVERAL DAYS

## 2019-05-02 ASSESSMENT — MIFFLIN-ST. JEOR: SCORE: 1498.49

## 2019-05-02 NOTE — PROGRESS NOTES
"  SUBJECTIVE:   Jeanmarie Mclean is a 59 year old male who presents to clinic today for the following   health issues:      Abdominal Pain      Duration: 1 week    Description (location/character/radiation): everywhere       Associated flank pain: None    Intensity:  moderate    Accompanying signs and symptoms: lightheaded, sweating       Fever/Chills: no        Gas/Bloating: no        Nausea/vomitting: YES- nausea       Diarrhea: no        Dysuria or Hematuria: no     History (previous similar pain/trauma/previous testing):     Precipitating or alleviating factors:       Pain worse with eating/BM/urination: no, worse when taking NSAIDS       Pain relieved by BM: YES    Therapies tried and outcome: None    LMP:  not applicable    scrotum problem      Duration: 2 weeks    Description (location/character/radiation): sharp with constant dull ache    Intensity:  moderate, severe    Accompanying signs and symptoms: pain    History (similar episodes/previous evaluation): yes    Precipitating or alleviating factors: None    Therapies tried and outcome: ibuprofen, naproxen, asprin   Patient states he drank soda from a fountain machine as well as drank alcohol a couple of weeks ago and this caused his scrotal pain.  He denies dysuria, freqeuncy, or urgency,  no penile discharge.  PMH significant for epididymitis, prostate cancer s/p radical prostatectomy 10/2004,  ED, chronic low back pain, and history of kidney stone as well.  He states he was due for epidural injection yesterday but cancelled his appt as he thought he might have an infection. He denies sexual activity, no blood in urine, no fever or chills.  He has been taking \"large quantities of Ibuprofen\" and states this has caused his generalized abdominal pain that is worse after eating. Appetite is decreased, he endorses nausea but no vomiting, no change in stool color, character- stools are brown, formed, no hematochezia or melena.      US Testicle 9/13/2018 at [facility] ER  IMPRESSION:  1.  Left varicocele, stable.  2.  Testicular microlithiasis bilaterally, stable. Increased risk of testicular malignancy has been associated with this finding.  3.  Stable small left hydrocele.  4.  Unremarkable remaining testicular ultrasound.  Reading Per Radiology.      Depression- followed by Psychiatry.  He is compliant with his medications, admits to thinking about suicide often but has no plan as he is responsible for his autistic son and would never leave him alone.  He also has history of delusional disorder for which Invega has worked well for him. PHQ-9=15, STEFANI-7=14 today.  He attributes his increased scores to pain and not feeling well.  Additional history: as documented    Reviewed  and updated as needed this visit by clinical staff  Tobacco  Allergies  Meds  Med Hx  Surg Hx  Fam Hx  Soc Hx        Reviewed and updated as needed this visit by Provider         Patient Active Problem List   Diagnosis     Malignant neoplasm of prostate (H)     CARDIOVASCULAR SCREENING; LDL GOAL LESS THAN 130     GERD (gastroesophageal reflux disease)     Overweight (BMI 25.0-29.9)     Advance care planning     Fibromyalgia syndrome     Posttraumatic stress disorder     Low back pain     Inadequate material resources     Anxiety state     History of Asperger's syndrome     Pervasive developmental disorder, active     Depressive disorder     Delusional disorder (H)     TBI (traumatic brain injury) (H)     Insomnia     Chronic pain     Myalgia     male stress incontinence     Major depressive disorder, recurrent episode, moderate (H)     Low back pain without sciatica, unspecified back pain laterality, unspecified chronicity     Alcohol dependence in remission (H)     Past Surgical History:   Procedure Laterality Date     C HEP B VAC ADULT 3 DOSE IM  1995    received all 3 vaccines     C REMV PROSTATE,RETROPUB,RADICAL  10/13/04    Dr. Muñoz ([place])     CYSTOSCOPY  10/98    for renal stones     HC KNEE  SCOPE,MED/LAT MENISECTOMY  11    Left, medial ([place]     HERNIA REPAIR, INGUINAL RT/LT      [facility]       Social History     Tobacco Use     Smoking status: Former Smoker     Packs/day: 0.50     Years: 10.00     Pack years: 5.00     Types: Cigarettes     Last attempt to quit: 1997     Years since quittin.3     Smokeless tobacco: Never Used   Substance Use Topics     Alcohol use: Yes     Comment: hx alcoholism quit . [facility] dependency [facility] in      Family History   Problem Relation Age of Onset     Psychotic Disorder Son         autism     Prostate Cancer Father         40s     Cancer Father      Cancer Maternal Grandmother         lung     Glaucoma Maternal Grandmother      Eye Disorder Maternal Grandmother         glaucoma     Diabetes Mother          45     Blood Disease Sister         sickle trait     Hypertension Sister      Blood Disease Paternal Uncle         sickle     Blood Disease Paternal Aunt         sickle     Alzheimer Disease Paternal Grandfather         70s     Macular Degeneration Paternal Grandfather      Cerebrovascular Disease No family hx of      Thyroid Disease No family hx of      Myocardial Infarction No family hx of      C.A.D. No family hx of          Current Outpatient Medications   Medication Sig Dispense Refill     aspirin 81 MG tablet Take by mouth daily       Aspirin-Calcium Carbonate  MG TABS Take by mouth daily       Cholecalciferol (VITAMIN D) 1000 UNIT capsule Take 1 capsule by mouth 2 times daily.       gabapentin (NEURONTIN) 100 MG capsule Take 100mg (1 capsule) by mouth at bedtime.  Increase as instructed in clinic to goal dose of 300mg (3 capsules) 3 times daily. 180 capsule 3     Ibuprofen (IBU-200 PO) Take 2 tablets by mouth as needed       menthol (ICY HOT) 5 % PADS Apply 1 patch topically every 8 hours as needed for muscle soreness May cut to fit 30 patch 3     order for DME Equipment being ordered: left wrist brace  "with thumb 1 each 0     oxyCODONE IR (ROXICODONE) 5 MG tablet Take 5 mg by mouth       paliperidone (INVEGA) 3 MG 24 hr tablet Take 3 mg by mouth       QUEtiapine (SEROQUEL) 200 MG tablet 100 mg daily as needed Take 100 mg by mouth daily. weening       ranitidine (ZANTAC) 150 MG tablet Take 1 tablet (150 mg) by mouth 2 times daily 60 tablet 1     sildenafil (VIAGRA) 100 MG tablet Take 1/4 - 1 tablet, as directed, 1-3 hours before intimacy. Maximum 1 dose per 24 hours. 10 tablet 5     BP Readings from Last 3 Encounters:   05/02/19 137/89   03/14/19 138/86   03/12/19 (!) 167/99    Wt Readings from Last 3 Encounters:   05/02/19 76.7 kg (169 lb 3.2 oz)   03/14/19 75.3 kg (166 lb)   03/12/19 75.8 kg (167 lb)                    ROS:  Constitutional, HEENT, cardiovascular, pulmonary, gi and gu systems are negative, except as otherwise noted.    OBJECTIVE:     /89   Pulse 54   Temp 97.8  F (36.6  C) (Oral)   Ht 1.626 m (5' 4\")   Wt 76.7 kg (169 lb 3.2 oz)   SpO2 97%   BMI 29.04 kg/m    Body mass index is 29.04 kg/m .  GENERAL: healthy, alert and no distress  EYES: Eyes grossly normal to inspection, PERRL and conjunctivae and sclerae normal  HENT: ear canals and TM's normal, nose and mouth without ulcers or lesions  NECK: no adenopathy, no asymmetry, masses, or scars and thyroid normal to palpation  RESP: lungs clear to auscultation - no rales, rhonchi or wheezes  CV: regular rate and rhythm, normal S1 S2, no S3 or S4, no murmur, click or rub, no peripheral edema and peripheral pulses strong  ABDOMEN: soft, nontender, no hepatosplenomegaly, no masses and bowel sounds normal   (male): testicles normal without atrophy or masses, no hernias and penis normal without urethral discharge, epididymis is mildly tender but not enlarged   MS: no gross musculoskeletal defects noted, no edema  SKIN: no suspicious lesions or rashes  NEURO: Normal strength and tone, mentation intact and speech normal  BACK: no CVA tenderness, " "no paralumbar tenderness  PSYCH: mentation appears normal, affect normal/bright  LYMPH: normal ant/post cervical, supraclavicular nodes  inguinal: no adenopathy    Diagnostic Test Results:  Results for orders placed or performed in visit on 05/02/19 (from the past 24 hour(s))   UA reflex to Microscopic and Culture   Result Value Ref Range    Color Urine Yellow     Appearance Urine Clear     Glucose Urine Negative NEG^Negative mg/dL    Bilirubin Urine Negative NEG^Negative    Ketones Urine Negative NEG^Negative mg/dL    Specific Gravity Urine <=1.005 1.003 - 1.035    Blood Urine Trace (A) NEG^Negative    pH Urine 6.0 5.0 - 7.0 pH    Protein Albumin Urine Negative NEG^Negative mg/dL    Urobilinogen Urine 0.2 0.2 - 1.0 EU/dL    Nitrite Urine Negative NEG^Negative    Leukocyte Esterase Urine Negative NEG^Negative    Source Midstream Urine    Urine Microscopic   Result Value Ref Range    WBC Urine 0 - 5 OTO5^0 - 5 /HPF    RBC Urine O - 2 OTO2^O - 2 /HPF   CBC with platelets   Result Value Ref Range    WBC 4.7 4.0 - 11.0 10e9/L    RBC Count 4.21 (L) 4.4 - 5.9 10e12/L    Hemoglobin 12.6 (L) 13.3 - 17.7 g/dL    Hematocrit 37.0 (L) 40.0 - 53.0 %    MCV 88 78 - 100 fl    MCH 29.9 26.5 - 33.0 pg    MCHC 34.1 31.5 - 36.5 g/dL    RDW 13.5 10.0 - 15.0 %    Platelet Count 235 150 - 450 10e9/L       ASSESSMENT/PLAN:       BP Screening:   Last 3 BP Readings:    BP Readings from Last 3 Encounters:   05/02/19 137/89   03/14/19 138/86   03/12/19 (!) 167/99       The following was recommended to the patient:  Re-screen BP within a year and recommended lifestyle modifications  BMI:   Estimated body mass index is 29.04 kg/m  as calculated from the following:    Height as of this encounter: 1.626 m (5' 4\").    Weight as of this encounter: 76.7 kg (169 lb 3.2 oz).   Weight management plan: Discussed healthy diet and exercise guidelines      1. Scrotal pain  Patient has normal WBC count and  negative urine. Per Urology note from 2015, would " avoid treating for epididymitis at this time, reviewed indications for return to clinic/UC visit for new/worsening symptoms.  - CBC with platelets  - UA reflex to Microscopic and Culture  - NEISSERIA GONORRHOEA PCR  - CHLAMYDIA TRACHOMATIS PCR  - Basic metabolic panel  (Ca, Cl, CO2, Creat, Gluc, K, Na, BUN)  - Urine Microscopic    2. Generalized abdominal pain  Checking labs- he is to not take more than 800 mg Ibuprofen every 8 hours and must take it with food and plenty of water.  If LFT's/other labs are elevated, will need to work up further.  - Hepatic panel  - Lipase  - Basic metabolic panel  (Ca, Cl, CO2, Creat, Gluc, K, Na, BUN)  3. History of prostate cancer  Pertinent history    4. Chronic pain syndrome  Patient can schedule back with Pain management for epidural injection as his WBC is normal and UA does not show infection    5. Delusional disorder (H)  Follow with Psychaitry for medication management. We verbally contracted for safety and he agrees to go to the ER for new/worsening symptoms.    6. Depressive disorder  Followed by Psychaitry. See above.    7.  Anemia, unspecified  Checking indices - B12, folate, ferritin, Fe/TIBC.    Work on weight loss  Regular exercise  See Patient Instructions    RAJINDER [facility]

## 2019-05-02 NOTE — PATIENT INSTRUCTIONS
At [facility], we strive to deliver an exceptional experience to you, every time we see you.  If you receive a survey in the mail, please send us back your thoughts. We really do value your feedback.    Your care team:                            Family Medicine Internal Medicine   MD Aaron Schulte MD Shantel Branch-Fleming, MD Katya Georgiev PA-C Megan Hill, APRN ZENIA Iglesias MD Pediatrics   Eliseo Riggs, LIZ Figueroa, MD Sarita Holden APRN CNP   MD Lisa Reese MD Deborah Mielke, MD Maricruz Coe, APRN [facility]      Clinic hours: Monday - Thursday 7 am-7 pm; Fridays 7 am-5 pm.   Urgent care: Monday - Friday 11 am-9 pm; Saturday and Sunday 9 am-5 pm.  Pharmacy : Monday -Thursday 8 am-8 pm; Friday 8 am-6 pm; Saturday and Sunday 9 am-5 pm.     Clinic: [phone]   Pharmacy: [phone]

## 2019-05-03 LAB
C TRACH DNA SPEC QL NAA+PROBE: NEGATIVE
N GONORRHOEA DNA SPEC QL NAA+PROBE: NEGATIVE
SPECIMEN SOURCE: NORMAL
SPECIMEN SOURCE: NORMAL

## 2019-05-03 ASSESSMENT — ANXIETY QUESTIONNAIRES: GAD7 TOTAL SCORE: 14

## 2019-05-06 ENCOUNTER — TELEPHONE (OUTPATIENT)
Dept: FAMILY MEDICINE | Facility: CLINIC | Age: 59
End: 2019-05-06

## 2019-05-06 NOTE — TELEPHONE ENCOUNTER
Reason for Call:  Request for results:    Name of test or procedure: lab results    Date of test of procedure: 05/02/2019    Location of the test or procedure: [facility]    OK to leave the result message on voice mail or with a family member? NO    Phone number Patient can be reached at:  Home number on file [phone] (home)    Additional comments: Patient does not need a callback, patient would like results sent through [employer]. Patient received it but per patient it needs it to be in simple english terms not medical terms - as patient does not understand. Patient just needs to know if has infection or not in easy terms. Please send result indicating has infection or not, because he cannot get an steroid shot in the back if he has an infection.     Call taken on 5/6/2019 at 10:30 AM by Ervin Plasencia

## 2019-05-22 ENCOUNTER — TELEPHONE (OUTPATIENT)
Dept: FAMILY MEDICINE | Facility: CLINIC | Age: 59
End: 2019-05-22

## 2019-05-22 ENCOUNTER — ALLIED HEALTH/NURSE VISIT (OUTPATIENT)
Dept: NURSING | Facility: CLINIC | Age: 59
End: 2019-05-22
Payer: COMMERCIAL

## 2019-05-22 DIAGNOSIS — Z23 NEED FOR VACCINATION: Primary | ICD-10-CM

## 2019-05-22 PROCEDURE — 90707 MMR VACCINE SC: CPT

## 2019-05-22 PROCEDURE — 99207 ZZC NO CHARGE NURSE ONLY: CPT

## 2019-05-22 PROCEDURE — 90471 IMMUNIZATION ADMIN: CPT

## 2019-05-22 NOTE — TELEPHONE ENCOUNTER
Updated patient with providers message. He had no further questions or concerns at this time.         Adán Deshpande RN, BSN, PHN

## 2019-05-22 NOTE — TELEPHONE ENCOUNTER
Reason for call:  Patient reporting a symptom    Symptom or request: patient was seen today 5/22/2019 at the [facility] for a MMR shot and he is now feeling funny would like suggestions on what he should do if he needs to come in or not.    Duration (how long have symptoms been present): about 20 minutes before    Have you been treated for this before? Yes    Additional comments:  Has had mumps in the past    Phone Number patient can be reached at:  Cell number on file:    Telephone Information:   Mobile [phone]       Best Time:  Any    Can we leave a detailed message on this number:  YES    Call taken on 5/22/2019 at 2:26 PM by Britton Vargas

## 2019-05-22 NOTE — TELEPHONE ENCOUNTER
"Writer took call from robyn.    Patient complaining of feeling \"weird\" since getting MMR vaccine today. Had done earlier this morning at [facility]. Vaccine was given in Right arm at about 9:00 am. This was second booster in series.     Symptoms started about about 2:15 pm today.  Reports left side of jaw and underneath ear feels swollen, like gland is swollen. Left ear is aching. Has \"tangy, sour-like\" feeling in salivary gland, like when you suck on a lemon. Notes some \"slight\" swelling in left jaw area. Feels chilled \"down to the bone\".  Denies any SOB, wheezing, swelling of face, rash or hives, trouble swallowing, change in vision, weakness, dizziness or faintness, headache, fatigue or lethargy or ringing in ear. Speaking well with writer, do not detect or note any distress. Feels good, just this \"feeling\" in salivary area like sucked on a lemon and the mild left sided jaw swelling and earache. Only occurring on left side of body. Patient notes he feels like he is getting the Mumps. Said he once had very long time ago, left side of neck, underneath ear, feels like this to him. Patient wondering what should do.    Routing to provider to review and advise. Could be possible mild reaction? Did not sound like severe allergic reaction, based on symptoms. Further guidance would be appreciated, thank you!    Courtney Francois RN        "

## 2019-05-22 NOTE — TELEPHONE ENCOUNTER
Mild reaction. Discussed if not improving in 2-3 days, patient should be seen in clinic. Any feelings of airway compromise, should call 911 for transport to ER if needed.    Milton Iglesias MD

## 2019-05-22 NOTE — NURSING NOTE
Screening Questionnaire for Adult Immunization    Are you sick today?   No   Do you have allergies to medications, food, a vaccine component or latex?   No   Have you ever had a serious reaction after receiving a vaccination?   No   Do you have a long-term health problem with heart disease, lung disease, asthma, kidney disease, metabolic disease (e.g. diabetes), anemia, or other blood disorder?   No   Do you have cancer, leukemia, HIV/AIDS, or any other immune system problem?   No   In the past 3 months, have you taken medications that affect  your immune system, such as prednisone, other steroids, or anticancer drugs; drugs for the treatment of rheumatoid arthritis, Crohn s disease, or psoriasis; or have you had radiation treatments?   Yes   Have you had a seizure, or a brain or other nervous system problem?   Yes   During the past year, have you received a transfusion of blood or blood     products, or been given immune (gamma) globulin or antiviral drug?   No   For women: Are you pregnant or is there a chance you could become        pregnant during the next month?   No   Have you received any vaccinations in the past 4 weeks?   No     Immunization questionnaire was positive for at least one answer.  Notified Dr. Read.        Per orders of Dr. Read, injection of MMR #2 given by Shayna Molina. Patient instructed to remain in clinic for 15 minutes afterwards, and to report any adverse reaction to me immediately.       Screening performed by Shayna Molina on 5/22/2019 at 9:00 AM.

## 2019-06-17 ENCOUNTER — TRANSFERRED RECORDS (OUTPATIENT)
Dept: HEALTH INFORMATION MANAGEMENT | Facility: CLINIC | Age: 59
End: 2019-06-17

## 2019-06-23 ENCOUNTER — MYC MEDICAL ADVICE (OUTPATIENT)
Dept: UROLOGY | Facility: CLINIC | Age: 59
End: 2019-06-23

## 2019-06-28 NOTE — PROGRESS NOTES
Discharge Summary - Hand Therapy    Patient did not return to therapy after the initial evaluation.  We will assume that patient's goals were met.    D/C from [facility].

## 2019-07-11 ENCOUNTER — TELEPHONE (OUTPATIENT)
Dept: FAMILY MEDICINE | Facility: CLINIC | Age: 59
End: 2019-07-11

## 2019-07-11 NOTE — TELEPHONE ENCOUNTER
Reason for call:  Order   Order or referral being requested: back brace  Reason for request: patient has the diagnosis in his chart from visits with Dr Melendez  Sciatica and degenerated disk  Date needed: as soon as possible  Has the patient been seen by the PCP for this problem? YES    Additional comments: can you fax the order to [phone] [facility] [employer] [facility]    Phone number to reach patient:  Home number on file [phone] (home)    Best Time:  any    Can we leave a detailed message on this number?  YES

## 2019-07-12 NOTE — TELEPHONE ENCOUNTER
Please have patient make appointment for evaluation and discussion of back issues.    Milton Iglesias MD

## 2019-07-12 NOTE — TELEPHONE ENCOUNTER
This writer attempted to contact patient on 07/12/19      Reason for call inform need to be seen and left detailed message.      If patient calls back:   Schedule Office Visit appointment PRN with PCP, document that pt called and close encounter         Mima Wong MA

## 2019-07-12 NOTE — TELEPHONE ENCOUNTER
Denied:    1. I haven't seen this patient for over 4 years (not my patient)    2. I never prescribe back braces to anyone because they are not effective.

## 2019-07-17 ENCOUNTER — OFFICE VISIT (OUTPATIENT)
Dept: URGENT CARE | Facility: URGENT CARE | Age: 59
End: 2019-07-17
Payer: COMMERCIAL

## 2019-07-17 VITALS
HEART RATE: 65 BPM | DIASTOLIC BLOOD PRESSURE: 85 MMHG | OXYGEN SATURATION: 98 % | RESPIRATION RATE: 16 BRPM | SYSTOLIC BLOOD PRESSURE: 148 MMHG | HEIGHT: 64 IN | WEIGHT: 161 LBS | TEMPERATURE: 98.4 F | BODY MASS INDEX: 27.49 KG/M2

## 2019-07-17 DIAGNOSIS — R05.9 COUGH: Primary | ICD-10-CM

## 2019-07-17 PROCEDURE — 99213 OFFICE O/P EST LOW 20 MIN: CPT | Performed by: NURSE PRACTITIONER

## 2019-07-17 RX ORDER — BENZONATATE 200 MG/1
200 CAPSULE ORAL 3 TIMES DAILY PRN
Qty: 21 CAPSULE | Refills: 0 | Status: SHIPPED | OUTPATIENT
Start: 2019-07-17 | End: 2019-07-26

## 2019-07-17 ASSESSMENT — ENCOUNTER SYMPTOMS
NAUSEA: 0
COUGH: 1
FEVER: 0
SHORTNESS OF BREATH: 0
RHINORRHEA: 0
DIARRHEA: 0
SORE THROAT: 0
CHILLS: 0
VOMITING: 0
DIAPHORESIS: 0

## 2019-07-17 ASSESSMENT — MIFFLIN-ST. JEOR: SCORE: 1461.29

## 2019-07-17 ASSESSMENT — PAIN SCALES - GENERAL: PAINLEVEL: NO PAIN (0)

## 2019-07-17 NOTE — PROGRESS NOTES
SUBJECTIVE:   Jeanmarie Mclean is a 59 year old male presenting with a chief complaint of   Chief Complaint   Patient presents with     Cough       He is an established patient of [facility].    URI Adult    Onset of symptoms was 7 day(s) ago.  Course of illness is worsening.    Severity moderate  Current and Associated symptoms: cough - productive  Treatment measures tried include OTC Cough med.  Predisposing factors include None.      Review of Systems   Constitutional: Negative for chills, diaphoresis and fever.   HENT: Negative for congestion, ear pain, rhinorrhea and sore throat.    Respiratory: Positive for cough. Negative for shortness of breath.    Gastrointestinal: Negative for diarrhea, nausea and vomiting.   All other systems reviewed and are negative.      Past Medical History:   Diagnosis Date     Alcoholism (H)      Arthritis      Asperger's syndrome     Dr. Owens, [facility]. 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    ultrasound     Family History   Problem Relation Age of Onset     Psychotic Disorder Son         autism     Prostate Cancer Father         40s     Cancer Father      Cancer Maternal Grandmother         lung     Glaucoma Maternal Grandmother      Eye Disorder Maternal Grandmother         glaucoma     Diabetes Mother          45     Blood Disease Sister         sickle trait     Hypertension Sister      Blood  Disease Paternal Uncle         sickle     Blood Disease Paternal Aunt         sickle     Alzheimer Disease Paternal Grandfather         70s     Macular Degeneration Paternal Grandfather      Cerebrovascular Disease No family hx of      Thyroid Disease No family hx of      Myocardial Infarction No family hx of      C.A.D. No family hx of      Current Outpatient Medications   Medication Sig Dispense Refill     aspirin 81 MG tablet Take by mouth daily       Aspirin-Calcium Carbonate  MG TABS Take by mouth daily       benzonatate (TESSALON) 200 MG capsule Take 1 capsule (200 mg) by mouth 3 times daily as needed for cough 21 capsule 0     Cholecalciferol (VITAMIN D) 1000 UNIT capsule Take 1 capsule by mouth 2 times daily.       gabapentin (NEURONTIN) 100 MG capsule Take 100mg (1 capsule) by mouth at bedtime.  Increase as instructed in clinic to goal dose of 300mg (3 capsules) 3 times daily. 180 capsule 3     Ibuprofen (IBU-200 PO) Take 2 tablets by mouth as needed       menthol (ICY HOT) 5 % PADS Apply 1 patch topically every 8 hours as needed for muscle soreness May cut to fit 30 patch 3     order for DME Equipment being ordered: left wrist brace with thumb 1 each 0     oxyCODONE IR (ROXICODONE) 5 MG tablet Take 5 mg by mouth       paliperidone (INVEGA) 3 MG 24 hr tablet Take 3 mg by mouth       QUEtiapine (SEROQUEL) 200 MG tablet 100 mg daily as needed Take 100 mg by mouth daily. weening       ranitidine (ZANTAC) 150 MG tablet Take 1 tablet (150 mg) by mouth 2 times daily 60 tablet 1     sildenafil (VIAGRA) 100 MG tablet Take 1/4 - 1 tablet, as directed, 1-3 hours before intimacy. Maximum 1 dose per 24 hours. 10 tablet 5     Social History     Tobacco Use     Smoking status: Former Smoker     Packs/day: 0.50     Years: 10.00     Pack years: 5.00     Types: Cigarettes     Last attempt to quit: 1997     Years since quittin.5     Smokeless tobacco: Never Used   Substance Use Topics     Alcohol use: Yes     " Comment: hx alcoholism quit 2007. [facility] dependency treatment in 1986       OBJECTIVE  /85 (BP Location: Left arm, Patient Position: Chair, Cuff Size: Adult Regular)   Pulse 65   Temp 98.4  F (36.9  C) (Oral)   Resp 16   Ht 1.626 m (5' 4\")   Wt 73 kg (161 lb)   SpO2 98%   BMI 27.64 kg/m      Physical Exam   Constitutional: No distress.   HENT:   Head: Normocephalic and atraumatic.   Right Ear: Tympanic membrane and external ear normal.   Left Ear: Tympanic membrane and external ear normal.   Nose: Mucosal edema and rhinorrhea present.   Mouth/Throat: Oropharynx is clear and moist.   Eyes: Pupils are equal, round, and reactive to light. EOM are normal.   Neck: Normal range of motion. Neck supple.   Pulmonary/Chest: Effort normal and breath sounds normal. No respiratory distress.   Lymphadenopathy:     He has no cervical adenopathy.   Neurological: He is alert. No cranial nerve deficit.   Skin: Skin is warm and dry. He is not diaphoretic.   Psychiatric: He has a normal mood and affect.   Nursing note and vitals reviewed.      ASSESSMENT:      ICD-10-CM    1. Cough R05 benzonatate (TESSALON) 200 MG capsule          Differential Diagnosis:  URI Adult/Peds:  Allergic rhinitis, Pneumonia, Sinusitis and Viral upper respiratory illness    Serious Comorbid Conditions:  Adult:  None    PLAN:  Discussed URI symptoms and causes  Increase hydration, rest  OTC cough and decongestants medication discused  Side effects of medications discussed  Follow up with PCP if symptoms are persisting in 3 days or sooner if getting worse.  All questions are answered and patient is in agreement with plan      worsens, follow up with your Primary Care Provider\"}    Patient Instructions       Patient Education     Viral Upper Respiratory Illness (Adult)    You have a viral upper respiratory illness (URI), which is another term for the common cold. This illness is contagious during the first few days. It is spread through the air by " coughing and sneezing. It may also be spread by direct contact (touching the sick person and then touching your own eyes, nose, or mouth). Frequent handwashing will decrease risk of spread. Most viral illnesses go away within 7 to 10 days with rest and simple home remedies. Sometimes the illness may last for several weeks. Antibiotics will not kill a virus, and they are generally not prescribed for this condition.  Home care    If symptoms are severe, rest at home for the first 2 to 3 days. When you resume activity, don't let yourself get too tired.    Don't smoke. If you need help stopping, talk with your healthcare provider.    Avoid being exposed to cigarette smoke (yours or others ).    You may use acetaminophen or ibuprofen to control pain and fever, unless another medicine was prescribed. If you have chronic liver or kidney disease, have ever had a stomach ulcer or gastrointestinal bleeding, or are taking blood-thinning medicines, talk with your healthcare provider before using these medicines. Aspirin should never be given to anyone under 18 years of age who is ill with a viral infection or fever. It may cause severe liver or brain damage.    Your appetite may be poor, so a light diet is fine. Stay well hydrated by drinking 6 to 8 glasses of fluids per day (water, soft drinks, juices, tea, or soup). Extra fluids will help loosen secretions in the nose and lungs.    Over-the-counter cold medicines will not shorten the length of time you re sick, but they may be helpful for the following symptoms: cough, sore throat, and nasal and sinus congestion. If you take prescription medicines, ask your healthcare provider or pharmacist which over-the-counter medicines are safe to use. (Note: Don't use decongestants if you have high blood pressure.)  Follow-up care  Follow up with your healthcare provider, or as advised.  When to seek medical advice  Call your healthcare provider right away if any of these occur:    Cough  with lots of colored sputum (mucus)    Severe headache; face, neck, or ear pain    Difficulty swallowing due to throat pain    Fever of 100.4 F (38 C) or higher, or as directed by your healthcare provider  Call 911  Call 911 if any of these occur:    Chest pain, shortness of breath, wheezing, or difficulty breathing    Coughing up blood    Very severe pain with swallowing, especially if it goes along with a muffled voice   Date Last Reviewed: 6/1/2018 2000-2018 [employer]. [address] [place] [address]. All rights reserved. This information is not intended as a substitute for professional medical care. Always follow your healthcare professional's instructions.

## 2019-07-17 NOTE — PATIENT INSTRUCTIONS

## 2019-07-26 ENCOUNTER — OFFICE VISIT (OUTPATIENT)
Dept: FAMILY MEDICINE | Facility: CLINIC | Age: 59
End: 2019-07-26
Payer: COMMERCIAL

## 2019-07-26 VITALS
DIASTOLIC BLOOD PRESSURE: 78 MMHG | HEIGHT: 64 IN | HEART RATE: 52 BPM | BODY MASS INDEX: 26.63 KG/M2 | SYSTOLIC BLOOD PRESSURE: 127 MMHG | TEMPERATURE: 98.5 F | OXYGEN SATURATION: 96 % | WEIGHT: 156 LBS

## 2019-07-26 DIAGNOSIS — Z13.1 SCREENING FOR DIABETES MELLITUS: ICD-10-CM

## 2019-07-26 DIAGNOSIS — M54.50 LOW BACK PAIN WITHOUT SCIATICA, UNSPECIFIED BACK PAIN LATERALITY, UNSPECIFIED CHRONICITY: ICD-10-CM

## 2019-07-26 DIAGNOSIS — Z23 ENCOUNTER FOR IMMUNIZATION: ICD-10-CM

## 2019-07-26 DIAGNOSIS — Z13.6 CARDIOVASCULAR SCREENING; LDL GOAL LESS THAN 130: ICD-10-CM

## 2019-07-26 DIAGNOSIS — Z12.5 SCREENING FOR PROSTATE CANCER: ICD-10-CM

## 2019-07-26 DIAGNOSIS — Z00.00 ROUTINE GENERAL MEDICAL EXAMINATION AT A HEALTH CARE FACILITY: Primary | ICD-10-CM

## 2019-07-26 LAB
ALBUMIN SERPL-MCNC: 4 G/DL (ref 3.4–5)
ALP SERPL-CCNC: 71 U/L (ref 40–150)
ALT SERPL W P-5'-P-CCNC: 49 U/L (ref 0–70)
ANION GAP SERPL CALCULATED.3IONS-SCNC: 6 MMOL/L (ref 3–14)
AST SERPL W P-5'-P-CCNC: 41 U/L (ref 0–45)
BILIRUB SERPL-MCNC: 1.2 MG/DL (ref 0.2–1.3)
BUN SERPL-MCNC: 16 MG/DL (ref 7–30)
CALCIUM SERPL-MCNC: 9.5 MG/DL (ref 8.5–10.1)
CHLORIDE SERPL-SCNC: 103 MMOL/L (ref 94–109)
CHOLEST SERPL-MCNC: 166 MG/DL
CO2 SERPL-SCNC: 28 MMOL/L (ref 20–32)
CREAT SERPL-MCNC: 0.93 MG/DL (ref 0.66–1.25)
GFR SERPL CREATININE-BSD FRML MDRD: 89 ML/MIN/{1.73_M2}
GLUCOSE SERPL-MCNC: 77 MG/DL (ref 70–99)
HDLC SERPL-MCNC: 70 MG/DL
LDLC SERPL CALC-MCNC: 83 MG/DL
NONHDLC SERPL-MCNC: 96 MG/DL
POTASSIUM SERPL-SCNC: 4.5 MMOL/L (ref 3.4–5.3)
PROT SERPL-MCNC: 7.7 G/DL (ref 6.8–8.8)
PSA SERPL-ACNC: 0.06 UG/L (ref 0–4)
SODIUM SERPL-SCNC: 137 MMOL/L (ref 133–144)
TRIGL SERPL-MCNC: 66 MG/DL

## 2019-07-26 PROCEDURE — 90471 IMMUNIZATION ADMIN: CPT | Performed by: FAMILY MEDICINE

## 2019-07-26 PROCEDURE — 90715 TDAP VACCINE 7 YRS/> IM: CPT | Performed by: FAMILY MEDICINE

## 2019-07-26 PROCEDURE — 36415 COLL VENOUS BLD VENIPUNCTURE: CPT | Performed by: FAMILY MEDICINE

## 2019-07-26 PROCEDURE — 80053 COMPREHEN METABOLIC PANEL: CPT | Performed by: FAMILY MEDICINE

## 2019-07-26 PROCEDURE — G0103 PSA SCREENING: HCPCS | Performed by: FAMILY MEDICINE

## 2019-07-26 PROCEDURE — 80061 LIPID PANEL: CPT | Performed by: FAMILY MEDICINE

## 2019-07-26 PROCEDURE — 99396 PREV VISIT EST AGE 40-64: CPT | Mod: 25 | Performed by: FAMILY MEDICINE

## 2019-07-26 ASSESSMENT — ANXIETY QUESTIONNAIRES
2. NOT BEING ABLE TO STOP OR CONTROL WORRYING: NEARLY EVERY DAY
3. WORRYING TOO MUCH ABOUT DIFFERENT THINGS: NEARLY EVERY DAY
5. BEING SO RESTLESS THAT IT IS HARD TO SIT STILL: SEVERAL DAYS
IF YOU CHECKED OFF ANY PROBLEMS ON THIS QUESTIONNAIRE, HOW DIFFICULT HAVE THESE PROBLEMS MADE IT FOR YOU TO DO YOUR WORK, TAKE CARE OF THINGS AT HOME, OR GET ALONG WITH OTHER PEOPLE: SOMEWHAT DIFFICULT
1. FEELING NERVOUS, ANXIOUS, OR ON EDGE: NEARLY EVERY DAY
GAD7 TOTAL SCORE: 15
6. BECOMING EASILY ANNOYED OR IRRITABLE: NOT AT ALL
7. FEELING AFRAID AS IF SOMETHING AWFUL MIGHT HAPPEN: NEARLY EVERY DAY

## 2019-07-26 ASSESSMENT — PATIENT HEALTH QUESTIONNAIRE - PHQ9
SUM OF ALL RESPONSES TO PHQ QUESTIONS 1-9: 14
5. POOR APPETITE OR OVEREATING: MORE THAN HALF THE DAYS

## 2019-07-26 ASSESSMENT — PAIN SCALES - GENERAL: PAINLEVEL: NO PAIN (0)

## 2019-07-26 ASSESSMENT — MIFFLIN-ST. JEOR: SCORE: 1438.61

## 2019-07-26 NOTE — PROGRESS NOTES
SUBJECTIVE:   CC: Jeanmarie Mclean is an 59 year old male who presents for preventive health visit.     Healthy Habits:    Do you get at least three servings of calcium containing foods daily (dairy, green leafy vegetables, etc.)? yesyes    Amount of exercise or daily activities, outside of work: 4-5 day(s) per week    Problems taking medications regularly No    Medication side effects: No    Have you had an eye exam in the past two years? Yes appointment scheduled 19    Do you see a dentist twice per year? yes    Do you have sleep apnea, excessive snoring or daytime drowsiness?yes        Today's PHQ-2 Score:   PHQ-2 (  [employer]) 10/11/2016 2016   Q1: Little interest or pleasure in doing things 3 3   Q2: Feeling down, depressed or hopeless 3 3   PHQ-2 Score 6 6       Abuse: Current or Past(Physical, Sexual or Emotional)- NO  Do you feel safe in your environment? YES    Social History     Tobacco Use     Smoking status: Former Smoker     Packs/day: 0.50     Years: 10.00     Pack years: 5.00     Types: Cigarettes     Last attempt to quit: 1997     Years since quittin.5     Smokeless tobacco: Never Used   Substance Use Topics     Alcohol use: Yes     Comment: hx alcoholism quit . [facility] dependency treatment in      If you drink alcohol do you typically have >3 drinks per day or >7 drinks per week? No                      Last PSA:   PSA   Date Value Ref Range Status   2018 0.03 0 - 4 ug/L Final     Comment:     Assay Method:  Chemiluminescence using Siemens Vista analyzer       Reviewed orders with patient. Reviewed health maintenance and updated orders accordingly - Yes  Lab work is in process    Reviewed and updated as needed this visit by clinical staff  Tobacco  Allergies  Meds  Med Hx  Surg Hx  Fam Hx  Soc Hx        Reviewed and updated as needed this visit by Provider            ROS:  CONSTITUTIONAL: NEGATIVE for fever, chills, change in weight  INTEGUMENTARY/SKIN:  "NEGATIVE for worrisome rashes, moles or lesions  EYES: NEGATIVE for vision changes or irritation  ENT: NEGATIVE for ear, mouth and throat problems  RESP: NEGATIVE for significant cough or SOB  CV: NEGATIVE for chest pain, palpitations or peripheral edema  GI: NEGATIVE for nausea, abdominal pain, heartburn, or change in bowel habits   male: negative for dysuria, hematuria, decreased urinary stream, erectile dysfunction, urethral discharge  MUSCULOSKELETAL: NEGATIVE for significant arthralgias or myalgia  NEURO: NEGATIVE for weakness, dizziness or paresthesias  PSYCHIATRIC: NEGATIVE for changes in mood or affect    OBJECTIVE:   /78 (BP Location: Left arm, Patient Position: Chair, Cuff Size: Adult Regular)   Pulse 52   Temp 98.5  F (36.9  C) (Oral)   Ht 1.626 m (5' 4\")   Wt 70.8 kg (156 lb)   SpO2 96%   BMI 26.78 kg/m    EXAM:  GENERAL: healthy, alert and no distress  NECK: no adenopathy, no asymmetry, masses, or scars and thyroid normal to palpation  RESP: lungs clear to auscultation - no rales, rhonchi or wheezes  CV: regular rate and rhythm, normal S1 S2, no S3 or S4, no murmur, click or rub, no peripheral edema and peripheral pulses strong  ABDOMEN: soft, nontender, no hepatosplenomegaly, no masses and bowel sounds normal  MS: no gross musculoskeletal defects noted, no edema    Diagnostic Test Results:  Labs reviewed in Epic    ASSESSMENT/PLAN:   1. Routine general medical examination at a health care facility  As below.    2. CARDIOVASCULAR SCREENING; LDL GOAL LESS THAN 130    - Comprehensive metabolic panel (BMP + Alb, Alk Phos, ALT, AST, Total. Bili, TP)  - Lipid Profile (Chol, Trig, HDL, LDL calc)    3. Screening for diabetes mellitus    - Comprehensive metabolic panel (BMP + Alb, Alk Phos, ALT, AST, Total. Bili, TP)    4. Screening for prostate cancer    - PSA, screen    5. Low back pain without sciatica, unspecified back pain laterality, unspecified chronicity    - order for DME; Equipment being " "ordered: back brace  Dispense: 1 each; Refill: 3    6. Encounter for immunization    - TDAP, IM (10 - 64 YRS) - Adacel  - ADMIN 1st VACCINE    COUNSELING:  Reviewed preventive health counseling, as reflected in patient instructions       Regular exercise       Healthy diet/nutrition       Vision screening    Estimated body mass index is 26.78 kg/m  as calculated from the following:    Height as of this encounter: 1.626 m (5' 4\").    Weight as of this encounter: 70.8 kg (156 lb).         reports that he quit smoking about 21 years ago. His smoking use included cigarettes. He has a 5.00 pack-year smoking history. He has never used smokeless tobacco.      Counseling Resources:  ATP IV Guidelines  Pooled Cohorts Equation Calculator  FRAX Risk Assessment  ICSI Preventive Guidelines  Dietary Guidelines for Americans, 2010  USDA's MyPlate  ASA Prophylaxis  Lung CA Screening    Milton Iglesias MD, MD  [facility]  "

## 2019-07-26 NOTE — NURSING NOTE
Screening Questionnaire for Adult Immunization    Are you sick today?   No   Do you have allergies to medications, food, a vaccine component or latex?   No   Have you ever had a serious reaction after receiving a vaccination?   No   Do you have a long-term health problem with heart disease, lung disease, asthma, kidney disease, metabolic disease (e.g. diabetes), anemia, or other blood disorder?   No   Do you have cancer, leukemia, HIV/AIDS, or any other immune system problem?   No   In the past 3 months, have you taken medications that affect  your immune system, such as prednisone, other steroids, or anticancer drugs; drugs for the treatment of rheumatoid arthritis, Crohn s disease, or psoriasis; or have you had radiation treatments?   No   Have you had a seizure, or a brain or other nervous system problem?   No   During the past year, have you received a transfusion of blood or blood     products, or been given immune (gamma) globulin or antiviral drug?   No   For women: Are you pregnant or is there a chance you could become        pregnant during the next month?   No   Have you received any vaccinations in the past 4 weeks?   No     Immunization questionnaire answers were all negative.        Per orders of Dr. Iglesias, injection of Tdap given by Simon Ladd. Patient full name and  verified. Patient instructed to remain in clinic for 15 minutes afterwards, and to report any adverse reaction to me immediately.       Screening performed by Simon Ladd

## 2019-07-26 NOTE — PATIENT INSTRUCTIONS
================================================================================  Normal Values   Blood pressure  <140/90 for most adults    <130/80 for some chronic diseases (ask your care team about yours)    BMI (body mass index)  18.5-25 kg/m2 (based on height and weight)     Thank you for visiting [facility]    Normal or non-critical lab and imaging results will be communicated to you by MyChart, letter or phone within 7 days.  If you do not hear from us within 10 days, please call the clinic. If you have a critical or abnormal lab result, we will notify you by phone as soon as possible.     If you have any questions regarding your visit please contact:     Team Comfort:   Clinic Hours Telephone Number   Dr. Barry Iglesias Dr. Vocal 7am-5pm  Monday - Friday [phone]  Dwayne RN  Linda Jolley RN   Pharmacy 8:00am-8pm Monday-Friday    9am-5pm Saturday-Sunday [phone]   Urgent Care 11am-9pm Monday-Friday        9am-5pm Saturday-Sunday [phone]     After hours, weekend or if you need to make an appointment with your primary provider please call [phone].   After Hours nurse advise: call [facility]: [phone]    Medication Refills:  Call your pharmacy and they will forward the refill to us. Please allow 3 business days for your refills to be completed.          Preventive Health Recommendations  Male Ages 50 - 64    Yearly exam:             See your health care provider every year in order to  o   Review health changes.   o   Discuss preventive care.    o   Review your medicines if your doctor has prescribed any.     Have a cholesterol test every 5 years, or more frequently if you are at risk for high cholesterol/heart disease.     Have a diabetes test (fasting glucose) every three years. If you are at risk for diabetes, you should have this test more often.     Have a colonoscopy at age 50, or have a yearly FIT test  (stool test). These exams will check for colon cancer.      Talk with your health care provider about whether or not a prostate cancer screening test (PSA) is right for you.    You should be tested each year for STDs (sexually transmitted diseases), if you re at risk.     Shots: Get a flu shot each year. Get a tetanus shot every 10 years.     Nutrition:    Eat at least 5 servings of fruits and vegetables daily.     Eat whole-grain bread, whole-wheat pasta and brown rice instead of white grains and rice.     Get adequate Calcium and Vitamin D.     Lifestyle    Exercise for at least 150 minutes a week (30 minutes a day, 5 days a week). This will help you control your weight and prevent disease.     Limit alcohol to one drink per day.     No smoking.     Wear sunscreen to prevent skin cancer.     See your dentist every six months for an exam and cleaning.     See your eye doctor every 1 to 2 years.

## 2019-07-27 ASSESSMENT — ANXIETY QUESTIONNAIRES: GAD7 TOTAL SCORE: 15

## 2019-07-29 ENCOUNTER — DOCUMENTATION ONLY (OUTPATIENT)
Dept: UROLOGY | Facility: CLINIC | Age: 59
End: 2019-07-29

## 2019-07-29 ENCOUNTER — TELEPHONE (OUTPATIENT)
Dept: FAMILY MEDICINE | Facility: CLINIC | Age: 59
End: 2019-07-29

## 2019-07-29 DIAGNOSIS — R05.9 COUGH: Primary | ICD-10-CM

## 2019-07-29 NOTE — TELEPHONE ENCOUNTER
Reason for Call:  Other prescription    Detailed comments: Was seen 07/17 [facility] for cough given a prescription that did not work. 07/18 went [facility] ER given a cough medicine with Codeine. That did work and is wondering if Dr Iglesias could refill that medicine for him as the cough has returned.   Please call to advise.  Thank you    Phone Number Patient can be reached at: Home number on file [phone] (home)    Best Time: Any    Can we leave a detailed message on this number? YES    Call taken on 7/29/2019 at 6:40 PM by Mingo Guaman

## 2019-07-29 NOTE — LETTER
[facility]  [address] [place], [place] [address]dle[facility] [address]  [phone]          July 30, 2019    Jeanmarie Mclean                                                                                                                     [address]  [facility] [address]            Dear Jeanmarie,      You are due for your regular follow up with Dr. Miles. Please call the scheduling line to arrange an appointment, [phone]. Please schedule a lab appointment one week prior for your PSA test.     Sincerely,       [facility]

## 2019-07-29 NOTE — PROGRESS NOTES
This patient has overdue labs. A [employer] message was sent on 6/23/2019 and there has been no lab appointment made. If you still want these labs done, please have your care team contact the patient to make a lab appointment. Otherwise, please have the labs discontinued and close the encounter.    Thank you,    Oly Bates MLT([facility])  [facility]

## 2019-07-30 RX ORDER — CODEINE PHOSPHATE AND GUAIFENESIN 10; 100 MG/5ML; MG/5ML
2 SOLUTION ORAL EVERY 4 HOURS PRN
Qty: 236 ML | Refills: 1 | Status: SHIPPED | OUTPATIENT
Start: 2019-07-30 | End: 2019-09-13

## 2019-07-30 NOTE — TELEPHONE ENCOUNTER
Please let patient know that Robitussin with codeine will be sent to pharmacy. rx in basket for fax.    Milton Iglesias MD

## 2019-07-30 NOTE — TELEPHONE ENCOUNTER
Written rx faxed to the pharmacy, Pharmacy will contact pt when medication is ready for pickup.  Pepe Lim,  For [facility]

## 2019-07-30 NOTE — TELEPHONE ENCOUNTER
"Spoke with patient on the phone. He was seen in Urgent Care on 7/17/19 and seen in ED on 7/18/19 for a cough. The medication he received at ED worked with for him and was cough medicine with codeine. He is requesting a prescription from Dr. Iglesias for cough medicine with codeine now for cough.    This RN told him that he has not been seen at  for this since his Urgent Care visit on 7/17/19 so he needs to come into clinic to be evaluated by a provider. He states that he \"does not want to come into the clinic\" . This writer explained to him that is the policy of the clinic that he would need to RTC since he was last seen in Urgent care and that was over 7 days ago. Also explained to him that it is in his best interest to be evaluated by a provider if he has that severe of a cough for that long of a time. He refuses to come into the clinic.    He states that he does have body aches, and the \"sweats\" . The cough is keeping him up at night and he's not able to sleep. He thinks he has a fever but has not taken his temp with thermometer. He did have shingles and tetanus shot recently and thinks that is why he has body aches and \"sweats\".    Patient uses [facility] [place].    Please review and advise on prescription for cough medicine with codeine.    Ximena Iniguez RN    "

## 2019-07-31 ENCOUNTER — MYC MEDICAL ADVICE (OUTPATIENT)
Dept: FAMILY MEDICINE | Facility: CLINIC | Age: 59
End: 2019-07-31

## 2019-08-07 ENCOUNTER — TELEPHONE (OUTPATIENT)
Dept: FAMILY MEDICINE | Facility: CLINIC | Age: 59
End: 2019-08-07

## 2019-08-07 NOTE — TELEPHONE ENCOUNTER
Patient states that the blood work that was taken at [facility] needs to be sent over to Dr. Devon Miles at [facility]. Patient does not want to repeat the blood test. Please call back if you have any questions, [phone]

## 2019-08-07 NOTE — TELEPHONE ENCOUNTER
This writer attempted to contact patient on 08/07/19      Reason for call results  and left detailed message.      If patient calls back:   Relay message Dr. Miles should be able to have access to patient's labs, (read verbatim), document that pt called and close encounter        Oralia Girard MA

## 2019-08-16 ENCOUNTER — NURSE TRIAGE (OUTPATIENT)
Dept: NURSING | Facility: CLINIC | Age: 59
End: 2019-08-16

## 2019-08-17 ENCOUNTER — OFFICE VISIT (OUTPATIENT)
Dept: URGENT CARE | Facility: URGENT CARE | Age: 59
End: 2019-08-17
Payer: COMMERCIAL

## 2019-08-17 VITALS
SYSTOLIC BLOOD PRESSURE: 138 MMHG | RESPIRATION RATE: 20 BRPM | OXYGEN SATURATION: 98 % | WEIGHT: 159.25 LBS | TEMPERATURE: 98.6 F | DIASTOLIC BLOOD PRESSURE: 80 MMHG | BODY MASS INDEX: 27.34 KG/M2 | HEART RATE: 63 BPM

## 2019-08-17 DIAGNOSIS — T23.222A PARTIAL THICKNESS BURN OF FINGER OF LEFT HAND, INITIAL ENCOUNTER: Primary | ICD-10-CM

## 2019-08-17 PROCEDURE — 99213 OFFICE O/P EST LOW 20 MIN: CPT | Performed by: FAMILY MEDICINE

## 2019-08-17 NOTE — PATIENT INSTRUCTIONS
Patient Education     Second-Degree Burn  A burn occurs when skin is exposed to too much heat, sun, or harsh chemicals. A second-degree burn (partial-thickness burn) is deeper than a first-degree burn (superficial burn). It usually causes a blister to form. The blister may remain intact and gradually go away on its own. Or it may break open. The goal of treatment is to relieve pain and stop infection while the burn heals.  Home care  Use pain medicine as directed. If no pain medicine was prescribed, you may use over-the-counter medicine to control pain. If you have chronic liver or kidney disease, talk with your healthcare provider before using acetaminophen or ibuprofen. Also talk with your provider if you've had a stomach ulcer or GI bleeding.  General care    On the first day, you may put a cool compress on the wound to ease pain. A cool compress is a small towel soaked in cool water.    If you were sent home with the blister intact, don't break the blister. The risk for infection is greater if the blister breaks. If a bandage was applied, change it once a day, unless told otherwise. If the bandage becomes wet or soiled, change it as soon as you can.    Sometimes an infection may occur even with proper treatment. Check the burn daily for the signs of infection listed below.    Eat more calories and protein until your wound is healed.    Wear a hat, sunscreen, and long sleeves while in the sun to protect the skin.    Don't pick or scratch at the wound. Use over-the-counter medicines like diphenhydramine for itching.    Avoid tight-fitting clothes.  To change a bandage:    Wash your hands.    Take off the old bandage. If the bandage sticks, soak it off under warm running water.    Once the bandage is off, gently wash the burn area with mild soap and warm water to remove any cream, ointment, ooze, or scab. You may do this in a sink, under a tub faucet, or in the shower. Rinse off the soap and gently pat dry with a  clean towel.    Check for signs of infection listed below.    Put any prescribed antibiotic cream or ointment on the wound.    Cover the burn with nonstick gauze. Then wrap it with the bandage material.  Follow-up care  Follow up with your healthcare provider, or as advised.  When to seek medical advice  Call your healthcare provider right away if you have any of these signs of infection:    Fever of 100.4 F (38 C) or higher, or as directed by your healthcare provider    Pain that gets worse    Redness or swelling that gets worse    Pus comes from the burn    Red streaks in your skin coming from the burn    Wound doesn't appear to be healing    Nausea or vomiting   Date Last Reviewed: 1/1/2017 2000-2018 [employer]. [address] [place] [address], [place] [address]. All rights reserved. This information is not intended as a substitute for professional medical care. Always follow your healthcare professional's instructions.

## 2019-08-17 NOTE — TELEPHONE ENCOUNTER
Pt reports he burned his finger on a hot pan yesterday and now has a blister with redness surrounding.  The blister is 1/2 inch in size.  Pain 3/10.  No fever or other symptoms reported.     Disposition:  See a provider within 24 hours.  He verbalized understanding and had no further questions.     Sadie Vasquez RN/FNA    Reason for Disposition    [1] Looks infected (spreading redness, pus) AND [2] no fever    Additional Information    Negative: [1] Difficulty breathing AND [2] exposure to fire, smoke, or fumes    Negative: Shock suspected (e.g., cold/pale/clammy skin, too weak to stand, low BP, rapid pulse)    Negative: Difficult to awaken or acting confused (e.g., disoriented, slurred speech)    Negative: [1] Burn area larger than 10 palms of hand (> 10% BSA) AND [2] blisters    Negative: Sounds like a life-threatening emergency to the triager    Negative: Burn area larger than 4 palms of hand (> 4% BSA)    Negative: Burn completely circles an arm or leg    Negative: Caused by explosion or gunpowder    Negative: [1] Caused by very hot substance AND [2] center of burn is white (or charred)    Negative: [1] Blister (intact or ruptured) AND [2] larger than 2 inches (5 cm)    Negative: [1] Blister (intact or ruptured) on the hand AND [3] larger  than 1 inch (2.5 cm)    Negative: [1] Blister (intact or ruptured) AND [2] on the face, neck, or genitals    Negative: [1] Headache or nausea AND [2] exposure to fire, smoke, or fumes    Negative: Sounds like a serious injury to the triager    Negative: [1] SEVERE pain (e.g., excruciating) AND [2] not improved 2 hours after pain medicine    Negative: [1] Looks infected (red streaks, spreading red area) AND [2] fever    Negative: Suspicious history for the burn    Negative: [1] Broken (ruptured) blister AND [2] caller doesn't want to trim the dead skin    Protocols used: BURNS - THERMAL-A-

## 2019-08-17 NOTE — PROGRESS NOTES
Subjective     Jeanmarie Mclean is a 59 year old male who presents to clinic today for the following health issues:    HPI   Concern - burn finger- left middle finger  Onset: 2 days    Description:   States that he burned through oven gloves, was initially red, now it's blistered, blister is still intact.  Minimal pain, 1/10    Intensity: mild    Progression of Symptoms:  improving    Accompanying Signs & Symptoms:  blister    Previous history of similar problem:   no    Precipitating factors:   Worsened by: pressure or bending     Alleviating factors:  Improved by: rest    Therapies Tried and outcome: ice.    Up to date with Tetanus immunization.     Patient Active Problem List   Diagnosis     Malignant neoplasm of prostate (H)     CARDIOVASCULAR SCREENING; LDL GOAL LESS THAN 130     GERD (gastroesophageal reflux disease)     Overweight (BMI 25.0-29.9)     Advance care planning     Fibromyalgia syndrome     Posttraumatic stress disorder     Low back pain     Inadequate material resources     Anxiety state     History of Asperger's syndrome     Pervasive developmental disorder, active     Depressive disorder     Delusional disorder (H)     TBI (traumatic brain injury) (H)     Insomnia     Chronic pain     Myalgia     male stress incontinence     Major depressive disorder, recurrent episode, moderate (H)     Low back pain without sciatica, unspecified back pain laterality, unspecified chronicity     Alcohol dependence in remission (H)     Past Surgical History:   Procedure Laterality Date     C HEP B VAC ADULT 3 DOSE IM  1995    received all 3 vaccines     C REMV PROSTATE,RETROPUB,RADICAL  10/13/04    [place] Muñoz ([place]     CYSTOSCOPY  10/98    for renal stones     HC KNEE SCOPE,MED/LAT MENISECTOMY  6/24/11    [place]     HERNIA REPAIR, INGUINAL RT/LT  5/92    [facility]       Social History     Tobacco Use     Smoking status: Former Smoker     Packs/day: 0.50     Years: 10.00     Pack years: 5.00      Types: Cigarettes     Last attempt to quit: 1997     Years since quittin.6     Smokeless tobacco: Never Used   Substance Use Topics     Alcohol use: Yes     Comment: hx alcoholism quit . [facility] in      Family History   Problem Relation Age of Onset     Psychotic Disorder Son         autism     Prostate Cancer Father         40s     Cancer Father      Cancer Maternal Grandmother         lung     Glaucoma Maternal Grandmother      Eye Disorder Maternal Grandmother         glaucoma     Diabetes Mother          45     Blood Disease Sister         sickle trait     Hypertension Sister      Blood Disease Paternal Uncle         sickle     Blood Disease Paternal Aunt         sickle     Alzheimer Disease Paternal Grandfather         70s     Macular Degeneration Paternal Grandfather      Cerebrovascular Disease No family hx of      Thyroid Disease No family hx of      Myocardial Infarction No family hx of      C.A.D. No family hx of          Current Outpatient Medications   Medication Sig Dispense Refill     aspirin 81 MG tablet Take by mouth daily       bacitracin-neomycin-polymyxin (NEOSPORIN) 400-5-5000 external ointment Apply topically 2 times daily 28.35 g 0     Cholecalciferol (VITAMIN D) 1000 UNIT capsule Take 1 capsule by mouth 2 times daily.       menthol (ICY HOT) 5 % PADS Apply 1 patch topically every 8 hours as needed for muscle soreness May cut to fit 30 patch 3     order for DME Equipment being ordered: back brace 1 each 3     oxyCODONE IR (ROXICODONE) 5 MG tablet Take 5 mg by mouth       paliperidone (INVEGA) 3 MG 24 hr tablet Take 3 mg by mouth       QUEtiapine (SEROQUEL) 200 MG tablet 100 mg daily as needed Take 100 mg by mouth daily. weening       sildenafil (VIAGRA) 100 MG tablet Take 1/4 - 1 tablet, as directed, 1-3 hours before intimacy. Maximum 1 dose per 24 hours. 10 tablet 5     Aspirin-Calcium Carbonate  MG TABS Take by mouth daily       gabapentin  (NEURONTIN) 100 MG capsule Take 100mg (1 capsule) by mouth at bedtime.  Increase as instructed in clinic to goal dose of 300mg (3 capsules) 3 times daily. (Patient not taking: Reported on 8/17/2019) 180 capsule 3     guaiFENesin-codeine (ROBITUSSIN AC) 100-10 MG/5ML solution Take 10 mLs by mouth every 4 hours as needed for cough (Patient not taking: Reported on 8/17/2019) 236 mL 1     Ibuprofen (IBU-200 PO) Take 2 tablets by mouth as needed       order for DME Equipment being ordered: left wrist brace with thumb 1 each 0     ranitidine (ZANTAC) 150 MG tablet Take 1 tablet (150 mg) by mouth 2 times daily (Patient not taking: Reported on 8/17/2019) 60 tablet 1     Allergies   Allergen Reactions     Risperidone Other (See Comments)     Serve numbness      Effexor [Venlafaxine Hydrochloride] Other (See Comments)     Headache, Painful scrotum and drainage from the penis     Ambien Other (See Comments)     headaches     Ambien [Zolpidem Tartrate] Nausea     Buspirone Nausea     Dizziness, headache     Celexa [Citalopram] Other (See Comments)     Headache     Duloxetine Other (See Comments)     Headache  headaches     Septra [Bactrim] Itching     Seroquel [Quetiapine] Other (See Comments)     Headache, N, V     Sulfa Drugs Itching     Sulfamethoxazole-Trimethoprim      hives     Tramadol Nausea     Nausea and headache     Ultram [Tramadol Hcl] Nausea and Vomiting     Venlafaxine      Zolpidem      headaches         Reviewed and updated as needed this visit by Provider         Review of Systems   ROS COMP: Constitutional, HEENT, cardiovascular, pulmonary, gi and gu systems are negative, except as otherwise noted.      Objective    /80   Pulse 63   Temp 98.6  F (37  C) (Oral)   Resp 20   Wt 72.2 kg (159 lb 4 oz)   SpO2 98%   BMI 27.34 kg/m    Body mass index is 27.34 kg/m .  Physical Exam   GENERAL: healthy, alert and no distress  Left Hand: Revealed a 1 cm intact blister on the distal aspect of the left middle  "finger. No surrounding erythema or signs of infection    Diagnostic Test Results:  none         Assessment & Plan     Jeanmarie was seen today for burn.    Diagnoses and all orders for this visit:    Partial thickness burn of finger of left hand, initial encounter  -    Home care instructions given. Instructed to keep the blister intact, breaking it may increase risk of infection.   If it breaks on its own, recommended to keep it clean, apply topical antibiotic and apply a bandage.  -   bacitracin-neomycin-polymyxin (NEOSPORIN) 400-5-5000 external ointment; Apply topically 2 times daily    Patient education and Handout with home care instructions given. See AVS for details.        BMI:   Estimated body mass index is 27.34 kg/m  as calculated from the following:    Height as of 7/26/19: 1.626 m (5' 4\").    Weight as of this encounter: 72.2 kg (159 lb 4 oz).   Weight management plan: Patient was referred to their PCP to discuss a diet and exercise plan.          Return in about 1 week (around 8/24/2019), or if symptoms worsen or fail to improve.    Antonia Briceno MD  [facility]      "

## 2019-08-19 ENCOUNTER — TELEPHONE (OUTPATIENT)
Dept: FAMILY MEDICINE | Facility: CLINIC | Age: 59
End: 2019-08-19

## 2019-08-19 NOTE — TELEPHONE ENCOUNTER
This writer attempted to contact Jeanmarie on 08/19/19      Reason for call triage and left message.      If patient calls back:   Registered Nurse called. Follow Triage Call workflow        Ximena Iniguez RN

## 2019-08-19 NOTE — TELEPHONE ENCOUNTER
Reason for call:  Other   Patient called regarding (reason for call): call back  Additional comments: patient was in urgent care for a blister on his finger  It is going down some, he is concerned and wanted to discuss with nurse      Phone number to reach patient:  Home number on file [phone] (home)    Best Time:  any    Can we leave a detailed message on this number?  YES

## 2019-08-20 NOTE — TELEPHONE ENCOUNTER
Patient contacted. Reports finger was really throbbing the other day. That has since resolved and swelling is going down. Things are getting better and he has no further needs or concerns at this time.     Linda Hobson RN

## 2019-08-30 ENCOUNTER — OFFICE VISIT (OUTPATIENT)
Dept: UROLOGY | Facility: CLINIC | Age: 59
End: 2019-08-30
Payer: COMMERCIAL

## 2019-08-30 VITALS — DIASTOLIC BLOOD PRESSURE: 84 MMHG | OXYGEN SATURATION: 98 % | HEART RATE: 65 BPM | SYSTOLIC BLOOD PRESSURE: 134 MMHG

## 2019-08-30 DIAGNOSIS — C61 MALIGNANT NEOPLASM OF PROSTATE (H): Primary | ICD-10-CM

## 2019-08-30 DIAGNOSIS — N52.31 ERECTILE DYSFUNCTION FOLLOWING RADICAL PROSTATECTOMY: ICD-10-CM

## 2019-08-30 PROCEDURE — 99213 OFFICE O/P EST LOW 20 MIN: CPT | Performed by: UROLOGY

## 2019-08-30 RX ORDER — SILDENAFIL 100 MG/1
TABLET, FILM COATED ORAL
Qty: 10 TABLET | Refills: 5 | Status: SHIPPED | OUTPATIENT
Start: 2019-08-30 | End: 2023-12-07

## 2019-08-30 NOTE — PROGRESS NOTES
Chief Complaint   Patient presents with     MARJ Mclean is a 59 year old male who presents today for follow up of   Chief Complaint   Patient presents with     MARJ Mclean is a 58-year-old gentleman who is here for prostate cancer followup.  The patient has seen Dr. Muñoz in the past.  He underwent radical prostatectomy in the year 2004 when he was only 44 years old.  He is sexually active.  With Viagra, he has a very good erection and very active sexual life with his steady girlfriend.  His recent psa is 0.06 up from 0.02.  He also had some stress incontinence.  After he started Kegel exercise, the stress incontinence is much improved.  He now does not need to wear a pad anymore.     Current Outpatient Medications   Medication Sig Dispense Refill     aspirin 81 MG tablet Take by mouth daily       Aspirin-Calcium Carbonate  MG TABS Take by mouth daily       bacitracin-neomycin-polymyxin (NEOSPORIN) 400-5-5000 external ointment Apply topically 2 times daily 28.35 g 0     Cholecalciferol (VITAMIN D) 1000 UNIT capsule Take 1 capsule by mouth 2 times daily.       guaiFENesin-codeine (ROBITUSSIN AC) 100-10 MG/5ML solution Take 10 mLs by mouth every 4 hours as needed for cough 236 mL 1     Ibuprofen (IBU-200 PO) Take 2 tablets by mouth as needed       menthol (ICY HOT) 5 % PADS Apply 1 patch topically every 8 hours as needed for muscle soreness May cut to fit 30 patch 3     order for DME Equipment being ordered: back brace 1 each 3     order for DME Equipment being ordered: left wrist brace with thumb 1 each 0     oxyCODONE IR (ROXICODONE) 5 MG tablet Take 5 mg by mouth       QUEtiapine (SEROQUEL) 200 MG tablet 100 mg daily as needed Take 100 mg by mouth daily. weening       ranitidine (ZANTAC) 150 MG tablet Take 1 tablet (150 mg) by mouth 2 times daily 60 tablet 1     sildenafil (VIAGRA) 100 MG tablet Take 1/4 - 1 tablet, as directed, 1-3 hours before intimacy. Maximum 1 dose per 24 hours.  10 tablet 5     gabapentin (NEURONTIN) 100 MG capsule Take 100mg (1 capsule) by mouth at bedtime.  Increase as instructed in clinic to goal dose of 300mg (3 capsules) 3 times daily. (Patient not taking: Reported on 8/30/2019) 180 capsule 3     paliperidone (INVEGA) 3 MG 24 hr tablet Take 3 mg by mouth       Allergies   Allergen Reactions     Risperidone Other (See Comments)     Serve numbness      Effexor [Venlafaxine Hydrochloride] Other (See Comments)     Headache, Painful scrotum and drainage from the penis     Ambien Other (See Comments)     headaches     Ambien [Zolpidem Tartrate] Nausea     Buspirone Nausea     Dizziness, headache     Celexa [Citalopram] Other (See Comments)     Headache     Duloxetine Other (See Comments)     Headache  headaches     Septra [Bactrim] Itching     Seroquel [Quetiapine] Other (See Comments)     Headache, N, V     Sulfa Drugs Itching     Sulfamethoxazole-Trimethoprim      hives     Tramadol Nausea     Nausea and headache     Ultram [Tramadol Hcl] Nausea and Vomiting     Venlafaxine      Zolpidem      headaches      Past Medical History:   Diagnosis Date     Alcoholism (H)      Arthritis      Asperger's syndrome     Dr. Owens, [facility]. 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 Muñoz ([place]     CYSTOSCOPY  10/98    for renal stones     HC KNEE SCOPE,MED/LAT MENISECTOMY  11    Left, medial ([place]     HERNIA REPAIR, INGUINAL RT/LT      [facility]     Family History   Problem Relation Age of Onset     Psychotic Disorder Son         autism     Prostate Cancer Father         40s     Cancer Father      Cancer Maternal Grandmother         lung     Glaucoma Maternal Grandmother      Eye Disorder Maternal Grandmother         glaucoma     Diabetes Mother          45     Blood Disease Sister         sickle trait     Hypertension Sister      Blood Disease Paternal Uncle         sickle     Blood Disease Paternal Aunt         sickle     Alzheimer Disease Paternal Grandfather         70s     Macular Degeneration Paternal Grandfather      Cerebrovascular Disease No family hx of      Thyroid Disease No family hx of      Myocardial Infarction No family hx of      C.A.D. 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 [facility] in      Drug use: No      Comment: crack (last used 10/08?)     Sexual activity: Not Currently     Partners: Female     Other Topics Concern     None     Social History Narrative    [employer] ([id number] [place] 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       REVIEW OF SYSTEMS  =================  C: NEGATIVE for fever, chills, change in weight  I: NEGATIVE for worrisome rashes, moles or lesions  E/M: NEGATIVE for ear, mouth and throat  problems  R: NEGATIVE for significant cough or SHORTNESS OF BREATH,   CV: NEGATIVE for chest pain, palpitations or peripheral edema  GI: NEGATIVE for nausea, abdominal pain, heartburn, or change in bowel habits  NEURO: NEGATIVE any motor/sensory changes  PSYCH: NEGATIVE for recent mood disorder    Physical Exam:  /84 (BP Location: Right arm, Patient Position: Chair, Cuff Size: Adult Regular)   Pulse 65   SpO2 98%    Patient is pleasant, in no acute distress, good general condition.  Lung: no evidence of respiratory distress    Abdomen: Soft, nondistended, non tender. No masses. No rebound or guarding.   Exam: no cva tenderness  Skin: Warm and dry.  No redness.  Psych: normal mood and affect  Neuro: alert and oriented    Assessment/Plan:   (C61) Malignant neoplasm of prostate (H)  (primary encounter diagnosis)  Comment: Discussed small rise in PSA.  Discussed potential need for retreatment in the future.  Plan: PSA tumor marker        In one year    (N52.9) Erectile dysfunction, unspecified erectile dysfunction type  Comment:    Plan:  Cont with viagra

## 2019-09-02 ENCOUNTER — NURSE TRIAGE (OUTPATIENT)
Dept: NURSING | Facility: CLINIC | Age: 59
End: 2019-09-02

## 2019-09-02 NOTE — TELEPHONE ENCOUNTER
"Pt requesting information on taking Tylenol with the use of alcohol.  Call transferred to the pharmacy for education.  He verbalized understanding and had no further questions.     Sadie Vasquez RN/LOULOU    Reason for Disposition    Caller has medication question about med not prescribed by PCP and triager unable to answer question (e.g., compatibility with other med, storage)    Additional Information    Negative: Drug overdose and nurse unable to answer question    Negative: Caller requesting information not related to medicine    Negative: Caller requesting a prescription for Strep throat and has a positive culture result    Negative: Rash while taking a medication or within 3 days of stopping it    Negative: Immunization reaction suspected    Negative: [1] Asthma and [2] having symptoms of asthma (cough, wheezing, etc)    Negative: MORE THAN A DOUBLE DOSE of a prescription or over-the-counter (OTC) drug    Negative: [1] DOUBLE DOSE (an extra dose or lesser amount) of over-the-counter (OTC) drug AND [2] any symptoms (e.g., dizziness, nausea, pain, sleepiness)    Negative: [1] DOUBLE DOSE (an extra dose or lesser amount) of prescription drug AND [2] any symptoms (e.g., dizziness, nausea, pain, sleepiness)    Negative: Took another person's prescription drug    Negative: [1] DOUBLE DOSE (an extra dose or lesser amount) of prescription drug AND [2] NO symptoms (Exception: a double dose of antibiotics)    Negative: Diabetes drug error or overdose (e.g., insulin or extra dose)    Negative: [1] Request for URGENT new prescription or refill of \"essential\" medication (i.e., likelihood of harm to patient if not taken) AND [2] triager unable to fill per unit policy    Negative: [1] Prescription not at pharmacy AND [2] was prescribed today by PCP    Negative: Pharmacy calling with prescription questions and triager unable to answer question    Negative: Caller has URGENT medication question about med that PCP prescribed and " triager unable to answer question    Negative: Caller has NON-URGENT medication question about med that PCP prescribed and triager unable to answer question    Negative: Caller requesting a NON-URGENT new prescription or refill and triager unable to refill per unit policy    Protocols used: MEDICATION QUESTION CALL-A-AH

## 2019-09-04 ENCOUNTER — TELEPHONE (OUTPATIENT)
Dept: NURSING | Facility: CLINIC | Age: 59
End: 2019-09-04

## 2019-09-04 ENCOUNTER — OFFICE VISIT (OUTPATIENT)
Dept: OPTOMETRY | Facility: CLINIC | Age: 59
End: 2019-09-04
Payer: COMMERCIAL

## 2019-09-04 DIAGNOSIS — H04.123 DRY EYE SYNDROME OF BOTH EYES: ICD-10-CM

## 2019-09-04 DIAGNOSIS — H52.4 MYOPIA OF BOTH EYES WITH ASTIGMATISM AND PRESBYOPIA: ICD-10-CM

## 2019-09-04 DIAGNOSIS — Z01.00 EXAMINATION OF EYES AND VISION: Primary | ICD-10-CM

## 2019-09-04 DIAGNOSIS — H52.203 MYOPIA OF BOTH EYES WITH ASTIGMATISM AND PRESBYOPIA: ICD-10-CM

## 2019-09-04 DIAGNOSIS — H52.13 MYOPIA OF BOTH EYES WITH ASTIGMATISM AND PRESBYOPIA: ICD-10-CM

## 2019-09-04 PROCEDURE — 92015 DETERMINE REFRACTIVE STATE: CPT | Performed by: OPTOMETRIST

## 2019-09-04 PROCEDURE — 92014 COMPRE OPH EXAM EST PT 1/>: CPT | Performed by: OPTOMETRIST

## 2019-09-04 RX ORDER — CEFUROXIME AXETIL 500 MG/1
500 TABLET ORAL 2 TIMES DAILY
COMMUNITY
End: 2019-09-13

## 2019-09-04 ASSESSMENT — REFRACTION_MANIFEST
OS_AXIS: 163
OD_CYLINDER: +0.25
OD_ADD: +2.00
OD_SPHERE: -1.50
OD_AXIS: 030
OS_CYLINDER: +0.25
OS_ADD: +2.00
OS_SPHERE: -1.50

## 2019-09-04 ASSESSMENT — REFRACTION_WEARINGRX
OS_AXIS: 163
OD_AXIS: 030
OD_ADD: +1.50
OS_CYLINDER: +0.25
OS_CYLINDER: +0.25
OD_SPHERE: -1.50
OD_AXIS: 030
SPECS_TYPE: SVL
OS_ADD: +1.50
OD_CYLINDER: +0.25
OD_SPHERE: -1.50
OS_SPHERE: -1.50
OS_SPHERE: -1.50
OS_ADD: +1.50
OD_ADD: +1.50
OS_AXIS: 163
OD_CYLINDER: +0.25

## 2019-09-04 ASSESSMENT — SLIT LAMP EXAM - LIDS
COMMENTS: NORMAL
COMMENTS: NORMAL

## 2019-09-04 ASSESSMENT — VISUAL ACUITY
OD_SC: 20/25
OD_SC+: -1
METHOD: SNELLEN - LINEAR
OS_SC: 20/50
OS_SC: 20/25
OD_SC: 20/50
OS_SC+: -1

## 2019-09-04 ASSESSMENT — EXTERNAL EXAM - RIGHT EYE: OD_EXAM: NORMAL

## 2019-09-04 ASSESSMENT — TONOMETRY
IOP_METHOD: TONOPEN
OD_IOP_MMHG: 16
OS_IOP_MMHG: 16

## 2019-09-04 ASSESSMENT — CUP TO DISC RATIO
OD_RATIO: 0.4
OS_RATIO: 0.35

## 2019-09-04 ASSESSMENT — CONF VISUAL FIELD
OS_NORMAL: 1
OD_NORMAL: 1

## 2019-09-04 ASSESSMENT — EXTERNAL EXAM - LEFT EYE: OS_EXAM: NORMAL

## 2019-09-04 NOTE — PROGRESS NOTES
Chief Complaint   Patient presents with     COMPREHENSIVE EYE EXAM      Accompanied by son  Last Eye Exam: 2/18  Dilated Previously: Yes    What are you currently using to see?  Prescription glasses, for distance only.  Has prescription sunglasses with him today.      Distance Vision Acuity: Satisfied with vision    Near Vision Acuity: Satisfied with vision while reading unaided    Eye Comfort: dry  Do you use eye drops? : Yes: OTC extreme relief  Occupation or Hobbies: disabled    Anne CavazosEssentia Healthmargarita, [facility]         Medical, surgical and family histories reviewed and updated 9/4/2019.       OBJECTIVE: See Ophthalmology exam    ASSESSMENT:    ICD-10-CM    1. Examination of eyes and vision Z01.00 lifitegrast (XIIDRA) 5 % opthalmic solution   2. Myopia of both eyes with astigmatism and presbyopia H52.13 REFRACTION    H52.203     H52.4    3. Dry eye syndrome of both eyes H04.123 lifitegrast (XIIDRA) 5 % opthalmic solution     EYE EXAM (SIMPLE-NONBILLABLE)      PLAN:     Patient Instructions   Eyeglass prescription given.    Xiidra- (Liftegras ophthalmic solution 5 %) 1 drop both eyes 2 x day.    It is important to wear sunglasses to protect your eyes from the UV light when outside.    Return in 1 year for a complete eye exam or sooner if needed.    Willis Jones, OD    Addended 3/22/2021 to clarify his sunglasses were prescription that he brought to that visit.

## 2019-09-04 NOTE — TELEPHONE ENCOUNTER
Patient seen in ER at [facility] yesterday, prescribed an antibiotic and naproxen. Patient is asking if he is at a higher risk of heart attack or stroke while taking thse meds if he exercises. Advised that these medications should no affect his ability to exercise as normal.  Mariaa Blackburn RN  [facility]

## 2019-09-04 NOTE — PATIENT INSTRUCTIONS
Eyeglass prescription given.    Xiidra- (Liftegras ophthalmic solution 5 %) 1 drop both eyes 2 x day.    It is important to wear sunglasses to protect your eyes from the UV light when outside.    Return in 1 year for a complete eye exam or sooner if needed.    Willis Jones, KATHY      The affects of the dilating drops last for 4- 6 hours.  You will be more sensitive to light and vision will be blurry up close.  Mydriatic sunglasses were given if needed.      Optometry Providers       Clinic Locations                                 Telephone Number   Dr. Milagro Richardson   [facility] [place]   [place] [phone]     [place] [facility] Hours:                [facility] Hours:       [facility] Hours:   [address]   [address] [facility]     [address]  [place] [address]   YOSSI Levi [address]    YOSSI Richardson [address]  Phone: [phone]                    Phone: [phone]     Phone: [phone]                      Monday 8:00-7:00                          Monday 8:00-7:00                          Monday 8:00-7:00              Tuesday 8:00-6:00                          Tuesday 8:00-7:00                          Tuesday 8:00-7:00              Wednesday 8:00-6:00                  Wednesday 8:00-7:00                   Wednesday 8:00-7:00      Thursday 8:00-6:00                        Thursday 8:00-7:00                         Thursday 8:00-7:00            Friday 8:00-5:00                              Friday 8:00-5:00                              Friday 8:00-5:00    [facility] Hours:   [address] [place], [place] [id number] [phone]    Monday 8:00-7:00  Tuesday 8:00-7:00  Wednesday 8:00-7:00  Thursday 8:00-7:00  Friday 8:00-5:00  Please log on to [email or web address] to order your contact lenses.  The link is found on the [facility] page.  As always, Thank you for trusting us with your health  care needs!

## 2019-09-07 ENCOUNTER — NURSE TRIAGE (OUTPATIENT)
Dept: NURSING | Facility: CLINIC | Age: 59
End: 2019-09-07

## 2019-09-07 NOTE — TELEPHONE ENCOUNTER
"C/o red, inflamed nipples bilat. Problem for 2 yrs but worse recently. Worse after exercise. States no inflammation but nipples are red \"where they used to be brown\" and tender to touch. No fever. Seen at ED 9/3 for epididymitis and is on abx for this. Advised see provider w/i 24 hrs.     Reason for Disposition    [1Breast looks infected (spreading redness, feels hot or painful to touch) AND [2] no fever    Additional Information    Negative: Chest pain    Negative: Patient is breastfeeding    Negative: Postpartum breast pain and swelling, not breastfeeding    Negative: Small spot, skin growth or mole    Negative: [1] SEVERE breast pain AND [2] fever > 103 F (39.4 C)    Negative: Patient sounds very sick or weak to the triager    Negative: [1Breast looks infected (spreading redness, feels hot or painful to touch) AND [2] fever    Protocols used: BREAST SYMPTOMS-A-AH      "

## 2019-09-08 ENCOUNTER — OFFICE VISIT (OUTPATIENT)
Dept: URGENT CARE | Facility: URGENT CARE | Age: 59
End: 2019-09-08
Payer: COMMERCIAL

## 2019-09-08 VITALS
WEIGHT: 165 LBS | RESPIRATION RATE: 18 BRPM | TEMPERATURE: 98.1 F | OXYGEN SATURATION: 99 % | DIASTOLIC BLOOD PRESSURE: 85 MMHG | BODY MASS INDEX: 28.32 KG/M2 | SYSTOLIC BLOOD PRESSURE: 169 MMHG | HEART RATE: 50 BPM

## 2019-09-08 DIAGNOSIS — L98.8 SKIN LESION OF BREAST: Primary | ICD-10-CM

## 2019-09-08 DIAGNOSIS — R03.0 ELEVATED BLOOD PRESSURE READING WITHOUT DIAGNOSIS OF HYPERTENSION: ICD-10-CM

## 2019-09-08 PROCEDURE — 99214 OFFICE O/P EST MOD 30 MIN: CPT | Performed by: FAMILY MEDICINE

## 2019-09-08 NOTE — PATIENT INSTRUCTIONS
Your blood pressure reading was elevated today.  Recommend that you have it re-checked either at home or at our clinic within a week  Avoid cold medicines that have pseudoephedrin in it, or Sudafed. You may take regular or plain Robitussin or Mucinex  If you are unsure, please ask the pharmacist    If your blood pressure top number (systolic) 180 and above, or the bottom number (diastolic) 120 and above, you need to be seen immediately.  If persistently elevated top number 140 and above, or the bottom number 90 and above, please schedule an appointment to see a provider in clinic within a week.  If you have very elevated blood pressures, accompanied by headache, chest pain, numbness, weakness, slurring of speech, confusion, difficulty walking, call 911 and go to the ER

## 2019-09-08 NOTE — PROGRESS NOTES
Chief complaint: nipple inflammed    BP elevated today but asymptomatic. No headache no blurring of vision no chest pain no shortness of breath no dizziness no unsteadiness no numbness no weakness    Patient would like to rule out breast cancer    Tips of the nipples have started to become red in color  But when he does physical activity it burns  2017 when he first notice it but comes and goes  But the past 6 months has not gone away and is now in both  No fevers or chills chest pain or shortness of breath    No lumps    Family history : no family history of breast cancer      Allergies   Allergen Reactions     Risperidone Other (See Comments)     Serve numbness      Effexor [Venlafaxine Hydrochloride] Other (See Comments)     Headache, Painful scrotum and drainage from the penis     Ambien Other (See Comments)     headaches     Ambien [Zolpidem Tartrate] Nausea     Buspirone Nausea     Dizziness, headache     Celexa [Citalopram] Other (See Comments)     Headache     Duloxetine Other (See Comments)     Headache  headaches     Septra [Bactrim] Itching     Seroquel [Quetiapine] Other (See Comments)     Headache, N, V     Sulfa Drugs Itching     Sulfamethoxazole-Trimethoprim      hives     Tramadol Nausea     Nausea and headache     Ultram [Tramadol Hcl] Nausea and Vomiting     Venlafaxine      Zolpidem      headaches       Past Medical History:   Diagnosis Date     Alcoholism (H)      Arthritis      Asperger's syndrome     Dr. Owens, [facility]. 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   gabapentin (NEURONTIN) 100 MG capsule Take 100mg (1 capsule) by mouth at bedtime.  Increase as instructed in clinic to goal dose of 300mg (3 capsules) 3 times daily.   guaiFENesin-codeine (ROBITUSSIN AC) 100-10 MG/5ML solution Take 10 mLs by mouth every 4 hours as needed for cough   Ibuprofen (IBU-200 PO) Take 2 tablets by mouth as needed   lifitegrast (XIIDRA) 5 % opthalmic solution Place 1 drop into both eyes 2 times daily   menthol (ICY HOT) 5 % PADS Apply 1 patch topically every 8 hours as needed for muscle soreness May cut to fit   order for DME Equipment being ordered: back brace   order for DME Equipment being ordered: left wrist brace with thumb   oxyCODONE IR (ROXICODONE) 5 MG tablet Take 5 mg by mouth   paliperidone (INVEGA) 3 MG 24 hr tablet Take 3 mg by mouth   QUEtiapine (SEROQUEL) 200 MG tablet 100 mg daily as needed Take 100 mg by mouth daily. weening   ranitidine (ZANTAC) 150 MG tablet Take 1 tablet (150 mg) by mouth 2 times daily   sildenafil (VIAGRA) 100 MG tablet Take 1/4 - 1 tablet, as directed, 1-3 hours before intimacy. Maximum 1 dose per 24 hours.     No current facility-administered medications on file prior to visit.     Social History     Tobacco Use     Smoking status: Former Smoker     Packs/day: 0.50     Years: 10.00     Pack years: 5.00     Types: Cigarettes     Last attempt to quit: 12/31/1997      Years since quittin.7     Smokeless tobacco: Never Used   Substance Use Topics     Alcohol use: Yes     Comment: hx alcoholism quit . [facility] in      Drug use: No     Comment: crack (last used 10/08?)       ROS:   review of systems negative except for noted above.   No thoughts of harming self or others.     OBJECTIVE:  BP (!) 169/85   Pulse 50   Temp 98.1  F (36.7  C) (Oral)   Resp 18   Wt 74.8 kg (165 lb)   SpO2 99%   BMI 28.32 kg/m     General:   awake, alert, and cooperative.  NAD.   Head: Normocephalic, atraumatic.  Eyes: Conjunctiva clear,   Breast exam:  No axillary lymphadenopathy  No bilateral breast masses  However on bilateral tips of the nipple there is a 3mm round erosion of the skin and the rest of the nipple is flat.  Neuro: Alert and oriented - normal speech.  MS: Using extremities freely  PSYCH:  Normal affect, normal speech. No thoughts of harming self or others   SKIN: no obvious rashes    ASSESSMENT:    ICD-10-CM    1. Skin lesion of breast N64.9 BREAST CENTER REFERRAL   2. Elevated blood pressure reading without diagnosis of hypertension R03.0        PLAN:   Patient works out a lot ?chaffing of nipple skin  Trial of vaseline BID   However recommend Breast Center evaluation in 2 weeks if symptoms persist and do not resolve  Concern for Paget's disease.   Come in ASAP if worsening symptoms     Your blood pressure reading was elevated today.  Recommend that you have it re-checked either at home or at our clinic within a week  Avoid cold medicines that have pseudoephedrin in it, or Sudafed. You may take regular or plain Robitussin or Mucinex  If you are unsure, please ask the pharmacist    If your blood pressure top number (systolic) 180 and above, or the bottom number (diastolic) 120 and above, you need to be seen immediately.  If persistently elevated top number 140 and above, or the bottom number 90 and above, please schedule an appointment to see a  provider in clinic within a week.  If you have very elevated blood pressures, accompanied by headache, chest pain, numbness, weakness, slurring of speech, confusion, difficulty walking, call 911 and go to the ER        Advised about symptoms which might herald more serious problems.        Tiffanie Jones MD

## 2019-09-13 ENCOUNTER — OFFICE VISIT (OUTPATIENT)
Dept: FAMILY MEDICINE | Facility: CLINIC | Age: 59
End: 2019-09-13
Payer: COMMERCIAL

## 2019-09-13 VITALS
SYSTOLIC BLOOD PRESSURE: 126 MMHG | RESPIRATION RATE: 12 BRPM | HEIGHT: 64 IN | TEMPERATURE: 97.8 F | BODY MASS INDEX: 27.83 KG/M2 | OXYGEN SATURATION: 98 % | WEIGHT: 163 LBS | HEART RATE: 57 BPM | DIASTOLIC BLOOD PRESSURE: 83 MMHG

## 2019-09-13 DIAGNOSIS — Z23 ENCOUNTER FOR IMMUNIZATION: ICD-10-CM

## 2019-09-13 DIAGNOSIS — N64.4 NIPPLE SORENESS: Primary | ICD-10-CM

## 2019-09-13 PROCEDURE — 99213 OFFICE O/P EST LOW 20 MIN: CPT | Mod: 25 | Performed by: FAMILY MEDICINE

## 2019-09-13 PROCEDURE — 90686 IIV4 VACC NO PRSV 0.5 ML IM: CPT | Performed by: FAMILY MEDICINE

## 2019-09-13 PROCEDURE — 90471 IMMUNIZATION ADMIN: CPT | Performed by: FAMILY MEDICINE

## 2019-09-13 ASSESSMENT — PAIN SCALES - GENERAL: PAINLEVEL: MILD PAIN (2)

## 2019-09-13 ASSESSMENT — MIFFLIN-ST. JEOR: SCORE: 1470.36

## 2019-09-13 NOTE — PATIENT INSTRUCTIONS
At [facility], we strive to deliver an exceptional experience to you, every time we see you.  If you receive a survey in the mail, please send us back your thoughts. We really do value your feedback.    Based on your medical history, these are the current health maintenance/preventive care services that you are due for (some may have been done at this visit.)  Health Maintenance Due   Topic Date Due     URINE DRUG SCREEN  1960     INFLUENZA VACCINE (1) 09/01/2019     ZOSTER IMMUNIZATION (2 of 2) 09/23/2019         Suggested websites for health information:  Www.G4S.oroeco : Up to date and easily searchable information on multiple topics.  Www.LK FREEMAN.gov : medication info, interactive tutorials, watch real surgeries online  Www.familydoctor.org : good info from the Academy of Family Physicians  Www.cdc.gov : public health info, travel advisories, epidemics (H1N1)  Www.aap.org : children's health info, normal development, vaccinations  Www.health.[facility].[place].us : [place] dept of health, public health issues in [place], N1N1    Your care team:                            Family Medicine Internal Medicine   MD Aaron Schulte MD Shantel Branch-Fleming, MD Katya Georgiev PA-C Nam Ho, MD Pediatrics   LIZ Uriostegui, ZENIA Iniguez APRN CNP   MD Lisa Reese MD Deborah Mielke, MD Kim Thein, APRN CNP      Clinic hours: Monday - Thursday 7 am-7 pm; Fridays 7 am-5 pm.   Urgent care: Monday - Friday 11 am-9 pm; Saturday and Sunday 9 am-5 pm.  Pharmacy : Monday -Thursday 8 am-8 pm; Friday 8 am-6 pm; Saturday and Sunday 9 am-5 pm.     Clinic: [phone]   Pharmacy: [phone]

## 2019-09-13 NOTE — PROGRESS NOTES
Subjective     Jeanmarie Mclean is a 59 year old male who presents to clinic today for the following health issues:    HPI   Breast problem      Duration: seen in urgent care at [place]    Description (location/character/radiation): red, pink around the nipple area.    Intensity:  moderate    Accompanying signs and symptoms: burning    History (similar episodes/previous evaluation): None    Precipitating or alleviating factors: burning starts when doing physical activities    Therapies tried and outcome: putting vaseline around both nipples.       Patient Active Problem List   Diagnosis     Malignant neoplasm of prostate (H)     CARDIOVASCULAR SCREENING; LDL GOAL LESS THAN 130     GERD (gastroesophageal reflux disease)     Overweight (BMI 25.0-29.9)     Advance care planning     Fibromyalgia syndrome     Posttraumatic stress disorder     Low back pain     Inadequate material resources     Anxiety state     History of Asperger's syndrome     Pervasive developmental disorder, active     Depressive disorder     Delusional disorder (H)     TBI (traumatic brain injury) (H)     Insomnia     Chronic pain     Myalgia     male stress incontinence     Major depressive disorder, recurrent episode, moderate (H)     Low back pain without sciatica, unspecified back pain laterality, unspecified chronicity     Alcohol dependence in remission (H)     Past Surgical History:   Procedure Laterality Date     C HEP B VAC ADULT 3 DOSE IM      received all 3 vaccines     C REMV PROSTATE,RETROPUB,RADICAL  10/13/04    [place] Muñoz ([place]     CYSTOSCOPY  10/98    for renal stones     HC KNEE SCOPE,MED/LAT MENISECTOMY  11    Left[place] medial ([place]     HERNIA REPAIR, INGUINAL RT/LT      [facility]       Social History     Tobacco Use     Smoking status: Former Smoker     Packs/day: 0.50     Years: 10.00     Pack years: 5.00     Types: Cigarettes     Last attempt to quit: 1997     Years since quittin.7     Smokeless  "tobacco: Never Used   Substance Use Topics     Alcohol use: Yes     Comment: hx alcoholism quit . [facility] in      Family History   Problem Relation Age of Onset     Psychotic Disorder Son         autism     Prostate Cancer Father         40s     Cancer Father      Cancer Maternal Grandmother         lung     Glaucoma Maternal Grandmother      Eye Disorder Maternal Grandmother         glaucoma     Diabetes Mother          45     Blood Disease Sister         sickle trait     Hypertension Sister      Blood Disease Paternal Uncle         sickle     Blood Disease Paternal Aunt         sickle     Alzheimer Disease Paternal Grandfather         70s     Macular Degeneration Paternal Grandfather      Cerebrovascular Disease No family hx of      Thyroid Disease No family hx of      Myocardial Infarction No family hx of      C.A.D. No family hx of            Reviewed and updated as needed this visit by Provider         Review of Systems   ROS COMP: Constitutional, HEENT, cardiovascular, pulmonary, GI, , musculoskeletal, neuro, skin, endocrine and psych systems are negative, except as otherwise noted.      Objective    /83 (BP Location: Left arm, Patient Position: Sitting, Cuff Size: Adult Large)   Pulse 57   Temp 97.8  F (36.6  C) (Oral)   Resp 12   Ht 1.626 m (5' 4\")   Wt 73.9 kg (163 lb)   SpO2 98%   BMI 27.98 kg/m    Body mass index is 27.98 kg/m .  Physical Exam   GENERAL: healthy, alert and no distress  NECK: no adenopathy, no asymmetry, masses, or scars and thyroid normal to palpation  RESP: lungs clear to auscultation - no rales, rhonchi or wheezes  BREAST: normal without masses, tenderness or nipple discharge and no palpable axillary masses or adenopathy  CV: regular rate and rhythm, normal S1 S2, no S3 or S4, no murmur, click or rub, no peripheral edema and peripheral pulses strong  ABDOMEN: soft, nontender, no hepatosplenomegaly, no masses and bowel sounds normal  MS: no " "gross musculoskeletal defects noted, no edema    Diagnostic Test Results:  Labs reviewed in Epic        Assessment & Plan     1. Nipple soreness  Likely from rubbing from shirt while exercising by history. No alarming history or red flags on exam. May continue with Vaseline or topical zinc oxide. RTC as needed.    2. Encounter for immunization    - HC FLU VAC PRESRV FREE QUAD SPLIT VIR 3+YRS IM  [[id number]]  - ADMIN 1st VACCINE     BMI:   Estimated body mass index is 27.98 kg/m  as calculated from the following:    Height as of this encounter: 1.626 m (5' 4\").    Weight as of this encounter: 73.9 kg (163 lb).           See Patient Instructions    No follow-ups on file.    Milton Iglesias MD, MD  [facility]      "

## 2019-09-23 ENCOUNTER — TELEPHONE (OUTPATIENT)
Dept: OPTOMETRY | Facility: CLINIC | Age: 59
End: 2019-09-23

## 2019-09-23 NOTE — TELEPHONE ENCOUNTER
[facility] CLINICAL DOCUMENTATION    9.23.19    Medication: lifitegrast (XIIDRA) 5 % opthalmic solution has a prior auth.  [facility] needs chart notes of the following:     Needs information on what has been tried in the past.  This is needed in chart form.  State when other medication was tried, when stopped and why it was stopped.      PA request by [facility] for medication.  They will fax over information regarding the PA.      Optometry/Dr Jones

## 2019-09-25 NOTE — TELEPHONE ENCOUNTER
Forms filled out and faxed to [facility] and [facility]    Willis Jones, OD  9/25/2019

## 2019-09-27 ENCOUNTER — OFFICE VISIT (OUTPATIENT)
Dept: UROLOGY | Facility: CLINIC | Age: 59
End: 2019-09-27
Payer: COMMERCIAL

## 2019-09-27 VITALS — OXYGEN SATURATION: 98 % | HEART RATE: 61 BPM | DIASTOLIC BLOOD PRESSURE: 71 MMHG | SYSTOLIC BLOOD PRESSURE: 110 MMHG

## 2019-09-27 DIAGNOSIS — N50.82 SCROTAL PAIN: Primary | ICD-10-CM

## 2019-09-27 PROCEDURE — 99213 OFFICE O/P EST LOW 20 MIN: CPT | Performed by: UROLOGY

## 2019-09-27 NOTE — PROGRESS NOTES
Chief Complaint   Patient presents with     MARJ       Jeanmarie Mclean is a 59 year old male who presents today for follow up of   Chief Complaint   Patient presents with     MARJ   Patient is a 59-year-old gentleman who was seen emergency room several weeks ago with left scrotal pain.  He was placed on antibiotics for possible epididymitis.  Patient describes a dull achy pain that comes and goes.  He has no fever nausea vomiting.  He has no penile discharge.  He finished his antibiotics and since then his symptoms have resolved.    Current Outpatient Medications   Medication Sig Dispense Refill     aspirin 81 MG tablet Take by mouth daily       bacitracin-neomycin-polymyxin (NEOSPORIN) 400-5-5000 external ointment Apply topically 2 times daily 28.35 g 0     Cholecalciferol (VITAMIN D) 1000 UNIT capsule Take 1 capsule by mouth 2 times daily.       lifitegrast (XIIDRA) 5 % opthalmic solution Place 1 drop into both eyes 2 times daily (Patient not taking: Reported on 9/13/2019) 23 each 3     menthol (ICY HOT) 5 % PADS Apply 1 patch topically every 8 hours as needed for muscle soreness May cut to fit 30 patch 3     order for DME Equipment being ordered: back brace 1 each 3     oxyCODONE IR (ROXICODONE) 5 MG tablet Take 5 mg by mouth       QUEtiapine (SEROQUEL) 200 MG tablet 100 mg daily as needed Take 100 mg by mouth daily. weening       sildenafil (VIAGRA) 100 MG tablet Take 1/4 - 1 tablet, as directed, 1-3 hours before intimacy. Maximum 1 dose per 24 hours. 10 tablet 5     Allergies   Allergen Reactions     Risperidone Other (See Comments)     Serve numbness      Effexor [Venlafaxine Hydrochloride] Other (See Comments)     Headache, Painful scrotum and drainage from the penis     Ambien Other (See Comments)     headaches     Ambien [Zolpidem Tartrate] Nausea     Buspirone Nausea     Dizziness, headache     Celexa [Citalopram] Other (See Comments)     Headache     Duloxetine Other (See Comments)      Headache  headaches     Septra [Bactrim] Itching     Seroquel [Quetiapine] Other (See Comments)     Headache, N, V     Sulfa Drugs Itching     Sulfamethoxazole-Trimethoprim      hives     Tramadol Nausea     Nausea and headache     Ultram [Tramadol Hcl] Nausea and Vomiting     Venlafaxine      Zolpidem      headaches      Past Medical History:   Diagnosis Date     Alcoholism (H)      Arthritis      Asperger's syndrome     Dr. Owens, [facility]. 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 Muñoz ([place]     CYSTOSCOPY  10/98    for renal stones     HC KNEE SCOPE,MED/LAT MENISECTOMY  11    Left, medial ([place])     HERNIA REPAIR, INGUINAL RT/LT      [facility])     Family History   Problem Relation Age of Onset     Psychotic Disorder Son         autism     Prostate Cancer Father         40s     Cancer Father      Cancer Maternal Grandmother         lung     Glaucoma Maternal Grandmother      Eye Disorder Maternal Grandmother         glaucoma     Diabetes Mother          45     Blood Disease Sister         sickle trait     Hypertension Sister      Blood Disease Paternal Uncle         sickle     Blood Disease Paternal Aunt         sickle      Alzheimer Disease Paternal Grandfather         70s     Macular Degeneration Paternal Grandfather      Cerebrovascular Disease No family hx of      Thyroid Disease No family hx of      Myocardial Infarction No family hx of      C.A.D. 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 [facility] in      Drug use: No     Comment: crack (last used 10/08?)     Sexual activity: Not Currently     Partners: Female   Lifestyle     Physical activity:     Days per week: None     Minutes per session: None     Stress: None   Relationships     Social connections:     Talks on phone: None     Gets together: None     Attends Episcopal service: None     Active member of club or organization: None     Attends meetings of clubs or organizations: None     Relationship status: None     Intimate partner violence:     Fear of current or ex partner: None     Emotionally abused: None     Physically abused: None     Forced sexual activity: None   Other Topics Concern     Parent/sibling w/ CABG, MI or angioplasty before 65F 55M? Not Asked   Social History Narrative    [employer] ([id number] [place] 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       REVIEW OF  SYSTEMS  =================  C: NEGATIVE for fever, chills, change in weight  I: NEGATIVE for worrisome rashes, moles or lesions  E/M: NEGATIVE for ear, mouth and throat problems  R: NEGATIVE for significant cough or SHORTNESS OF BREATH  CV:  NEGATIVE for chest pain, palpitations or peripheral edema  GI: NEGATIVE for nausea, abdominal pain, heartburn, or change in bowel habits  NEURO: NEGATIVE numbness/weakness  : see HPI  PSYCH: NEGATIVE depression/anxiety  LYmph: no new enlarged lymph nodes  Ortho: no new trauma/movements    Physical Exam:  /71 (BP Location: Right arm, Patient Position: Chair, Cuff Size: Adult Regular)   Pulse 61   SpO2 98%    Patient is pleasant, in no acute distress, good general condition.  Lung: no evidence of respiratory distress    Abdomen: Soft, nondistended, non tender. No masses. No rebound or guarding.   Exam: Penis no discharge.  Testes without any masses.  Both epididymis feels normal.  No scrotal skin lesion.  Skin: Warm and dry.  No redness.  Psych: normal mood and affect  Neuro: alert and oriented  Musculaskeletal: moving all extremities    Assessment/Plan:   (N50.82) Scrotal pain  (primary encounter diagnosis)  Comment: Normal exam  Plan: Reassurance.

## 2019-10-11 ENCOUNTER — TELEPHONE (OUTPATIENT)
Dept: UROLOGY | Facility: CLINIC | Age: 59
End: 2019-10-11

## 2019-10-11 ENCOUNTER — NURSE TRIAGE (OUTPATIENT)
Dept: NURSING | Facility: CLINIC | Age: 59
End: 2019-10-11

## 2019-10-12 NOTE — TELEPHONE ENCOUNTER
"Clinic Action Needed: FYI, prep for Tuesday appointment with patient  FNA Triage Call  Presenting Problem:  Patient is calling for pain in scrotum/testicle area and lump the size of a marble in between the testicles.  Patient reports being monitored for testicular cancer, and 1 week ago, first noticed a marble sized mass in scrotum.  Patient is rating the pain 6/10 in the scrotum.      Per Protocol recommended for patient to go to the ER.  Patient verbalized already planning to go to the ER early tomorrow morning and wanting to ensure that provider will do an ultrasound to diagnose any possible testicular cancer.  Advised patient that provider will perform testing based their assessment and we can not provide any guarantees like that.  He was understanding and still wanting to keep plan of going in the early am.      Patient does have a follow-up appointment with his urologist on Tuesday and advised patient to keep this appointment, even though patient is frustrated \"urologist missed this\" at previous appointment a few weeks ago.  Advised patient to be open with provider about frustrations.  Guideline Used:  Scrotal Pain    Routed to: Dr. Devon Perdomo, RN on 10/11/2019 at 9:20 PM    "

## 2019-10-12 NOTE — TELEPHONE ENCOUNTER
"Patient is calling for pain in scrotum/testicle area and lump the size of a marble in between the testicles.  Patient reports being monitored for testicular cancer, and 1 week ago, first noticed a marble sized mass in scrotum.  Patient is rating the pain 6/10 in the scrotum.      Per Protocol recommended for patient to go to the ER.  Patient verbalized already planning to go to the ER early tomorrow morning and wanting to ensure that provider will do an ultrasound to diagnose any possible testicular cancer.  Advised patient that provider will perform testing based their assessment and we can not provide any guarantees like that.  He was understanding and still wanting to keep plan of going in the early am.      Patient does have a follow-up appointment with his urologist on Tuesday and advised patient to keep this appointment, even though patient is frustrated \"urologist missed this\".  Advised patient to be open with provider about frustrations.    Naa Perdomo RN on 10/11/2019 at 9:20 PM    Reason for Disposition    [1] Constant pain in scrotum or testicle AND [2] present > 1 hour    Additional Information    Negative: SEVERE pain (e.g., excruciating)    Negative: Swollen scrotum    Protocols used: SCROTAL PAIN-A-AH      "

## 2019-11-20 ENCOUNTER — TELEPHONE (OUTPATIENT)
Dept: FAMILY MEDICINE | Facility: CLINIC | Age: 59
End: 2019-11-20

## 2019-11-20 ENCOUNTER — MYC MEDICAL ADVICE (OUTPATIENT)
Dept: FAMILY MEDICINE | Facility: CLINIC | Age: 59
End: 2019-11-20

## 2019-11-20 DIAGNOSIS — M53.3 SACRAL PAIN: Primary | ICD-10-CM

## 2019-11-20 NOTE — TELEPHONE ENCOUNTER
Please let patient know that he has been referred to Sports Medicine. Patient can call [phone] to schedule an appointment.    Milton Iglesias MD

## 2019-11-20 NOTE — TELEPHONE ENCOUNTER
Called and spoke to patient, he stated he needs 2 referrals, 1 for the trigger point injection and 1 for the SI joint injection, patient stated Dr King does both of these injections for patient but she does them in 2 different locations, the SI joint is done at the [facility] and the Trigger point is done in the [facility] clinic but is part of the [facility]. Phone number for Dr. Kign office is [phone], fax # [phone].  Pepe Lim,  For 1st Floor Primary Care ([facility])

## 2019-11-20 NOTE — TELEPHONE ENCOUNTER
Please let patient know that referrals placed. Patient can call to schedule.    Milton Iglesias MD

## 2019-11-21 NOTE — TELEPHONE ENCOUNTER
Called, patient is notified and will call to set up his appointment.  Pepe Lim,  For 1st Floor Primary Care ([facility])

## 2019-11-27 ENCOUNTER — RADIOLOGY INJECTION OFFICE VISIT (OUTPATIENT)
Dept: PALLIATIVE MEDICINE | Facility: CLINIC | Age: 59
End: 2019-11-27
Payer: COMMERCIAL

## 2019-11-27 ENCOUNTER — ANCILLARY PROCEDURE (OUTPATIENT)
Dept: RADIOLOGY | Facility: CLINIC | Age: 59
End: 2019-11-27
Attending: PSYCHIATRY & NEUROLOGY
Payer: COMMERCIAL

## 2019-11-27 VITALS — DIASTOLIC BLOOD PRESSURE: 81 MMHG | OXYGEN SATURATION: 100 % | SYSTOLIC BLOOD PRESSURE: 125 MMHG | HEART RATE: 51 BPM

## 2019-11-27 DIAGNOSIS — M53.3 SI (SACROILIAC) JOINT DYSFUNCTION: Primary | ICD-10-CM

## 2019-11-27 DIAGNOSIS — M53.3 SACROILIAC JOINT DYSFUNCTION: ICD-10-CM

## 2019-11-27 PROCEDURE — 27096 INJECT SACROILIAC JOINT: CPT | Mod: 50 | Performed by: PSYCHIATRY & NEUROLOGY

## 2019-11-27 ASSESSMENT — PAIN SCALES - GENERAL: PAINLEVEL: MODERATE PAIN (4)

## 2019-11-27 NOTE — NURSING NOTE
Discharge Information    IV Discontiued Time:  NA    Amount of Fluid Infused:  NA    Discharge Criteria = When patient returns to baseline or as per MD order    Consciousness:  Pt is fully awake    Circulation:  BP +/- 20% of pre-procedure level    Respiration:  Patient is able to breathe deeply    O2 Sat:  Patient is able to maintain O2 Sat >92% on room air    Activity:  Moves 4 extremities on command    Ambulation:  Patient is able to stand and walk or stand and pivot into wheelchair    Dressing:  Clean/dry or No Dressing    Notes:   Discharge instructions and AVS given to patient    Patient meets criteria for discharge?  YES    Admitted to PCU?  No    Responsible adult present to accompany patient home?  Yes    Signature/Title:    ytler moore RN  RN Care Coordinator  [facility] [place]

## 2019-11-27 NOTE — PATIENT INSTRUCTIONS
[facility]   Procedure Discharge Instructions    Today you saw:     Dr. Angélica King     You had an:  bilateral sacroiliac joint injection     Medications used:  Lidocaine    Omnipaque  Ropivicaine   Kenalog             Be cautious when walking. Numbness and/or weakness in the lower extremities may occur for up to 6-8 hours after the procedure due to effect of the local anesthetic    Do not drive for 6 hours. The effect of the local anesthetic could slow your reflexes.     You may resume your regular activities after 24 hours    Avoid strenuous activity for the first 24 hours    You may shower, however avoid swimming, tub baths or hot tubs for 24 hours following your procedure    You may have a mild to moderate increase in pain for several days following the injection.    It may take up to 14 days for the steroid medication to start working although you may feel the effect as early as a few days after the procedure.       You may use ice packs for 10-15 minutes, 3 to 4 times a day at the injection site for comfort    Do not use heat to painful areas for 6 to 8 hours. This will give the local anesthetic time to wear off and prevent you from accidentally burning your skin.     Unless you have been directed to avoid the use of anti-inflammatory medications (NSAIDS), you may use medications such as ibuprofen, Aleve or Tylenol for pain control if needed.     If you were fasting, you may resume your normal diet and medications after the procedure    If you have diabetes, check your blood sugar more frequently than usual as your blood sugar may be higher than normal for 10-14 days following a steroid injection. Contact your doctor who manages your diabetes if your blood sugar is higher than usual    Possible side effects of steroids that you may experience include flushing, elevated blood pressure, increased appetite, mild headaches and restlessness.  All of these symptoms will get better  with time.    If you experience any of the following, call the Pain Clinic during work hours (Mon-Friday 8-4:30 pm) at [phone] or the Provider Line after hours at [phone]:  -Fever over 100 degree F  -Swelling, bleeding, redness, drainage, warmth at the injection site  -Progressive weakness or numbness in your legs or arms  -Loss of bowel or bladder function  -Unusual headache that is not relieved by Tylenol or other pain reliever  -Unusual new onset of pain that is not improving

## 2019-11-27 NOTE — NURSING NOTE
Pre-procedure Intake    Have you been fasting? NA    If yes, for how long? NA    Are you taking a prescribed blood thinner such as coumadin, Plavix, Xarelto?    No    If yes, when did you take your last dose? NA    Do you take aspirin?  Yes -   ASA    If cervical procedure, have you held aspirin for 6 days?   NA    Do you have any allergies to contrast dye, iodine, steroid and/or numbing medications?  NO    Are you currently taking antibiotics or have an active infection?  NO    Have you had a fever/elevated temperature within the past week? NO    Are you currently taking oral steroids? NO    Do you have a ? Yes       Are you pregnant or breastfeeding?  Not Applicable    Are the vital signs normal?  Yes      Emma Breaux CMA ([facility])

## 2019-11-27 NOTE — PROGRESS NOTES
Pre procedure Diagnosis: SI joint dysfunction    Post procedure Diagnosis: Same  Procedure performed: bilateral SI joint injections  Anesthesia: none  Complications: none  Operators: Cally King MD    Indications:   Jeanmarie Mclean is a 59 year old male, seen by me for trigger points at the pain clinic. They have a history of pain across the low back and buttocks. Exam shows tenderness of the lumbosacral region and bilateral SI joints.They have tried conservative treatment including physical therapy and medications. He uses a TENS unit.  SI joint injections help for ~3 months.  He also gets trigger point injections    Options/alternatives, benefits and risks were discussed with the patient including bleeding, infection, tissue trauma, exposure to radiation, reaction to medications including seizure, nerve injury, weakness, and numbness.  Questions were answered to his satisfaction and he agrees to proceed. Voluntary informed consent was obtained and signed.     Vitals were reviewed: Yes  Allergies were reviewed:  Yes  Medications were reviewed:  Yes  Pre-procedure pain score: 4/10    Procedure:  After getting informed consent, patient was brought into the procedure suite and was placed in a prone position on the procedure table.   A Pause for the Cause was performed.  Patient was prepped and draped in sterile fashion.     After identifying the bilateral SI joint, the C-arm was rotated to a obliquely to obtain the best view of the inferior angle of the joint.  A total of 4ml of Lidocaine 1% was used to anesthetize the skin at a skin entry site coaxial with the fluoroscopy beam at this location.  A 22gauge 3.5 inch needle was advanced under intermittent fluoroscopy until it was felt to enter the SI joint.    A total of 1.5ml of Omnipaque-300 was injected, confirming appropriate position, with spread into the intraarticular space, with no intravascular uptake noted.  8.5ml was wasted. Location was verified in lateral  view.    3 ml of 0.2% ropivacaine with 80mg of kenalog was injected divided between the two sides.  The needle was flushed with lidocaine and removed.     Hemostasis was achieved, the area was cleaned, and bandaids were placed when appropriate.    The patient tolerated the procedure well, and was taken to the recovery room.    Images were saved to PACS.    Post-procedure pain score: 0/10  Follow-up includes:   -f/u with referring provider    Cally King MD  [facility]

## 2019-12-09 ENCOUNTER — TELEPHONE (OUTPATIENT)
Dept: FAMILY MEDICINE | Facility: CLINIC | Age: 59
End: 2019-12-09

## 2019-12-10 NOTE — TELEPHONE ENCOUNTER
Please let patient know it is okay to wait until the 11th to be seen as scheduled.    Milton Iglesias MD

## 2019-12-10 NOTE — TELEPHONE ENCOUNTER
Patient seen in emergency room on 11/24/19.  Patient is very concerned about his condition. He states that they told him that he should not exercise due to his elevated BP with activity until he sees his PCP and PCP advises.  Patient is very worried about this as he has a son that is dependent on him. His son has medical apt tomorrow and wants to know if his apt that he scheduled is an appropriate timeline and would like PCP to advise on this. Told him what symptoms he needs to report to ED for. Tried to assist patient in moving his apt to tomorrow but he states he cannot come in tomorrow and wants to know if it is ok if he waits until Dec 11?    Provider please review and advise.      Ximena Iniguez RN      Next 5 appointments (look out 90 days)    Dec 11, 2019  8:00 AM CST  [facility] [address] [place] [id number] [phone]

## 2019-12-10 NOTE — TELEPHONE ENCOUNTER
Reason for Call:  Other call back    Detailed comments: Jeanmarie stated he would like to speak to a nurse about a medical condition. Would not specify other than he would like a nurse to call him back.     Phone Number Patient can be reached at: Home number on file [phone] (home)    Best Time: Any    Can we leave a detailed message on this number? YES    Call taken on 12/9/2019 at 6:28 PM by Mingo Guaman

## 2019-12-11 ENCOUNTER — OFFICE VISIT (OUTPATIENT)
Dept: FAMILY MEDICINE | Facility: CLINIC | Age: 59
End: 2019-12-11
Payer: COMMERCIAL

## 2019-12-11 VITALS
HEIGHT: 64 IN | OXYGEN SATURATION: 98 % | WEIGHT: 168 LBS | BODY MASS INDEX: 28.68 KG/M2 | RESPIRATION RATE: 16 BRPM | SYSTOLIC BLOOD PRESSURE: 124 MMHG | HEART RATE: 67 BPM | TEMPERATURE: 98 F | DIASTOLIC BLOOD PRESSURE: 76 MMHG

## 2019-12-11 DIAGNOSIS — I10 HYPERTENSIVE RESPONSE TO EXERCISE: Primary | ICD-10-CM

## 2019-12-11 PROCEDURE — 99214 OFFICE O/P EST MOD 30 MIN: CPT | Performed by: FAMILY MEDICINE

## 2019-12-11 RX ORDER — AMLODIPINE BESYLATE 2.5 MG/1
2.5 TABLET ORAL DAILY
Qty: 90 TABLET | Refills: 3 | Status: SHIPPED | OUTPATIENT
Start: 2019-12-11 | End: 2020-12-28

## 2019-12-11 ASSESSMENT — PAIN SCALES - GENERAL: PAINLEVEL: NO PAIN (0)

## 2019-12-11 ASSESSMENT — MIFFLIN-ST. JEOR: SCORE: 1493.04

## 2019-12-11 NOTE — PROGRESS NOTES
Subjective     Jeanmarie Mclean is a 59 year old male who presents to clinic today for the following health issues:    HPI     Results: Stress Test  Hypertension: BP increases when performing physical activity. Ongoing for 9 months    Recent cardiac stress test was negative but stopped due to high blood pressure 240's/100's. Patient was asymptomatic.    Patient Active Problem List   Diagnosis     Malignant neoplasm of prostate (H)     CARDIOVASCULAR SCREENING; LDL GOAL LESS THAN 130     GERD (gastroesophageal reflux disease)     Overweight (BMI 25.0-29.9)     Advance care planning     Fibromyalgia syndrome     Posttraumatic stress disorder     Low back pain     Inadequate material resources     Anxiety state     History of Asperger's syndrome     Pervasive developmental disorder, active     Depressive disorder     Delusional disorder (H)     TBI (traumatic brain injury) (H)     Insomnia     Chronic pain     Myalgia     male stress incontinence     Major depressive disorder, recurrent episode, moderate (H)     Low back pain without sciatica, unspecified back pain laterality, unspecified chronicity     Alcohol dependence in remission (H)     Hypertensive response to exercise     Past Surgical History:   Procedure Laterality Date     C HEP B VAC ADULT 3 DOSE IM      received all 3 vaccines     C REMV PROSTATE,RETROPUB,RADICAL  10/13/04    Dr. Muñoz ([place])     CYSTOSCOPY  10/98    for renal stones     HC KNEE SCOPE,MED/LAT MENISECTOMY  11    [place] ([place]     HERNIA REPAIR, INGUINAL RT/LT      [facility]       Social History     Tobacco Use     Smoking status: Former Smoker     Packs/day: 0.50     Years: 10.00     Pack years: 5.00     Types: Cigarettes     Last attempt to quit: 1997     Years since quittin.9     Smokeless tobacco: Never Used   Substance Use Topics     Alcohol use: Yes     Comment: hx alcoholism quit . [facility] dependency treatment in      Family History   Problem  "Relation Age of Onset     Psychotic Disorder Son         autism     Prostate Cancer Father         40s     Cancer Father      Cancer Maternal Grandmother         lung     Glaucoma Maternal Grandmother      Eye Disorder Maternal Grandmother         glaucoma     Diabetes Mother          45     Blood Disease Sister         sickle trait     Hypertension Sister      Blood Disease Paternal Uncle         sickle     Blood Disease Paternal Aunt         sickle     Alzheimer Disease Paternal Grandfather         70s     Macular Degeneration Paternal Grandfather      Cerebrovascular Disease No family hx of      Thyroid Disease No family hx of      Myocardial Infarction No family hx of      C.A.D. No family hx of            Reviewed and updated as needed this visit by Provider         Review of Systems   ROS COMP: Constitutional, HEENT, cardiovascular, pulmonary, GI, , musculoskeletal, neuro, skin, endocrine and psych systems are negative, except as otherwise noted.      Objective    /76   Pulse 67   Temp 98  F (36.7  C) (Oral)   Resp 16   Ht 1.626 m (5' 4\")   Wt 76.2 kg (168 lb)   SpO2 98%   BMI 28.84 kg/m    Body mass index is 28.84 kg/m .  Physical Exam   GENERAL: healthy, alert and no distress  NECK: no adenopathy, no asymmetry, masses, or scars and thyroid normal to palpation  RESP: lungs clear to auscultation - no rales, rhonchi or wheezes  CV: regular rate and rhythm, normal S1 S2, no S3 or S4, no murmur, click or rub, no peripheral edema and peripheral pulses strong  ABDOMEN: soft, nontender, no hepatosplenomegaly, no masses and bowel sounds normal  MS: no gross musculoskeletal defects noted, no edema    Diagnostic Test Results:  Labs reviewed in Epic        Assessment & Plan     1. Hypertensive response to exercise  Start amlodipine 2.5 mg daily to hopefully help with exercise induced hypertension. RTC in 1 month for recheck. Patient will monitor resting bp's. Another option would be " hydrochlorothiazide.  - amLODIPine (NORVASC) 2.5 MG tablet; Take 1 tablet (2.5 mg) by mouth daily  Dispense: 90 tablet; Refill: 3       Regular exercise  See Patient Instructions    Return in about 4 weeks (around 1/8/2020) for BP Recheck.    Milton Iglesias MD, MD  [facility]

## 2019-12-11 NOTE — PATIENT INSTRUCTIONS
At [facility], we strive to deliver an exceptional experience to you, every time we see you.  If you receive a survey in the mail, please send us back your thoughts. We really do value your feedback.    Based on your medical history, these are the current health maintenance/preventive care services that you are due for (some may have been done at this visit.)  Health Maintenance Due   Topic Date Due     URINE DRUG SCREEN  1960         Suggested websites for health information:  Www.[facility]Evidence.org : Up to date and easily searchable information on multiple topics.  Www.medlineplus.gov : medication info, interactive tutorials, watch real surgeries online  Www.familydoctor.org : good info from the Academy of Family Physicians  Www.cdc.gov : public health info, travel advisories, epidemics (H1N1)  Www.aap.org : children's health info, normal development, vaccinations  Www.health.[facility].[place].us : [place] dept of health, public health issues in [place], N1N1    Your care team:                            Family Medicine Internal Medicine   MD Aaron Schulte MD Shantel Branch-Fleming, MD Katya Georgiev PA-C Nam Ho, MD Pediatrics   LIZ Uriostegui, ZENIA CALVILLO CNP   MD Lisa Reese MD Deborah Mielke, MD Kim Thein, APRN CNP      Clinic hours: Monday - Thursday 7 am-7 pm; Fridays 7 am-5 pm.   Urgent care: Monday - Friday 11 am-9 pm; Saturday and Sunday 9 am-5 pm.  Pharmacy : Monday -Thursday 8 am-8 pm; Friday 8 am-6 pm; Saturday and Sunday 9 am-5 pm.     Clinic: [phone]   Pharmacy: [phone]

## 2019-12-20 ENCOUNTER — TELEPHONE (OUTPATIENT)
Dept: FAMILY MEDICINE | Facility: CLINIC | Age: 59
End: 2019-12-20

## 2019-12-20 DIAGNOSIS — I10 HYPERTENSIVE RESPONSE TO EXERCISE: Primary | ICD-10-CM

## 2019-12-20 RX ORDER — BLOOD PRESSURE TEST KIT
KIT MISCELLANEOUS
Qty: 1 KIT | Refills: 1 | Status: SHIPPED | OUTPATIENT
Start: 2019-12-20 | End: 2019-12-27

## 2019-12-20 NOTE — TELEPHONE ENCOUNTER
Reason for Call:  Other prescription    Detailed comments: Pt calling for he would like to put in a medication request for a blood pressure cuff and would like a call back when Rx is ready for  at the .      Phone Number Patient can be reached at: Other phone number:  [phone]    Best Time: anytime    Can we leave a detailed message on this number? YES    Call taken on 12/20/2019 at 11:07 AM by Bora Kim

## 2019-12-20 NOTE — TELEPHONE ENCOUNTER
Routing to provider to review and advise, see telephone message below.  Patient asking for prescription for BP cuff.  RN's unable to order DME supplies, per protocol.    Courtney Francois RN  [facility]

## 2019-12-21 NOTE — TELEPHONE ENCOUNTER
Please let patient know that blood pressure monitoring kit sent to [facility].    Milton Iglesias MD

## 2019-12-23 NOTE — TELEPHONE ENCOUNTER
Pharmacy will notify patient when DME is ready for pickup.  Pepe Lim,  For 1st Floor Primary Care ([facility])

## 2019-12-27 ENCOUNTER — TELEPHONE (OUTPATIENT)
Dept: FAMILY MEDICINE | Facility: CLINIC | Age: 59
End: 2019-12-27

## 2019-12-27 DIAGNOSIS — I10 HYPERTENSIVE RESPONSE TO EXERCISE: ICD-10-CM

## 2019-12-27 RX ORDER — BLOOD PRESSURE TEST KIT
KIT MISCELLANEOUS
Qty: 1 KIT | Refills: 1 | Status: SHIPPED | OUTPATIENT
Start: 2019-12-27

## 2019-12-27 NOTE — TELEPHONE ENCOUNTER
Reason for Call:  Other call back    Detailed comments: Pt would like to request a written prescription be left at the front for his blood pressure supplies so that he may bring it to reliable medical supply. He states he was told it was sent to [facility] in [place] and that they do not have the supplies there. Thank you, He would like a call when that is ready for him to .    Phone Number Patient can be reached at: Home number on file [phone] (home)    Best Time: Any    Can we leave a detailed message on this number? YES    Call taken on 12/27/2019 at 11:01 AM by Kathryn Villatoro

## 2019-12-27 NOTE — TELEPHONE ENCOUNTER
Written DME is signed by Dr. Bradley and  will be deliver to the  this afternoon for pickup after 4p.  Pepe Lim,  For [facility]    Called, patient is notified of the above info.  Pepe Lim,  For 1st Floor Primary Care ([facility])     NOTE: If patient and or guardians calls the clinic before the care teams gets a chance to call them, please notify the caller the above information regarding pickup times, remind them to bring a photo ID, document and close the encounter.  Pepe Lim,  For [facility]    FYI:  Anything completed after 2:00p will not be delivered until the next business day after 3p.   Pepe Lim,  for [facility].

## 2020-01-07 ENCOUNTER — MYC MEDICAL ADVICE (OUTPATIENT)
Dept: FAMILY MEDICINE | Facility: CLINIC | Age: 60
End: 2020-01-07

## 2020-01-10 ENCOUNTER — OFFICE VISIT (OUTPATIENT)
Dept: FAMILY MEDICINE | Facility: CLINIC | Age: 60
End: 2020-01-10
Payer: COMMERCIAL

## 2020-01-10 VITALS
HEART RATE: 56 BPM | BODY MASS INDEX: 27.83 KG/M2 | RESPIRATION RATE: 16 BRPM | WEIGHT: 163 LBS | TEMPERATURE: 98.6 F | DIASTOLIC BLOOD PRESSURE: 78 MMHG | HEIGHT: 64 IN | OXYGEN SATURATION: 99 % | SYSTOLIC BLOOD PRESSURE: 110 MMHG

## 2020-01-10 DIAGNOSIS — I10 HYPERTENSIVE RESPONSE TO EXERCISE: Primary | ICD-10-CM

## 2020-01-10 PROCEDURE — 99213 OFFICE O/P EST LOW 20 MIN: CPT | Performed by: FAMILY MEDICINE

## 2020-01-10 ASSESSMENT — MIFFLIN-ST. JEOR: SCORE: 1470.36

## 2020-01-10 ASSESSMENT — PAIN SCALES - GENERAL: PAINLEVEL: MODERATE PAIN (4)

## 2020-01-10 NOTE — PATIENT INSTRUCTIONS
At [facility], we strive to deliver an exceptional experience to you, every time we see you.  If you receive a survey in the mail, please send us back your thoughts. We really do value your feedback.    Based on your medical history, these are the current health maintenance/preventive care services that you are due for (some may have been done at this visit.)  Health Maintenance Due   Topic Date Due     URINE DRUG SCREEN  1960     PHQ-9  01/26/2020         Suggested websites for health information:  Www.[employer].org : Up to date and easily searchable information on multiple topics.  Www.[employer].gov : medication info, interactive tutorials, watch real surgeries online  Www.familydoctor.org : good info from the Academy of Family Physicians  Www.cdc.gov : public health info, travel advisories, epidemics (H1N1)  Www.aap.org : children's health info, normal development, vaccinations  Www.health.[facility].[place].us : [place] dept of health, public health issues in [place], N1N1    Your care team:                            Family Medicine Internal Medicine   MD Aaron Schulte MD Shantel Branch-Fleming, MD Katya Georgiev PA-C Nam Ho, MD Pediatrics   Eliseo Riggs PAJOSÉ Figueroa, MD Lisa Noguera CNP, MD Deborah Mielke, MD Kim Thein, APRN CNP      Clinic hours: Monday - Thursday 7 am-7 pm; Fridays 7 am-5 pm.   Urgent care: Monday - Friday 11 am-9 pm; Saturday and Sunday 9 am-5 pm.  Pharmacy : Monday -Thursday 8 am-8 pm; Friday 8 am-6 pm; Saturday and Sunday 9 am-5 pm.     Clinic: [phone]   Pharmacy: [phone]

## 2020-01-10 NOTE — PROGRESS NOTES
Subjective     Jeanmarie Mclean is a 59 year old male who presents to clinic today for the following health issues:    HPI   Hypertension Follow-up      Do you check your blood pressure regularly outside of the clinic? Yes     Are you following a low salt diet? Yes    Are your blood pressures ever more than 140 on the top number (systolic) OR more   than 90 on the bottom number (diastolic), for example 140/90? No 108/65      How many days per week do you miss taking your medication? 0      Patient Active Problem List   Diagnosis     Malignant neoplasm of prostate (H)     CARDIOVASCULAR SCREENING; LDL GOAL LESS THAN 130     GERD (gastroesophageal reflux disease)     Overweight (BMI 25.0-29.9)     Advance care planning     Fibromyalgia syndrome     Posttraumatic stress disorder     Low back pain     Inadequate material resources     Anxiety state     History of Asperger's syndrome     Pervasive developmental disorder, active     Depressive disorder     Delusional disorder (H)     TBI (traumatic brain injury) (H)     Insomnia     Chronic pain     Myalgia     male stress incontinence     Major depressive disorder, recurrent episode, moderate (H)     Low back pain without sciatica, unspecified back pain laterality, unspecified chronicity     Alcohol dependence in remission (H)     Hypertensive response to exercise     Past Surgical History:   Procedure Laterality Date     C HEP B VAC ADULT 3 DOSE IM      received all 3 vaccines     C REMV PROSTATE,RETROPUB,RADICAL  10/13/04    Dr. Muñoz ([place])     CYSTOSCOPY  10/98    for renal stones     HC KNEE SCOPE,MED/LAT MENISECTOMY  11    [place] medial ([place]     HERNIA REPAIR, INGUINAL RT/LT      [facility]       Social History     Tobacco Use     Smoking status: Former Smoker     Packs/day: 0.50     Years: 10.00     Pack years: 5.00     Types: Cigarettes     Last attempt to quit: 1997     Years since quittin.0     Smokeless tobacco: Never Used    Substance Use Topics     Alcohol use: Yes     Comment: hx alcoholism quit . [facility] dependency [facility] in      Family History   Problem Relation Age of Onset     Psychotic Disorder Son         autism     Prostate Cancer Father         40s     Cancer Father      Cancer Maternal Grandmother         lung     Glaucoma Maternal Grandmother      Eye Disorder Maternal Grandmother         glaucoma     Diabetes Mother          45     Blood Disease Sister         sickle trait     Hypertension Sister      Blood Disease Paternal Uncle         sickle     Blood Disease Paternal Aunt         sickle     Alzheimer Disease Paternal Grandfather         70s     Macular Degeneration Paternal Grandfather      Cerebrovascular Disease No family hx of      Thyroid Disease No family hx of      Myocardial Infarction No family hx of      C.A.D. No family hx of          Current Outpatient Medications   Medication Sig Dispense Refill     amLODIPine (NORVASC) 2.5 MG tablet Take 1 tablet (2.5 mg) by mouth daily 90 tablet 3     aspirin 81 MG tablet Take by mouth daily       Blood Pressure KIT Monitor blood pressure as needed. 1 kit 1     Cholecalciferol (VITAMIN D) 1000 UNIT capsule Take 1 capsule by mouth 2 times daily.       menthol (ICY HOT) 5 % PADS Apply 1 patch topically every 8 hours as needed for muscle soreness May cut to fit 30 patch 3     oxyCODONE IR (ROXICODONE) 5 MG tablet Take 5 mg by mouth       QUEtiapine (SEROQUEL) 200 MG tablet 100 mg daily as needed Take 100 mg by mouth daily. weening       sildenafil (VIAGRA) 100 MG tablet Take 1/4 - 1 tablet, as directed, 1-3 hours before intimacy. Maximum 1 dose per 24 hours. 10 tablet 5     bacitracin-neomycin-polymyxin (NEOSPORIN) 400-5-5000 external ointment Apply topically 2 times daily (Patient not taking: Reported on 2019) 28.35 g 0     Allergies   Allergen Reactions     Risperidone Other (See Comments)     Serve numbness      Effexor [Venlafaxine Hydrochloride]  "Other (See Comments)     Headache, Painful scrotum and drainage from the penis     Ambien Other (See Comments)     headaches     Ambien [Zolpidem Tartrate] Nausea     Buspirone Nausea     Dizziness, headache     Celexa [Citalopram] Other (See Comments)     Headache     Duloxetine Other (See Comments)     Headache  headaches     Septra [Bactrim] Itching     Seroquel [Quetiapine] Other (See Comments)     Headache, N, V     Sulfa Drugs Itching     Sulfamethoxazole-Trimethoprim      hives     Tramadol Nausea     Nausea and headache     Ultram [Tramadol Hcl] Nausea and Vomiting     Venlafaxine      Zolpidem      headaches     BP Readings from Last 3 Encounters:   01/10/20 110/78   12/11/19 124/76   11/27/19 125/81    Wt Readings from Last 3 Encounters:   01/10/20 73.9 kg (163 lb)   12/11/19 76.2 kg (168 lb)   09/13/19 73.9 kg (163 lb)               Reviewed and updated as needed this visit by Provider         Review of Systems   ROS COMP: Constitutional, HEENT, cardiovascular, pulmonary, GI, , musculoskeletal, neuro, skin, endocrine and psych systems are negative, except as otherwise noted.      Objective    /78 (BP Location: Left arm, Patient Position: Chair, Cuff Size: Adult Regular)   Pulse 56   Temp 98.6  F (37  C) (Oral)   Resp 16   Ht 1.626 m (5' 4\")   Wt 73.9 kg (163 lb)   SpO2 99%   BMI 27.98 kg/m    Body mass index is 27.98 kg/m .  Physical Exam   GENERAL: healthy, alert and no distress  NECK: no adenopathy, no asymmetry, masses, or scars and thyroid normal to palpation  RESP: lungs clear to auscultation - no rales, rhonchi or wheezes  CV: regular rate and rhythm, normal S1 S2, no S3 or S4, no murmur, click or rub, no peripheral edema and peripheral pulses strong  ABDOMEN: soft, nontender, no hepatosplenomegaly, no masses and bowel sounds normal  MS: no gross musculoskeletal defects noted, no edema    Diagnostic Test Results:  Labs reviewed in Epic        Assessment & Plan     (I10) Hypertensive " response to exercise  (primary encounter diagnosis)  Comment:   Plan: bp controlled with amlodipine. Continue. Reviewed low salt diet. RTC in 6 months for recheck with physical.           Work on weight loss  Regular exercise  See Patient Instructions    Return in about 6 months (around 7/10/2020) for Physical Exam.    Milton Iglesias MD, MD  [facility]

## 2020-01-11 ENCOUNTER — MYC MEDICAL ADVICE (OUTPATIENT)
Dept: FAMILY MEDICINE | Facility: CLINIC | Age: 60
End: 2020-01-11

## 2020-01-13 ENCOUNTER — MYC MEDICAL ADVICE (OUTPATIENT)
Dept: FAMILY MEDICINE | Facility: CLINIC | Age: 60
End: 2020-01-13

## 2020-01-13 ENCOUNTER — MYC MEDICAL ADVICE (OUTPATIENT)
Dept: PALLIATIVE MEDICINE | Facility: CLINIC | Age: 60
End: 2020-01-13

## 2020-01-13 NOTE — TELEPHONE ENCOUNTER
"Routing to provider to review and advise, see MyC message below.  Patient noting BP readings, are WNL, but unclear if \"healthy reading\" for this particular patient.    Courtney Francois RN  [facility]/ [facility]      "

## 2020-01-21 ENCOUNTER — TELEPHONE (OUTPATIENT)
Dept: FAMILY MEDICINE | Facility: CLINIC | Age: 60
End: 2020-01-21

## 2020-01-21 ENCOUNTER — TELEPHONE (OUTPATIENT)
Dept: PALLIATIVE MEDICINE | Facility: CLINIC | Age: 60
End: 2020-01-21

## 2020-01-21 DIAGNOSIS — M53.3 SI (SACROILIAC) JOINT DYSFUNCTION: Primary | ICD-10-CM

## 2020-01-21 NOTE — TELEPHONE ENCOUNTER
Reason for call:  Order   Order or referral being requested: Repeat SI Joint Injection  Reason for request: N/A  Date needed: as soon as possible  Has the patient been seen by the PCP for this problem? YES    Additional comments: Pt calling asking to schedule a repeat SI Joint Injection. Dr. Iglesias sent an order over for his last injection. Unsure if he needs a new order from Dr. Iglesias or if Dr. King can place the order. Instructed pt to call Dr. Iglesias's office and request a new order and I would follow up with our care team.    Phone number to reach patient:  Home number on file [phone] (home)    Best Time:  Anytime    Can we leave a detailed message on this number?  YES     Raya URBANO    [facility]

## 2020-01-21 NOTE — TELEPHONE ENCOUNTER
11/27/19 Procedure performed: bilateral SI joint injections  12/18/20 Procedure performed: bilateral SI joint injections    Patient is contacting Dr. Iglesias for an order for repeat SI joint injections.     SONNY GantN, RN  Care Coordinator  [facility] [place]

## 2020-01-21 NOTE — TELEPHONE ENCOUNTER
.Reason for Call:   orders      Detailed comments: Patient calling to request orders for SI joint injections in Feb/needs orders/ Dr Christine Subramanian;     Certain dates he cannot attend 2-11, 2-19 and 2-24;      Phone Number Patient can be reached at: Home number on file [phone] (home)    Best Time: any    Can we leave a detailed message on this number? YES    Call taken on 1/21/2020 at 12:24 PM by Marisa Vernon

## 2020-01-22 ENCOUNTER — MYC MEDICAL ADVICE (OUTPATIENT)
Dept: FAMILY MEDICINE | Facility: CLINIC | Age: 60
End: 2020-01-22

## 2020-01-27 ENCOUNTER — MYC MEDICAL ADVICE (OUTPATIENT)
Dept: FAMILY MEDICINE | Facility: CLINIC | Age: 60
End: 2020-01-27

## 2020-01-27 ENCOUNTER — MYC MEDICAL ADVICE (OUTPATIENT)
Dept: PALLIATIVE MEDICINE | Facility: CLINIC | Age: 60
End: 2020-01-27

## 2020-01-28 NOTE — TELEPHONE ENCOUNTER
Thanks for the update.  I see you are talking to your primary about the problems.  Reach out to [phone] to reschedule at your convenience.    Cally King MD  [facility]

## 2020-01-28 NOTE — TELEPHONE ENCOUNTER
From: Jeanmarie Mclean      Created: 1/27/2020 9:23 PM        Dr. King,    I'm having adverse reations to the amlodipine besylate. I'm having shortness of breath and stomach pain. I'm concerned with having further adverse reaction if I continue taking this medication. And I'm especially worried about how this drug will interact with my scheduled si joint injections  appointment.    I'm cancelling my 2-5-2020 for SI joint injections until I wean off of this medication. I will reschedule once I have weaned off of this medication. Thank you for your patience with me.

## 2020-01-28 NOTE — TELEPHONE ENCOUNTER
See other mychart message from yesterday, patient had sent 2 messages about SE experiencing from taking Amlodipine.  Dr. Iglesias responded in other message this morning, advising for patient to stop taking medication.  Patient read provider response and instruction this morning as well.  No further outreach to be done at this time.      Courtney Francois RN  [facility]/ [facility]

## 2020-01-30 ENCOUNTER — TRANSFERRED RECORDS (OUTPATIENT)
Dept: HEALTH INFORMATION MANAGEMENT | Facility: CLINIC | Age: 60
End: 2020-01-30

## 2020-01-31 ENCOUNTER — MYC MEDICAL ADVICE (OUTPATIENT)
Dept: FAMILY MEDICINE | Facility: CLINIC | Age: 60
End: 2020-01-31

## 2020-02-18 ENCOUNTER — MYC MEDICAL ADVICE (OUTPATIENT)
Dept: FAMILY MEDICINE | Facility: CLINIC | Age: 60
End: 2020-02-18

## 2020-02-22 NOTE — TELEPHONE ENCOUNTER
Reason for Disposition    Previously diagnosed hernia (all triage questions negative)    Additional Information    Negative: [1] Rash or color change of scrotum BUT [2] no swelling or pain    Inguinal hernia previously diagnosed by a physician    Negative: [1] Swelling of scrotum AND [2] has not previously been diagnosed with a hernia    Negative: SEVERE abdominal pain    Negative: Hernia is painful or tender to touch    Negative: [1] Vomiting AND [2] can't reduce the hernia    Negative: [1] Vomiting AND [2] abdomen looks much more swollen than usual    Negative: [1] Swollen lump in groin AND [2] pulsating (like heartbeat)    Negative: Patient sounds very sick or weak to the triager    Negative: [1] Constant abdominal pain AND [2] present > 2 hours  (NO pain or tenderness of hernia)    Negative: Can't reduce the hernia (NO pain, local tenderness, or vomiting)    Negative: [1] New-onset hernia suspected (reducible bulge in groin or abdomen; non-tender) AND [2] NO pain or vomiting    Protocols used: HERNIA-ADULT-, SCROTAL PAIN-ADULT-    Jeanmarie calls and says that he has pain in his scrotum. Pt. Says that he had a CT scan done and is supposed to have a cystoscopy done on 9/24/18. Pt. Says that he ate some frozen yogurt and then his lower abdomen began hurting. Pt. Says that he is lactose-intolerant.   
No

## 2020-02-27 ENCOUNTER — ANCILLARY PROCEDURE (OUTPATIENT)
Dept: RADIOLOGY | Facility: CLINIC | Age: 60
End: 2020-02-27
Attending: PSYCHIATRY & NEUROLOGY
Payer: COMMERCIAL

## 2020-02-27 ENCOUNTER — RADIOLOGY INJECTION OFFICE VISIT (OUTPATIENT)
Dept: PALLIATIVE MEDICINE | Facility: CLINIC | Age: 60
End: 2020-02-27
Payer: COMMERCIAL

## 2020-02-27 VITALS
DIASTOLIC BLOOD PRESSURE: 93 MMHG | RESPIRATION RATE: 16 BRPM | OXYGEN SATURATION: 100 % | HEART RATE: 54 BPM | SYSTOLIC BLOOD PRESSURE: 136 MMHG

## 2020-02-27 DIAGNOSIS — M53.3 SACROILIAC JOINT DYSFUNCTION: ICD-10-CM

## 2020-02-27 DIAGNOSIS — M53.3 SI (SACROILIAC) JOINT DYSFUNCTION: Primary | ICD-10-CM

## 2020-02-27 PROCEDURE — 27096 INJECT SACROILIAC JOINT: CPT | Mod: 50 | Performed by: PSYCHIATRY & NEUROLOGY

## 2020-02-27 ASSESSMENT — PAIN SCALES - GENERAL: PAINLEVEL: EXTREME PAIN (8)

## 2020-02-27 NOTE — PROGRESS NOTES
Pre procedure Diagnosis: SI joint dysfunction    Post procedure Diagnosis: Same  Procedure performed: bilateral SI joint injections  Anesthesia: none  Complications: none  Operators: Cally King MD    Indications:   Jeanmarie Mclean is a 60 year old male, seen by me for trigger points at the pain clinic. They have a history of pain across the low back and buttocks. Exam shows tenderness of the lumbosacral region and bilateral SI joints.They have tried conservative treatment including physical therapy and medications. He uses a TENS unit.  SI joint injections help for ~3 months.  He also gets trigger point injections.    Options/alternatives, benefits and risks were discussed with the patient including bleeding, infection, tissue trauma, exposure to radiation, reaction to medications including seizure, nerve injury, weakness, and numbness.  Questions were answered to his satisfaction and he agrees to proceed. Voluntary informed consent was obtained and signed.     Vitals were reviewed: Yes  Allergies were reviewed:  Yes  Medications were reviewed:  Yes  Pre-procedure pain score: 8/10    Procedure:  After getting informed consent, patient was brought into the procedure suite and was placed in a prone position on the procedure table.   A Pause for the Cause was performed.  Patient was prepped and draped in sterile fashion.     After identifying the bilateral SI joint, the C-arm was rotated to a obliquely to obtain the best view of the inferior angle of the joint.  A total of 4ml of Lidocaine 1% was used to anesthetize the skin at a skin entry site coaxial with the fluoroscopy beam at this location.  A 22gauge 3.5 inch needle was advanced under intermittent fluoroscopy until it was felt to enter the SI joint.    A total of 4.5ml of Omnipaque-300 was injected, confirming appropriate position, with spread into the intraarticular space, with no intravascular uptake noted.  5.5ml was wasted. Location was verified in lateral  view.    3 ml of 0.2% ropivacaine with 80mg of kenalog was injected divided between the two sides.  The needle was flushed with lidocaine and removed.     Hemostasis was achieved, the area was cleaned, and bandaids were placed when appropriate.    The patient tolerated the procedure well, and was taken to the recovery room.    Images were saved to PACS.    Post-procedure pain score: 3/10  Follow-up includes:   -f/u with referring provider    Cally King MD  [facility]

## 2020-02-27 NOTE — NURSING NOTE
Discharge Information    IV Discontiued Time:  NA    Amount of Fluid Infused:  NA    Discharge Criteria = When patient returns to baseline or as per MD order    Consciousness:  Pt is fully awake    Circulation:  BP +/- 20% of pre-procedure level    Respiration:  Patient is able to breathe deeply    O2 Sat:  Patient is able to maintain O2 Sat >92% on room air    Activity:  Moves 4 extremities on command    Ambulation:  Patient is able to stand and walk or stand and pivot into wheelchair    Dressing:  Clean/dry or No Dressing    Notes:   Discharge instructions and AVS given to patient    Patient meets criteria for discharge?  YES    Admitted to PCU?  No    Responsible adult present to accompany patient home?  Yes    Signature/Title:    Enrique Rizo RN  RN Care Coordinator  [facility] [place]

## 2020-02-27 NOTE — NURSING NOTE
Pre-procedure Intake    Have you been fasting? NA    If yes, for how long? No     Are you taking a prescribed blood thinner such as coumadin, Plavix, Xarelto?    No    If yes, when did you take your last dose? No     Do you take aspirin?  No    If cervical procedure, have you held aspirin for 6 days?   NA    Do you have any allergies to contrast dye, iodine, steroid and/or numbing medications?  NO    Are you currently taking antibiotics or have an active infection?  NO    Have you had a fever/elevated temperature within the past week? NO    Are you currently taking oral steroids? NO    Do you have a ? Yes       Are you pregnant or breastfeeding?  Not applicable     Are the vital signs normal?  Yes

## 2020-02-27 NOTE — PATIENT INSTRUCTIONS
Schedule MRI-   [facility] (including [place]): [phone]       Follow up with me for 30 min.    [facility]   Procedure Discharge Instructions    Today you saw:  Dr. Angélica King      You had an:  sacroiliac joint injection       Medications used:  Lidocaine Omnipaque  Ropivicaine   Kenalog                  If you were holding your blood thinning medication, please restart taking it: N/A    Be cautious when walking. Numbness and/or weakness in the lower extremities may occur for up to 6-8 hours after the procedure due to effect of the local anesthetic    Do not drive for 6 hours. The effect of the local anesthetic could slow your reflexes.     You may resume your regular activities after 24 hours    Avoid strenuous activity for the first 24 hours    You may shower, however avoid swimming, tub baths or hot tubs for 24 hours following your procedure    You may have a mild to moderate increase in pain for several days following the injection.    It may take up to 14 days for the steroid medication to start working although you may feel the effect as early as a few days after the procedure.       You may use ice packs for 10-15 minutes, 3 to 4 times a day at the injection site for comfort    Do not use heat to painful areas for 6 to 8 hours. This will give the local anesthetic time to wear off and prevent you from accidentally burning your skin.     Unless you have been directed to avoid the use of anti-inflammatory medications (NSAIDS), you may use medications such as ibuprofen, Aleve or Tylenol for pain control if needed.     If you were fasting, you may resume your normal diet and medications after the procedure    If you have diabetes, check your blood sugar more frequently than usual as your blood sugar may be higher than normal for 10-14 days following a steroid injection. Contact your doctor who manages your diabetes if your blood sugar is higher than  usual    Possible side effects of steroids that you may experience include flushing, elevated blood pressure, increased appetite, mild headaches and restlessness.  All of these symptoms will get better with time.    If you experience any of the following, call the Pain Clinic during work hours (Mon-Friday 8-4:30 pm) at [phone] or the Provider Line after hours at [phone]:  -Fever over 100 degree F  -Swelling, bleeding, redness, drainage, warmth at the injection site  -Progressive weakness or numbness in your legs   -Unusual new onset of pain that is not improving

## 2020-03-19 ENCOUNTER — NURSE TRIAGE (OUTPATIENT)
Dept: NURSING | Facility: CLINIC | Age: 60
End: 2020-03-19

## 2020-03-19 ENCOUNTER — MYC MEDICAL ADVICE (OUTPATIENT)
Dept: FAMILY MEDICINE | Facility: CLINIC | Age: 60
End: 2020-03-19

## 2020-03-20 NOTE — TELEPHONE ENCOUNTER
Cough started 2 days ago (is suspicious his girlfriend has covid) - did go away and came back tonight. Feels warm but doesn't have thermometer.  Feels like his throat his itchy - something is irritating his throat which is making him cough. Feels a little bit weak.     Phi Mclean is his son - he is severely autistic.     Per protocol advised [email or web address] evaluation and symptom management.    Petty Wiseman RN on 3/19/2020 at 9:06 PM    Reason for Disposition    [1] Fever or feeling feverish AND [2] within 14 Days of CORONAVIRUS EXPOSURE    Additional Information    Negative: Severe difficulty breathing (e.g., struggling for each breath, speak in single words, bluish lips)    Negative: Sounds like a life-threatening emergency to the triager    Negative: [1] Difficulty breathing (shortness of breath) occurs AND [2] onset > 14 days after CORONAVIRUS EXPOSURE (Close Contact)    Negative: [1] Dry cough occurs AND [2] onset > 14 days after CORONAVIRUS EXPOSURE    Negative: [1] Wet cough (i.e., white-yellow, yellow, green, or nella colored sputum) AND [2] onset > 14 days after CORONAVIRUS EXPOSURE    Negative: [1] Common cold symptoms AND [2] onset > 14 days after CORONAVIRUS EXPOSURE    Negative: [1] Difficulty breathing occurs AND [2] within 14 days of CORONAVIRUS EXPOSURE    Negative: Patient sounds very sick or weak to the triager    Protocols used: CORONAVIRUS (2019-NCOV) EXPOSURE-A-

## 2020-03-23 DIAGNOSIS — C61 MALIGNANT NEOPLASM OF PROSTATE (H): ICD-10-CM

## 2020-03-23 LAB — PSA SERPL-MCNC: 0.03 UG/L (ref 0–4)

## 2020-03-23 PROCEDURE — 84153 ASSAY OF PSA TOTAL: CPT | Performed by: UROLOGY

## 2020-03-23 PROCEDURE — 36415 COLL VENOUS BLD VENIPUNCTURE: CPT | Performed by: UROLOGY

## 2020-03-24 ENCOUNTER — TELEPHONE (OUTPATIENT)
Dept: FAMILY MEDICINE | Facility: CLINIC | Age: 60
End: 2020-03-24

## 2020-03-24 NOTE — TELEPHONE ENCOUNTER
Called and discussed PSA results with patient    PSA  0.03   0 - 4  ug/L  Final  03/23/2020  8:07 AM       Notes recorded by Devon Miles MD on 3/23/2020 at 4:02 PM CDT   Please CC patient of results.   Recheck psa in one year     This information was relayed to patient and agrees to have it rechecked in a year. Patient states that he was having some difficulties with mychart and just wanted verbal clarification.     No further questions at this time.    Korin Castellon RN  [facility]

## 2020-03-24 NOTE — TELEPHONE ENCOUNTER
Reason for Call:  Other call back    Detailed comments: Pt would like to request a call back as he doesn't understand his lab results that were put onto My Chart. Thank you.    Phone Number Patient can be reached at: Home number on file [phone] (home)    Best Time: Any    Can we leave a detailed message on this number? YES    Call taken on 3/24/2020 at 4:10 PM by Kathryn iVllatoro

## 2020-03-25 ENCOUNTER — TELEPHONE (OUTPATIENT)
Dept: UROLOGY | Facility: CLINIC | Age: 60
End: 2020-03-25

## 2020-03-25 ENCOUNTER — MYC MEDICAL ADVICE (OUTPATIENT)
Dept: UROLOGY | Facility: CLINIC | Age: 60
End: 2020-03-25

## 2020-03-25 ENCOUNTER — MYC MEDICAL ADVICE (OUTPATIENT)
Dept: FAMILY MEDICINE | Facility: CLINIC | Age: 60
End: 2020-03-25

## 2020-03-25 NOTE — TELEPHONE ENCOUNTER
Reason for call:  Other   Patient called regarding (reason for call): appointment  Additional comments:  Patient calling. He is having scrotum pain. Please call and advise if he can be seen.     Phone number to reach patient:  Home number on file [phone] (home)    Best Time:   Any     Can we leave a detailed message on this number?  YES    Travel screening: Not Applicable

## 2020-03-26 NOTE — TELEPHONE ENCOUNTER
Advised patient to be seen in the ER per Dr. Miles.  Patient read my chart message.   Kenyatta Ramos RN

## 2020-03-27 ENCOUNTER — MYC MEDICAL ADVICE (OUTPATIENT)
Dept: FAMILY MEDICINE | Facility: CLINIC | Age: 60
End: 2020-03-27

## 2020-03-28 ENCOUNTER — MYC MEDICAL ADVICE (OUTPATIENT)
Dept: FAMILY MEDICINE | Facility: CLINIC | Age: 60
End: 2020-03-28

## 2020-05-31 ENCOUNTER — MYC MEDICAL ADVICE (OUTPATIENT)
Dept: FAMILY MEDICINE | Facility: CLINIC | Age: 60
End: 2020-05-31

## 2020-06-03 ENCOUNTER — MYC MEDICAL ADVICE (OUTPATIENT)
Dept: FAMILY MEDICINE | Facility: CLINIC | Age: 60
End: 2020-06-03

## 2020-07-10 ENCOUNTER — MYC MEDICAL ADVICE (OUTPATIENT)
Dept: FAMILY MEDICINE | Facility: CLINIC | Age: 60
End: 2020-07-10

## 2020-07-10 ENCOUNTER — TELEPHONE (OUTPATIENT)
Dept: FAMILY MEDICINE | Facility: CLINIC | Age: 60
End: 2020-07-10

## 2020-07-10 DIAGNOSIS — M53.3 SI (SACROILIAC) JOINT DYSFUNCTION: Primary | ICD-10-CM

## 2020-07-10 NOTE — TELEPHONE ENCOUNTER
Routing to provider to advise; patient also called about this today as well.     Adán Deshpande RN, BSN, PHN

## 2020-07-10 NOTE — TELEPHONE ENCOUNTER
Patient needs orders for SI joint injections from  [facility] so can you update that in epic    Thank you      Call Jeanmarie to advise when done so he can schedule  [phone] ok to leave message

## 2020-07-10 NOTE — TELEPHONE ENCOUNTER
Please see below message. Does patient need appointment? Appears maybe this was ordered in 3/2/20?     Ximena Iniguez RN

## 2020-07-15 ENCOUNTER — TELEPHONE (OUTPATIENT)
Dept: PALLIATIVE MEDICINE | Facility: CLINIC | Age: 60
End: 2020-07-15

## 2020-07-15 NOTE — TELEPHONE ENCOUNTER
Informed pt of below. Instructed pt to have son bring a mask.    Raya URBANO    [facility]     DISPLAY PLAN FREE TEXT

## 2020-07-15 NOTE — TELEPHONE ENCOUNTER
"Screening Questions for Radiology Injections:    Injection to be done at which interventional clinic site? [facility]    Instruct patient to arrive as directed prior to the scheduled appointment time:    [place] minutes before      [place]: 30 minutes before; if IV needed 1 hour before     Dr. Masterson-no IV needed for Cervical JOSE R; please instruct to arrive 30\" early    Procedure ordered by Dr. Iglesias    Procedure ordered? SI Joint Injection    As a reminder, receiving steroids can decrease your body's ability to fight infection.   Would you still like to move forward with scheduling the injection?  Yes      Transforaminal Cervical JOSE R - no pain provider currently performing    What insurance would patient like us to bill for this procedure? [employer]      Worker's comp or MVA (motor vehicle accident) -Any injection DO NOT SCHEDULE and route to Kym Phillip.      Insurance [employer] insurance - For SI joint injections, DO NOT SCHEDULE and route Kym Fisher.       [employer] - Any injection besides hip/shoulder/knee joint DO NOT SCHEDULE and route to Kym Phillip. She will obtain PA and call pt back to schedule procedure or notify pt of denial.       HP [employer]-Route to [place] for review      **BCBS- ALL need to be routed to [place] for review if a PA is needed**      IF SCHEDULING IN [place] AND NEEDS A PA, IT IS OKAY TO SCHEDULE. [place] HANDLES THEIR OWN PA'S AFTER THE PATIENT IS SCHEDULED. PLEASE SCHEDULE AT LEAST 1 WEEK OUT SO A PA CAN BE OBTAINED.    Any chance of pregnancy? Not Applicable   If YES, do NOT schedule and route to RN pool    Is an  needed? No     Patient has a drive home? (mandatory) YES: Informed    Is patient taking any blood thinners (i.e. plavix, coumadin, jantoven, warfarin, heparin, pradaxa or dabigatran, etc)? No   If hold needed, do NOT schedule, route to RN pool     Is patient taking any aspirin products (includes Excedrin and Fiorinal)? No     If more than " 325mg/day, OK to schedule; Instruct pt to decrease to less than 325 mg for 7 days AND route to RN pool    For CERVICAL procedures, hold all aspirin products for 6 days.     Tell pt that if aspirin product is not held for 6 days, the procedure WILL BE cancelled.      Does the patient have a bleeding or clotting disorder? No     If YES, okay to schedule AND route to RN nurse pool    For any patients with platelet count <100, must be forwarded to provider    Is patient diabetic?  No  If YES, instruct them to bring their glucometer.    Does patient have an active infection or treated for one within the past week? No     Is patient currently taking any antibiotics?  No     For patients on chronic, preventative, or prophylactic antibiotics, procedures may be scheduled.     For patients on antibiotics for active or recent infection:antibiotic course must have been completed for 4 days    Is patient currently taking any steroid medications? (i.e. Prednisone, Medrol)  No     For patients on steroid medications, course must have been completed for 4 days    Is patient actively being treated for cancer or immunocompromised? No  If YES, do NOT schedule and route to RN pool     Are you able to get on and off an exam table with minimal or no assistance? Yes  If NO, do NOT schedule and route to RN pool    Are you able to roll over and lay on your stomach with minimal or no assistance? Yes  If NO, do NOT schedule and route to RN pool     Any allergies to contrast dye, iodine, shellfish, or numbing and steroid medications? No  If YES, route to RN pool AND add allergy information to appointment notes    Allergies: Risperidone; Effexor [venlafaxine hydrochloride]; Ambien; Ambien [zolpidem tartrate]; Buspirone; Celexa [citalopram]; Duloxetine; Septra [bactrim]; Seroquel [quetiapine]; Sulfa drugs; Sulfamethoxazole-trimethoprim; Tramadol; Ultram [tramadol hcl]; Venlafaxine; and Zolpidem      Has the patient had a flu shot or any other  vaccinations within 7 days before or after the procedure.  No     Does patient have an MRI/CT?  Not Applicable  Check Procedure Scheduling Grid to see if required.      Was the MRI done within the last 3 years?  NA    If yes, where was the MRI done i.e.[facility], [facility], [place], [facility] etc?       If no, do not schedule and route to RN pool    If MRI was not done at [place], [facility] or [facility] do NOT schedule and route to RN pool.      If pt has an imaging disc, the injection MAY be scheduled but pt has to bring disc to appt.     If they show up without the disc the injection cannot be done    Procedure Specific Instructions:      If celiac plexus block, informed patient NPO for 6 hours and that it is okay to take medications with sips of water, especially blood pressure medications  Not Applicable         If this is for a cervical procedure, informed patient that aspirin needs to be held for 6 days.   Not Applicable      If IV needed:    Do not schedule procedures requiring IV placement in the first appointment of the day or first appointment after lunch. Do NOT schedule at 0745, 0815 or 1245.     Instructed pt to arrive 30 minutes early for IV start if required. (Check Procedure Scheduling Grid)  Not Applicable    Reminders:      If you are started on any steroids or antibiotics between now and your appointment, you must contact us because the procedure may need to be cancelled.  Yes      For all procedures except radiofrequency ablations (RFAs) and spinal cord stimulator (SCS) trials, informed patient:    IV sedation is not provided for this procedure.  If you feel that an oral anti-anxiety medication is needed, you can discuss this further with your referring provider or primary care provider.  The Pain Clinic provider will discuss specifics of what the procedure includes at your appointment.  Most procedures last 10-20 minutes.  We use numbing medications to help with any discomfort during  the procedure.  Not Applicable      For patients 85 or older we recommend having an adult stay w/ them for the remainder of the day.       Does the patient have any questions?  NO  Raya Pruitt  [facility]

## 2020-07-15 NOTE — TELEPHONE ENCOUNTER
Spoke with RN  We can accommodate this request.    Cally King MD  [facility]

## 2020-07-15 NOTE — TELEPHONE ENCOUNTER
Pt scheduled 8/10 and pre-screened. Pt states his son usually comes with him because he has autism and has a tendency to elope. Pt is wondering if this can be accommodated. Pt is scheduled 8/10.    Raya URBANO    [facility]

## 2020-07-15 NOTE — TELEPHONE ENCOUNTER
Spoke to Jeanmarie and he reports that this is standard for him when he has injections. His son waits in a separate exam room for him. Routing to Dr King to review if we can accomadate this.     SONNY GantN, RN  Care Coordinator  [facility] [place]

## 2020-08-06 ENCOUNTER — TELEPHONE (OUTPATIENT)
Dept: FAMILY MEDICINE | Facility: CLINIC | Age: 60
End: 2020-08-06

## 2020-08-06 DIAGNOSIS — I10 HYPERTENSIVE RESPONSE TO EXERCISE: Primary | ICD-10-CM

## 2020-08-06 DIAGNOSIS — Z12.5 SCREENING FOR PROSTATE CANCER: ICD-10-CM

## 2020-08-06 DIAGNOSIS — Z13.6 CARDIOVASCULAR SCREENING; LDL GOAL LESS THAN 130: ICD-10-CM

## 2020-08-06 DIAGNOSIS — Z13.1 SCREENING FOR DIABETES MELLITUS: ICD-10-CM

## 2020-08-06 NOTE — TELEPHONE ENCOUNTER
Please let patient know that future, fasting labs has been ordered for him and his son.    Mliton Iglesias MD

## 2020-08-06 NOTE — TELEPHONE ENCOUNTER
Reason for Call:  Other call back    Detailed comments: PT wondering if the provider can put in a order for labs for both him and his son before their physical appt 09/09/2020.    Phone Number Patient can be reached at: Home number on file [phone] (home)    Best Time: Anytime.    Can we leave a detailed message on this number? YES    Call taken on 8/6/2020 at 11:11 AM by Juhi Ferro

## 2020-08-09 NOTE — PROGRESS NOTES
Pre procedure Diagnosis: SI joint dysfunction    Post procedure Diagnosis: Same  Procedure performed: bilateral SI joint injections  Anesthesia: none  Complications: none  Operators: Cally King MD and Shay Esqueda MD     Indications:   Jeanmarie Mclean is a 60 year old male, sent by PCP for repeat SI joint injections. They have a history of pain across the low back and buttocks. Exam shows tenderness of the lumbosacral region and bilateral SI joints. They have tried conservative treatment including physical therapy and medications.  SI joint injections help for ~3 months.  He also gets trigger point injections.    Options/alternatives, benefits and risks were discussed with the patient including bleeding, infection, tissue trauma, exposure to radiation, reaction to medications including seizure, nerve injury, weakness, and numbness.  Questions were answered to his satisfaction and he agrees to proceed. Voluntary informed consent was obtained and signed.     Vitals were reviewed: Yes  Allergies were reviewed:  Yes  Medications were reviewed:  Yes  Pre-procedure pain score: 6/10    Procedure:  After getting informed consent, patient was brought into the procedure suite and was placed in a prone position on the procedure table.   A Pause for the Cause was performed.  Patient was prepped and draped in sterile fashion.     After identifying the bilateral SI joint, the C-arm was rotated to a obliquely to obtain the best view of the inferior angle of the joint.  A total of 4ml of Lidocaine 1% was used to anesthetize the skin at a skin entry site coaxial with the fluoroscopy beam at this location.  A 22gauge 3.5 inch needle was advanced under intermittent fluoroscopy until it was felt to enter the SI joint.    A total of 3ml of Omnipaque-300 was injected, confirming appropriate position, with spread into the intraarticular space, with no intravascular uptake noted.  7ml was wasted. Location was verified in lateral  view.    3 ml of 0.2% ropivacaine with 80mg of kenalog was injected divided between the two sides.  The needle was flushed with lidocaine and removed.     Hemostasis was achieved, the area was cleaned, and bandaids were placed when appropriate.    The patient tolerated the procedure well, and was taken to the recovery room.    Images were saved to PACS.    Post-procedure pain score: 7/10  Follow-up includes:   -f/u with referring provider    Cally King MD  [facility]

## 2020-08-10 ENCOUNTER — ANCILLARY PROCEDURE (OUTPATIENT)
Dept: RADIOLOGY | Facility: CLINIC | Age: 60
End: 2020-08-10
Attending: PSYCHIATRY & NEUROLOGY
Payer: COMMERCIAL

## 2020-08-10 ENCOUNTER — RADIOLOGY INJECTION OFFICE VISIT (OUTPATIENT)
Dept: PALLIATIVE MEDICINE | Facility: CLINIC | Age: 60
End: 2020-08-10
Payer: COMMERCIAL

## 2020-08-10 VITALS
OXYGEN SATURATION: 98 % | DIASTOLIC BLOOD PRESSURE: 81 MMHG | HEART RATE: 56 BPM | SYSTOLIC BLOOD PRESSURE: 119 MMHG | RESPIRATION RATE: 16 BRPM

## 2020-08-10 DIAGNOSIS — M53.3 SI (SACROILIAC) JOINT DYSFUNCTION: ICD-10-CM

## 2020-08-10 DIAGNOSIS — M53.3 SI (SACROILIAC) JOINT DYSFUNCTION: Primary | ICD-10-CM

## 2020-08-10 PROCEDURE — 27096 INJECT SACROILIAC JOINT: CPT | Mod: 50 | Performed by: PSYCHIATRY & NEUROLOGY

## 2020-08-10 ASSESSMENT — PAIN SCALES - GENERAL: PAINLEVEL: SEVERE PAIN (6)

## 2020-08-10 NOTE — NURSING NOTE
Pre-procedure Intake    Have you been fasting? NA    If yes, for how long? NA    Are you taking a prescribed blood thinner such as coumadin, Plavix, Xarelto?    No    If yes, when did you take your last dose? NA    Do you take aspirin?  No    If cervical procedure, have you held aspirin for 6 days?   NA    Do you have any allergies to contrast dye, iodine, steroid and/or numbing medications?  NO    Are you currently taking antibiotics or have an active infection?  NO    Have you had a fever/elevated temperature within the past week? NO    Are you currently taking oral steroids? NO    Do you have a ? Yes       Are you pregnant or breastfeeding?  Not Applicable    Are the vital signs normal?  Yes      Emma Breaux CMA ([facility])

## 2020-08-10 NOTE — PATIENT INSTRUCTIONS
[facility]   Procedure Discharge Instructions    Today you saw:    Dr. Angélica King      You had an:  sacroiliac joint injection       Medications used:  Lidocaine   Dexamethasone Omnipaque  Ropivicaine              If you were holding your blood thinning medication, please restart taking it: N/A    Be cautious when walking. Numbness and/or weakness in the lower extremities may occur for up to 6-8 hours after the procedure due to effect of the local anesthetic    Do not drive for 6 hours. The effect of the local anesthetic could slow your reflexes.     You may resume your regular activities after 24 hours    Avoid strenuous activity for the first 24 hours    You may shower, however avoid swimming, tub baths or hot tubs for 24 hours following your procedure    You may have a mild to moderate increase in pain for several days following the injection.    It may take up to 14 days for the steroid medication to start working although you may feel the effect as early as a few days after the procedure.       You may use ice packs for 10-15 minutes, 3 to 4 times a day at the injection site for comfort    Do not use heat to painful areas for 6 to 8 hours. This will give the local anesthetic time to wear off and prevent you from accidentally burning your skin.     Unless you have been directed to avoid the use of anti-inflammatory medications (NSAIDS), you may use medications such as ibuprofen, Aleve or Tylenol for pain control if needed.     Possible side effects of steroids that you may experience include flushing, elevated blood pressure, increased appetite, mild headaches and restlessness.  All of these symptoms will get better with time.    If you experience any of the following, call the Pain Clinic during work hours (Mon-Friday 8-4:30 pm) at [phone] or the Provider Line after hours at [phone]:  -Fever over 100 degree F  -Swelling, bleeding, redness, drainage, warmth at the  injection site  -Progressive weakness or numbness in your legs  -Unusual new onset of pain that is not improving

## 2020-08-10 NOTE — NURSING NOTE
Discharge Information    IV Discontiued Time:  NA    Amount of Fluid Infused:  NA    Discharge Criteria = When patient returns to baseline or as per MD order    Consciousness:  Pt is fully awake    Circulation:  BP +/- 20% of pre-procedure level    Respiration:  Patient is able to breathe deeply    O2 Sat:  Patient is able to maintain O2 Sat >92% on room air    Activity:  Moves 4 extremities on command    Ambulation:  Patient is able to stand and walk or stand and pivot into wheelchair    Dressing:  Clean/dry or No Dressing    Notes:   Discharge instructions and AVS given to patient    Patient meets criteria for discharge?  YES    Admitted to PCU?  No    Responsible adult present to accompany patient home?  Yes    Signature/Title:    Enrique Rizo RN  RN Care Coordinator  [facility] [place]

## 2020-08-11 ENCOUNTER — TELEPHONE (OUTPATIENT)
Dept: FAMILY MEDICINE | Facility: CLINIC | Age: 60
End: 2020-08-11

## 2020-08-11 NOTE — TELEPHONE ENCOUNTER
Reason for Call: Request for an order or referral:    Order or referral being requested: Orders    Date needed: as soon as possible    Has the patient been seen by the PCP for this problem? NO    Additional comments: Reason for Call:  Other call back    Detailed comments: Pt calling and he was advised from [facility] that he would have to get in touch with his Provider to put in an order for a Serology test.  He would like to know if he could have that Serology teset done     Phone Number Patient can be reached at: Home number on file [phone] (home)    Best Time: anytime    Can we leave a detailed message on this number? YES    Call taken on 8/11/2020 at 8:39 AM by Bora Kim

## 2020-08-13 DIAGNOSIS — R05.9 COUGH: ICD-10-CM

## 2020-08-13 RX ORDER — CODEINE PHOSPHATE AND GUAIFENESIN 10; 100 MG/5ML; MG/5ML
SOLUTION ORAL
Qty: 236 ML | Refills: 1 | Status: SHIPPED | OUTPATIENT
Start: 2020-08-13 | End: 2022-11-29

## 2020-08-13 NOTE — TELEPHONE ENCOUNTER
Requesting discontinued med:    guaiFENesin-codeine (ROBITUSSIN AC) 100-10 MG/5ML solution (Discontinued)

## 2020-08-13 NOTE — TELEPHONE ENCOUNTER
Routing refill request to provider for review/approval because:    Drug not active on patient's medication list    Ximena Iniguez RN      [facility]

## 2020-08-14 DIAGNOSIS — I10 HYPERTENSIVE RESPONSE TO EXERCISE: ICD-10-CM

## 2020-08-14 DIAGNOSIS — Z12.5 SCREENING FOR PROSTATE CANCER: ICD-10-CM

## 2020-08-14 DIAGNOSIS — Z13.6 CARDIOVASCULAR SCREENING; LDL GOAL LESS THAN 130: ICD-10-CM

## 2020-08-14 DIAGNOSIS — Z13.1 SCREENING FOR DIABETES MELLITUS: ICD-10-CM

## 2020-08-14 LAB
ALBUMIN SERPL-MCNC: 4.2 G/DL (ref 3.4–5)
ALP SERPL-CCNC: 79 U/L (ref 40–150)
ALT SERPL W P-5'-P-CCNC: 32 U/L (ref 0–70)
ANION GAP SERPL CALCULATED.3IONS-SCNC: 6 MMOL/L (ref 3–14)
AST SERPL W P-5'-P-CCNC: 18 U/L (ref 0–45)
BILIRUB SERPL-MCNC: 1.2 MG/DL (ref 0.2–1.3)
BUN SERPL-MCNC: 13 MG/DL (ref 7–30)
CALCIUM SERPL-MCNC: 9.2 MG/DL (ref 8.5–10.1)
CHLORIDE SERPL-SCNC: 106 MMOL/L (ref 94–109)
CHOLEST SERPL-MCNC: 200 MG/DL
CO2 SERPL-SCNC: 28 MMOL/L (ref 20–32)
CREAT SERPL-MCNC: 0.88 MG/DL (ref 0.66–1.25)
CREAT UR-MCNC: 383 MG/DL
GFR SERPL CREATININE-BSD FRML MDRD: >90 ML/MIN/{1.73_M2}
GLUCOSE SERPL-MCNC: 109 MG/DL (ref 70–99)
HDLC SERPL-MCNC: 69 MG/DL
LDLC SERPL CALC-MCNC: 114 MG/DL
MICROALBUMIN UR-MCNC: 25 MG/L
MICROALBUMIN/CREAT UR: 6.48 MG/G CR (ref 0–17)
NONHDLC SERPL-MCNC: 131 MG/DL
POTASSIUM SERPL-SCNC: 3.8 MMOL/L (ref 3.4–5.3)
PROT SERPL-MCNC: 8 G/DL (ref 6.8–8.8)
PSA SERPL-ACNC: 0.02 UG/L (ref 0–4)
SODIUM SERPL-SCNC: 140 MMOL/L (ref 133–144)
TRIGL SERPL-MCNC: 84 MG/DL

## 2020-08-14 PROCEDURE — G0103 PSA SCREENING: HCPCS | Performed by: FAMILY MEDICINE

## 2020-08-14 PROCEDURE — 80053 COMPREHEN METABOLIC PANEL: CPT | Performed by: FAMILY MEDICINE

## 2020-08-14 PROCEDURE — 80061 LIPID PANEL: CPT | Performed by: FAMILY MEDICINE

## 2020-08-14 PROCEDURE — 82043 UR ALBUMIN QUANTITATIVE: CPT | Performed by: FAMILY MEDICINE

## 2020-08-14 PROCEDURE — 36415 COLL VENOUS BLD VENIPUNCTURE: CPT | Performed by: FAMILY MEDICINE

## 2020-08-31 ENCOUNTER — MYC MEDICAL ADVICE (OUTPATIENT)
Dept: FAMILY MEDICINE | Facility: CLINIC | Age: 60
End: 2020-08-31

## 2020-09-02 ENCOUNTER — MYC MEDICAL ADVICE (OUTPATIENT)
Dept: FAMILY MEDICINE | Facility: CLINIC | Age: 60
End: 2020-09-02

## 2020-09-03 ENCOUNTER — MYC MEDICAL ADVICE (OUTPATIENT)
Dept: PALLIATIVE MEDICINE | Facility: CLINIC | Age: 60
End: 2020-09-03

## 2020-09-03 ENCOUNTER — NURSE TRIAGE (OUTPATIENT)
Dept: NURSING | Facility: CLINIC | Age: 60
End: 2020-09-03

## 2020-09-03 NOTE — TELEPHONE ENCOUNTER
Caller is complaining of left side neck, shoulder and upper back pain. Caller states he thinks he has a pinched nerve in there and has seen a provider for the pain. Caller states he ws offered cortisone injections for the pain. Caller rates pain 7/10. Caller denies any chest pain. Triage guidelines recommended to see pcp within 24 hours. [facility]  COVID 19 Nurse Triage Plan/Patient Instructions    Please be aware that novel coronavirus (COVID-19) may be circulating in the community. If you develop symptoms such as fever, cough, or SOB or if you have concerns about the presence of another infection including coronavirus (COVID-19), please contact your health care provider or visit [email or web address].     Disposition/Instructions    In-Person Visit with provider recommended. Reference Visit Selection Guide.    Thank you for taking steps to prevent the spread of this virus.  o Limit your contact with others.  o Wear a simple mask to cover your cough.  o Wash your hands well and often.    Resources    [facility]: About COVID-19: [email or web address]    CDC: What to Do If You're Sick: www.cdc.gov/coronavirus/2019-ncov/about/steps-when-sick.html    CDC: Ending Home Isolation: www.cdc.gov/coronavirus/2019-ncov/hcp/disposition-in-home-patients.html     CDC: Caring for Someone: www.cdc.gov/coronavirus/2019-ncov/if-you-are-sick/care-for-someone.html     [facility]: Interim Guidance for Hospital Discharge to Home: www.health.[facility].mn.us/diseases/coronavirus/hcp/hospdischarge.pdf    [facility] clinical trials (COVID-19 research studies): clinicalaffairs.[facility].edu/um-clinical-trials     Below are the COVID-19 hotlines at the [place] Department of Health ([facility]). Interpreters are available.   o For health questions: Call 073-342-7368 or 1-882.313.2613 (7 a.m. to 7 p.m.)  o For questions about schools and childcare: Call  "583.259.3861 or 1-255.360.9664 (7 a.m. to 7 p.m.)                     Additional Information    Negative: Shock suspected (e.g., cold/pale/clammy skin, too weak to stand, low BP, rapid pulse)    Negative: [1] Similar pain previously AND [2] it was from \"heart attack\"    Negative: [1] Similar pain previously AND [2] it was from \"angina\" AND [3] not relieved by nitroglycerin    Negative: Sounds like a life-threatening emergency to the triager    Negative: Followed an elbow injury    Negative: Chest pain    Negative: Wound looks infected    Negative: Elbow swelling is main symptom    Negative: Arm pain is main symptom    Negative: Difficulty breathing or unusual sweating (e.g., sweating without exertion)    Negative: [1] Age > 40 AND [2] associated chest or jaw pain AND [3] pain lasts > 5 minutes    Negative: [1] Swollen joint AND [2] fever    Negative: [1] Red area or streak AND [2] fever    Negative: Patient sounds very sick or weak to the triager    Negative: [1] SEVERE pain AND [2] not improved 2 hours after pain medicine    Negative: [1] Looks infected (spreading redness, pus) AND [2] large red area (> 2 in. or 5 cm)    Negative: Entire arm is swollen    Negative: Weakness (i.e., loss of strength) in hand or fingers     (Exception: not truly weak; hand feels weak because of pain)    Numbness (i.e., loss of sensation) in hand or fingers    Negative: [1] Painful rash AND [2] multiple small blisters grouped together (i.e., dermatomal distribution or \"band\" or \"stripe\")    Negative: [1] Localized rash is very painful AND [2] no fever    Negative: Looks like a boil, infected sore, deep ulcer or other infected rash (spreading redness, pus)    Negative: Fluid-filled sack located directly over point of elbow    Negative: Swollen joint    Protocols used: ELBOW PAIN-A-AH      "

## 2020-09-04 NOTE — TELEPHONE ENCOUNTER
ReVera message from patient on 9/3 at 1355:    Dr. King,     I'd like to know if I can get SI joint injections in my shoulder? I'm having constant pain in my shoulder, and even greater pain when I move my shoulder. Please let know. Thanks.   ---------------  Message sent to pt:    Vero Murry,     I am sorry that you are struggling with the pain in your shoulder. After a review of your chart, I see that we are only doing injections for you when your primary care provider sends a referral for a procedure. Please connect with your primary care provider, Dr. Iglesias, and let him know what is going on with your shoulder. He will determine if a shoulder injection may be able to help and will send an order to us if needed.     Take care,     SONNY RoseN, RN-BC  Patient Care Supervisor  [facility] [place]

## 2020-09-09 ENCOUNTER — OFFICE VISIT (OUTPATIENT)
Dept: FAMILY MEDICINE | Facility: CLINIC | Age: 60
End: 2020-09-09
Payer: COMMERCIAL

## 2020-09-09 VITALS
DIASTOLIC BLOOD PRESSURE: 90 MMHG | OXYGEN SATURATION: 95 % | TEMPERATURE: 97.5 F | SYSTOLIC BLOOD PRESSURE: 136 MMHG | BODY MASS INDEX: 27.49 KG/M2 | HEIGHT: 64 IN | WEIGHT: 161 LBS | HEART RATE: 78 BPM

## 2020-09-09 DIAGNOSIS — Z12.5 SCREENING FOR PROSTATE CANCER: ICD-10-CM

## 2020-09-09 DIAGNOSIS — Z00.00 ROUTINE GENERAL MEDICAL EXAMINATION AT A HEALTH CARE FACILITY: Primary | ICD-10-CM

## 2020-09-09 DIAGNOSIS — I10 HYPERTENSIVE RESPONSE TO EXERCISE: ICD-10-CM

## 2020-09-09 DIAGNOSIS — Z13.1 SCREENING FOR DIABETES MELLITUS: ICD-10-CM

## 2020-09-09 DIAGNOSIS — Z13.6 CARDIOVASCULAR SCREENING; LDL GOAL LESS THAN 130: ICD-10-CM

## 2020-09-09 PROCEDURE — 99396 PREV VISIT EST AGE 40-64: CPT | Performed by: FAMILY MEDICINE

## 2020-09-09 ASSESSMENT — ANXIETY QUESTIONNAIRES
5. BEING SO RESTLESS THAT IT IS HARD TO SIT STILL: NOT AT ALL
6. BECOMING EASILY ANNOYED OR IRRITABLE: NOT AT ALL
1. FEELING NERVOUS, ANXIOUS, OR ON EDGE: NEARLY EVERY DAY
GAD7 TOTAL SCORE: 13
2. NOT BEING ABLE TO STOP OR CONTROL WORRYING: NEARLY EVERY DAY
IF YOU CHECKED OFF ANY PROBLEMS ON THIS QUESTIONNAIRE, HOW DIFFICULT HAVE THESE PROBLEMS MADE IT FOR YOU TO DO YOUR WORK, TAKE CARE OF THINGS AT HOME, OR GET ALONG WITH OTHER PEOPLE: VERY DIFFICULT
3. WORRYING TOO MUCH ABOUT DIFFERENT THINGS: NEARLY EVERY DAY
7. FEELING AFRAID AS IF SOMETHING AWFUL MIGHT HAPPEN: NEARLY EVERY DAY

## 2020-09-09 ASSESSMENT — PAIN SCALES - GENERAL: PAINLEVEL: SEVERE PAIN (6)

## 2020-09-09 ASSESSMENT — PATIENT HEALTH QUESTIONNAIRE - PHQ9
5. POOR APPETITE OR OVEREATING: SEVERAL DAYS
SUM OF ALL RESPONSES TO PHQ QUESTIONS 1-9: 13

## 2020-09-09 ASSESSMENT — MIFFLIN-ST. JEOR: SCORE: 1456.29

## 2020-09-09 NOTE — PATIENT INSTRUCTIONS
Preventive Health Recommendations  Male Ages 50 - 64    Yearly exam:             See your health care provider every year in order to  o   Review health changes.   o   Discuss preventive care.    o   Review your medicines if your doctor has prescribed any.     Have a cholesterol test every 5 years, or more frequently if you are at risk for high cholesterol/heart disease.     Have a diabetes test (fasting glucose) every three years. If you are at risk for diabetes, you should have this test more often.     Have a colonoscopy at age 50, or have a yearly FIT test (stool test). These exams will check for colon cancer.      Talk with your health care provider about whether or not a prostate cancer screening test (PSA) is right for you.    You should be tested each year for STDs (sexually transmitted diseases), if you re at risk.     Shots: Get a flu shot each year. Get a tetanus shot every 10 years.     Nutrition:    Eat at least 5 servings of fruits and vegetables daily.     Eat whole-grain bread, whole-wheat pasta and brown rice instead of white grains and rice.     Get adequate Calcium and Vitamin D.     Lifestyle    Exercise for at least 150 minutes a week (30 minutes a day, 5 days a week). This will help you control your weight and prevent disease.     Limit alcohol to one drink per day.     No smoking.     Wear sunscreen to prevent skin cancer.     See your dentist every six months for an exam and cleaning.     See your eye doctor every 1 to 2 years.    At [facility], we strive to deliver an exceptional experience to you, every time we see you. If you receive a survey, please complete it as we do value your feedback.  If you have [employer], you can expect to receive results automatically within 24 hours of their completion.  Your provider will send a note interpreting your results as well.   If you do not have [employer], you should receive your results in about a week by mail.    Your  care team:                            Family Medicine Internal Medicine   MD Aaron Schulte, MD Melinda Hays, MD Amberly Gonzalez PA-C, APRN ZENIA Iglesias, MD Pediatrics   Eliseo Riggs, PALeoC  Tiffanie Figueroa, MD Sarita Holden APRN CNP   Pilar Han, MD Lisa Pérez, MD Leanne Bradley, MD Maricruz Coe, APRN CNP  Iona Lindsey, PAJOSÉ Barahona, MD Shaylee Dolan MD Angela Wermerskirchen, MD      Clinic hours: Monday - Thursday 7 am-7 pm; Fridays 7 am-5 pm.   Urgent care: Monday - Friday 11 am-9 pm; Saturday and Sunday 9 am-5 pm.    Clinic: [phone]       [facility]: Monday - Thursday 8 am - 7 pm; Friday 8 am - 6 pm  [facility]: [phone]     Use www.[email or web address] for 24/7 diagnosis and treatment of dozens of conditions.

## 2020-09-09 NOTE — PROGRESS NOTES
3  SUBJECTIVE:   CC: Jeanmarie Mclean is an 60 year old male who presents for preventive health visit.     Healthy Habits:    Do you get at least three servings of calcium containing foods daily (dairy, green leafy vegetables, etc.)? yes    Amount of exercise or daily activities, outside of work: 4-5 day(s) per week    Problems taking medications regularly No    Medication side effects: No    Have you had an eye exam in the past two years? yes    Do you see a dentist twice per year? yes    Do you have sleep apnea, excessive snoring or daytime drowsiness?yes      Today's PHQ-2 Score:   PHQ-2 (  [employer]) 10/11/2016 2016   Q1: Little interest or pleasure in doing things 3 3   Q2: Feeling down, depressed or hopeless 3 3   PHQ-2 Score 6 6       Abuse: Current or Past(Physical, Sexual or Emotional)- NO  Do you feel safe in your environment? YES        Social History     Tobacco Use     Smoking status: Former Smoker     Packs/day: 0.50     Years: 10.00     Pack years: 5.00     Types: Cigarettes     Last attempt to quit: 1997     Years since quittin.7     Smokeless tobacco: Never Used   Substance Use Topics     Alcohol use: Yes     Comment: hx alcoholism quit . [facility] in      If you drink alcohol do you typically have >3 drinks per day or >7 drinks per week? No                      Last PSA:   PSA   Date Value Ref Range Status   2020 0.02 0 - 4 ug/L Final     Comment:     Assay Method:  Chemiluminescence using Siemens Vista analyzer       Reviewed orders with patient. Reviewed health maintenance and updated orders accordingly - Yes  Labs reviewed in EPIC  BP Readings from Last 3 Encounters:   20 (!) 136/90   08/10/20 119/81   20 (!) 136/93    Wt Readings from Last 3 Encounters:   20 73 kg (161 lb)   01/10/20 73.9 kg (163 lb)   19 76.2 kg (168 lb)                  Patient Active Problem List   Diagnosis     Malignant neoplasm of prostate (H)      CARDIOVASCULAR SCREENING; LDL GOAL LESS THAN 130     GERD (gastroesophageal reflux disease)     Overweight (BMI 25.0-29.9)     Advance care planning     Fibromyalgia syndrome     Posttraumatic stress disorder     Low back pain     Inadequate material resources     Anxiety state     History of Asperger's syndrome     Pervasive developmental disorder, active     Depressive disorder     Delusional disorder (H)     TBI (traumatic brain injury) (H)     Insomnia     Chronic pain     Myalgia     male stress incontinence     Major depressive disorder, recurrent episode, moderate (H)     Low back pain without sciatica, unspecified back pain laterality, unspecified chronicity     Alcohol dependence in remission (H)     Hypertensive response to exercise     Past Surgical History:   Procedure Laterality Date     C HEP B VAC ADULT 3 DOSE IM      received all 3 vaccines     C REMV PROSTATE,RETROPUB,RADICAL  10/13/04    Dr. Muñoz ([place])     CYSTOSCOPY  10/98    for renal stones     HC KNEE SCOPE,MED/LAT MENISECTOMY  11    Left, medial ([place])     HERNIA REPAIR, INGUINAL RT/LT      [facility]       Social History     Tobacco Use     Smoking status: Former Smoker     Packs/day: 0.50     Years: 10.00     Pack years: 5.00     Types: Cigarettes     Last attempt to quit: 1997     Years since quittin.7     Smokeless tobacco: Never Used   Substance Use Topics     Alcohol use: Yes     Comment: hx alcoholism quit . [facility] in      Family History   Problem Relation Age of Onset     Psychotic Disorder Son         autism     Prostate Cancer Father         40s     Cancer Father      Cancer Maternal Grandmother         lung     Glaucoma Maternal Grandmother      Eye Disorder Maternal Grandmother         glaucoma     Diabetes Mother          45     Blood Disease Sister         sickle trait     Hypertension Sister      Blood Disease Paternal Uncle         sickle     Blood Disease  Paternal Aunt         sickle     Alzheimer Disease Paternal Grandfather         70s     Macular Degeneration Paternal Grandfather      Cerebrovascular Disease No family hx of      Thyroid Disease No family hx of      Myocardial Infarction No family hx of      C.A.D. No family hx of          Current Outpatient Medications   Medication Sig Dispense Refill     amLODIPine (NORVASC) 2.5 MG tablet Take 1 tablet (2.5 mg) by mouth daily 90 tablet 3     aspirin 81 MG tablet Take by mouth daily       bacitracin-neomycin-polymyxin (NEOSPORIN) 400-5-5000 external ointment Apply topically 2 times daily 28.35 g 0     Blood Pressure KIT Monitor blood pressure as needed. 1 kit 1     Cholecalciferol (VITAMIN D) 1000 UNIT capsule Take 1 capsule by mouth 2 times daily.       diclofenac (VOLTAREN) 1 % topical gel Place onto the skin 4 times daily       guaiFENesin-codeine (ROBITUSSIN AC) 100-10 MG/5ML solution Take 10 mLs by mouth every 4 hours as needed for cough 236 mL 1     menthol (ICY HOT) 5 % PADS Apply 1 patch topically every 8 hours as needed for muscle soreness May cut to fit 30 patch 3     oxyCODONE IR (ROXICODONE) 5 MG tablet Take 5 mg by mouth       QUEtiapine (SEROQUEL) 200 MG tablet 100 mg daily as needed Take 100 mg by mouth daily. weening       sildenafil (VIAGRA) 100 MG tablet Take 1/4 - 1 tablet, as directed, 1-3 hours before intimacy. Maximum 1 dose per 24 hours. 10 tablet 5     Allergies   Allergen Reactions     Risperidone Other (See Comments)     Serve numbness      Effexor [Venlafaxine Hydrochloride] Other (See Comments)     Headache, Painful scrotum and drainage from the penis     Ambien Other (See Comments)     headaches     Ambien [Zolpidem Tartrate] Nausea     Buspirone Nausea     Dizziness, headache     Celexa [Citalopram] Other (See Comments)     Headache     Duloxetine Other (See Comments)     Headache  headaches     Septra [Bactrim] Itching     Seroquel [Quetiapine] Other (See Comments)     Headache, N, V  "    Sulfa Drugs Itching     Sulfamethoxazole-Trimethoprim      hives     Tramadol Nausea     Nausea and headache     Ultram [Tramadol Hcl] Nausea and Vomiting     Venlafaxine      Zolpidem      headaches       Reviewed and updated as needed this visit by clinical staff  Allergies  Meds  Problems         Reviewed and updated as needed this visit by Provider            ROS:  CONSTITUTIONAL: NEGATIVE for fever, chills, change in weight  INTEGUMENTARY/SKIN: NEGATIVE for worrisome rashes, moles or lesions  EYES: NEGATIVE for vision changes or irritation  ENT: NEGATIVE for ear, mouth and throat problems  RESP: NEGATIVE for significant cough or SOB  CV: NEGATIVE for chest pain, palpitations or peripheral edema  GI: NEGATIVE for nausea, abdominal pain, heartburn, or change in bowel habits   male: negative for dysuria, hematuria, decreased urinary stream, erectile dysfunction, urethral discharge  MUSCULOSKELETAL: NEGATIVE for significant arthralgias or myalgia  NEURO: NEGATIVE for weakness, dizziness or paresthesias  PSYCHIATRIC: NEGATIVE for changes in mood or affect    OBJECTIVE:   BP (!) 136/90   Pulse 78   Temp 97.5  F (36.4  C) (Tympanic)   Ht 1.626 m (5' 4\")   Wt 73 kg (161 lb)   SpO2 95%   BMI 27.64 kg/m    EXAM:  GENERAL: healthy, alert and no distress  NECK: no adenopathy, no asymmetry, masses, or scars and thyroid normal to palpation  RESP: lungs clear to auscultation - no rales, rhonchi or wheezes  CV: regular rate and rhythm, normal S1 S2, no S3 or S4, no murmur, click or rub, no peripheral edema and peripheral pulses strong  ABDOMEN: soft, nontender, no hepatosplenomegaly, no masses and bowel sounds normal  MS: no gross musculoskeletal defects noted, no edema    Diagnostic Test Results:  Labs reviewed in Epic    ASSESSMENT/PLAN:   1. Routine general medical examination at a health care facility  As below.    2. Hypertensive response to exercise  Stable.    3. CARDIOVASCULAR SCREENING; LDL GOAL LESS " "THAN 130  Lipids normal.    4. Screening for diabetes mellitus  Glucose mildly elevated in the prediabetes range. Monitor. Discussed weight goal.    5. Screening for prostate cancer  PSA normal.      COUNSELING:  Reviewed preventive health counseling, as reflected in patient instructions       Regular exercise       Healthy diet/nutrition       Vision screening    Estimated body mass index is 27.98 kg/m  as calculated from the following:    Height as of 1/10/20: 1.626 m (5' 4\").    Weight as of 1/10/20: 73.9 kg (163 lb).        He reports that he quit smoking about 22 years ago. His smoking use included cigarettes. He has a 5.00 pack-year smoking history. He has never used smokeless tobacco.      Counseling Resources:  ATP IV Guidelines  Pooled Cohorts Equation Calculator  FRAX Risk Assessment  ICSI Preventive Guidelines  Dietary Guidelines for Americans, 2010  USDA's MyPlate  ASA Prophylaxis  Lung CA Screening    Milton Iglesias MD, MD  [facility]  "

## 2020-09-10 ASSESSMENT — ANXIETY QUESTIONNAIRES: GAD7 TOTAL SCORE: 13

## 2020-09-10 NOTE — TELEPHONE ENCOUNTER
Last read by Jeanmarie Mclean at 9:51 AM on 9/4/2020.     SONNY GantN, RN  Care Coordinator  [facility]

## 2020-09-17 ENCOUNTER — MYC MEDICAL ADVICE (OUTPATIENT)
Dept: FAMILY MEDICINE | Facility: CLINIC | Age: 60
End: 2020-09-17

## 2020-10-05 NOTE — TELEPHONE ENCOUNTER
Reason for call:  Other   Patient called regarding (reason for call): call back  Additional comments:   patient is taking OTC advil *wasn't helping    Bought Naproxin today * not helping    He is taking a low dose aspirin every morning    He has a drink of an alcoholic beverage that started scrotal pain and that brought him to look for relief with OTC nsaids  He is concerned over liver damage with him taking too many of those kind of meds  Can you call to advise      Phone number to reach patient:  Home number on file [phone] (home)    Best Time:  any    Can we leave a detailed message on this number?  YES   Spoke with patients daughter informed medication had been d/c voice understanding.

## 2020-10-29 ENCOUNTER — DOCUMENTATION ONLY (OUTPATIENT)
Dept: FAMILY MEDICINE | Facility: CLINIC | Age: 60
End: 2020-10-29

## 2020-10-29 DIAGNOSIS — R73.09 ELEVATED GLUCOSE: Primary | ICD-10-CM

## 2020-10-29 NOTE — PROGRESS NOTES
This patient is scheduled for lab work on 11/1/2020 but does not qualify for pre-visit protocol. Please place future orders, or have your care team call and advise patient to cancel lab appointment.   Thank you,    Oly Bates MLT ([facility])  [facility]

## 2020-11-18 ENCOUNTER — OFFICE VISIT (OUTPATIENT)
Dept: OPTOMETRY | Facility: CLINIC | Age: 60
End: 2020-11-18
Payer: COMMERCIAL

## 2020-11-18 DIAGNOSIS — H52.203 MYOPIA OF BOTH EYES WITH ASTIGMATISM AND PRESBYOPIA: ICD-10-CM

## 2020-11-18 DIAGNOSIS — H04.123 DRY EYE SYNDROME OF BOTH EYES: ICD-10-CM

## 2020-11-18 DIAGNOSIS — H52.4 MYOPIA OF BOTH EYES WITH ASTIGMATISM AND PRESBYOPIA: ICD-10-CM

## 2020-11-18 DIAGNOSIS — H52.13 MYOPIA OF BOTH EYES WITH ASTIGMATISM AND PRESBYOPIA: ICD-10-CM

## 2020-11-18 DIAGNOSIS — Z01.00 EXAMINATION OF EYES AND VISION: Primary | ICD-10-CM

## 2020-11-18 PROCEDURE — 92015 DETERMINE REFRACTIVE STATE: CPT | Performed by: OPTOMETRIST

## 2020-11-18 PROCEDURE — 92014 COMPRE OPH EXAM EST PT 1/>: CPT | Performed by: OPTOMETRIST

## 2020-11-18 ASSESSMENT — TONOMETRY
IOP_METHOD: TONOPEN
OS_IOP_MMHG: 13
OD_IOP_MMHG: 15

## 2020-11-18 ASSESSMENT — VISUAL ACUITY
OS_SC: 20/20
OS_SC: 20/60
OS_SC+: -1
OD_SC: 20/30-1
OS_CC+: -1
OD_SC: 20/70
OD_CC: 20/20
OD_SC+: -1
METHOD: SNELLEN - LINEAR
OS_CC: 20/20
OD_CC+: -1

## 2020-11-18 ASSESSMENT — REFRACTION_WEARINGRX
OD_AXIS: 030
OS_ADD: +2.00
OS_ADD: +1.50
OS_SPHERE: -1.50
OD_SPHERE: -1.50
OD_SPHERE: -1.50
OD_CYLINDER: +0.25
OS_SPHERE: -1.50
OD_ADD: +2.00
OD_AXIS: 030
OD_ADD: +1.50
OS_CYLINDER: +0.25
OS_CYLINDER: +0.25
SPECS_TYPE: SVL/SUN
OS_AXIS: 163
OS_AXIS: 163
OD_CYLINDER: +0.25

## 2020-11-18 ASSESSMENT — EXTERNAL EXAM - LEFT EYE: OS_EXAM: NORMAL

## 2020-11-18 ASSESSMENT — EXTERNAL EXAM - RIGHT EYE: OD_EXAM: NORMAL

## 2020-11-18 ASSESSMENT — CONF VISUAL FIELD
OD_NORMAL: 1
OS_NORMAL: 1

## 2020-11-18 ASSESSMENT — REFRACTION_MANIFEST
OD_AXIS: 030
OD_CYLINDER: +0.25
OS_ADD: +2.00
OS_SPHERE: -1.50
OS_CYLINDER: +0.25
OS_AXIS: 163
OD_SPHERE: -1.50
OD_ADD: +2.00

## 2020-11-18 ASSESSMENT — SLIT LAMP EXAM - LIDS
COMMENTS: MEIBOMIAN GLAND DYSFUNCTION
COMMENTS: MEIBOMIAN GLAND DYSFUNCTION

## 2020-11-18 ASSESSMENT — CUP TO DISC RATIO
OD_RATIO: 0.4
OS_RATIO: 0.35

## 2020-11-18 NOTE — PATIENT INSTRUCTIONS
Eyeglass prescription given.    Systane Ultra 1 drop both eyes 2-4 x day.  Use 1 drop regularly in both eyes once in the morning and once in the evening.    Heat to the eyes daily for 10-15 minutes nightly with warm washcloth or reusable gel masks from the pharmacy or  [employer] heat masks can be purchased at [employer].    Return in 1 year for a complete eye exam or sooner if needed.    Willis Jones, OD    The affects of the dilating drops last for 4- 6 hours.  You will be more sensitive to light and vision will be blurry up close.  Do not drive if you do not feel comfortable.  Mydriatic sunglasses were given if needed.    There is a combination of three treatments which can greatly improve symptoms of dry eyes.    1.  Artificial tears  2. Heat (eyes closed)  3. Eyelid and eyelash cleansing (eyes closed)     Use one drop of artificial tears both eyes 4 x daily.  Once in the morning, lunch, dinner and bedtime. Continue to use the drops regardless if your eyes are comfortable or not.  Artificial tears work best as a preventative and not as well after your eyes are starting to bother you.  It may take 4- 6 weeks of using the drops before you notice improvement.  If after that time you are still having problems schedule an appointment for an evaluation and discussion of different treatments such as Restasis or Xiidra.  Dry eyes are a chronic condition and you may have more symptoms at certain times of the year.    Excess tearing can be due to the right tears not working properly or a blockage in the tear drainage system.  You can try using artificial tears 1 drop right eye 4 x day.  If the excess tearing is bothersome after 4-6 weeks of treatment then we can send you for further testing.  This would entail a referral to our oculoplastic specialist Dr. Abraham German at the [facility]-[phone].    Recommended brands are:    Systane Complete  Systane Ultra  Systane Balance  Refresh Advanced Optive  Refresh  Relieva  Blink    Recommended brands for contact lens wearers are:    Systane contacts  Refresh contacts  Blink contacts    If you are using drops more than 4 x day or have sensitivities to preservatives I recommend non preserved artificial tears.  These come in 1 use vials.  They can be used every 1-2 hours.  Do not reuse the vials.    Recommended brands are:    Refresh Optive Joshua-3  Systane- preservative free  Refresh-  preservative free  Blink- preservative free    Gels or ointment can be used at night.    Recommended brands are:    Systane Gel  Refresh Gel  Blink Gel  Genteal Gel    Systane night time (ointment)  Refresh Celluvisc  Refresh PM (ointment)      Visine, Clear Eyes or Murine (drops that get the red out) can irritate the eyes and cause a rebound effect where the eyes become more red and you end up using more drops.  Avoid drops containing tetrahydrozoline, naphazoline, phenylephrine, oxymetazoline.      OTC Lumify is a newer product that gives immediate redness relief without the rebound effect.  Use as needed to take the redness out.    Artificial tears may be used with other drops (such as allergy, glaucoma, antibiotics) around the same time.  Be sure to wait 5 minutes in between drops.    Heat to the eyelids can also improve your symptoms of dry eyes.  Rowena heat masks can be purchased at [employer] to be used nightly for 10-15 minutes.  Other options are gel masks that can be put in the microwave and purchased at most pharmacies.      Tea Tree Oil eyelid cleansers recommended are Ocusoft Oust foam cleanser to cleanse eyelids/lashes at night and in the am. Other options are Blephadex or Cliradex eyelid wipes.  KEEP EYES CLOSED when using these products.  These can be purchased on amazon.com   A good product for make up remover with tea tree oil is WeLoveEyes.  This can be found at www.[employer] or [employer].    Other good eyelid cleansers have hypochlorous acid which removes excess bacteria  and is safe around the eyes. Products are Avenova, Ocusoft Hypochlor or Heyedrate. Spray solution onto cotton pad, close eyes and gently apply to eyelids and eyelashes using side to side motion.  You can also KEEP EYES CLOSED spray and rub into eyelashes.  You do not need to rinse it off. Use morning and evening. These products can be found on Amazon.  You can check with your local pharmacy and see if they can order if for you if they don't have it.    Other brands of eyelid cleansing wipes are:    Ocusoft wipes  Systane wipes      Optometry Providers       Clinic Locations                                 Telephone Number   Dr. Milagro Alfaro [phone]     [facility] Hours:                [facility] Hours:       [facility] Hours:   [address] [facility] [address]   [address] [facility] [address]     [address] [facility] [address] [facility]  [place] [address]   [place] [address]    [place] [address]  Phone: [phone]                    Phone: [phone]     Phone: [phone]                      Monday 8:00-7:00                          Monday 8:00-7:00                          Monday 8:00-7:00              Tuesday 8:00-6:00                          Tuesday 8:00-7:00                          Tuesday 8:00-7:00              Wednesday 8:00-6:00                  Wednesday 8:00-7:00                   Wednesday 8:00-7:00      Thursday 8:00-6:00                        Thursday 8:00-7:00                         Thursday 8:00-7:00            Friday 8:00-5:00                              Friday 8:00-5:00                              Friday 8:00-5:00    [facility] Hours:   [address] [facility] [place] [id number] [phone]    Monday 8:00-7:00  Tuesday 8:00-7:00  Wednesday 8:00-7:00  Thursday 8:00-7:00  Friday 8:00-5:00  Please log on to [email or web address] to order your contact lenses.  The link is found on the Eye Care and Vision  Services page.  As always, Thank you for trusting us with your health care needs!

## 2020-11-18 NOTE — PROGRESS NOTES
Chief Complaint   Patient presents with     Annual Eye Exam      Accompanied by son  Last Eye Exam: 9-4-2019  Dilated Previously: Yes    What are you currently using to see?  glasses       Distance Vision Acuity: Satisfied with vision    Near Vision Acuity: Satisfied with vision while reading  unaided    Eye Comfort: dry- uses non preserved drops as needed- may use them 4 x day for a week or 2 then doesn't need them for awhile.    Do you use eye drops? : Yes: systane   Occupation or Hobbies: disabled     Phuong Ness Optometric Assistant, A.B.O.C.          Medical, surgical and family histories reviewed and updated 11/18/2020.       OBJECTIVE: See Ophthalmology exam    ASSESSMENT:    ICD-10-CM    1. Examination of eyes and vision  Z01.00    2. Myopia of both eyes with astigmatism and presbyopia  H52.13     H52.203     H52.4    3. Dry eye syndrome of both eyes  H04.123    4. Presbyopia  H52.4       PLAN:     Patient Instructions   Eyeglass prescription given.    Systane Ultra 1 drop both eyes 2-4 x day.  Use 1 drop regularly in both eyes once in the morning and once in the evening.    Heat to the eyes daily for 10-15 minutes nightly with warm washcloth or reusable gel masks from the pharmacy or  [employer] heat masks can be purchased at [employer].    Return in 1 year for a complete eye exam or sooner if needed.    Willis Jones, OD

## 2020-11-18 NOTE — LETTER
11/18/2020         RE: Jeanmarie Mclean  [address]  [place] [address]        Dear Colleague,    Thank you for referring your patient, Jeanmarie Mclean, to the [facility]. Please see a copy of my visit note below.    Chief Complaint   Patient presents with     Annual Eye Exam      Accompanied by son  Last Eye Exam: 9-4-2019  Dilated Previously: Yes    What are you currently using to see?  glasses       Distance Vision Acuity: Satisfied with vision    Near Vision Acuity: Satisfied with vision while reading  unaided    Eye Comfort: dry- uses non preserved drops as needed- may use them 4 x day for a week or 2 then doesn't need them for awhile.    Do you use eye drops? : Yes: systane   Occupation or Hobbies: disabled     Phuong Oakes Optometric Assistant, A.B.O.C.          Medical, surgical and family histories reviewed and updated 11/18/2020.       OBJECTIVE: See Ophthalmology exam    ASSESSMENT:    ICD-10-CM    1. Examination of eyes and vision  Z01.00    2. Myopia of both eyes with astigmatism and presbyopia  H52.13     H52.203     H52.4    3. Dry eye syndrome of both eyes  H04.123    4. Presbyopia  H52.4       PLAN:     Patient Instructions   Eyeglass prescription given.    Systane Ultra 1 drop both eyes 2-4 x day.  Use 1 drop regularly in both eyes once in the morning and once in the evening.    Heat to the eyes daily for 10-15 minutes nightly with warm washcloth or reusable gel masks from the pharmacy or  [employer] heat masks can be purchased at [employer].    Return in 1 year for a complete eye exam or sooner if needed.    Willis Jones, KATHY             Again, thank you for allowing me to participate in the care of your patient.        Sincerely,        Willis Jones, OD

## 2020-12-21 ENCOUNTER — MYC MEDICAL ADVICE (OUTPATIENT)
Dept: FAMILY MEDICINE | Facility: CLINIC | Age: 60
End: 2020-12-21

## 2020-12-21 DIAGNOSIS — R25.3 MUSCLE TWITCHING: Primary | ICD-10-CM

## 2020-12-22 ENCOUNTER — NURSE TRIAGE (OUTPATIENT)
Dept: FAMILY MEDICINE | Facility: CLINIC | Age: 60
End: 2020-12-22

## 2020-12-22 ENCOUNTER — TELEPHONE (OUTPATIENT)
Dept: FAMILY MEDICINE | Facility: CLINIC | Age: 60
End: 2020-12-22

## 2020-12-22 NOTE — TELEPHONE ENCOUNTER
See 12/22/20 Triage encounter.      RN attempted to reach pt to triage sx using the following protocol if appropriate, MUSCLE JERKS - TICS - [id number], and to assist pt with appointment scheduling if appropriate after triage.     If patient calls back:             Registered Nurse called. Follow Triage Call workflow     AWA Amin, RN

## 2020-12-22 NOTE — TELEPHONE ENCOUNTER
Attempted to reach pt for triage of sx reported in [employer] messages. There was no answer. LM to call clinic at [phone].    If patient calls back:   Registered Nurse called. Follow Triage Call workflow    SONNY AminN, RN

## 2020-12-22 NOTE — TELEPHONE ENCOUNTER
Reason for Call:  Other call back    Detailed comments: Luis,Care Coordinator from [facility] is seeking assistance to help patient with video call 12/29. She would like a call back for assistance.    Phone Number Patient can be reached at: Other phone number:  Luis [phone]    Best Time: any    Can we leave a detailed message on this number? YES    Call taken on 12/22/2020 at 3:03 PM by Kiki Lees

## 2020-12-22 NOTE — TELEPHONE ENCOUNTER
E-visit/Virtual visit/Telephone visit instructions given to Jeanmarie to further discuss referral request.     Adán Deshpande RN, BSN, PHN

## 2020-12-23 NOTE — TELEPHONE ENCOUNTER
Patient spoke with someone yesterday and wanted appointment changed to telephone visit. This was done; closing encounter.       Adán Deshpande RN, BSN, PHN

## 2020-12-26 DIAGNOSIS — I10 HYPERTENSIVE RESPONSE TO EXERCISE: ICD-10-CM

## 2020-12-28 DIAGNOSIS — R73.09 ELEVATED GLUCOSE: ICD-10-CM

## 2020-12-28 DIAGNOSIS — R25.3 MUSCLE TWITCHING: ICD-10-CM

## 2020-12-28 LAB
ANION GAP SERPL CALCULATED.3IONS-SCNC: 6 MMOL/L (ref 3–14)
BUN SERPL-MCNC: 10 MG/DL (ref 7–30)
CALCIUM SERPL-MCNC: 9.6 MG/DL (ref 8.5–10.1)
CHLORIDE SERPL-SCNC: 103 MMOL/L (ref 94–109)
CO2 SERPL-SCNC: 29 MMOL/L (ref 20–32)
CREAT SERPL-MCNC: 0.99 MG/DL (ref 0.66–1.25)
GFR SERPL CREATININE-BSD FRML MDRD: 83 ML/MIN/{1.73_M2}
GLUCOSE SERPL-MCNC: 100 MG/DL (ref 70–99)
HBA1C MFR BLD: 5.7 % (ref 0–5.6)
POTASSIUM SERPL-SCNC: 3.9 MMOL/L (ref 3.4–5.3)
SODIUM SERPL-SCNC: 138 MMOL/L (ref 133–144)

## 2020-12-28 PROCEDURE — 83036 HEMOGLOBIN GLYCOSYLATED A1C: CPT | Performed by: FAMILY MEDICINE

## 2020-12-28 PROCEDURE — 80048 BASIC METABOLIC PNL TOTAL CA: CPT | Performed by: FAMILY MEDICINE

## 2020-12-28 PROCEDURE — 36415 COLL VENOUS BLD VENIPUNCTURE: CPT | Performed by: FAMILY MEDICINE

## 2020-12-28 RX ORDER — AMLODIPINE BESYLATE 2.5 MG/1
2.5 TABLET ORAL DAILY
Qty: 90 TABLET | Refills: 3 | Status: SHIPPED | OUTPATIENT
Start: 2020-12-28 | End: 2022-01-24

## 2020-12-29 ENCOUNTER — MYC MEDICAL ADVICE (OUTPATIENT)
Dept: FAMILY MEDICINE | Facility: CLINIC | Age: 60
End: 2020-12-29

## 2020-12-29 ENCOUNTER — VIRTUAL VISIT (OUTPATIENT)
Dept: FAMILY MEDICINE | Facility: CLINIC | Age: 60
End: 2020-12-29
Payer: COMMERCIAL

## 2020-12-29 DIAGNOSIS — R25.3 EYE MUSCLE TWITCHES: ICD-10-CM

## 2020-12-29 DIAGNOSIS — R25.3 MUSCLE TWITCHING: Primary | ICD-10-CM

## 2020-12-29 PROCEDURE — 99213 OFFICE O/P EST LOW 20 MIN: CPT | Mod: TEL | Performed by: FAMILY MEDICINE

## 2020-12-29 ASSESSMENT — PAIN SCALES - GENERAL: PAINLEVEL: NO PAIN (0)

## 2020-12-29 NOTE — PATIENT INSTRUCTIONS
At [facility], we strive to deliver an exceptional experience to you, every time we see you. If you receive a survey, please complete it as we do value your feedback.  If you have MyChart, you can expect to receive results automatically within 24 hours of their completion.  Your provider will send a note interpreting your results as well.   If you do not have MyChart, you should receive your results in about a week by mail.    Your care team:                            Family Medicine Internal Medicine   MD Aaron Schulte MD Shantel Branch-Fleming, MD Srinivasa Vaka, MD Katya Georgiev PA-C Megan Hill, APRN CNP    Milton Iglesias, MD Pediatrics   Eliseo Riggs, PALeoC  Tiffanie Figueroa, CNP MD Sarita De Los Santos APRN CNP   MD Lisa Reese MD Deborah Mielke, MD Maricruz Coe, APRN CNP  Iona Lindsey, PALeoC  Davida Barahona, CNP  MD Shaylee Grimes MD Angela Wermerskirchen, MD      Clinic hours: Monday - Thursday 7 am-7 pm; Fridays 7 am-5 pm.   Urgent care: Monday - Friday 11 am-9 pm; Saturday and Sunday 9 am-5 pm.    Clinic: [phone]       [facility]: Monday - Thursday 8 am - 7 pm; Friday 8 am - 6 pm  [facility]: [phone]     Use [email or web address] for 24/7 diagnosis and treatment of dozens of conditions.

## 2020-12-29 NOTE — PROGRESS NOTES
"Jeanmarie Mclean is a 60 year old male who is being evaluated via a billable telephone visit.      The patient has been notified of following:     \"This telephone visit will be conducted via a call between you and your physician/provider. We have found that certain health care needs can be provided without the need for a physical exam.  This service lets us provide the care you need with a short phone conversation.  If a prescription is necessary we can send it directly to your pharmacy.  If lab work is needed we can place an order for that and you can then stop by our lab to have the test done at a later time.    Telephone visits are billed at different rates depending on your insurance coverage. During this emergency period, for some insurers they may be billed the same as an in-person visit.  Please reach out to your insurance provider with any questions.    If during the course of the call the physician/provider feels a telephone visit is not appropriate, you will not be charged for this service.\"    Patient has given verbal consent for Telephone visit?  Yes    What phone number would you like to be contacted at? [phone]    How would you like to obtain your AVS? Jarodhart    Subjective     Jeanmarie Mclean is a 60 year old male who presents via phone visit today for the following health issues:    HPI     Concern - Twitching  Onset: 3 years  Description: twitching in the hands, involuntarily moving back and forth. Eyes twitching. Rule out stress  Intensity: severe  Progression of Symptoms:  Worsening 3-4 weeks ago  Accompanying Signs & Symptoms: twitching  Previous history of similar problem: yes  Precipitating factors:        Worsened by: none  Alleviating factors:        Improved by: none  Therapies tried and outcome: Respiradone      Review of Systems   Constitutional, HEENT, cardiovascular, pulmonary, GI, , musculoskeletal, neuro, skin, endocrine and psych systems are negative, except as otherwise noted.   "     Objective   Vitals - Patient Reported  Pain Score: No Pain (0)      Vitals:  No vitals were obtained today due to virtual visit.    healthy, alert and no distress  PSYCH: Alert and oriented times 3; coherent speech, normal   rate and volume, able to articulate logical thoughts, able   to abstract reason, no tangential thoughts, no hallucinations   or delusions  His affect is normal  RESP: No cough, no audible wheezing, able to talk in full sentences  Remainder of exam unable to be completed due to telephone visits          Assessment/Plan:    Assessment & Plan     Muscle twitching  Unclear etiology. Lytes were normal. Eye/hand strain? Monitor. If worsening, patient will see Neurology. Improving per patient.  - NEUROLOGY ADULT REFERRAL    Eye muscle twitches  As above.  - NEUROLOGY ADULT REFERRAL        Regular exercise  See Patient Instructions    Return in about 6 months (around 6/29/2021) for Physical Exam, Lab Work.    Milton Iglesias MD, MD  [facility]    Phone call duration:  11 minutes

## 2021-01-13 ENCOUNTER — MYC MEDICAL ADVICE (OUTPATIENT)
Dept: FAMILY MEDICINE | Facility: CLINIC | Age: 61
End: 2021-01-13

## 2021-01-13 DIAGNOSIS — L98.9 FACIAL LESION: Primary | ICD-10-CM

## 2021-01-22 ENCOUNTER — NURSE TRIAGE (OUTPATIENT)
Dept: NURSING | Facility: CLINIC | Age: 61
End: 2021-01-22

## 2021-01-23 NOTE — TELEPHONE ENCOUNTER
"Pt called in states he eat some food today ad feels he has food poison.  The Pt has abdominal pain, dizziness.  The pain is constant since 7am today.  The Pt pain is 3/10 on the scale.  The Pt states he want to take Pepto bismol   today. Pt want to know if he can take this with amlodipine.  The pt request to speak the Provider.  No vomit, no diarrhea.   the disposition is to be seen with in 4 hours.  Care advice given per protocol.  Patient agrees with care advice given.   Agreed to call back if he has additional symptoms or questions.      Alonzo Marmolejo [place] Nurse Advisor 1/22/2021 7:32 PM        Additional Information    Negative: Shock suspected (e.g., cold/pale/clammy skin, too weak to stand, low BP, rapid pulse)    Negative: Difficult to awaken or acting confused (e.g., disoriented, slurred speech)    Negative: Passed out (i.e., lost consciousness, collapsed and was not responding)    Negative: Sounds like a life-threatening emergency to the triager    Negative: Chest pain    Negative: Pain is mainly in upper abdomen  (if needed ask: \"is it mainly above the belly button?\")    Negative: Followed an abdomen (stomach) injury    Negative: [1] SEVERE pain (e.g., excruciating) AND [2] present > 1 hour    Negative: [1] SEVERE pain AND [2] age > 60    Negative: [1] Vomiting AND [2] contains red blood or black (\"coffee ground\") material  (Exception: few red streaks in vomit that only happened once)    Negative: Blood in bowel movements  (Exception: Blood on surface of BM with constipation)    Negative: Black or tarry bowel movements  (Exception: chronic-unchanged  black-grey bowel movements AND is taking iron pills or Pepto-bismol)    Negative: [1] Unable to urinate (or only a few drops) > 4 hours AND [2] bladder feels very full (e.g., palpable bladder or strong urge to urinate)    Negative: [1] Pain in the scrotum or testicle AND [2] present > 1 hour    Negative: Patient sounds very sick or weak to the triager    " [1] MILD-MODERATE pain AND [2] constant AND [3] present > 2 hours    Protocols used: ABDOMINAL PAIN - MALE-A-AH

## 2021-01-26 ENCOUNTER — TELEPHONE (OUTPATIENT)
Dept: SURGERY | Facility: CLINIC | Age: 61
End: 2021-01-26

## 2021-01-26 ENCOUNTER — MYC MEDICAL ADVICE (OUTPATIENT)
Dept: FAMILY MEDICINE | Facility: CLINIC | Age: 61
End: 2021-01-26

## 2021-01-26 DIAGNOSIS — Z12.11 COLON CANCER SCREENING: Primary | ICD-10-CM

## 2021-01-26 NOTE — TELEPHONE ENCOUNTER
Location: [facility]  Is this a cancellation or reschedule?  no  The name of the procedure: colonoscopy  Provider scheduled with: Saud  Date 2/22/2021, Time 9        
no

## 2021-01-28 ENCOUNTER — MYC MEDICAL ADVICE (OUTPATIENT)
Dept: FAMILY MEDICINE | Facility: CLINIC | Age: 61
End: 2021-01-28
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
## 2021-02-03 ENCOUNTER — OFFICE VISIT (OUTPATIENT)
Dept: NEUROLOGY | Facility: CLINIC | Age: 61
End: 2021-02-03
Attending: FAMILY MEDICINE
Payer: COMMERCIAL

## 2021-02-03 VITALS
BODY MASS INDEX: 27.64 KG/M2 | RESPIRATION RATE: 16 BRPM | DIASTOLIC BLOOD PRESSURE: 85 MMHG | WEIGHT: 161 LBS | SYSTOLIC BLOOD PRESSURE: 125 MMHG | HEART RATE: 60 BPM | OXYGEN SATURATION: 98 %

## 2021-02-03 DIAGNOSIS — F43.10 POSTTRAUMATIC STRESS DISORDER: Primary | Chronic | ICD-10-CM

## 2021-02-03 DIAGNOSIS — F32.A DEPRESSIVE DISORDER: Chronic | ICD-10-CM

## 2021-02-03 PROCEDURE — 99205 OFFICE O/P NEW HI 60 MIN: CPT | Performed by: INTERNAL MEDICINE
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
## 2021-02-04 ENCOUNTER — MYC MEDICAL ADVICE (OUTPATIENT)
Dept: FAMILY MEDICINE | Facility: CLINIC | Age: 61
End: 2021-02-04

## 2021-02-05 ENCOUNTER — MYC MEDICAL ADVICE (OUTPATIENT)
Dept: FAMILY MEDICINE | Facility: CLINIC | Age: 61
End: 2021-02-05

## 2021-02-07 DIAGNOSIS — Z11.59 ENCOUNTER FOR SCREENING FOR OTHER VIRAL DISEASES: Primary | ICD-10-CM

## 2021-02-08 ENCOUNTER — MYC MEDICAL ADVICE (OUTPATIENT)
Dept: FAMILY MEDICINE | Facility: CLINIC | Age: 61
End: 2021-02-08

## 2021-02-08 ENCOUNTER — TELEPHONE (OUTPATIENT)
Dept: SURGERY | Facility: CLINIC | Age: 61
End: 2021-02-08

## 2021-02-08 NOTE — TELEPHONE ENCOUNTER
Reason for Call:  Other call back    Detailed comments: patient is calling stating has up coming procedure for colonoscopy and would like to discuss questions. Please call to discuss. Thank you.    Phone Number Patient can be reached at: Home number on file [phone] (home)    Best Time:     Can we leave a detailed message on this number? YES    Call taken on 2/8/2021 at 2:06 PM by Sally Ladd

## 2021-02-09 NOTE — TELEPHONE ENCOUNTER
Patient returned the call. He reports his insurance does not cover colonoscopy prep supplies (supprep and Golytley.) One of the nurses at the  surgery center said they would help him get something OTC that he can mix with Gatorade. They are going to send him a sheet with the names of the supplies he needs to get. He is wondering if this is okay?    Secondly, he said he has an OAB and is concerned about having to drink liquid the day of the procedure. If this happens, he will urinate during the procedure which will be embarrassing for both him and us. He is wondering if he can abstain from liquids the day of the procedure. He said he cannot get through to the nurses at the surgery center and the instruction sheets have too much information.    Alejandro FLANNERY RN....2/9/2021 11:14 AM

## 2021-02-09 NOTE — TELEPHONE ENCOUNTER
Per Dr Saud garcia to do the miralax gatorade prep for colonoscopy. I mailed the instructions to the patient. Sheila Spence Cma

## 2021-02-09 NOTE — TELEPHONE ENCOUNTER
Called patient however there was no answer.  Left message for patient to return call to clinic.    Alejandro Luna RN....2/9/2021 10:27 AM

## 2021-02-15 ENCOUNTER — MYC MEDICAL ADVICE (OUTPATIENT)
Dept: FAMILY MEDICINE | Facility: CLINIC | Age: 61
End: 2021-02-15

## 2021-02-15 ENCOUNTER — PATIENT OUTREACH (OUTPATIENT)
Dept: SURGERY | Facility: CLINIC | Age: 61
End: 2021-02-15

## 2021-02-15 NOTE — PROGRESS NOTES
Called patient regarding his upcoming procedure. Patient states he was told when he scheduled his colonoscopy that his disabled 26 year old son would be able to attend with him. However, today the patient received a call from our someone who was discussing his colonoscopy with him and he was informed that his son would not be able to attend. Patient states that he has an aide that will be present with his son the whole time of his procedure and the aide will drive him to and from his procedure and remain with him for 6 hours. Advised patient that we can make an accommodation to have his son and aide present in the waiting area while he has his procedure. However, he would need someone present with him for 24 hours following his procedure because of the medications given during the procedure. Patient is checking with his aide and will callback to discuss keeping his procedure or rescheduling to when he can have someone with him for 24 hours following the procedure.

## 2021-02-19 ENCOUNTER — CARE COORDINATION (OUTPATIENT)
Dept: GASTROENTEROLOGY | Facility: CLINIC | Age: 61
End: 2021-02-19

## 2021-02-19 DIAGNOSIS — Z11.59 ENCOUNTER FOR SCREENING FOR OTHER VIRAL DISEASES: ICD-10-CM

## 2021-02-19 LAB
SARS-COV-2 RNA RESP QL NAA+PROBE: NORMAL
SPECIMEN SOURCE: NORMAL

## 2021-02-19 PROCEDURE — U0005 INFEC AGEN DETEC AMPLI PROBE: HCPCS | Performed by: SURGERY

## 2021-02-19 PROCEDURE — U0003 INFECTIOUS AGENT DETECTION BY NUCLEIC ACID (DNA OR RNA); SEVERE ACUTE RESPIRATORY SYNDROME CORONAVIRUS 2 (SARS-COV-2) (CORONAVIRUS DISEASE [COVID-19]), AMPLIFIED PROBE TECHNIQUE, MAKING USE OF HIGH THROUGHPUT TECHNOLOGIES AS DESCRIBED BY CMS-2020-01-R: HCPCS | Performed by: SURGERY

## 2021-02-19 NOTE — PROGRESS NOTES
Called patient to discuss his colonoscopy for Monday. Patient's son and aide will be able to wait during his procedure. Health Aide verified she will be present with patient for 24 hours after his procedure.

## 2021-02-20 LAB
LABORATORY COMMENT REPORT: NORMAL
SARS-COV-2 RNA RESP QL NAA+PROBE: NEGATIVE
SPECIMEN SOURCE: NORMAL

## 2021-02-22 ENCOUNTER — HOSPITAL ENCOUNTER (OUTPATIENT)
Facility: AMBULATORY SURGERY CENTER | Age: 61
Discharge: HOME | End: 2021-02-22
Attending: SURGERY | Admitting: SURGERY
Payer: COMMERCIAL

## 2021-02-22 VITALS
TEMPERATURE: 97.6 F | HEART RATE: 57 BPM | SYSTOLIC BLOOD PRESSURE: 119 MMHG | DIASTOLIC BLOOD PRESSURE: 82 MMHG | OXYGEN SATURATION: 100 % | RESPIRATION RATE: 16 BRPM

## 2021-02-22 LAB — COLONOSCOPY: NORMAL

## 2021-02-22 PROCEDURE — 99152 MOD SED SAME PHYS/QHP 5/>YRS: CPT | Mod: 59 | Performed by: SURGERY

## 2021-02-22 PROCEDURE — G8907 PT DOC NO EVENTS ON DISCHARG: HCPCS

## 2021-02-22 PROCEDURE — 45385 COLONOSCOPY W/LESION REMOVAL: CPT

## 2021-02-22 PROCEDURE — 45385 COLONOSCOPY W/LESION REMOVAL: CPT | Mod: PT | Performed by: SURGERY

## 2021-02-22 PROCEDURE — 88305 TISSUE EXAM BY PATHOLOGIST: CPT | Mod: GC | Performed by: PATHOLOGY

## 2021-02-22 PROCEDURE — G8918 PT W/O PREOP ORDER IV AB PRO: HCPCS

## 2021-02-22 RX ORDER — NALOXONE HYDROCHLORIDE 0.4 MG/ML
0.2 INJECTION, SOLUTION INTRAMUSCULAR; INTRAVENOUS; SUBCUTANEOUS
Status: DISCONTINUED | OUTPATIENT
Start: 2021-02-22 | End: 2021-02-23 | Stop reason: HOSPADM

## 2021-02-22 RX ORDER — ONDANSETRON 4 MG/1
4 TABLET, ORALLY DISINTEGRATING ORAL EVERY 6 HOURS PRN
Status: DISCONTINUED | OUTPATIENT
Start: 2021-02-22 | End: 2021-02-23 | Stop reason: HOSPADM

## 2021-02-22 RX ORDER — PROCHLORPERAZINE MALEATE 10 MG
10 TABLET ORAL EVERY 6 HOURS PRN
Status: DISCONTINUED | OUTPATIENT
Start: 2021-02-22 | End: 2021-02-23 | Stop reason: HOSPADM

## 2021-02-22 RX ORDER — ONDANSETRON 2 MG/ML
4 INJECTION INTRAMUSCULAR; INTRAVENOUS EVERY 6 HOURS PRN
Status: DISCONTINUED | OUTPATIENT
Start: 2021-02-22 | End: 2021-02-23 | Stop reason: HOSPADM

## 2021-02-22 RX ORDER — LIDOCAINE 40 MG/G
CREAM TOPICAL
Status: DISCONTINUED | OUTPATIENT
Start: 2021-02-22 | End: 2021-02-23 | Stop reason: HOSPADM

## 2021-02-22 RX ORDER — NALOXONE HYDROCHLORIDE 0.4 MG/ML
0.4 INJECTION, SOLUTION INTRAMUSCULAR; INTRAVENOUS; SUBCUTANEOUS
Status: DISCONTINUED | OUTPATIENT
Start: 2021-02-22 | End: 2021-02-23 | Stop reason: HOSPADM

## 2021-02-22 RX ORDER — FENTANYL CITRATE 50 UG/ML
INJECTION, SOLUTION INTRAMUSCULAR; INTRAVENOUS PRN
Status: DISCONTINUED | OUTPATIENT
Start: 2021-02-22 | End: 2021-02-22 | Stop reason: HOSPADM

## 2021-02-22 RX ORDER — FLUMAZENIL 0.1 MG/ML
0.2 INJECTION, SOLUTION INTRAVENOUS
Status: SHIPPED | OUTPATIENT
Start: 2021-02-22 | End: 2021-02-22

## 2021-02-24 ENCOUNTER — MYC MEDICAL ADVICE (OUTPATIENT)
Dept: FAMILY MEDICINE | Facility: CLINIC | Age: 61
End: 2021-02-24

## 2021-02-24 DIAGNOSIS — M53.3 SI (SACROILIAC) JOINT DYSFUNCTION: Primary | ICD-10-CM

## 2021-02-25 ENCOUNTER — TELEPHONE (OUTPATIENT)
Dept: PALLIATIVE MEDICINE | Facility: CLINIC | Age: 61
End: 2021-02-25

## 2021-02-25 LAB — COPATH REPORT: NORMAL
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
## 2021-03-02 ENCOUNTER — TELEPHONE (OUTPATIENT)
Dept: FAMILY MEDICINE | Facility: CLINIC | Age: 61
End: 2021-03-02

## 2021-03-02 NOTE — TELEPHONE ENCOUNTER
Patient is calling regarding his SI injection scheduled tomorrow.  Patient has genital herpes flare up.  He has some valtrex on hand that he can take.  He is asking if his flare up is a contraindication to the injection tomorrow.  Writer informed that Herpes is a chronic virus that lives in the body and manifests with flare ups and is not a contraindication to the SI joint injection.    Glenis Cole RN

## 2021-03-03 ENCOUNTER — RADIOLOGY INJECTION OFFICE VISIT (OUTPATIENT)
Dept: PALLIATIVE MEDICINE | Facility: CLINIC | Age: 61
End: 2021-03-03
Payer: COMMERCIAL

## 2021-03-03 VITALS — DIASTOLIC BLOOD PRESSURE: 78 MMHG | SYSTOLIC BLOOD PRESSURE: 139 MMHG | HEART RATE: 52 BPM | OXYGEN SATURATION: 100 %

## 2021-03-03 DIAGNOSIS — M53.3 SI (SACROILIAC) JOINT DYSFUNCTION: ICD-10-CM

## 2021-03-03 PROCEDURE — 27096 INJECT SACROILIAC JOINT: CPT | Mod: 50 | Performed by: PSYCHIATRY & NEUROLOGY

## 2021-03-03 RX ORDER — GADOBUTROL 604.72 MG/ML
5 INJECTION INTRAVENOUS ONCE
Status: DISCONTINUED | OUTPATIENT
Start: 2021-03-03 | End: 2021-03-03 | Stop reason: CLARIF

## 2021-03-03 RX ORDER — VALACYCLOVIR HYDROCHLORIDE 500 MG/1
500 TABLET, FILM COATED ORAL DAILY PRN
COMMUNITY
End: 2023-06-07

## 2021-03-03 ASSESSMENT — PAIN SCALES - GENERAL: PAINLEVEL: SEVERE PAIN (6)

## 2021-03-03 NOTE — PATIENT INSTRUCTIONS
[facility]   Procedure Discharge Instructions    Today you saw:  Dr. Angélica King    You had an: bilateral sacroiliac joint injections       Medications used:  Lidocaine    Omnipaque  Ropivicaine   Kenalog            Be cautious when walking. Numbness and/or weakness in the lower extremities may occur for up to 6-8 hours after the procedure due to effect of the local anesthetic    Do not drive for 6 hours. The effect of the local anesthetic could slow your reflexes.     You may resume your regular activities after 24 hours    Avoid strenuous activity for the first 24 hours    You may shower, however avoid swimming, tub baths or hot tubs for 24 hours following your procedure    You may have a mild to moderate increase in pain for several days following the injection.    It may take up to 14 days for the steroid medication to start working although you may feel the effect as early as a few days after the procedure.       You may use ice packs for 10-15 minutes, 3 to 4 times a day at the injection site for comfort    Do not use heat to painful areas for 6 to 8 hours. This will give the local anesthetic time to wear off and prevent you from accidentally burning your skin.     Unless you have been directed to avoid the use of anti-inflammatory medications (NSAIDS), you may use medications such as ibuprofen, Aleve or Tylenol for pain control if needed.     If you were fasting, you may resume your normal diet and medications after the procedure    If you have diabetes, check your blood sugar more frequently than usual as your blood sugar may be higher than normal for 10-14 days following a steroid injection. Contact your doctor who manages your diabetes if your blood sugar is higher than usual    Possible side effects of steroids that you may experience include flushing, elevated blood pressure, increased appetite, mild headaches and restlessness.  All of these symptoms will get better with  time.    If you experience any of the following, call the Pain Clinic during work hours (Mon-Friday 8-4:30 pm) at [phone] or the Provider Line after hours at [phone]:  -Fever over 100 degree F  -Swelling, bleeding, redness, drainage, warmth at the injection site  -Progressive weakness or numbness in your legs or arms  -Loss of bowel or bladder function  -Unusual headache that is not relieved by Tylenol or other pain reliever  -Unusual new onset of pain that is not improving

## 2021-03-03 NOTE — NURSING NOTE
Discharge Information    IV Discontiued Time:  NA    Amount of Fluid Infused:  NA    Discharge Criteria = When patient returns to baseline or as per MD order    Consciousness:  Pt is fully awake    Circulation:  BP +/- 20% of pre-procedure level    Respiration:  Patient is able to breathe deeply    O2 Sat:  Patient is able to maintain O2 Sat >92% on room air    Activity:  Moves 4 extremities on command    Ambulation:  Patient is able to stand and walk or stand and pivot into wheelchair    Dressing:  Clean/dry or No Dressing    Notes:   Discharge instructions and AVS given to patient    Patient meets criteria for discharge?  YES    Admitted to PCU?  No    Responsible adult present to accompany patient home?  Yes    Signature/Title:    tyler moore RN  RN Care Coordinator  [facility]

## 2021-03-03 NOTE — NURSING NOTE
Pre-procedure Intake    Have you been fasting? NA    If yes, for how long? NA    Are you taking a prescribed blood thinner such as coumadin, Plavix, Xarelto?    No    If yes, when did you take your last dose? NA    Do you take aspirin?  No    If cervical procedure, have you held aspirin for 6 days?   NA    Do you have any allergies to contrast dye, iodine, steroid and/or numbing medications?  NO    Are you currently taking antibiotics or have an active infection?  NO    Have you had a fever/elevated temperature within the past week? NO    Are you currently taking oral steroids? NO    Do you have a ? Yes       Are you pregnant or breastfeeding?  Not Applicable    Are the vital signs normal?  Yes      Emma Breaux CMA ([facility])

## 2021-03-03 NOTE — PROGRESS NOTES
Pre procedure Diagnosis: SI joint dysfunction    Post procedure Diagnosis: Same  Procedure performed: bilateral SI joint injections  Anesthesia: none  Complications: none  Operators: Cally King MD    Indications:   Jeanmarie Mclean is a 60 year old male, sent by Dr. Iglesias for repeat SI joint injections. They have a history of pain across the low back and buttocks. Exam shows tenderness of the lumbosacral region and bilateral SI joints. They have tried conservative treatment including physical therapy and medications.  SI joint injections help for ~3 months.  He also gets trigger point injections.    Options/alternatives, benefits and risks were discussed with the patient including bleeding, infection, tissue trauma, exposure to radiation, reaction to medications including seizure, nerve injury, weakness, and numbness.  Questions were answered to his satisfaction and he agrees to proceed. Voluntary informed consent was obtained and signed.     Vitals were reviewed: Yes  Allergies were reviewed:  Yes  Medications were reviewed:  Yes  Pre-procedure pain score: 6/10    Procedure:  After getting informed consent, patient was brought into the procedure suite and was placed in a prone position on the procedure table.   A Pause for the Cause was performed.  Patient was prepped and draped in sterile fashion.     After identifying the bilateral SI joint, the C-arm was rotated to a obliquely to obtain the best view of the inferior angle of the joint.  A total of 4ml of Lidocaine 1% was used to anesthetize the skin at a skin entry site coaxial with the fluoroscopy beam at this location.  A 22gauge 3.5 inch needle was advanced under intermittent fluoroscopy until it was felt to enter the SI joint.    A total of 2ml of Omnipaque-300 was injected, confirming appropriate position, with spread into the intraarticular space, with no intravascular uptake noted.  8ml was wasted. Location was verified in lateral view.    3 ml of 0.2%  ropivacaine with 80mg of kenalog was injected divided between the two sides.  The needle was flushed with lidocaine and removed.     Hemostasis was achieved, the area was cleaned, and bandaids were placed when appropriate.    The patient tolerated the procedure well, and was taken to the recovery room.    Images were saved to PACS.    Post-procedure pain score: 0/10  Follow-up includes:   -f/u with referring provider    Cally King MD  [facility]

## 2021-03-05 ENCOUNTER — MYC MEDICAL ADVICE (OUTPATIENT)
Dept: FAMILY MEDICINE | Facility: CLINIC | Age: 61
End: 2021-03-05

## 2021-03-17 ENCOUNTER — MYC MEDICAL ADVICE (OUTPATIENT)
Dept: FAMILY MEDICINE | Facility: CLINIC | Age: 61
End: 2021-03-17

## 2021-03-17 NOTE — TELEPHONE ENCOUNTER
Which vaccines did patient get? Moderna or Envoimoinscher. Mychart sent to patient to clarify    FRAN Wong MA

## 2021-03-20 ENCOUNTER — MYC MEDICAL ADVICE (OUTPATIENT)
Dept: FAMILY MEDICINE | Facility: CLINIC | Age: 61
End: 2021-03-20

## 2021-04-05 ENCOUNTER — MYC MEDICAL ADVICE (OUTPATIENT)
Dept: FAMILY MEDICINE | Facility: CLINIC | Age: 61
End: 2021-04-05

## 2021-04-20 ENCOUNTER — MYC MEDICAL ADVICE (OUTPATIENT)
Dept: FAMILY MEDICINE | Facility: CLINIC | Age: 61
End: 2021-04-20

## 2021-04-20 NOTE — LETTER
[address] [address]  Phone: [phone]    April 21, 2021        Jeanmarie Mclean  [address] [place] [address] [place] [id number]          To whom it may concern:    RE: Jeanmarie Mclean    Patient and patient's son is are patients of mine. Patient stated that at his current home he is exposed to smoking which has caused him to have illness with stomach issues and breathing issues. Patient is also experiencing mental issues with the stresses that he is enduring currently. I would recommend patient to find another suitable home for both himself and his son to help with his medical issues. This would be greatly appreciated.    Please contact me for questions or concerns.      Sincerely,        Milton Iglesias MD

## 2021-04-21 NOTE — TELEPHONE ENCOUNTER
Letter completed and placed in 1st floor TC bin to mail to patient's home.    Milton Iglesias MD

## 2021-04-23 ENCOUNTER — MYC MEDICAL ADVICE (OUTPATIENT)
Dept: FAMILY MEDICINE | Facility: CLINIC | Age: 61
End: 2021-04-23

## 2021-04-23 ENCOUNTER — TRANSFERRED RECORDS (OUTPATIENT)
Dept: HEALTH INFORMATION MANAGEMENT | Facility: CLINIC | Age: 61
End: 2021-04-23

## 2021-04-26 ENCOUNTER — MYC MEDICAL ADVICE (OUTPATIENT)
Dept: FAMILY MEDICINE | Facility: CLINIC | Age: 61
End: 2021-04-26

## 2021-04-26 NOTE — LETTER
[address] [address]  Phone: [phone]    April 28, 2021        Jeanmarie Mclean  [address] [place] [address] [place] [phone]          To whom it may concern:    RE: Jeanmarie Mclean    Patient and patient's son is are patients of mine. Patient stated that at his current home he is exposed to smoking which has caused him to have illness with stomach issues and breathing issues. Patient is also experiencing mental issues with the stresses that he is enduring currently. Eliminating smoke exposure will help with his medical issues. This would be greatly appreciated.     Please contact me for questions or concerns.        Sincerely,           Milton Iglesias MD

## 2021-04-29 ENCOUNTER — MYC MEDICAL ADVICE (OUTPATIENT)
Dept: FAMILY MEDICINE | Facility: CLINIC | Age: 61
End: 2021-04-29

## 2021-04-29 DIAGNOSIS — H91.93 HEARING PROBLEM OF BOTH EARS: Primary | ICD-10-CM

## 2021-04-29 NOTE — TELEPHONE ENCOUNTER
Referral is faxed to [facility] at fax # [phone].  Pepe Lim,  For 1st Floor Primary Care

## 2021-04-29 NOTE — TELEPHONE ENCOUNTER
Referral placed in 1st floor TC bin for fax to [facility] at [phone].    Milton Iglesias MD

## 2021-04-30 ENCOUNTER — MYC MEDICAL ADVICE (OUTPATIENT)
Dept: FAMILY MEDICINE | Facility: CLINIC | Age: 61
End: 2021-04-30

## 2021-04-30 DIAGNOSIS — R91.8 PULMONARY NODULES: Primary | ICD-10-CM

## 2021-04-30 DIAGNOSIS — M89.9 RIB LESION: ICD-10-CM

## 2021-06-27 ENCOUNTER — NURSE TRIAGE (OUTPATIENT)
Dept: NURSING | Facility: CLINIC | Age: 61
End: 2021-06-27

## 2021-06-28 NOTE — TELEPHONE ENCOUNTER
Triage Call    Pt asking if he can take tylenol and ibuprofen with [facility]    RN able to answer pt question.  Dosing of OTC pain meds reviewed.    Chayo Simons RN            Reason for Disposition    Health Information question, no triage required and triager able to answer question    Additional Information    Negative: [1] Caller is not with the adult (patient) AND [2] reporting urgent symptoms    Negative: Lab result questions    Negative: Medication questions    Negative: Caller can't be reached by phone    Negative: Caller has already spoken to PCP or another triager    Negative: RN needs further essential information from caller in order to complete triage    Negative: Requesting regular office appointment    Negative: [1] Caller requesting NON-URGENT health information AND [2] PCP's office is the best resource    Protocols used: INFORMATION ONLY CALL - NO TRIAGE-[facility]

## 2021-06-30 ENCOUNTER — TELEPHONE (OUTPATIENT)
Dept: UROLOGY | Facility: CLINIC | Age: 61
End: 2021-06-30

## 2021-06-30 DIAGNOSIS — N50.82 SCROTAL PAIN: Primary | ICD-10-CM

## 2021-06-30 NOTE — TELEPHONE ENCOUNTER
Elsa betancourt per MD. Orders place and pt updated via vm with his permission from earlier conversation.    Coty CLINE RN Specialty Triage 6/30/2021 2:38 PM

## 2021-06-30 NOTE — TELEPHONE ENCOUNTER
Returned call to pt to discuss s/s  No fever   no chills   no N&V  No blood in urine  U&F present (has had prostate removed)  Advised tight under garments for support  Ice and heat recommended  Advised sitz bath/ warm bath  Elevate- recommended  No flank pain  Voiding good   Pt went into detailed hx of how artificial sweeteners cause him to have epididymitis. Let pt know since he has not been see since 2019 that Dr Miles needs to approve the UA/UC. He is taking Augmentin currently, as he would not take levofloxacin d/t tendon s/e with med, has been taking tylenol and baby ASA with no pain relief, and will not take his oxycodone as pt states he read it interferes with the abx efficacy. Pt was told he had an infection at a recent appt, but they did not collect any blood/urine. Informed pt that he will get a call back once MD approves or denies his request.    Coty CLINE RN Specialty Triage 6/30/2021 1:23 PM

## 2021-06-30 NOTE — TELEPHONE ENCOUNTER
Reason for Call:  Other     Detailed comments: Patient calling to request orders for UA. He believe he has an infection/epididymitis    Phone Number Patient can be reached at: Home number on file [phone] (home)    Best Time: any    Can we leave a detailed message on this number? YES    Call taken on 6/30/2021 at 7:55 AM by Elisha Soria

## 2021-07-03 DIAGNOSIS — N50.82 SCROTAL PAIN: ICD-10-CM

## 2021-07-03 LAB
ALBUMIN UR-MCNC: NEGATIVE MG/DL
APPEARANCE UR: CLEAR
BACTERIA #/AREA URNS HPF: ABNORMAL /HPF
BILIRUB UR QL STRIP: NEGATIVE
CAOX CRY #/AREA URNS HPF: ABNORMAL /HPF
COLOR UR AUTO: YELLOW
GLUCOSE UR STRIP-MCNC: NEGATIVE MG/DL
HGB UR QL STRIP: ABNORMAL
KETONES UR STRIP-MCNC: NEGATIVE MG/DL
LEUKOCYTE ESTERASE UR QL STRIP: NEGATIVE
NITRATE UR QL: NEGATIVE
PH UR STRIP: 7.5 PH (ref 5–7)
RBC #/AREA URNS AUTO: ABNORMAL /HPF
SOURCE: ABNORMAL
SP GR UR STRIP: 1.02 (ref 1–1.03)
UROBILINOGEN UR STRIP-ACNC: 0.2 EU/DL (ref 0.2–1)
WBC #/AREA URNS AUTO: ABNORMAL /HPF

## 2021-07-03 PROCEDURE — 81001 URINALYSIS AUTO W/SCOPE: CPT | Performed by: UROLOGY

## 2021-07-06 ENCOUNTER — MYC MEDICAL ADVICE (OUTPATIENT)
Dept: FAMILY MEDICINE | Facility: CLINIC | Age: 61
End: 2021-07-06

## 2021-07-06 ENCOUNTER — TELEPHONE (OUTPATIENT)
Dept: FAMILY MEDICINE | Facility: CLINIC | Age: 61
End: 2021-07-06

## 2021-07-06 NOTE — TELEPHONE ENCOUNTER
.What type of form? DMV  What day did you drop off your forms? 07-  Is there a due date?  (7-10 business day to compete forms)   How would you like to receive these forms? Patient will  at the clinic when completed  Which clinic [facility] the [facility]    What is the best number to contact you? Cell [phone]  What time works best to contact you with in 4 hrs? any  Is it okay to leave a message? Yes    Shakira Traylor

## 2021-07-07 NOTE — TELEPHONE ENCOUNTER
Copy of form sent for immediate abstraction into chart.  Original form placed at [facility]  for pt  after 3PM today. Pt notified.

## 2021-07-08 ENCOUNTER — TELEPHONE (OUTPATIENT)
Dept: FAMILY MEDICINE | Facility: CLINIC | Age: 61
End: 2021-07-08

## 2021-07-08 ENCOUNTER — MYC MEDICAL ADVICE (OUTPATIENT)
Dept: FAMILY MEDICINE | Facility: CLINIC | Age: 61
End: 2021-07-08

## 2021-07-08 NOTE — TELEPHONE ENCOUNTER
Incoming call received from Gennaro Navarrete (also see [employer] message from pt).     She states an order was received to dispense medical cannabis to this pt, but she is unable to dispense it without provider reviewing the following contraindications:    1. Rosalba states pt has schizoaffective disorder, and states schizophrenia is a contraindication.    2. Rosalba states pt has hx of heart murmur. She states this also needs to be reviewed as THC can cause tachycardia, and CBD can cause bradycardia, so this requires provider review.    Rosalba states medical cannabis cannot be dispensed to pt unless provider reviews both items above, and agrees it is still appropriate for pt to use as ordered.  Please return call to Pharmacist at [phone] with provider response.    Naa Begum, SONNYN, RN

## 2021-07-09 ENCOUNTER — MYC MEDICAL ADVICE (OUTPATIENT)
Dept: FAMILY MEDICINE | Facility: CLINIC | Age: 61
End: 2021-07-09

## 2021-07-09 NOTE — TELEPHONE ENCOUNTER
Reviewed the two concerns. Okay for patient to try the medical cannabis. Please okay this with pharmacy.    Milton Iglesias MD

## 2021-07-09 NOTE — TELEPHONE ENCOUNTER
Called and spoke with Rosalba, pharmacist with [facility]  Updated her on provider's message below  She verbalized understanding    August Zamorano RN  [facility]

## 2021-07-26 ENCOUNTER — MYC MEDICAL ADVICE (OUTPATIENT)
Dept: FAMILY MEDICINE | Facility: CLINIC | Age: 61
End: 2021-07-26

## 2021-07-27 NOTE — TELEPHONE ENCOUNTER
Patient asking about Whooping cough vaccine, informed he had it 3 years ago.    Naa Mccartney RN, [facility]

## 2021-08-10 ENCOUNTER — APPOINTMENT (OUTPATIENT)
Dept: OPTOMETRY | Facility: CLINIC | Age: 61
End: 2021-08-10
Payer: COMMERCIAL

## 2021-08-10 PROCEDURE — 92340 FIT SPECTACLES MONOFOCAL: CPT | Performed by: OPTOMETRIST

## 2021-08-22 ENCOUNTER — VIRTUAL VISIT (OUTPATIENT)
Dept: URGENT CARE | Facility: CLINIC | Age: 61
End: 2021-08-22
Payer: COMMERCIAL

## 2021-08-22 DIAGNOSIS — R42 DIZZINESS: Primary | ICD-10-CM

## 2021-08-22 DIAGNOSIS — Z20.822 SUSPECTED COVID-19 VIRUS INFECTION: ICD-10-CM

## 2021-08-22 PROCEDURE — 99213 OFFICE O/P EST LOW 20 MIN: CPT | Mod: TEL

## 2021-08-22 NOTE — PROGRESS NOTES
"Jeanmarie Mclean is being evaluated via a billable telephone visit.      Assessment & Plan:     Symptoms consistent with possible COVID-19; advised pt be tested for this. However, I am concerned about his lightheadedness/pre-syncope samia in the setting of SOB and upper back pain/chest discomfort yesterday. Concerned about PE, ACS as well as acute anemia, electrolyte disturbance, dehydration. I advised pt be seen in urgent care in [place] for this, as well as COVID testing. He states he is so dizzy he doesn't feel comfortable driving. He declines in -person evaluation. [facility]. Advised of risks of delaying care. He will call 911 if he has syncopal episode, worsening SOB, or chest pain.    See patient instructions below.  At the end of the encounter, I discussed diagnosis & medications. Discussed red flags for being seen in person at clinic/ER, as well as indications for follow up if no improvement. Patient understood and agreed to plan.       ICD-10-CM    1. Dizziness  R42    2. Suspected COVID-19 virus infection  Z20.822          Return in about 1 day (around 8/23/2021) for Recheck with primary care provider.    Phone Call Duration : 15  minutes  Additional time spent documenting and reviewing chart:    MENDY Earl PA-C [facility]  -----------------------------------------------------------------------------------------------------------------------------------------------------------------    Subjective:   Jeanmarie Mclean is a 61 year old male with hx of HTN who is contacted via telephone through scheduled urgent care virtual visit to discuss:   Chief Complaint   Patient presents with     Suspected Covid       Yesterday- SOB, chest felt \"raw\", subjective fever, cough, mild sore throat, and myalgias in upper back/shoulders onset yesterday. He took Nyquil last night, all symptoms resolved today except some mild SOB and now has felt dizzy for " the past hour or so. Pt describes this as lightheadedness that is worse when he stands up or when walking. He has had similar symptoms (regarding SOB) in the past and has been treated with albuterol with success; no COPD or asthma diagnosis. Patient reports no syncope, palpitations, nasal congestion, loss of sense of taste or smell, headache, chest pain,  abdominal pain, nausea, vomiting, diarrhea, rash, or any other symptoms. No known COVID exposure.  He has no cardiac history.      Past Medical History:   Diagnosis Date     Alcoholism (H)      Arthritis      Asperger's syndrome     Dr. Owens, [facility]. Last visit 2006/2007     GERD (gastroesophageal reflux disease) 1999    EGD 2003 [place]     History of hypercholesterolemia      Hypertension      Kidney stones      Malignant hyperthermia due to anesthesia      Melasma     forehead, has received desonide from dermatologist     MMT (medial meniscus tear) 10/01    LT     Myalgia and myositis 4/5/2016    mid thorax, left subscapularis, latisimus dorsi Bilateral along iliac crest      Posttraumatic stress disorder     per pt     Prostate cancer (H)      Recurrent genital herpes 1982     Rib fracture 1985    L 6th     Skull fracture (H) 1985    frequent vertigo     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    There are no Patient Instructions on file for this visit.

## 2021-09-01 ENCOUNTER — TELEPHONE (OUTPATIENT)
Dept: PALLIATIVE MEDICINE | Facility: CLINIC | Age: 61
End: 2021-09-01

## 2021-09-01 NOTE — TELEPHONE ENCOUNTER
Patient called asking to speak to a nurse about his medical marijuana.        Jeana Rosario    [facility]

## 2021-09-01 NOTE — TELEPHONE ENCOUNTER
Call back to Pt.    Last office visit on 11/9/2018, but Pt has had injections since    Will await call back    Enrique Rizo RN  Patient Care Supervisor   [facility] [place]

## 2021-09-03 NOTE — TELEPHONE ENCOUNTER
Medical marijuana, Pt stated hat Pt was prescribed this and wanted to know if there was any contraindication between Medical marijuana and the SI joint injections that he gets.    Writer informed Pt that there is no contraindication.  Pt will call his primary for a referral for SI joint injection    Will forward to Dr. King as JERI Rizo, RN  Patient Care Supervisor   [facility] [place]

## 2021-09-06 ENCOUNTER — MYC MEDICAL ADVICE (OUTPATIENT)
Dept: FAMILY MEDICINE | Facility: CLINIC | Age: 61
End: 2021-09-06

## 2021-09-21 ENCOUNTER — MYC MEDICAL ADVICE (OUTPATIENT)
Dept: FAMILY MEDICINE | Facility: CLINIC | Age: 61
End: 2021-09-21

## 2021-09-21 DIAGNOSIS — M53.3 SI (SACROILIAC) JOINT DYSFUNCTION: Primary | ICD-10-CM

## 2021-09-23 ENCOUNTER — TELEPHONE (OUTPATIENT)
Dept: PALLIATIVE MEDICINE | Facility: CLINIC | Age: 61
End: 2021-09-23

## 2021-09-23 NOTE — TELEPHONE ENCOUNTER
Screening Questions for Radiology Injections:    Injection to be done at which interventional clinic site? [facility]    If [facility] location, tell patient that this procedure requires a COVID-19 lab test be done within 4 days of the procedure. Would you still like to move forward with scheduling the procedure?  Not Applicable   If YES, let patient know that someone will call them to schedule the COVID-19 test and that they will only receive a call back if the result is positive. Route to nursing to enter order.     Instruct patient to arrive as directed prior to the scheduled appointment time:    [place] minutes before      [place]: 30 minutes before; if IV needed 1 hour before     Procedure ordered by     Procedure ordered? Bilateral SI Joint Injection      Transforaminal Cervical JOSE R -  [facility] does NOT have providers that do these- [facility] [place] [facility] and [facility] do have providers that do    As a reminder, receiving steroids can decrease your body's ability to fight infection.   Would you still like to move forward with scheduling the injection?  Yes    What insurance would patient like us to bill for this procedure? [employer]      Worker's comp or MVA (motor vehicle accident) -Any injection DO NOT SCHEDULE and route to Kym Fisher.      [employer] insurance - For SI joint injections, DO NOT SCHEDULE and route Kym Fisher.       ALL BCBS, [employer] and [employer]-Route to Kym for review DO NOT SCHEDULE      IF SCHEDULING IN [place] AND NEEDS A PA, IT IS OKAY TO SCHEDULE. [place] HANDLES THEIR OWN PA'S AFTER THE PATIENT IS SCHEDULED. PLEASE SCHEDULE AT LEAST 1 WEEK OUT SO A PA CAN BE OBTAINED.    Any chance of pregnancy? NO   If YES, do NOT schedule and route to RN pool    Is an  needed? No     Patient has a drive home? (mandatory) YES: ok    Is patient taking any blood thinners (i.e. plavix, coumadin, jantoven, warfarin, heparin, pradaxa or dabigatran, etc)? No    If hold needed, do NOT schedule, route to RN pool     Is patient taking any aspirin products (includes Excedrin and Fiorinal)? No     If more than 325mg/day, OK to schedule; Instruct pt to decrease to less than 325 mg for 7 days AND route to RN pool    For CERVICAL procedures, hold all aspirin products for 6 days.     Tell pt that if aspirin product is not held for 6 days, the procedure WILL BE cancelled.      Does the patient have a bleeding or clotting disorder? No     If YES, okay to schedule AND route to RN nurse pool    For any patients with platelet count <100, must be forwarded to provider    Any allergies to contrast dye, iodine, shellfish, or numbing and steroid medications? No    If YES, add allergy information to appointment notes AND route to the RN pool     If JOSE R and Contrast Dye / Iodine Allergy? DO NOT SCHEDULE, route to RN pool    Allergies: Risperidone, Effexor [venlafaxine hydrochloride], Ambien, Ambien [zolpidem tartrate], Buspirone, Celexa [citalopram], Duloxetine, Septra [bactrim], Seroquel [quetiapine], Sulfa drugs, Sulfamethoxazole-trimethoprim, Tramadol, Ultram [tramadol hcl], Venlafaxine, and Zolpidem     Is patient diabetic?  No  If YES, instruct them to bring their glucometer.    Does patient have an active infection or treated for one within the past week? No     Is patient currently taking any antibiotics?  No     For patients on chronic, preventative, or prophylactic antibiotics, procedures may be scheduled.     For patients on antibiotics for active or recent infection:antibiotic course must have been completed for 4 days    Is patient currently taking any steroid medications? (i.e. Prednisone, Medrol)  No     For patients on steroid medications, course must have been completed for 4 days    Is patient actively being treated for cancer or immunocompromised? No  If YES, do NOT schedule and route to RN pool     Are you able to get on and off an exam table with minimal or no  assistance? Yes  If NO, do NOT schedule and route to RN pool    Are you able to roll over and lay on your stomach with minimal or no assistance? Yes  If NO, do NOT schedule and route to RN pool     Has the patient had a flu shot or any other vaccinations within 7 days before or after the procedure.  No     Have you recently had a COVID vaccine or have plans to get it in the near future? No    If yes, explain that for the vaccine to work best they need to:       wait 1 week before and 1 week after getting Vaccine #1    wait 1 week before and 2 weeks after getting Vaccine #2    If patient has concerns about the timing, send to RN pool     Does patient have an MRI/CT?  Not Applicable  Check Procedure Scheduling Grid to see if required.      Was the MRI done within the last 3 years?  NA    If yes, where was the MRI done i.e.[facility], [facility], [facility], [facility] etc?       If no, do not schedule and route to RN pool    If MRI was not done at [facility], [facility] or [facility] do NOT schedule and route to RN pool.      If pt has an imaging disc, the injection MAY be scheduled but pt has to bring disc to appt.     If they show up without the disc the injection cannot be done    Procedure Specific Instructions:      If celiac plexus block, informed patient NPO for 6 hours and that it is okay to take medications with sips of water, especially blood pressure medications  Not Applicable         If this is for a cervical procedure, informed patient that aspirin needs to be held for 6 days.   Not Applicable      If IV needed:    Do not schedule procedures requiring IV placement in the first appointment of the day or first appointment after lunch. Do NOT schedule at 0745, 0815 or 1245. ok    Instructed pt to arrive 30 minutes early for IV start if required. (Check Procedure Scheduling Grid)  Not Applicable    Reminders:      If you are started on any steroids or antibiotics between now and your appointment, you must  contact us because the procedure may need to be cancelled.  Yes      For all procedures except radiofrequency ablations (RFAs) and spinal cord stimulator (SCS) trials, informed patient:    IV sedation is not provided for this procedure.  If you feel that an oral anti-anxiety medication is needed, you can discuss this further with your referring provider or primary care provider.  The Pain Clinic provider will discuss specifics of what the procedure includes at your appointment.  Most procedures last 10-20 minutes.  We use numbing medications to help with any discomfort during the procedure.  Not Applicable      For patients 85 or older we recommend having an adult stay w/ them for the remainder of the day.   ok    Does the patient have any questions?  NO  Nan Loving  [facility]

## 2021-09-27 ENCOUNTER — TELEPHONE (OUTPATIENT)
Dept: NURSING | Facility: CLINIC | Age: 61
End: 2021-09-27

## 2021-09-27 ENCOUNTER — MYC MEDICAL ADVICE (OUTPATIENT)
Dept: FAMILY MEDICINE | Facility: CLINIC | Age: 61
End: 2021-09-27

## 2021-09-28 ENCOUNTER — MYC MEDICAL ADVICE (OUTPATIENT)
Dept: FAMILY MEDICINE | Facility: CLINIC | Age: 61
End: 2021-09-28

## 2021-09-28 ENCOUNTER — RADIOLOGY INJECTION OFFICE VISIT (OUTPATIENT)
Dept: PALLIATIVE MEDICINE | Facility: CLINIC | Age: 61
End: 2021-09-28
Attending: FAMILY MEDICINE
Payer: COMMERCIAL

## 2021-09-28 VITALS — HEART RATE: 58 BPM | OXYGEN SATURATION: 100 % | SYSTOLIC BLOOD PRESSURE: 121 MMHG | DIASTOLIC BLOOD PRESSURE: 82 MMHG

## 2021-09-28 DIAGNOSIS — M53.3 SI (SACROILIAC) JOINT DYSFUNCTION: ICD-10-CM

## 2021-09-28 PROCEDURE — 27096 INJECT SACROILIAC JOINT: CPT | Mod: 50 | Performed by: PAIN MEDICINE

## 2021-09-28 RX ORDER — TRIAMCINOLONE ACETONIDE 40 MG/ML
40 INJECTION, SUSPENSION INTRA-ARTICULAR; INTRAMUSCULAR ONCE
Status: COMPLETED | OUTPATIENT
Start: 2021-09-28 | End: 2021-09-28

## 2021-09-28 RX ORDER — ASPIRIN 81 MG/1
81 TABLET ORAL DAILY
COMMUNITY
End: 2022-03-23

## 2021-09-28 RX ADMIN — TRIAMCINOLONE ACETONIDE 40 MG: 40 INJECTION, SUSPENSION INTRA-ARTICULAR; INTRAMUSCULAR at 09:28

## 2021-09-28 ASSESSMENT — PAIN SCALES - GENERAL: PAINLEVEL: WORST PAIN (10)

## 2021-09-28 NOTE — PATIENT INSTRUCTIONS
Preventive Health Recommendations  Male Ages 50 - 64    Yearly exam:             See your health care provider every year in order to  o   Review health changes.   o   Discuss preventive care.    o   Review your medicines if your doctor has prescribed any.     Have a cholesterol test every 5 years, or more frequently if you are at risk for high cholesterol/heart disease.     Have a diabetes test (fasting glucose) every three years. If you are at risk for diabetes, you should have this test more often.     Have a colonoscopy at age 50, or have a yearly FIT test (stool test). These exams will check for colon cancer.      Talk with your health care provider about whether or not a prostate cancer screening test (PSA) is right for you.    You should be tested each year for STDs (sexually transmitted diseases), if you re at risk.     Shots: Get a flu shot each year. Get a tetanus shot every 10 years.     Nutrition:    Eat at least 5 servings of fruits and vegetables daily.     Eat whole-grain bread, whole-wheat pasta and brown rice instead of white grains and rice.     Get adequate Calcium and Vitamin D.     Lifestyle    Exercise for at least 150 minutes a week (30 minutes a day, 5 days a week). This will help you control your weight and prevent disease.     Limit alcohol to one drink per day.     No smoking.     Wear sunscreen to prevent skin cancer.     See your dentist every six months for an exam and cleaning.     See your eye doctor every 1 to 2 years.  At [facility], we strive to deliver an exceptional experience to you, every time we see you. If you receive a survey, please complete it as we do value your feedback.  If you have [employer], you can expect to receive results automatically within 24 hours of their completion.  Your provider will send a note interpreting your results as well.   If you do not have [employer], you should receive your results in about a week by mail.    Your  care team:                            Family Medicine Internal Medicine   MD Aaron Schulte, MD Jefe Benavidez MD Katya Belousova, PAJOSÉ Grewal, APRNIEVES Iglesias MD Pediatrics   Eliseo Riggs, LIZ Figueroa, MD Sarita Holden APRN CNP   MD Lisa Reese MD Deborah Mielke, MD Maricruz Coe, APRN [facility]      Clinic hours: Monday - Thursday 7 am-6 pm; Fridays 7 am-5 pm.   Urgent care: Monday - Friday 10 am- 8 pm; Saturday and Sunday 9 am-5 pm.    Clinic: [phone]       [facility]: Monday - Thursday 8 am - 7 pm; Friday 8 am - 6 pm  [facility]: [phone]     Use [email or web address] for 24/7 diagnosis and treatment of dozens of conditions.

## 2021-09-28 NOTE — NURSING NOTE
Pre-procedure Intake    Have you been fasting? NA    If yes, for how long?     Are you taking a prescribed blood thinner such as coumadin, Plavix, Xarelto?    No    If yes, when did you take your last dose?     Do you take aspirin?  No    If cervical procedure, have you held aspirin for 6 days?   No     Do you have any allergies to contrast dye, iodine, steroid and/or numbing medications?  NO    Are you currently taking antibiotics or have an active infection?  NO    Have you had a fever/elevated temperature within the past week? NO    Are you currently taking oral steroids? NO    Do you have a ? Yes       Are you pregnant or breastfeeding?  Not Applicable    Have you received the COVID-19 vaccine? Yes       If yes, was it your 1st, 2nd or only dose needed? 2nd dose     Date of most recent vaccine: 4/6/2021    Notify provider and RNs if systolic BP >170, diastolic BP >100, P >100 or O2 sats < 90%

## 2021-09-28 NOTE — PROGRESS NOTES
SUBJECTIVE:   CC: Jeanmarie Mclean is an 61 year old male who presents for preventative health visit.       Patient has been advised of split billing requirements and indicates understanding: No  Healthy Habits:     Getting at least 3 servings of Calcium per day:  Yes    Bi-annual eye exam:  NO    Dental care twice a year:  NO    Sleep apnea or symptoms of sleep apnea:  Daytime drowsiness    Diet:  Low salt and Low fat/cholesterol    Frequency of exercise:  4-5 days/week    Duration of exercise:  15-30 minutes    Taking medications regularly:  No    Medication side effects:  Other    PHQ-2 Total Score: 4    Additional concerns today:  Yes      Today's PHQ-2 Score:   PHQ-2 (  [employer]) 2021   Q1: Little interest or pleasure in doing things 1   Q2: Feeling down, depressed or hopeless 3   PHQ-2 Score 4   Q1: Little interest or pleasure in doing things Several days   Q2: Feeling down, depressed or hopeless Nearly every day   PHQ-2 Score 4       Abuse: Current or Past(Physical, Sexual or Emotional)- No  Do you feel safe in your environment? Yes        Social History     Tobacco Use     Smoking status: Former Smoker     Packs/day: 0.50     Years: 10.00     Pack years: 5.00     Types: Cigarettes     Quit date: 1997     Years since quittin.7     Smokeless tobacco: Never Used   Substance Use Topics     Alcohol use: Yes     Comment: hx alcoholism quit . [facility] in      If you drink alcohol do you typically have >3 drinks per day or >7 drinks per week? No    Alcohol Use 2021   Prescreen: >3 drinks/day or >7 drinks/week? Not Applicable   Prescreen: >3 drinks/day or >7 drinks/week? -   No flowsheet data found.    Last PSA:   PSA   Date Value Ref Range Status   2020 0.02 0 - 4 ug/L Final     Comment:     Assay Method:  Chemiluminescence using Siemens Vista analyzer       Reviewed orders with patient. Reviewed health maintenance and updated orders accordingly - Yes  Lab work  is in process  Labs reviewed in EPIC  BP Readings from Last 3 Encounters:   21 137/85   21 121/82   21 139/78    Wt Readings from Last 3 Encounters:   21 75.8 kg (167 lb)   21 73 kg (161 lb)   20 73 kg (161 lb)                  Patient Active Problem List   Diagnosis     Malignant neoplasm of prostate (H)     CARDIOVASCULAR SCREENING; LDL GOAL LESS THAN 130     GERD (gastroesophageal reflux disease)     Overweight (BMI 25.0-29.9)     Advance care planning     Fibromyalgia syndrome     Posttraumatic stress disorder     Low back pain     Inadequate material resources     Anxiety state     History of Asperger's syndrome     Pervasive developmental disorder, active     Depressive disorder     Delusional disorder (H)     TBI (traumatic brain injury) (H)     Insomnia     Chronic pain     Myalgia     male stress incontinence     Major depressive disorder, recurrent episode, moderate (H)     Low back pain without sciatica, unspecified back pain laterality, unspecified chronicity     Alcohol dependence in remission (H)     Hypertensive response to exercise     Past Surgical History:   Procedure Laterality Date     C HEP B VAC ADULT 3 DOSE IM      received all 3 vaccines     C REMV PROSTATE,RETROPUB,RADICAL  10/13/04    Dr. Muñoz ([place])     CYSTOSCOPY  10/98    for renal stones     HC KNEE SCOPE,MED/LAT MENISECTOMY  11    Left, medial ([place])     HERNIA REPAIR, INGUINAL RT/LT      [facility]       Social History     Tobacco Use     Smoking status: Former Smoker     Packs/day: 0.50     Years: 10.00     Pack years: 5.00     Types: Cigarettes     Quit date: 1997     Years since quittin.7     Smokeless tobacco: Never Used   Substance Use Topics     Alcohol use: Yes     Comment: hx alcoholism quit . [facility] in      Family History   Problem Relation Age of Onset     Psychotic Disorder Son         autism     Prostate Cancer Father          40s     Cancer Father      Cancer Maternal Grandmother         lung     Glaucoma Maternal Grandmother      Eye Disorder Maternal Grandmother         glaucoma     Diabetes Mother          45     Blood Disease Sister         sickle trait     Hypertension Sister      Blood Disease Paternal Uncle         sickle     Blood Disease Paternal Aunt         sickle     Alzheimer Disease Paternal Grandfather         70s     Macular Degeneration Paternal Grandfather      Cerebrovascular Disease No family hx of      Thyroid Disease No family hx of      Myocardial Infarction No family hx of      C.A.D. No family hx of          Current Outpatient Medications   Medication Sig Dispense Refill     amLODIPine (NORVASC) 2.5 MG tablet Take 1 tablet (2.5 mg) by mouth daily (Patient not taking: Reported on 3/3/2021) 90 tablet 3     aspirin 81 MG EC tablet Take 81 mg by mouth daily       bacitracin-neomycin-polymyxin (NEOSPORIN) 400-5-5000 external ointment Apply topically 2 times daily (Patient not taking: Reported on 3/3/2021) 28.35 g 0     Blood Pressure KIT Monitor blood pressure as needed. (Patient not taking: Reported on 2/3/2021) 1 kit 1     Cholecalciferol (VITAMIN D) 1000 UNIT capsule Take 1 capsule by mouth 2 times daily.       diclofenac (VOLTAREN) 1 % topical gel Place onto the skin 4 times daily       guaiFENesin-codeine (ROBITUSSIN AC) 100-10 MG/5ML solution Take 10 mLs by mouth every 4 hours as needed for cough 236 mL 1     medical cannabis (Patient's own supply) See Admin Instructions (The purpose of this order is to document that the patient reports taking medical cannabis.  This is not a prescription, and is not used to certify that the patient has a qualifying medical condition.)       menthol (ICY HOT) 5 % PADS Apply 1 patch topically every 8 hours as needed for muscle soreness May cut to fit 30 patch 3     oxyCODONE IR (ROXICODONE) 5 MG tablet Take 5 mg by mouth (Patient not taking: Reported on 2021)        sildenafil (VIAGRA) 100 MG tablet Take 1/4 - 1 tablet, as directed, 1-3 hours before intimacy. Maximum 1 dose per 24 hours. 10 tablet 5     UNABLE TO FIND MEDICATION NAME: XS lozenges 10 mg THC 0.5 CBD per lozenge       valACYclovir (VALTREX) 500 MG tablet Take 500 mg by mouth daily as needed       Allergies   Allergen Reactions     Risperidone Other (See Comments)     Serve numbness      Effexor [Venlafaxine Hydrochloride] Other (See Comments)     Headache, Painful scrotum and drainage from the penis     Ambien Other (See Comments)     headaches     Ambien [Zolpidem Tartrate] Nausea     Buspirone Nausea     Dizziness, headache     Celexa [Citalopram] Other (See Comments)     Headache     Duloxetine Other (See Comments)     Headache  headaches     Septra [Bactrim] Itching     Seroquel [Quetiapine] Other (See Comments)     Headache, N, V     Sulfa Drugs Itching     Sulfamethoxazole-Trimethoprim      hives     Tramadol Nausea     Nausea and headache     Ultram [Tramadol Hcl] Nausea and Vomiting     Venlafaxine      Zolpidem      headaches       Reviewed and updated as needed this visit by clinical staff   Allergies  Meds  Problems             Reviewed and updated as needed this visit by Provider                    Review of Systems   Constitutional: Negative for chills and fever.   HENT: Positive for hearing loss. Negative for congestion, ear pain and sore throat.    Eyes: Negative for pain and visual disturbance.   Respiratory: Positive for shortness of breath. Negative for cough.    Cardiovascular: Positive for chest pain and palpitations.   Gastrointestinal: Positive for nausea. Negative for abdominal pain, diarrhea, heartburn and hematochezia.   Genitourinary: Positive for frequency, impotence and urgency. Negative for discharge, dysuria, genital sores and hematuria.   Musculoskeletal: Positive for arthralgias and myalgias.   Skin: Negative for rash.   Neurological: Positive for dizziness, headaches and  "paresthesias. Negative for weakness.   Psychiatric/Behavioral: Positive for mood changes.     CONSTITUTIONAL: NEGATIVE for fever, chills, change in weight  INTEGUMENTARY/SKIN: NEGATIVE for worrisome rashes, moles or lesions  EYES: NEGATIVE for vision changes or irritation  ENT: NEGATIVE for ear, mouth and throat problems  RESP: NEGATIVE for significant cough or SOB  CV: NEGATIVE for chest pain, palpitations or peripheral edema  GI: NEGATIVE for nausea, abdominal pain, heartburn, or change in bowel habits   male: negative for dysuria, hematuria, decreased urinary stream, erectile dysfunction, urethral discharge  MUSCULOSKELETAL: NEGATIVE for significant arthralgias or myalgia  NEURO: NEGATIVE for weakness, dizziness or paresthesias  PSYCHIATRIC: NEGATIVE for changes in mood or affect    OBJECTIVE:   /85 (BP Location: Left arm, Patient Position: Chair, Cuff Size: Adult Large)   Pulse 69   Temp 97  F (36.1  C) (Tympanic)   Ht 1.626 m (5' 4\")   Wt 75.8 kg (167 lb)   SpO2 98%   BMI 28.67 kg/m      Physical Exam  GENERAL: healthy, alert and no distress  NECK: no adenopathy, no asymmetry, masses, or scars and thyroid normal to palpation  RESP: lungs clear to auscultation - no rales, rhonchi or wheezes  CV: regular rate and rhythm, normal S1 S2, no S3 or S4, no murmur, click or rub, no peripheral edema and peripheral pulses strong  ABDOMEN: soft, nontender, no hepatosplenomegaly, no masses and bowel sounds normal  MS: no gross musculoskeletal defects noted, no edema    Diagnostic Test Results:  Labs reviewed in Epic    ASSESSMENT/PLAN:   (Z00.00) Routine general medical examination at a health care facility  (primary encounter diagnosis)  Comment:   Plan: as below.    (Z13.6) CARDIOVASCULAR SCREENING; LDL GOAL LESS THAN 130  Comment:   Plan: Comprehensive metabolic panel (BMP + Alb, Alk         Phos, ALT, AST, Total. Bili, TP), Lipid panel         reflex to direct LDL Fasting            (Z13.1) Screening for " "diabetes mellitus  Comment:   Plan: Comprehensive metabolic panel (BMP + Alb, Alk         Phos, ALT, AST, Total. Bili, TP), Hemoglobin         A1c            (Z12.5) Screening for prostate cancer  Comment:   Plan: PSA, screen            (F10.21) Alcohol dependence in remission (H)  Comment:   Plan: doing well.    (F22) Delusional disorder (H)  Comment:   Plan: stable.    (C61) Malignant neoplasm of prostate (H)  Comment:   Plan: history of monitor.    (S06.9X9S) Traumatic brain injury with loss of consciousness, sequela (H)  Comment:   Plan: stable.        Patient has been advised of split billing requirements and indicates understanding: Yes  COUNSELING:   Reviewed preventive health counseling, as reflected in patient instructions       Regular exercise       Healthy diet/nutrition       Vision screening    Estimated body mass index is 27.64 kg/m  as calculated from the following:    Height as of 9/9/20: 1.626 m (5' 4\").    Weight as of 2/3/21: 73 kg (161 lb).         He reports that he quit smoking about 23 years ago. His smoking use included cigarettes. He has a 5.00 pack-year smoking history. He has never used smokeless tobacco.      Counseling Resources:  ATP IV Guidelines  Pooled Cohorts Equation Calculator  FRAX Risk Assessment  ICSI Preventive Guidelines  Dietary Guidelines for Americans, 2010  USDA's MyPlate  ASA Prophylaxis  Lung CA Screening    Milton Iglesias MD, MD  [facility]  Answers for HPI/ROS submitted by the patient on 9/29/2021  If you checked off any problems, how difficult have these problems made it for you to do your work, take care of things at home, or get along with other people?: Somewhat difficult  PHQ9 TOTAL SCORE: 13      "

## 2021-09-28 NOTE — PATIENT INSTRUCTIONS
[facility]   Procedure Discharge Instructions    Today you saw: Dr. Barry Castellanos      You had an:     sacroiliac joint injection         Be cautious when walking. Numbness and/or weakness in the lower extremities may occur for up to 6-8 hours after the procedure due to effect of the local anesthetic    Do not drive for 6 hours. The effect of the local anesthetic could slow your reflexes.     You may resume your regular activities after 24 hours    Avoid strenuous activity for the first 24 hours    You may shower, however avoid swimming, tub baths or hot tubs for 24 hours following your procedure    You may have a mild to moderate increase in pain for several days following the injection.    It may take up to 14 days for the steroid medication to start working although you may feel the effect as early as a few days after the procedure.       You may use ice packs for 10-15 minutes, 3 to 4 times a day at the injection site for comfort    Do not use heat to painful areas for 6 to 8 hours. This will give the local anesthetic time to wear off and prevent you from accidentally burning your skin.     Unless you have been directed to avoid the use of anti-inflammatory medications (NSAIDS), you may use medications such as ibuprofen, Aleve or Tylenol for pain control if needed.     If you were fasting, you may resume your normal diet and medications after the procedure    If you have diabetes, check your blood sugar more frequently than usual as your blood sugar may be higher than normal for 10-14 days following a steroid injection. Contact your doctor who manages your diabetes if your blood sugar is higher than usual    Possible side effects of steroids that you may experience include flushing, elevated blood pressure, increased appetite, mild headaches and restlessness.  All of these symptoms will get better with time.    If you experience any of the following, call the Pain Clinic during  work hours (Mon-Friday 8-4:30 pm) at [phone] or the Provider Line after hours at [phone]:  -Fever over 100 degree F  -Swelling, bleeding, redness, drainage, warmth at the injection site  -Progressive weakness or numbness in your legs or arms  -Unusual new onset of pain that is not improving

## 2021-09-28 NOTE — TELEPHONE ENCOUNTER
Pt having a steroid injection in his back tomorrow and was told not to take any Asprin products - Wants to use a lidocaine patch, but wants to be sure that there is no Asprin in it.  Looked up what pt uses - Aspercreme lidocaine relief patch and it contains Trolamine Salicylate which is closely related to Asprin which is a Salicylate. Advised pt to use ice for any pain tonight and when he has procedure done tomorrow to ask for future reference.    Cathy Payton RN  [facility] Nurse Advisor  9:00 PM 9/27/2021       COVID 19 Nurse Triage Plan/Patient Instructions    Please be aware that novel coronavirus (COVID-19) may be circulating in the community. If you develop symptoms such as fever, cough, or SOB or if you have concerns about the presence of another infection including coronavirus (COVID-19), please contact your health care provider or visit [email or web address].     Disposition/Instructions    Home care recommended. Follow home care protocol based instructions.    Thank you for taking steps to prevent the spread of this virus.  o Limit your contact with others.  o Wear a simple mask to cover your cough.  o Wash your hands well and often.    Resources    [facility]: About COVID-19: [email or web address]    CDC: What to Do If You're Sick: www.cdc.gov/coronavirus/2019-ncov/about/steps-when-sick.html    CDC: Ending Home Isolation: www.cdc.gov/coronavirus/2019-ncov/hcp/disposition-in-home-patients.html     CDC: Caring for Someone: www.cdc.gov/coronavirus/2019-ncov/if-you-are-sick/care-for-someone.html     [facility]: Interim Guidance for Hospital Discharge to Home: www.health.[facility].[place]/diseases/coronavirus/hcp/hospdischarge.pdf    [facility] clinical trials (COVID-19 research studies): clinicalaffairs.[facility]/umn-clinical-trials     Below are the COVID-19 hotlines at the [place] Department of Health ([facility]). Interpreters are available.   o For health questions: Call [phone]  or [phone] (7 a.m. to 7 p.m.)  o For questions about schools and childcare: Call [phone] or [phone] (7 a.m. to 7 p.m.)

## 2021-09-28 NOTE — PROGRESS NOTES
Pre procedure Diagnosis: SI joint dysfunction    Post procedure Diagnosis: Same  Procedure performed: bilateral SI joint injection  Anesthesia: none  Complications: none  Operators: Barry Castellanos MD and Dr. Lucero (fellow)    Indications:   Jeanmarie Mclean is a 61 year old male. The patient has a history of bilateral upper buttocks pain. Other conservative treatments prior to injection include meds/pt/injections with benefit .    Options/alternatives, benefits and risks were discussed with the patient including bleeding, infection, tissue trauma, exposure to radiation, reaction to medications including seizure, nerve injury, weakness, and numbness.  Questions were answered to his satisfaction and he agrees to proceed. Voluntary informed consent was obtained and signed.     Vitals were reviewed: Yes  Allergies were reviewed:  Yes   Medications were reviewed:  Yes   Pre-procedure pain score: 10/10    Procedure:  After obtaining signed informed consent, the patient was brought into the procedure suite and was placed in a prone position on the procedure table.   A Pause for the Cause was performed.  The patient was prepped and draped in the usual sterile fashion.     After identifying the bilateral SI joint, the C-arm was rotated obliquely to obtain the best view of the inferior angle of the joint.  Then 5 ml of Lidocaine 1%  was used to anesthetize the skin at a skin entry site coaxial with the fluoroscopy beam at this location.  A 22 gauge 3.5 inch needle was advanced under intermittent fluoroscopy until it was felt to enter the SI joint.  Aspiration was negative.    A total of 1ml of Omnipaque-300 was injected, confirming appropriate position, with spread into the intraarticular space, with no intravascular uptake noted.  9ml of Omnipaque was wasted. Location was verified in lateral view.    2 ml of 0.5% bupivacaine with 40mg of Kenalog was injected.  The needle was removed.     Hemostasis was achieved, the area was  cleaned, and bandaids were placed when appropriate.  The patient tolerated the procedure well, and was taken to the recovery room.  Images were saved to PACS.    Post-procedure pain score: 5/10  Follow-up includes:   -f/u phone call in one week  -f/u with referring Physician   I saw and examined the patient with the Pain Fellow/Resident. I have reviewed and agree with the resident's note and plan of care and made changes and corrections directly to the body of the note.   TIME SPENT:     BY MYSELF AND FELLOW/RESIDENT TOGETHER with fellow the entire procedure.      Barry Castellanos MD  [facility]

## 2021-09-29 ENCOUNTER — OFFICE VISIT (OUTPATIENT)
Dept: FAMILY MEDICINE | Facility: CLINIC | Age: 61
End: 2021-09-29
Payer: COMMERCIAL

## 2021-09-29 VITALS
DIASTOLIC BLOOD PRESSURE: 85 MMHG | TEMPERATURE: 97 F | HEIGHT: 64 IN | SYSTOLIC BLOOD PRESSURE: 137 MMHG | OXYGEN SATURATION: 98 % | BODY MASS INDEX: 28.51 KG/M2 | WEIGHT: 167 LBS | HEART RATE: 69 BPM

## 2021-09-29 DIAGNOSIS — F22 DELUSIONAL DISORDER (H): ICD-10-CM

## 2021-09-29 DIAGNOSIS — Z00.00 ROUTINE GENERAL MEDICAL EXAMINATION AT A HEALTH CARE FACILITY: Primary | ICD-10-CM

## 2021-09-29 DIAGNOSIS — F10.21 ALCOHOL DEPENDENCE IN REMISSION (H): ICD-10-CM

## 2021-09-29 DIAGNOSIS — S06.9X9S TRAUMATIC BRAIN INJURY WITH LOSS OF CONSCIOUSNESS, SEQUELA (H): ICD-10-CM

## 2021-09-29 DIAGNOSIS — Z13.1 SCREENING FOR DIABETES MELLITUS: ICD-10-CM

## 2021-09-29 DIAGNOSIS — Z13.6 CARDIOVASCULAR SCREENING; LDL GOAL LESS THAN 130: ICD-10-CM

## 2021-09-29 DIAGNOSIS — Z12.5 SCREENING FOR PROSTATE CANCER: ICD-10-CM

## 2021-09-29 DIAGNOSIS — C61 MALIGNANT NEOPLASM OF PROSTATE (H): ICD-10-CM

## 2021-09-29 PROCEDURE — 99396 PREV VISIT EST AGE 40-64: CPT | Performed by: FAMILY MEDICINE

## 2021-09-29 ASSESSMENT — ENCOUNTER SYMPTOMS
SORE THROAT: 0
COUGH: 0
HEMATOCHEZIA: 0
HEARTBURN: 0
HEMATURIA: 0
ARTHRALGIAS: 1
CHILLS: 0
FEVER: 0
ABDOMINAL PAIN: 0
PARESTHESIAS: 1
WEAKNESS: 0
PALPITATIONS: 1
EYE PAIN: 0
FREQUENCY: 1
MYALGIAS: 1
DYSURIA: 0
DIZZINESS: 1
HEADACHES: 1
SHORTNESS OF BREATH: 1
DIARRHEA: 0
NAUSEA: 1

## 2021-09-29 ASSESSMENT — PATIENT HEALTH QUESTIONNAIRE - PHQ9
SUM OF ALL RESPONSES TO PHQ QUESTIONS 1-9: 13
10. IF YOU CHECKED OFF ANY PROBLEMS, HOW DIFFICULT HAVE THESE PROBLEMS MADE IT FOR YOU TO DO YOUR WORK, TAKE CARE OF THINGS AT HOME, OR GET ALONG WITH OTHER PEOPLE: SOMEWHAT DIFFICULT
SUM OF ALL RESPONSES TO PHQ QUESTIONS 1-9: 13

## 2021-09-29 ASSESSMENT — MIFFLIN-ST. JEOR: SCORE: 1478.51

## 2021-09-29 ASSESSMENT — PAIN SCALES - GENERAL: PAINLEVEL: NO PAIN (0)

## 2021-09-30 ASSESSMENT — PATIENT HEALTH QUESTIONNAIRE - PHQ9: SUM OF ALL RESPONSES TO PHQ QUESTIONS 1-9: 13

## 2021-10-01 ENCOUNTER — LAB (OUTPATIENT)
Dept: LAB | Facility: CLINIC | Age: 61
End: 2021-10-01
Payer: COMMERCIAL

## 2021-10-01 DIAGNOSIS — Z12.5 SCREENING FOR PROSTATE CANCER: ICD-10-CM

## 2021-10-01 DIAGNOSIS — Z13.1 SCREENING FOR DIABETES MELLITUS: ICD-10-CM

## 2021-10-01 DIAGNOSIS — Z13.6 CARDIOVASCULAR SCREENING; LDL GOAL LESS THAN 130: ICD-10-CM

## 2021-10-01 DIAGNOSIS — R63.5 WEIGHT GAIN: ICD-10-CM

## 2021-10-01 LAB
ALBUMIN SERPL-MCNC: 4.2 G/DL (ref 3.4–5)
ALP SERPL-CCNC: 77 U/L (ref 40–150)
ALT SERPL W P-5'-P-CCNC: 82 U/L (ref 0–70)
ANION GAP SERPL CALCULATED.3IONS-SCNC: 9 MMOL/L (ref 3–14)
AST SERPL W P-5'-P-CCNC: 60 U/L (ref 0–45)
BILIRUB SERPL-MCNC: 0.8 MG/DL (ref 0.2–1.3)
BUN SERPL-MCNC: 15 MG/DL (ref 7–30)
CALCIUM SERPL-MCNC: 9.4 MG/DL (ref 8.5–10.1)
CHLORIDE BLD-SCNC: 106 MMOL/L (ref 94–109)
CHOLEST SERPL-MCNC: 251 MG/DL
CO2 SERPL-SCNC: 25 MMOL/L (ref 20–32)
CREAT SERPL-MCNC: 0.79 MG/DL (ref 0.66–1.25)
FASTING STATUS PATIENT QL REPORTED: YES
GFR SERPL CREATININE-BSD FRML MDRD: >90 ML/MIN/1.73M2
GLUCOSE BLD-MCNC: 108 MG/DL (ref 70–99)
HBA1C MFR BLD: 5.7 % (ref 0–5.6)
HDLC SERPL-MCNC: 74 MG/DL
LDLC SERPL CALC-MCNC: 155 MG/DL
NONHDLC SERPL-MCNC: 177 MG/DL
POTASSIUM BLD-SCNC: 3.5 MMOL/L (ref 3.4–5.3)
PROT SERPL-MCNC: 8.1 G/DL (ref 6.8–8.8)
PSA SERPL-MCNC: 0.03 UG/L (ref 0–4)
SODIUM SERPL-SCNC: 140 MMOL/L (ref 133–144)
TRIGL SERPL-MCNC: 108 MG/DL

## 2021-10-01 PROCEDURE — 80053 COMPREHEN METABOLIC PANEL: CPT

## 2021-10-01 PROCEDURE — 80061 LIPID PANEL: CPT

## 2021-10-01 PROCEDURE — 84443 ASSAY THYROID STIM HORMONE: CPT

## 2021-10-01 PROCEDURE — G0103 PSA SCREENING: HCPCS

## 2021-10-01 PROCEDURE — 36415 COLL VENOUS BLD VENIPUNCTURE: CPT

## 2021-10-01 PROCEDURE — 83036 HEMOGLOBIN GLYCOSYLATED A1C: CPT

## 2021-10-04 ENCOUNTER — ANCILLARY PROCEDURE (OUTPATIENT)
Dept: CT IMAGING | Facility: CLINIC | Age: 61
End: 2021-10-04
Attending: FAMILY MEDICINE
Payer: COMMERCIAL

## 2021-10-04 DIAGNOSIS — R91.8 PULMONARY NODULES: ICD-10-CM

## 2021-10-04 DIAGNOSIS — M89.9 RIB LESION: ICD-10-CM

## 2021-10-04 PROCEDURE — 71250 CT THORAX DX C-: CPT | Mod: GC | Performed by: RADIOLOGY

## 2021-10-05 ENCOUNTER — TELEPHONE (OUTPATIENT)
Dept: FAMILY MEDICINE | Facility: CLINIC | Age: 61
End: 2021-10-05

## 2021-10-05 DIAGNOSIS — R63.5 WEIGHT GAIN: Primary | ICD-10-CM

## 2021-10-05 LAB — TSH SERPL DL<=0.005 MIU/L-ACNC: 1.57 MU/L (ref 0.4–4)

## 2021-10-05 NOTE — TELEPHONE ENCOUNTER
Patient called to clinic regarding some questions from recent visit and test results.    1) Patient asking for a copy of his CT scan of the chest from yesterday (10/4/21) to be mailed to his home address. [facility]. Patient will try to print out a copy at home through his MyChart page, however, he would still like a copy mailed to him, just in case.  Team, please mail requested imaging copy. Confirmed home address in chart.    2) To PCP: Viewed all his recent results on MyChart page. Patient is concerned about his weight, notes he has gained about 30 lbs in the last few years. Notes once he hit his mid 50's , his appetite has greatly increased. Notes he is hungry all the time, wants to eat every couple of hours.  Feels he has a pretty healthy diet, eats lots of vegetables and fruits, salmon, lean meats, but then does admit to going to fast food for lunch or some of his meals. Notes he works out with his son about 4-5 days per week, walking, weight training, cardio.  Patient doesn't know why he has gained so much weight and feels so hungry all the time, wonders if could be related to his thyroid or if something else could contribute to this change.  Patient asking about having his thyroid levels checked.    Routing to team and to PCP to address above.      Courtney Francois RN  [facility]

## 2021-10-05 NOTE — TELEPHONE ENCOUNTER
Mailed results to home, Not imaging. Patient would have to complete an KIERA with imagining where he got his CT done to get the images. Called pt and relayed message. Pt understands.

## 2021-10-05 NOTE — TELEPHONE ENCOUNTER
Please mail copy of his recent CT scan to patient. Please let patient know that thyroid test was ordered. This can be done on the blood that he gave us a few days ago.    Milton Iglesias MD

## 2021-10-08 ENCOUNTER — MYC MEDICAL ADVICE (OUTPATIENT)
Dept: FAMILY MEDICINE | Facility: CLINIC | Age: 61
End: 2021-10-08

## 2021-10-08 DIAGNOSIS — Z20.822 ENCOUNTER FOR LABORATORY TESTING FOR COVID-19 VIRUS: Primary | ICD-10-CM

## 2021-10-09 ENCOUNTER — NURSE TRIAGE (OUTPATIENT)
Dept: NURSING | Facility: CLINIC | Age: 61
End: 2021-10-09

## 2021-10-09 NOTE — TELEPHONE ENCOUNTER
Patient had a question in regards to covid exposure. Patient already has test setup on 10/14. Provider replied to patient on [employer] message.    Dalia Ashley RN   10/09/21 12:49 AM  [facility] Nurse Advisor

## 2021-10-09 NOTE — TELEPHONE ENCOUNTER
Reason for Disposition    [1] Caller concerned that exposure to COVID-19 occurred BUT [2] does not meet COVID-19 EXPOSURE criteria from CDC    Protocols used: CORONAVIRUS (COVID-19) EXPOSURE-A- 8.25.2021

## 2021-10-12 ENCOUNTER — TRANSFERRED RECORDS (OUTPATIENT)
Dept: HEALTH INFORMATION MANAGEMENT | Facility: CLINIC | Age: 61
End: 2021-10-12
Payer: COMMERCIAL

## 2021-10-14 ENCOUNTER — LAB (OUTPATIENT)
Dept: URGENT CARE | Facility: URGENT CARE | Age: 61
End: 2021-10-14
Attending: FAMILY MEDICINE
Payer: COMMERCIAL

## 2021-10-14 DIAGNOSIS — Z20.822 ENCOUNTER FOR LABORATORY TESTING FOR COVID-19 VIRUS: ICD-10-CM

## 2021-10-14 LAB — SARS-COV-2 RNA RESP QL NAA+PROBE: NEGATIVE

## 2021-10-14 PROCEDURE — U0005 INFEC AGEN DETEC AMPLI PROBE: HCPCS

## 2021-10-14 PROCEDURE — U0003 INFECTIOUS AGENT DETECTION BY NUCLEIC ACID (DNA OR RNA); SEVERE ACUTE RESPIRATORY SYNDROME CORONAVIRUS 2 (SARS-COV-2) (CORONAVIRUS DISEASE [COVID-19]), AMPLIFIED PROBE TECHNIQUE, MAKING USE OF HIGH THROUGHPUT TECHNOLOGIES AS DESCRIBED BY CMS-2020-01-R: HCPCS

## 2021-10-19 ENCOUNTER — MYC MEDICAL ADVICE (OUTPATIENT)
Dept: FAMILY MEDICINE | Facility: CLINIC | Age: 61
End: 2021-10-19

## 2021-10-20 ENCOUNTER — NURSE TRIAGE (OUTPATIENT)
Dept: NURSING | Facility: CLINIC | Age: 61
End: 2021-10-20

## 2021-10-20 NOTE — TELEPHONE ENCOUNTER
Pt was seen yesterday for a booster COVID-19 and shared that his arm is sore     Pt took 3 marijuana lozenges and is concerned if this could effect his booster shot     Advised pt to ask primary MD about this during next visit, reassured pt that there is nothing in the protocol that would suggest his booster was any less effective after using medical marijuana.     Care advice given per protocol and when to call back. Pt verbalized understanding and agrees to plan of care.    Cathy Payton RN  [facility] Nurse Advisor  2:02 AM 10/20/2021      COVID 19 Nurse Triage Plan/Patient Instructions    Please be aware that novel coronavirus (COVID-19) may be circulating in the community. If you develop symptoms such as fever, cough, or SOB or if you have concerns about the presence of another infection including coronavirus (COVID-19), please contact your health care provider or visit [email or web address].     Disposition/Instructions    Home care recommended. Follow home care protocol based instructions.    Thank you for taking steps to prevent the spread of this virus.  o Limit your contact with others.  o Wear a simple mask to cover your cough.  o Wash your hands well and often.    Resources    [facility]: About COVID-19: [email or web address]    CDC: What to Do If You're Sick: www.cdc.gov/coronavirus/2019-ncov/about/steps-when-sick.html    CDC: Ending Home Isolation: www.cdc.gov/coronavirus/2019-ncov/hcp/disposition-in-home-patients.html     CDC: Caring for Someone: www.cdc.gov/coronavirus/2019-ncov/if-you-are-sick/care-for-someone.html     [facility]: Interim Guidance for Hospital Discharge to Home: www.health.[facility].[place]/diseases/coronavirus/hcp/hospdischarge.pdf    [facility] clinical trials (COVID-19 research studies): [email or web address].[email or web address]/umn-clinical-trials     Below are the COVID-19 hotlines at the [place] Department of Health ([facility]). Interpreters are available.   o For [employer]  questions: Call 908-295-1272 or 1-830.675.9191 (7 a.m. to 7 p.m.)  o For questions about schools and childcare: Call 363-687-4380 or 1-212.229.2924 (7 a.m. to 7 p.m.)                           Reason for Disposition    COVID-19 Prevention and Healthy Living, questions about    Additional Information    Negative: [1] Difficulty breathing or swallowing AND [2] starts within 2 hours after injection    Negative: Sounds like a life-threatening emergency to the triager    Negative: Fever > 104 F (40 C)    Negative: Sounds like a severe, unusual reaction to the triager    Negative: [1] Symptoms of COVID-19 (e.g., cough, fever, SOB, or others) AND [2] within 14 days of EXPOSURE (close contact) with diagnosed or suspected COVID-19 patient    Negative: [1] Symptoms of COVID-19 (e.g., cough, fever, SOB, or others) AND [2] within 14 days of being at a crowded indoor or outdoor event (e.g., concert, festival, rally, wedding)    Negative: Typical COVID-19 symptoms (e.g., cough, difficulty breathing, loss of taste or smell, runny nose, sore throat) that are NOT expected from vaccine    Negative: [1] COVID-19 exposure AND [2] no symptoms, or symptoms not typical of COVID-19    Negative: [1] Redness or red streak around the injection site AND [2] started > 48 hours after getting vaccine AND [3] fever    Negative: [1] Fever > 101 F (38.3 C) AND [2] age > 60 years AND [3] started > 48 hours after getting vaccine    Negative: [1] Fever > 100.0 F (37.8 C) AND [2] bedridden (e.g., nursing home patient, CVA, chronic illness, recovering from surgery) AND [3] started > 48 hours after getting vaccine    Negative: [1] Fever > 100.0 F (37.8 C) AND [2] diabetes mellitus or weak immune system (e.g., HIV positive, cancer chemo, splenectomy, organ transplant, chronic steroids) AND [3] started > 48 hours after getting vaccine    Negative: Fever > 100.0 F (37.8 C) present > 3 days (72 hours)    Negative: [1] Fever > 100.0 F (37.8 C) AND [2]  healthcare worker    Negative: [1] Redness around the injection site AND [2] started > 48 hours after getting vaccine AND [3] no fever  (Exception: red area < 1 inch or 2.5 cm wide)    Negative: [1] Pain, tenderness, or swelling at the injection site AND [2] over 3 days (72 hours) since vaccine AND [3] getting worse    Negative: [1] Pain, tenderness, or swelling at the injection site AND [2] lasts > 7 days    Negative: [1] Lymph node swelling (i.e., armpit or neck on side of vaccine) AND [2] lasts > 3 weeks    Negative: [1] Requesting COVID-19 vaccine AND [2] healthcare worker (e.g., EMS first responders, doctors, nurses)    Negative: [1] Requesting COVID-19 vaccine AND [2] resident of a long-term care facility (e.g., nursing home)    Negative: [1] Requesting COVID-19 vaccine AND [2] vaccine available in the community for this patient group    Protocols used: CORONAVIRUS (COVID-19) VACCINE QUESTIONS AND [id number] 8.25.2021

## 2021-10-29 ENCOUNTER — DOCUMENTATION ONLY (OUTPATIENT)
Dept: OTHER | Facility: CLINIC | Age: 61
End: 2021-10-29

## 2021-11-04 ENCOUNTER — NURSE TRIAGE (OUTPATIENT)
Dept: NURSING | Facility: CLINIC | Age: 61
End: 2021-11-04
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
## 2021-11-18 ENCOUNTER — OFFICE VISIT (OUTPATIENT)
Dept: OPHTHALMOLOGY | Facility: CLINIC | Age: 61
End: 2021-11-18
Payer: COMMERCIAL

## 2021-11-18 DIAGNOSIS — H00.11 CHALAZION OF RIGHT UPPER EYELID: Primary | ICD-10-CM

## 2021-11-18 PROCEDURE — 92002 INTRM OPH EXAM NEW PATIENT: CPT | Performed by: STUDENT IN AN ORGANIZED HEALTH CARE EDUCATION/TRAINING PROGRAM

## 2021-11-18 ASSESSMENT — EXTERNAL EXAM - RIGHT EYE: OD_EXAM: NORMAL

## 2021-11-18 ASSESSMENT — EXTERNAL EXAM - LEFT EYE: OS_EXAM: NORMAL

## 2021-11-18 ASSESSMENT — VISUAL ACUITY
OD_CC: 20/20
CORRECTION_TYPE: GLASSES
METHOD: SNELLEN - LINEAR
OD_CC+: -2
OS_CC: 20/20

## 2021-11-18 ASSESSMENT — SLIT LAMP EXAM - LIDS: COMMENTS: NORMAL

## 2021-11-18 NOTE — LETTER
11/18/2021         RE: Jeanmarie Mclean  [address]  [facility] [facility] [address]        Dear Colleague,    Thank you for referring your patient, Jeanmarie Mclean, to the [facility]. Please see a copy of my visit note below.     Current Eye Medications:  PF AT's as needed      Subjective:  Here for lid Evaluation. Pt complains of bump on right upper lid. Pt noticed bump about 2 weeks ago. No pain or discomfort. Vision is stable. Pt has upcoming (12/16/2021) appt for complete eye exam with Dr. Jones.      Objective:  See Ophthalmology Exam.      Assessment:  Jeanmarie Mclean is a 61 year old male who presents with:   Encounter Diagnosis   Name Primary?     Chalazion of right upper eyelid Discussed hot packing with option to I&D if it does not resolve.        Plan:  1. Apply a hot compress for 15 minutes at least 4 times a day. Use a microwaveable pack filled with dry,uncooked rice, gel beads, etc.  This will reduce the swelling and soften the hardened oils blocking the duct.   2. Massage the area gently after applying the compress to promote drainage.  Do not try to pop or squeeze the chalazion.  3. Once a day, with eyes closed, clean your eyelids with baby shampoo to help  reduce clogging of the duct, as well as help prevent recurrences.  If the bump does not resolve with hot packing after a few weeks, we may be able to  incise and drain the bump.  Musa Swanson MD  [phone]                           Again, thank you for allowing me to participate in the care of your patient.        Sincerely,        Musa Swanson MD

## 2021-11-18 NOTE — PROGRESS NOTES
Current Eye Medications:  PF AT's as needed      Subjective:  Here for lid Evaluation. Pt complains of bump on right upper lid. Pt noticed bump about 2 weeks ago. No pain or discomfort. Vision is stable. Pt has upcoming (12/16/2021) appt for complete eye exam with Dr. Jones.      Objective:  See Ophthalmology Exam.      Assessment:  Jeanmarie Mclean is a 61 year old male who presents with:   Encounter Diagnosis   Name Primary?     Chalazion of right upper eyelid Discussed hot packing with option to I&D if it does not resolve.        Plan:  1. Apply a hot compress for 15 minutes at least 4 times a day. Use a microwaveable pack filled with dry,uncooked rice, gel beads, etc.  This will reduce the swelling and soften the hardened oils blocking the duct.   2. Massage the area gently after applying the compress to promote drainage.  Do not try to pop or squeeze the chalazion.  3. Once a day, with eyes closed, clean your eyelids with baby shampoo to help  reduce clogging of the duct, as well as help prevent recurrences.  If the bump does not resolve with hot packing after a few weeks, we may be able to  incise and drain the bump.  Musa Swanson MD  [phone]

## 2021-11-18 NOTE — PATIENT INSTRUCTIONS
Chalazion  There are tiny oil glands in the upper eyelid near the eyelashes. They help lubricate the eyes.   If these glands become blocked, a small hard lump forms in the upper eyelid, called a chalazion.   The lump can take several weeks to grow, causing pain and tenderness, sensitivity to light, and increased tearing.    Home Care  1. Apply a hot compress for 15 minutes at least 4 times a day. Use a microwaveable pack filled with dry,uncooked rice, gel beads, etc.  This will reduce the swelling and soften the hardened oils blocking the duct.   2. Massage the area gently after applying the compress to promote drainage.  Do not try to pop or squeeze the chalazion.  3. Once a day, with eyes closed, clean your eyelids with baby shampoo to help  reduce clogging of the duct, as well as help prevent recurrences.  If the bump does not resolve with hot packing after a few weeks, we may be able to  incise and drain the bump.  Musa Swanson MD  [phone]

## 2021-11-23 ENCOUNTER — TELEPHONE (OUTPATIENT)
Dept: FAMILY MEDICINE | Facility: CLINIC | Age: 61
End: 2021-11-23
Payer: COMMERCIAL

## 2021-12-16 ENCOUNTER — OFFICE VISIT (OUTPATIENT)
Dept: OPTOMETRY | Facility: CLINIC | Age: 61
End: 2021-12-16
Payer: COMMERCIAL

## 2021-12-16 DIAGNOSIS — H04.123 DRY EYE SYNDROME OF BOTH EYES: ICD-10-CM

## 2021-12-16 DIAGNOSIS — H52.223 REGULAR ASTIGMATISM OF BOTH EYES: ICD-10-CM

## 2021-12-16 DIAGNOSIS — H52.13 MYOPIA OF BOTH EYES: ICD-10-CM

## 2021-12-16 DIAGNOSIS — H00.11 CHALAZION OF RIGHT UPPER EYELID: ICD-10-CM

## 2021-12-16 DIAGNOSIS — H01.02A SQUAMOUS BLEPHARITIS OF UPPER AND LOWER EYELIDS OF BOTH EYES: ICD-10-CM

## 2021-12-16 DIAGNOSIS — H52.4 PRESBYOPIA: ICD-10-CM

## 2021-12-16 DIAGNOSIS — H01.02B SQUAMOUS BLEPHARITIS OF UPPER AND LOWER EYELIDS OF BOTH EYES: ICD-10-CM

## 2021-12-16 DIAGNOSIS — Z01.00 EXAMINATION OF EYES AND VISION: Primary | ICD-10-CM

## 2021-12-16 PROCEDURE — 92015 DETERMINE REFRACTIVE STATE: CPT | Performed by: OPTOMETRIST

## 2021-12-16 PROCEDURE — 92014 COMPRE OPH EXAM EST PT 1/>: CPT | Performed by: OPTOMETRIST

## 2021-12-16 RX ORDER — POLYETHYLENE GLYCOL 400 AND PROPYLENE GLYCOL 4; 3 MG/ML; MG/ML
1 SOLUTION/ DROPS OPHTHALMIC 4 TIMES DAILY
Qty: 6 ML | Refills: 12 | Status: SHIPPED | OUTPATIENT
Start: 2021-12-16 | End: 2022-12-16

## 2021-12-16 ASSESSMENT — VISUAL ACUITY
OD_CC: 20/25
OD_CC+: -2
METHOD: SNELLEN - LINEAR
OD_SC: 20/80
CORRECTION_TYPE: GLASSES
OS_CC: 20/20
OD_SC+: -1
OS_SC+: -1
OS_SC: 20/20
OS_SC: 20/60
OD_SC: 20/25-1

## 2021-12-16 ASSESSMENT — REFRACTION_WEARINGRX
OS_AXIS: 163
OS_SPHERE: -1.50
OD_SPHERE: -1.50
OD_AXIS: 030
OS_ADD: +2.00
OD_CYLINDER: +0.25
OS_CYLINDER: +0.25
OS_SPHERE: -1.50
OS_AXIS: 163
OD_AXIS: 030
OD_SPHERE: -1.50
OD_ADD: +2.00
OD_CYLINDER: +0.25
OS_CYLINDER: +0.25

## 2021-12-16 ASSESSMENT — TONOMETRY
OS_IOP_MMHG: 15
IOP_METHOD: TONOPEN
OD_IOP_MMHG: 17

## 2021-12-16 ASSESSMENT — KERATOMETRY
OS_K1POWER_DIOPTERS: 44.00
OD_K2POWER_DIOPTERS: 44.50
OD_AXISANGLE_DEGREES: 066
OD_K1POWER_DIOPTERS: 44.00
OS_AXISANGLE_DEGREES: 097
OD_AXISANGLE2_DEGREES: 156
OS_K2POWER_DIOPTERS: 44.50
OS_AXISANGLE2_DEGREES: 007

## 2021-12-16 ASSESSMENT — CONF VISUAL FIELD
OS_NORMAL: 1
OD_NORMAL: 1

## 2021-12-16 ASSESSMENT — REFRACTION_MANIFEST
OS_AXIS: 163
OD_SPHERE: -1.75
OD_CYLINDER: +0.50
OD_AXIS: 030
OD_ADD: +2.00
OS_SPHERE: -1.50
OS_CYLINDER: +0.25
OS_ADD: +2.00

## 2021-12-16 ASSESSMENT — EXTERNAL EXAM - LEFT EYE: OS_EXAM: NORMAL

## 2021-12-16 ASSESSMENT — EXTERNAL EXAM - RIGHT EYE: OD_EXAM: NORMAL

## 2021-12-16 ASSESSMENT — CUP TO DISC RATIO
OD_RATIO: 0.4
OS_RATIO: 0.35

## 2021-12-16 NOTE — PATIENT INSTRUCTIONS
Eyeglass prescription given.  Systane Ultra 1 drop both eyes 2-4 x day.    Heat to the eyes daily for 10-15 minutes nightly with warm washcloth or reusable gel masks from the pharmacy or  [employer] heat masks can be purchased at [employer].    Ocusoft lid scrubs at night.     Continue warm packs to right eye with massage. Return to Dr. Swanson at [facility] if the bump (chalazion) on your upper lid is bothersome for removal.    Return in 1 year for a complete eye exam or sooner if needed.    Willis Jones, OD    The affects of the dilating drops last for 4- 6 hours.  You will be more sensitive to light and vision will be blurry up close.  Do not drive if you do not feel comfortable.  Mydriatic sunglasses were given if needed.      Optometry Providers       Clinic Locations                                 Telephone Number   Dr. Milagro Spence [facility]  [facility] [phone]     [facility] Hours:                [facility] Hours:       [facility] Hours:   [address]   [address]     [address] [place] [address]   [place] [address]    [place] [address]  Phone: [phone]                    Phone: [phone]     Phone: [phone]                      Monday 8:00-6:00                          Monday 8:00-6:00                          Monday 8:00-6:00              Tuesday 8:00-6:00                          Tuesday 8:00-6:00                          Tuesday 8:00-6:00              Wednesday 8:00-6:00                  Wednesday 8:00-6:00                   Wednesday 8:00-6:00      Thursday 8:00-6:00                        Thursday 8:00-6:00                         Thursday 8:00-6:00            Friday 8:00-5:00                              Friday 8:00-5:00                              Friday 8:00-5:00    [facility] Hours:   [address] [place] [address]  [phone]    Monday 9:00-6:00  Tuesday 9:00-6:00  Wednesday 9:00-6:00  Thursday 9:00-6:00  Friday 9:00-5:00  Please log on to [email or web address] [facility] to order your contact lenses.  The link is found on the [facility] Care [facility] Vision Services page.  As always, Thank you for trusting us with your health care needs!

## 2021-12-16 NOTE — LETTER
12/16/2021         RE: Jeanmarie Mclean  [address]  [place] [address]        Dear Colleague,    Thank you for referring your patient, Jeanmarie Mclean, to the [facility]. Please see a copy of my visit note below.    Chief Complaint   Patient presents with     Annual Eye Exam      Accompanied by son  Last Eye Exam: 11-  Dilated Previously: Yes    What are you currently using to see?  glasses       Distance Vision Acuity: Satisfied with vision    Near Vision Acuity: Satisfied with vision while reading  unaided    Eye Comfort: dry  Do you use eye drops? 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          Medical, surgical and family histories reviewed and updated 12/16/2021.       OBJECTIVE: See Ophthalmology exam    ASSESSMENT:    ICD-10-CM    1. Examination of eyes and vision  Z01.00 EYE EXAM (SIMPLE-NONBILLABLE)   2. Myopia of both eyes  H52.13 EYE EXAM (SIMPLE-NONBILLABLE)     REFRACTION   3. Regular astigmatism of both eyes  H52.223 EYE EXAM (SIMPLE-NONBILLABLE)     REFRACTION   4. Presbyopia  H52.4 EYE EXAM (SIMPLE-NONBILLABLE)     REFRACTION   5. Dry eye syndrome of both eyes  H04.123 EYE EXAM (SIMPLE-NONBILLABLE)     polyethylene glycol-propylene glycol (SYSTANE ULTRA) 0.4-0.3 % SOLN ophthalmic solution   6. Squamous blepharitis of upper and lower eyelids of both eyes  H01.02A EYE EXAM (SIMPLE-NONBILLABLE)    H01.02B       PLAN:     Patient Instructions   Eyeglass prescription given.  Systane Ultra 1 drop both eyes 2-4 x day.  Heat to the eyes daily for 10-15 minutes nightly with warm washcloth or reusable gel masks from the pharmacy or  [employer] heat masks can be purchased at [employer].    Ocusoft lid scrubs at night.     Return in 1 year for a complete eye exam or sooner if needed.    Willis Jones, OD                 Again,  thank you for allowing me to participate in the care of your patient.        Sincerely,        Willis Jones OD

## 2021-12-16 NOTE — PROGRESS NOTES
Chief Complaint   Patient presents with     Annual Eye Exam      Accompanied by son  Last Eye Exam: 11-  Dilated Previously: Yes    What are you currently using to see?  glasses       Distance Vision Acuity: Satisfied with vision    Near Vision Acuity: Satisfied with vision while reading  unaided    Eye Comfort: dry  Do you use eye drops? 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          Medical, surgical and family histories reviewed and updated 12/16/2021.       OBJECTIVE: See Ophthalmology exam    ASSESSMENT:    ICD-10-CM    1. Examination of eyes and vision  Z01.00 EYE EXAM (SIMPLE-NONBILLABLE)   2. Myopia of both eyes  H52.13 EYE EXAM (SIMPLE-NONBILLABLE)     REFRACTION   3. Regular astigmatism of both eyes  H52.223 EYE EXAM (SIMPLE-NONBILLABLE)     REFRACTION   4. Presbyopia  H52.4 EYE EXAM (SIMPLE-NONBILLABLE)     REFRACTION   5. Dry eye syndrome of both eyes  H04.123 EYE EXAM (SIMPLE-NONBILLABLE)     polyethylene glycol-propylene glycol (SYSTANE ULTRA) 0.4-0.3 % SOLN ophthalmic solution   6. Squamous blepharitis of upper and lower eyelids of both eyes  H01.02A EYE EXAM (SIMPLE-NONBILLABLE)    H01.02B    7. Chalazion of right upper eyelid  H00.11       PLAN:     Patient Instructions   Eyeglass prescription given.  Systane Ultra 1 drop both eyes 2-4 x day.    Heat to the eyes daily for 10-15 minutes nightly with warm washcloth or reusable gel masks from the pharmacy or  [employer] heat masks can be purchased at [employer].    Ocusoft lid scrubs at night.     Continue warm packs to right eye with massage. Return to Dr. Swanson at [facility] if the bump (chalazion) on your upper lid is bothersome for removal.    Return in 1 year for a complete eye exam or sooner if needed.    Willis Jones, OD

## 2022-01-03 ENCOUNTER — MYC MEDICAL ADVICE (OUTPATIENT)
Dept: FAMILY MEDICINE | Facility: CLINIC | Age: 62
End: 2022-01-03
Payer: COMMERCIAL

## 2022-01-03 DIAGNOSIS — M53.3 SI (SACROILIAC) JOINT DYSFUNCTION: Primary | ICD-10-CM

## 2022-01-05 ENCOUNTER — TELEPHONE (OUTPATIENT)
Dept: PALLIATIVE MEDICINE | Facility: CLINIC | Age: 62
End: 2022-01-05
Payer: COMMERCIAL

## 2022-01-05 NOTE — TELEPHONE ENCOUNTER
Screening Questions for Radiology Injections:    Injection to be done at which interventional clinic site? [facility]    Remind [place] Pt's that Covid test is no longer required except for sedation procedure Pt's.    Instruct patient to arrive as directed prior to the scheduled appointment time:    [facility] [place]: 30 minutes before      [place]: 30 minutes before; if IV needed 1 hour before     Procedure ordered by     Procedure ordered?       Transforaminal Cervical JOSE R -  [facility] does NOT have providers that do these- [facility] and [facility] do have providers that do    As a reminder, receiving steroids can decrease your body's ability to fight infection.   Would you still like to move forward with scheduling the injection?  Yes    What insurance would patient like us to bill for this procedure? [employer]      Worker's comp or MVA (motor vehicle accident) -Any injection DO NOT SCHEDULE and route to Kym Fisher.      [employer] insurance - For SI joint injections, DO NOT SCHEDULE and route Kym Fisher.       ALL BCBS, [employer] and [employer]-Route to Kym for review DO NOT SCHEDULE      IF SCHEDULING IN [place] AND NEEDS A PA, IT IS OKAY TO SCHEDULE. [place] HANDLES THEIR OWN PA'S AFTER THE PATIENT IS SCHEDULED. PLEASE SCHEDULE AT LEAST 1 WEEK OUT SO A PA CAN BE OBTAINED.    Any chance of pregnancy? NO   If YES, do NOT schedule and route to RN pool    Is an  needed? No     Patient has a drive home? (mandatory) YES: ok    Is patient taking any blood thinners (That is: Coumadin, Warfarin, Jantoven, Pradaxa Xarelto, Eliquis, Edoxaban, Enoxaparin, Lovenox, Heparin, Arixtra, Fondaparinux, or Fragmin? OR Antiplatelet medication such as Plavix, Brilinta, or Effient? )? No   If hold needed, do NOT schedule, route to RN pool     Is patient taking any aspirin products (includes Excedrin and Fiorinal)? No     If more than 325mg/day, OK to schedule; Instruct pt to decrease to less than 325 mg  for 7 days AND route to RN pool    For CERVICAL procedures, hold all aspirin products for 6 days.     Tell pt that if aspirin product is not held for 6 days, the procedure WILL BE cancelled.      Does the patient have a bleeding or clotting disorder? No     If YES, okay to schedule AND route to RN nurse pool    For any patients with platelet count <100, must be forwarded to provider    Any allergies to contrast dye, iodine, shellfish, or numbing and steroid medications? No    If YES, add allergy information to appointment notes AND route to the RN pool     If JOSE R and Contrast Dye / Iodine Allergy? DO NOT SCHEDULE, route to RN pool    Allergies: Risperidone, Effexor [venlafaxine hydrochloride], Ambien, Ambien [zolpidem tartrate], Buspirone, Celexa [citalopram], Duloxetine, Septra [bactrim], Seroquel [quetiapine], Sulfa drugs, Sulfamethoxazole-trimethoprim, Tramadol, Ultram [tramadol hcl], Venlafaxine, and Zolpidem     Is patient diabetic?  No  If YES, instruct them to bring their glucometer.    Does patient have an active infection or treated for one within the past week? No     Is patient currently taking any antibiotics?  No     For patients on chronic, preventative, or prophylactic antibiotics, procedures may be scheduled.     For patients on antibiotics for active or recent infection:antibiotic course must have been completed for 4 days    Is patient currently taking any steroid medications? (i.e. Prednisone, Medrol)  No     For patients on steroid medications, course must have been completed for 4 days    Is patient actively being treated for cancer or immunocompromised? No  If YES, do NOT schedule and route to RN pool     Are you able to get on and off an exam table with minimal or no assistance? Yes  If NO, do NOT schedule and route to RN pool    Are you able to roll over and lay on your stomach with minimal or no assistance? Yes  If NO, do NOT schedule and route to RN pool     Has the patient had a flu shot  or any other vaccinations within 7 days before or after the procedure.  No     Have you recently had a COVID vaccine or have plans to get it in the near future? No    If yes, explain that for the vaccine to work best they need to:       wait 1 week before and 1 week after getting Vaccine #1    wait 1 week before and 2 weeks after getting Vaccine #2    wait 1 week before and 2 weeks after getting Vaccine BOOSTER    If patient has concerns about the timing, send to RN pool     Does patient have an MRI/CT?  Not Applicable  Check Procedure Scheduling Grid to see if required.      Was the MRI done within the last 3 years?  NA    If yes, where was the MRI done i.e.[facility], [facility], [facility], [facility] etc?       If no, do not schedule and route to RN pool    If MRI was not done at [facility], [facility] or [facility] do NOT schedule and route to RN pool.      If pt has an imaging disc, the injection MAY be scheduled but pt has to bring disc to appt.     If they show up without the disc the injection cannot be done    Procedure Specific Instructions:      If celiac plexus block, informed patient NPO for 6 hours and that it is okay to take medications with sips of water, especially blood pressure medications  Not Applicable         If this is for a cervical procedure, informed patient that aspirin needs to be held for 6 days.   Not Applicable      If IV needed:    Do not schedule procedures requiring IV placement in the first appointment of the day or first appointment after lunch. Do NOT schedule at 0745, 0815 or 1245. ok    Instructed pt to arrive 30 minutes early for IV start if required. (Check Procedure Scheduling Grid)  Not Applicable    Reminders:      If you are started on any steroids or antibiotics between now and your appointment, you must contact us because the procedure may need to be cancelled.  Yes      For all procedures except radiofrequency ablations (RFAs) and spinal cord stimulator (SCS)  trials, informed patient:    IV sedation is not provided for this procedure.  If you feel that an oral anti-anxiety medication is needed, you can discuss this further with your referring provider or primary care provider.  The Pain Clinic provider will discuss specifics of what the procedure includes at your appointment.  Most procedures last 10-20 minutes.  We use numbing medications to help with any discomfort during the procedure.  Not Applicable      For patients 85 or older we recommend having an adult stay w/ them for the remainder of the day.   ok    Does the patient have any questions?  NO  Nan Loving  [facility]

## 2022-01-05 NOTE — TELEPHONE ENCOUNTER
Order received for Injection to be Determined by Pain Management Specialist.    Routing to review.    Raya URBANO    [facility]

## 2022-01-08 ENCOUNTER — NURSE TRIAGE (OUTPATIENT)
Dept: NURSING | Facility: CLINIC | Age: 62
End: 2022-01-08
Payer: COMMERCIAL

## 2022-01-08 NOTE — TELEPHONE ENCOUNTER
Started yesterday. [employer] card has our number. Yesterday prepared [place]. First two bites had a nasty chemical taste. This morning has nausea and dizziness. He threw it away. Also threw out the frozen salmon. Has appointment on the 12th to get joint injections. Those injections can lower immunity. Should he reschedule until he finds out the food poisoning is going to effect yet. He will give himself some time to improve. I told him to cancel if he's not improved by Monday evening. He said he is fully vaccinated and denies other symptoms.  Destiny Desouza RN  [facility]      Reason for Disposition    Unexplained nausea    Additional Information    Negative: Shock suspected (e.g., cold/pale/clammy skin, too weak to stand, low BP, rapid pulse)    Negative: Sounds like a life-threatening emergency to the triager    Negative: [1] Nausea or vomiting AND [2] pregnancy < 20 weeks    Negative: Menstrual Period - Missed or Late (i.e., pregnancy suspected)    Negative: Heat exhaustion suspected (i.e., dehydration from heat exposure)    Negative: Motion sickness suspected (i.e., nausea with car, plane, boat, or train travel)    Negative: Anxiety or stress suspected (i.e., nausea with anxiety attacks or stressful situations)    Negative: Traumatic Brain Injury (TBI) suspected    Negative: Nausea (or Vomiting) in a cancer patient who is currently (or recently) receiving chemotherapy or radiation therapy, or cancer patient who has metastatic or end-stage cancer and is receiving palliative care    Negative: Vomiting occurs    Negative: Other symptom is present, see that guideline.  (e.g., chest pain, headache, dizziness, abdominal pain, colds, sore throat, etc.).    Negative: Unable to walk, or can only walk with assistance (e.g., requires support)    Negative: Difficulty breathing    Negative: [1] Insulin-dependent diabetes (Type I) AND [2] glucose > 400 mg/dl (22 mmol/l)    Negative: [1] Drinking very little AND [2]  dehydration suspected (e.g., no urine > 12 hours, very dry mouth, very lightheaded)    Negative: Patient sounds very sick or weak to the triager    Negative: Fever > 104 F (40 C)    Negative: [1] Fever > 101 F (38.3 C) AND [2] age > 60    Negative: [1] Fever > 100.0 F (37.8 C) AND [2] bedridden (e.g., nursing home patient, CVA, chronic illness, recovering from surgery)    Negative: [1] Fever > 100.0 F (37.8 C) AND [2] diabetes mellitus or weak immune system (e.g., HIV positive, cancer chemo, splenectomy, organ transplant, chronic steroids)     Doesn't have a thermometer. Feels warm.    Negative: Taking any of the following medications: digoxin (Lanoxin), lithium, theophylline, phenytoin (Dilantin)    Negative: Yellowish color of the skin or white of the eye (i.e., jaundice)    Negative: Fever present > 3 days (72 hours)    Negative: Receiving cancer chemotherapy medication    Negative: Taking prescription medication that could cause nausea (e.g., narcotics/opiates, antibiotics, OCPs, many others)    Negative: Nausea lasts > 1 week    Negative: Alcohol or drug abuse, known or suspected    Negative: Nausea is a chronic symptom (recurrent or ongoing AND present > 4 weeks)    Protocols used: NAUSEA-A-

## 2022-01-12 ENCOUNTER — RADIOLOGY INJECTION OFFICE VISIT (OUTPATIENT)
Dept: PALLIATIVE MEDICINE | Facility: CLINIC | Age: 62
End: 2022-01-12
Attending: FAMILY MEDICINE
Payer: COMMERCIAL

## 2022-01-12 VITALS — SYSTOLIC BLOOD PRESSURE: 121 MMHG | OXYGEN SATURATION: 100 % | DIASTOLIC BLOOD PRESSURE: 71 MMHG | HEART RATE: 55 BPM

## 2022-01-12 DIAGNOSIS — M53.3 SI (SACROILIAC) JOINT DYSFUNCTION: Primary | ICD-10-CM

## 2022-01-12 PROCEDURE — 27096 INJECT SACROILIAC JOINT: CPT | Mod: 50 | Performed by: PSYCHIATRY & NEUROLOGY

## 2022-01-12 RX ORDER — ACETAMINOPHEN 500 MG
500-1000 TABLET ORAL EVERY 6 HOURS PRN
COMMUNITY

## 2022-01-12 RX ORDER — TRIAMCINOLONE ACETONIDE 40 MG/ML
40 INJECTION, SUSPENSION INTRA-ARTICULAR; INTRAMUSCULAR ONCE
Status: COMPLETED | OUTPATIENT
Start: 2022-01-12 | End: 2022-01-12

## 2022-01-12 RX ADMIN — TRIAMCINOLONE ACETONIDE 40 MG: 40 INJECTION, SUSPENSION INTRA-ARTICULAR; INTRAMUSCULAR at 08:41

## 2022-01-12 ASSESSMENT — PAIN SCALES - GENERAL: PAINLEVEL: EXTREME PAIN (8)

## 2022-01-12 NOTE — NURSING NOTE
Discharge Information    IV Discontiued Time:  NA    Amount of Fluid Infused:  NA    Discharge Criteria = When patient returns to baseline or as per MD order    Consciousness:  Pt is fully awake    Circulation:  BP +/- 20% of pre-procedure level    Respiration:  Patient is able to breathe deeply    O2 Sat:  Patient is able to maintain O2 Sat >92% on room air    Activity:  Moves 4 extremities on command    Ambulation:  Patient is able to stand and walk or stand and pivot into wheelchair    Dressing:  Clean/dry or No Dressing    Notes:   Discharge instructions and AVS given to patient    Patient meets criteria for discharge?  YES    Admitted to PCU?  No    Responsible adult present to accompany patient home?  Yes    Signature/Title:    tyler moore RN  RN Care Coordinator  [facility] [place]

## 2022-01-12 NOTE — PROGRESS NOTES
Pre procedure Diagnosis: SI joint dysfunction    Post procedure Diagnosis: Same  Procedure performed: bilateral SI joint injections  Anesthesia: none  Complications: none  Operators: Cally King MD    Indications:   Jeanmarie Mclean is a 61 year old male, sent by Dr. Iglesias for repeat SI joint injections. They have a history of pain across the low back and buttocks. Exam shows tenderness of bilateral SI joints. They have tried conservative treatment including physical therapy and medications.  SI joint injections help for ~3 months.     Options/alternatives, benefits and risks were discussed with the patient including bleeding, infection, tissue trauma, exposure to radiation, reaction to medications including seizure, nerve injury, weakness, and numbness.  Questions were answered to his satisfaction and he agrees to proceed. Voluntary informed consent was obtained and signed.     Vitals were reviewed: Yes  Allergies were reviewed:  Yes  Medications were reviewed:  Yes  Pre-procedure pain score: 8/10    Procedure:  After getting informed consent, patient was brought into the procedure suite and was placed in a prone position on the procedure table.   A Pause for the Cause was performed.  Patient was prepped and draped in sterile fashion.     After identifying the bilateral SI joint, the C-arm was rotated to a obliquely to obtain the best view of the inferior angle of the joint.  A total of 4ml of Lidocaine 1% was used to anesthetize the skin at a skin entry site coaxial with the fluoroscopy beam at this location.  A 22gauge 3.5 inch needle was advanced under intermittent fluoroscopy until it was felt to enter the SI joint.    A total of 2ml of Omnipaque-300 was injected, confirming appropriate position, with spread into the intraarticular space, with no intravascular uptake noted.  8ml was wasted. Location was verified in lateral view.    3 ml of 0.2% ropivacaine with 80mg of kenalog was injected divided between the two  sides.  The needle was flushed with lidocaine and removed.     Hemostasis was achieved, the area was cleaned, and bandaids were placed when appropriate.    The patient tolerated the procedure well, and was taken to the recovery room.    Images were saved to PACS.    Post-procedure pain score: 0/10  Follow-up includes:   -f/u with referring provider  -ok to have 4 steroid injections/year, so could be seen every 3 months for procedure.    Cally King MD  [facility]

## 2022-01-12 NOTE — NURSING NOTE
Pre-procedure Intake  If YES to any questions or NO to having a   Please complete laminated checklist and leave on the computer keyboard for Provider, verbally inform provider if able.    For SCS Trial, RFA's or any sedation procedure:  Have you been fasting? NA    If yes, for how long?     Are you taking any any blood thinners such as Coumadin, Warfarin, Jantoven, Pradaxa Xarelto, Eliquis, Edoxaban, Enoxaparin, Lovenox, Heparin, Arixtra, Fondaparinux, or Fragmin? OR Antiplatelet medication such as Plavix, Brilinta, or Effient?   No     If yes, when did you take your last dose?     Do you take aspirin?  Yes     If cervical procedure, have you held aspirin for 6 days?   Yes    Do you have any allergies to contrast dye, iodine, steroid and/or numbing medications?  NO    Are you currently taking antibiotics or have an active infection?  NO    Have you had a fever/elevated temperature within the past week? NO    Are you currently taking oral steroids? NO    Do you have a ? Yes    Are you pregnant or breastfeeding?  Not Applicable    Have you received the COVID-19 vaccine? Yes    If yes, was it your 1st, 2nd or only dose needed? Booster     Date of most recent vaccine: 10/19/2021    Notify provider and RNs if systolic BP >170, diastolic BP >100, P >100 or O2 sats < 90%

## 2022-01-12 NOTE — PATIENT INSTRUCTIONS
[facility]   Procedure Discharge Instructions    Today you saw: Dr. Angélica King      You had an:    sacroiliac joint injection       Medications used:  Lidocaine   Omnipaque  ropivicaine kenalog          Be cautious when walking. Numbness and/or weakness in the lower extremities may occur for up to 6-8 hours after the procedure due to effect of the local anesthetic    Do not drive for 6 hours. The effect of the local anesthetic could slow your reflexes.     You may resume your regular activities after 24 hours    Avoid strenuous activity for the first 24 hours    You may shower, however avoid swimming, tub baths or hot tubs for 24 hours following your procedure    You may have a mild to moderate increase in pain for several days following the injection.    It may take up to 14 days for the steroid medication to start working although you may feel the effect as early as a few days after the procedure.       You may use ice packs for 10-15 minutes, 3 to 4 times a day at the injection site for comfort    Do not use heat to painful areas for 6 to 8 hours. This will give the local anesthetic time to wear off and prevent you from accidentally burning your skin.     Unless you have been directed to avoid the use of anti-inflammatory medications (NSAIDS), you may use medications such as ibuprofen, Aleve or Tylenol for pain control if needed.     If you were fasting, you may resume your normal diet and medications after the procedure    If you have diabetes, check your blood sugar more frequently than usual as your blood sugar may be higher than normal for 10-14 days following a steroid injection. Contact your doctor who manages your diabetes if your blood sugar is higher than usual    Possible side effects of steroids that you may experience include flushing, elevated blood pressure, increased appetite, mild headaches and restlessness.  All of these symptoms will get better with time.    If  you experience any of the following, call the Pain Clinic during work hours (Mon-Friday 8-4:30 pm) at [phone] or the Provider Line after hours at [phone]:  -Fever over 100 degree F  -Swelling, bleeding, redness, drainage, warmth at the injection site  -Progressive weakness or numbness in your legs or arms  -Loss of bowel or bladder function  -Unusual headache that is not relieved by Tylenol or other pain reliever  -Unusual new onset of pain that is not improving

## 2022-03-03 ENCOUNTER — OFFICE VISIT (OUTPATIENT)
Dept: FAMILY MEDICINE | Facility: CLINIC | Age: 62
End: 2022-03-03
Payer: COMMERCIAL

## 2022-03-03 VITALS
DIASTOLIC BLOOD PRESSURE: 58 MMHG | OXYGEN SATURATION: 99 % | TEMPERATURE: 98.1 F | WEIGHT: 157.4 LBS | SYSTOLIC BLOOD PRESSURE: 115 MMHG | BODY MASS INDEX: 26.23 KG/M2 | HEIGHT: 65 IN | HEART RATE: 63 BPM

## 2022-03-03 DIAGNOSIS — K21.9 GASTROESOPHAGEAL REFLUX DISEASE WITHOUT ESOPHAGITIS: Primary | Chronic | ICD-10-CM

## 2022-03-03 DIAGNOSIS — R63.4 WEIGHT LOSS: ICD-10-CM

## 2022-03-03 DIAGNOSIS — R10.13 EPIGASTRIC PAIN: ICD-10-CM

## 2022-03-03 LAB
ALBUMIN SERPL-MCNC: 3.7 G/DL (ref 3.4–5)
ALP SERPL-CCNC: 74 U/L (ref 40–150)
ALT SERPL W P-5'-P-CCNC: 51 U/L (ref 0–70)
AMYLASE SERPL-CCNC: 66 U/L (ref 30–110)
ANION GAP SERPL CALCULATED.3IONS-SCNC: 8 MMOL/L (ref 3–14)
AST SERPL W P-5'-P-CCNC: 28 U/L (ref 0–45)
BILIRUB SERPL-MCNC: 0.5 MG/DL (ref 0.2–1.3)
BUN SERPL-MCNC: 11 MG/DL (ref 7–30)
CALCIUM SERPL-MCNC: 9.3 MG/DL (ref 8.5–10.1)
CHLORIDE BLD-SCNC: 108 MMOL/L (ref 94–109)
CO2 SERPL-SCNC: 25 MMOL/L (ref 20–32)
CREAT SERPL-MCNC: 0.79 MG/DL (ref 0.66–1.25)
ERYTHROCYTE [DISTWIDTH] IN BLOOD BY AUTOMATED COUNT: 13.1 % (ref 10–15)
GFR SERPL CREATININE-BSD FRML MDRD: >90 ML/MIN/1.73M2
GLUCOSE BLD-MCNC: 124 MG/DL (ref 70–99)
HCT VFR BLD AUTO: 42.5 % (ref 40–53)
HGB BLD-MCNC: 13.9 G/DL (ref 13.3–17.7)
LIPASE SERPL-CCNC: 104 U/L (ref 73–393)
MCH RBC QN AUTO: 28.9 PG (ref 26.5–33)
MCHC RBC AUTO-ENTMCNC: 32.7 G/DL (ref 31.5–36.5)
MCV RBC AUTO: 88 FL (ref 78–100)
PLATELET # BLD AUTO: 268 10E3/UL (ref 150–450)
POTASSIUM BLD-SCNC: 3.6 MMOL/L (ref 3.4–5.3)
PROT SERPL-MCNC: 7.6 G/DL (ref 6.8–8.8)
RBC # BLD AUTO: 4.81 10E6/UL (ref 4.4–5.9)
SODIUM SERPL-SCNC: 141 MMOL/L (ref 133–144)
TSH SERPL DL<=0.005 MIU/L-ACNC: 0.72 MU/L (ref 0.4–4)
WBC # BLD AUTO: 5.4 10E3/UL (ref 4–11)

## 2022-03-03 PROCEDURE — 85027 COMPLETE CBC AUTOMATED: CPT | Performed by: NURSE PRACTITIONER

## 2022-03-03 PROCEDURE — 84443 ASSAY THYROID STIM HORMONE: CPT | Performed by: NURSE PRACTITIONER

## 2022-03-03 PROCEDURE — 36415 COLL VENOUS BLD VENIPUNCTURE: CPT | Performed by: NURSE PRACTITIONER

## 2022-03-03 PROCEDURE — 80053 COMPREHEN METABOLIC PANEL: CPT | Performed by: NURSE PRACTITIONER

## 2022-03-03 PROCEDURE — 83690 ASSAY OF LIPASE: CPT | Performed by: NURSE PRACTITIONER

## 2022-03-03 PROCEDURE — 99214 OFFICE O/P EST MOD 30 MIN: CPT | Performed by: NURSE PRACTITIONER

## 2022-03-03 PROCEDURE — 87338 HPYLORI STOOL AG IA: CPT | Performed by: NURSE PRACTITIONER

## 2022-03-03 PROCEDURE — 82150 ASSAY OF AMYLASE: CPT | Performed by: NURSE PRACTITIONER

## 2022-03-03 RX ORDER — CAMPHOR, MENTHOL, METHYL SALICYLATE 21.56; 41.73; 69.55 MG/1; MG/1; MG/1
PATCH PERCUTANEOUS; TOPICAL; TRANSDERMAL
COMMUNITY
End: 2024-02-21

## 2022-03-03 ASSESSMENT — PAIN SCALES - GENERAL: PAINLEVEL: MODERATE PAIN (4)

## 2022-03-03 NOTE — PATIENT INSTRUCTIONS
PLAN:   1.   Symptomatic therapy suggested: will increase prilosec to twice a day .  2.  Orders Placed This Encounter   Medications     Camphor-Menthol-Methyl Sal (SALONPAS) 3.1-6-10 % PTCH     Nerve Stimulator (TENS THERAPY PAIN RELIEF) GEORGIE     omeprazole (PRILOSEC) 20 MG DR capsule     Sig: Take 1 capsule (20 mg) by mouth 2 times daily     Dispense:  60 capsule     Refill:  1     Orders Placed This Encounter   Procedures     CBC with platelets     Comprehensive metabolic panel     Lipase     Amylase     Helicobacter pylori Antigen Stool     TSH with free T4 reflex     Adult Gastro Ref - Procedure Only       3. Patient needs to follow up in if no improvement,or sooner if worsening of symptoms or other symptoms develop.  FURTHER TESTING:       - endoscopy   Please call [phone] to make appointment  if you do not hear from referrals in the next few days.   Will follow up and/or notify patient of  results via My Chart to determine further need for followup

## 2022-03-03 NOTE — PROGRESS NOTES
"  Subjective   Jeanmarie Mclean is a 62 year old who presents for the following health issues     Would like to receive prescription for omeprazole.  Went to urgent care for stomach issues 2/15/22.    Still experiencing pain when he eats certain foods.       Florinda Chavez PA-C - 02/15/2022 9:05 AM CST  Formatting of this note is different from the original.  Subjective   Chief Complaint: Abdominal pain and Muscle spasms    HPI  Jeanmarie Mclean is a 62 y.o. male presents with chief complaint of abdominal pain. He reports 2 weeks of epigastric pain. He describes it as feeling like a burning sensation. The pain is present constantly, but worse after certain food intake. It is associated with slight decrease in appetite and some loose stools. He does have a history of acid reflux. He is not having any regurgitation or belching right now. For treatment he has tried Mylanta, Pepto-Bismol, and Rolaids. These have not provided significant relief.  He admits he has been eating a lot of spicy and acidic foods which he notices make the symptoms worse. He denies chest pain, cough, shortness of breath, heart palpitations, hemoptysis, or pain radiating outside of the upper gastric region abdomen. He has had pain like this in the past related to \" heartburn.\"    When asked about recent alcohol intake he admits to having 124 ounce cans of malt liquor on his [birth year] on [birth year] 4 days ago. Other than that no recent alcohol intake.      Abdominal/Flank Pain  Duration of complaint: started about 3 weeks ago and that is why he went into the ER   Had been taking mylanta and pepto Bismol and was not helping   They gave him prilosec which helped some     Description:   Character: Burning  Location: epigastric region  Radiation: Back  Intensity: moderate  Progression of Symptoms:  improving however still can not even eat pumpkin spice pancake and it set him off   Accompanying Signs & Symptoms:  Fever/Chills: no  Gas/Bloating: YES- " bloating   Nausea: YES- at first and one episode of vomiting   Vomitting: no  Diarrhea: YES- had some diarrhea but that is resolved   Dysuria or Hematuria: YES- does have kidney stones but no difficulty urinating now   History:   Trauma: no  Previous similar pain: YES- several years ago and had H pylori    Previous tests done: many years ago   Precipitating factors:   Does the pain change with:     Food: YES- particularly anything spicy or acid      BM: no    Urination: no  Alleviating factors: the prilosec helps but still has some symptoms if he eats   No NSAIDs in the last several weeks  Alcohol yes. States he is an alcoholic and usually drinks about 2 to 3 24 oz can malt liqueur 3 or 4 times a day   No history of pancreatitis     Therapies Tried and outcome: on Prilosec   LMP:  not applicable    Musculoskeletal Problem       Labs reviewed in EPIC  BP Readings from Last 3 Encounters:   03/03/22 115/58   01/12/22 121/71   09/29/21 137/85    Wt Readings from Last 3 Encounters:   03/03/22 71.4 kg (157 lb 6.4 oz)   09/29/21 75.8 kg (167 lb)   02/03/21 73 kg (161 lb)                  Patient Active Problem List   Diagnosis     Malignant neoplasm of prostate (H)     CARDIOVASCULAR SCREENING; LDL GOAL LESS THAN 130     GERD (gastroesophageal reflux disease)     Overweight (BMI 25.0-29.9)     Fibromyalgia syndrome     Posttraumatic stress disorder     Low back pain     Inadequate material resources     Anxiety state     History of Asperger's syndrome     Pervasive developmental disorder, active     Depressive disorder     Delusional disorder (H)     TBI (traumatic brain injury) (H)     Insomnia     Chronic pain     Myalgia     male stress incontinence     Major depressive disorder, recurrent episode, moderate (H)     Low back pain without sciatica, unspecified back pain laterality, unspecified chronicity     Alcohol dependence in remission (H)     Hypertensive response to exercise     Past Surgical History:   Procedure  Laterality Date     CYSTOSCOPY  10/98    for renal stones     HC KNEE SCOPE,MED/LAT MENISECTOMY  11    Left, medial ([place])     HERNIA REPAIR, INGUINAL RT/LT      [facility]     [facility] HEP B VAC ADULT 3 DOSE IM      received all 3 vaccines     [facility] REMV PROSTATE,RETROPUB,RADICAL  10/13/04    Dr. Muñoz ([place])       Social History     Tobacco Use     Smoking status: Former Smoker     Packs/day: 0.50     Years: 10.00     Pack years: 5.00     Types: Cigarettes     Quit date: 1997     Years since quittin.1     Smokeless tobacco: Never Used   Substance Use Topics     Alcohol use: Yes     Comment: hx alcoholism quit . [facility] dependency treatment in      Family History   Problem Relation Age of Onset     Psychotic Disorder Son         autism     Prostate Cancer Father         40s     Cancer Father      Cancer Maternal Grandmother         lung     Glaucoma Maternal Grandmother      Eye Disorder Maternal Grandmother         glaucoma     Diabetes Mother          45     Blood Disease Sister         sickle trait     Hypertension Sister      Blood Disease Paternal Uncle         sickle     Blood Disease Paternal Aunt         sickle     Alzheimer Disease Paternal Grandfather         70s     Macular Degeneration Paternal Grandfather      Cerebrovascular Disease No family hx of      Thyroid Disease No family hx of      Myocardial Infarction No family hx of      C.A.D. No family hx of          Current Outpatient Medications   Medication Sig Dispense Refill     acetaminophen (TYLENOL) 500 MG tablet Take 500-1,000 mg by mouth every 6 hours as needed for mild pain 1000 mg per day       amLODIPine (NORVASC) 2.5 MG tablet Take 1 tablet (2.5 mg) by mouth daily 90 tablet 0     Camphor-Menthol-Methyl Sal (SALONPAS) 3.1-6-10 % PTCH        Cholecalciferol (VITAMIN D) 1000 UNIT capsule Take 1 capsule by mouth 2 times daily.       diclofenac (VOLTAREN) 1 % topical gel Place onto the skin 4 times daily        guaiFENesin-codeine (ROBITUSSIN AC) 100-10 MG/5ML solution Take 10 mLs by mouth every 4 hours as needed for cough 236 mL 1     medical cannabis (Patient's own supply) See Admin Instructions (The purpose of this order is to document that the patient reports taking medical cannabis.  This is not a prescription, and is not used to certify that the patient has a qualifying medical condition.)       Nerve Stimulator (TENS THERAPY PAIN RELIEF) GEORGIE        omeprazole (PRILOSEC) 20 MG DR capsule Take 1 capsule (20 mg) by mouth 2 times daily 60 capsule 1     polyethylene glycol-propylene glycol (SYSTANE ULTRA) 0.4-0.3 % SOLN ophthalmic solution Place 1 drop into both eyes 4 times daily 6 mL 12     aspirin 81 MG EC tablet Take 81 mg by mouth daily (Patient not taking: Reported on 3/3/2022)       Blood Pressure KIT Monitor blood pressure as needed. 1 kit 1     sildenafil (VIAGRA) 100 MG tablet Take 1/4 - 1 tablet, as directed, 1-3 hours before intimacy. Maximum 1 dose per 24 hours. (Patient not taking: Reported on 3/3/2022) 10 tablet 5     valACYclovir (VALTREX) 500 MG tablet Take 500 mg by mouth daily as needed (Patient not taking: Reported on 3/3/2022)       Allergies   Allergen Reactions     Risperidone Other (See Comments)     Serve numbness      Trimethoprim Hives     Effexor [Venlafaxine Hydrochloride] Other (See Comments)     Headache, Painful scrotum and drainage from the penis     Ambien Other (See Comments)     headaches     Ambien [Zolpidem Tartrate] Nausea     Buspirone Nausea     Dizziness, headache     Celexa [Citalopram] Other (See Comments)     Headache     Duloxetine Other (See Comments)     Headache  headaches     Septra [Bactrim] Itching     Seroquel [Quetiapine] Other (See Comments)     Headache, N, V     Sulfa Drugs Itching     Sulfamethoxazole-Trimethoprim      hives     Tramadol Nausea     Nausea and headache     Ultram [Tramadol Hcl] Nausea and Vomiting     Venlafaxine      Zolpidem      headaches  "      Review of Systems   CONSTITUTIONAL:NEGATIVE for fever, chills, change in weight  ENT/MOUTH: NEGATIVE for ear, mouth and throat problems  RESP:NEGATIVE for significant cough or SOB  CV: NEGATIVE for chest pain, palpitations or peripheral edema  GI: POSITIVE for abdominal pain epigastric and Hx GERD and NEGATIVE for jaundice, melena, nausea, poor appetite and vomiting  MUSCULOSKELETAL: NEGATIVE for significant arthralgias or myalgia  NEURO: NEGATIVE for weakness, dizziness or paresthesias  ENDOCRINE: POSITIVE  for polyphagia and NEGATIVE for polydipsia and polyuria  HEME/ALLERGY/IMMUNE: NEGATIVE for bleeding problems  PSYCHIATRIC: NEGATIVE for changes in mood or affect      Objective    /58   Pulse 63   Temp 98.1  F (36.7  C) (Temporal)   Ht 1.648 m (5' 4.9\")   Wt 71.4 kg (157 lb 6.4 oz)   SpO2 99%   BMI 26.27 kg/m    Body mass index is 26.27 kg/m .   Wt Readings from Last 4 Encounters:   03/03/22 71.4 kg (157 lb 6.4 oz)   09/29/21 75.8 kg (167 lb)   02/03/21 73 kg (161 lb)   09/09/20 73 kg (161 lb)       Physical Exam   GENERAL: Patient is well nourished, well developed,in no apparent distress, non-toxic, in no respiratory distress and acyanotic, alert, cooperative and well hydrated  EYES: Eyes grossly normal to inspection and conjunctivae and sclerae normal  HENT:ear canals and TM's normal and nose and mouth without ulcers or lesions   NECK:normal, supple and no adenopathy  CARDIAC:regular rates and rhythm, no murmur, click or rub and no irregular beats  without LE edema bilaterally  RESP: normal respiratory rate and rhythm, lungs clear to auscultation  ABD:mild and moderate tenderness in the epigastric area, no rebound tenderness, no guarding or rigidity, no CVA tenderness, no inguinal nodes, no herniae noted      SKIN: Skin color, texture, turgor normal. No rashes or lesions.  MS: extremities normal- no gross deformities noted, gait normal and normal muscle tone  NEURO: mentation intact and " speech normal  PSYCH: Alert, oriented, thought content appropriate,mentation appears normal., affect and mood normal      Results for orders placed or performed in visit on 03/03/22   Helicobacter pylori Antigen Stool     Status: Normal   Result Value Ref Range    Helicobacter pylori Antigen Stool Negative Negative   Amylase     Status: Normal   Result Value Ref Range    Amylase 66 30 - 110 U/L   Lipase     Status: Normal   Result Value Ref Range    Lipase 104 73 - 393 U/L   Comprehensive metabolic panel     Status: Abnormal   Result Value Ref Range    Sodium 141 133 - 144 mmol/L    Potassium 3.6 3.4 - 5.3 mmol/L    Chloride 108 94 - 109 mmol/L    Carbon Dioxide (CO2) 25 20 - 32 mmol/L    Anion Gap 8 3 - 14 mmol/L    Urea Nitrogen 11 7 - 30 mg/dL    Creatinine 0.79 0.66 - 1.25 mg/dL    Calcium 9.3 8.5 - 10.1 mg/dL    Glucose 124 (H) 70 - 99 mg/dL    Alkaline Phosphatase 74 40 - 150 U/L    AST 28 0 - 45 U/L    ALT 51 0 - 70 U/L    Protein Total 7.6 6.8 - 8.8 g/dL    Albumin 3.7 3.4 - 5.0 g/dL    Bilirubin Total 0.5 0.2 - 1.3 mg/dL    GFR Estimate >90 >60 mL/min/1.73m2   CBC with platelets     Status: Normal   Result Value Ref Range    WBC Count 5.4 4.0 - 11.0 10e3/uL    RBC Count 4.81 4.40 - 5.90 10e6/uL    Hemoglobin 13.9 13.3 - 17.7 g/dL    Hematocrit 42.5 40.0 - 53.0 %    MCV 88 78 - 100 fL    MCH 28.9 26.5 - 33.0 pg    MCHC 32.7 31.5 - 36.5 g/dL    RDW 13.1 10.0 - 15.0 %    Platelet Count 268 150 - 450 10e3/uL   TSH with free T4 reflex     Status: Normal   Result Value Ref Range    TSH 0.72 0.40 - 4.00 mU/L     Assessment & Plan     Gastroesophageal reflux disease without esophagitis  Discussed that these symptoms is likely related to reflux or GERD. Discussed non-pharmacologic treatment, including elevating the head of bed, decrease food before bedtime and decreased caffeine and nicotine and alcohol.  Went over meds for reflux. Pt will try prilosec twice a day. Recheck if not improving as expected.  -  Helicobacter pylori Antigen Stool  - omeprazole (PRILOSEC) 20 MG DR capsule  Dispense: 60 capsule; Refill: 1  - Adult Gastro Ref - Procedure Only  - Helicobacter pylori Antigen Stool    Epigastric pain  - CBC with platelets  - Comprehensive metabolic panel  - Lipase  - Amylase  - Helicobacter pylori Antigen Stool  - omeprazole (PRILOSEC) 20 MG DR capsule  Dispense: 60 capsule; Refill: 1  - Adult Gastro Ref - Procedure Only  - Helicobacter pylori Antigen Stool  -Will follow up and/or notify patient of  results via My Chart to determine further need for followup      Weight loss  Will screen for thyroid abnormality but otherwise plan Is for endoscopy  - TSH with free T4 reflex  - Adult Gastro Ref - Procedure Only  -  Ordering of each unique test  Prescription drug management   Time spent doing chart review, history and exam, documentation and further activities per the note       See Patient Instructions  Patient Instructions     PLAN:   1.   Symptomatic therapy suggested: will increase prilosec to twice a day .  2.  Orders Placed This Encounter   Medications     Camphor-Menthol-Methyl Sal (SALONPAS) 3.1-6-10 % PTCH     Nerve Stimulator (TENS THERAPY PAIN RELIEF) GEORGIE     omeprazole (PRILOSEC) 20 MG DR capsule     Sig: Take 1 capsule (20 mg) by mouth 2 times daily     Dispense:  60 capsule     Refill:  1     Orders Placed This Encounter   Procedures     CBC with platelets     Comprehensive metabolic panel     Lipase     Amylase     Helicobacter pylori Antigen Stool     TSH with free T4 reflex     Adult Gastro Ref - Procedure Only       3. Patient needs to follow up in if no improvement,or sooner if worsening of symptoms or other symptoms develop.  FURTHER TESTING:       - endoscopy   Please call [phone] to make appointment  if you do not hear from referrals in the next few days.   Will follow up and/or notify patient of  results via My Chart to determine further need for followup          Return in about 1 week  (around 3/10/2022), or if symptoms worsen or fail to improve.    RAJINDER Magallon [facility]

## 2022-03-03 NOTE — RESULT ENCOUNTER NOTE
Vero Mclean,    Attached are your test results.  -Normal red blood cell (hgb) levels, normal white blood cell count and normal platelet levels.   Please contact us if you have any questions.    Whit Varghese, CNP

## 2022-03-04 ENCOUNTER — TELEPHONE (OUTPATIENT)
Dept: GASTROENTEROLOGY | Facility: CLINIC | Age: 62
End: 2022-03-04
Payer: COMMERCIAL

## 2022-03-04 NOTE — RESULT ENCOUNTER NOTE
Vero Mclean,    Attached are your test results.  -Normal red blood cell (hgb) levels, normal white blood cell count and normal platelet levels.  -Liver and gallbladder tests (ALT,AST, Alk phos,bilirubin) are normal.  -Kidney function (GFR) is normal.  -Sodium is normal.  -Potassium is normal.  -Calcium is normal.  -Glucose is mildly elevated but you were nonfasting and this is okay..  -TSH (thyroid stimulating hormone) level is normal which indicates normal thyroid function.  Pancreas labs are normal which is reassuring    Please contact us if you have any questions.    Whit Varghese, CNP

## 2022-03-04 NOTE — TELEPHONE ENCOUNTER
Screening Questions  BlueKIND OF PREP RedLOCATION [review exclusion criteria] GreenSEDATION TYPE    1. Have you had a positive covid test in the last 90 days? N     2. Are you active on mychart? Y    3. What insurance is in the chart? MEDICA CHOICE      3.  Ordering/Referring Provider: Whit Varghese APRN CNP    4. BMI 26.9 [BMI OVER 40-EXTENDED PREP]  If greater than 40 review exclusion criteria [PAC APPT IF @ [facility]]    5.  Respiratory Screening :  [If yes to any of the following HOSPITAL setting only]     Do you use daily home oxygen? N    Do you have mod to severe Obstructive Sleep Apnea? N  [OKAY @ [facility] SH PH RI]   Do you have Pulmonary Hypertension? N     Do you have UNCONTROLLED asthma? N      6. Have you had a heart or lung transplant? N      7. Are you currently on dialysis? N [ If yes, G-PREP & HOSPITAL setting only]     8. Do you have chronic kidney disease? N [ If yes, G-PREP ]    9. Have you had a stroke or Transient ischemic attack (TIA) within 6 months? N (If yes, do not schedule at [facility])    10. In the past 6 months, have you had any heart related issues including cardiomyopathy or heart attack? N        If yes, did it require cardiac stenting or other implantable device? N      11. Do you have any implantable devices in your body (pacemaker, defib, LVAD)? N (If yes, schedule at U)    12. Do you take nitroglycerin? N   If yes, how often? N  (if yes, HOSPITAL setting ONLY)    13. Are you currently taking any blood thinners? N   [IF YES, INFORM PATIENT TO FOLLOW UP W/ ORDERING PROVIDER FOR BRIDGING INSTRUCTIONS]     14. Are you a diabetic? N   [ If yes, G-PREP ]    15. [FEMALES] Are you currently pregnant? NA    If yes, how many weeks? NA    16. Are you taking any prescription pain medications on a routine schedule?  MEDICAL MARIJUANA  [ If yes, EXTENDED PREP.] [If yes, MAC]    17. Do you have any chemical dependencies such as alcohol, street drugs, or methadone?  IN THE PAST [If yes,  MAC]    18. Do you have any history of post-traumatic stress syndrome, severe anxiety or history of psychosis?  Y  [If yes, MAC]    19. Do you transfer independently?  Y    20.  Do you have any issues with constipation?  NA  [ If yes, EXTENDED PREP.]    21. Preferred LOCAL Pharmacy for Pre Prescription     [id number] - [facility], [place] - [address]  [facility] - [place], [place] - [address]  WRITTEN PRESCRIPTION REQUESTED  [id number] - [place], [place] - [address]  [facility] [place] - [address] [id number] - [place], [place] - [address] [place]  Scheduling Details      Caller : LUISA  (Please ask for phone number if not scheduled by patient)    Type of Procedure Scheduled: EGD  Which Colonoscopy Prep was Sent?: SARA WEIR CF PATIENTS & GROEN'S PATIENTS NEEDS EXTENDED PREP  Surgeon: KALIE   Date of Procedure: 4/8/2022  Location:       Sedation Type: MAC  Conscious Sedation- Needs  for 6 hours after the procedure  MAC/General-Needs  for 24 hours after procedure    Pre-op Required at [facility], [place], [place] and OR for MAC sedation: Y  (advise patient they will need a pre-op prior to procedure -)      Informed patient they will need an adult  Y  Cannot take any type of public or medical transportation alone    Pre-Procedure Covid test to be completed at [facility] or Externally: Y    Confirmed Nurse will call to complete assessment Y    Additional comments:

## 2022-03-05 DIAGNOSIS — Z11.59 ENCOUNTER FOR SCREENING FOR OTHER VIRAL DISEASES: Primary | ICD-10-CM

## 2022-03-07 LAB — H PYLORI AG STL QL IA: NEGATIVE

## 2022-03-07 NOTE — RESULT ENCOUNTER NOTE
Vero Mclean,    Attached are your test results.  H Pylori is negative    Please contact us if you have any questions.    Whit Varghese, CNP

## 2022-03-17 NOTE — TELEPHONE ENCOUNTER
RECORDS RECEIVED FROM: [facility]   REASON FOR VISIT: right hand involuntary movements and fingers are locking up    Date of Appt: 06/08/2022    NOTES (FOR ALL VISITS) STATUS DETAILS   OFFICE NOTE from referring provider Care Everywhere 02/15/2022 Dr Chavez [facility]    OFFICE NOTE from other specialist N/A    DISCHARGE SUMMARY from hospital N/A    DISCHARGE REPORT from the ER N/A    OPERATIVE REPORT N/A    MEDICATION LIST N/A    IMAGING  (FOR ALL VISITS)     EMG N/A    EEG N/A    LUMBAR PUNCTURE N/A    MARQUEZ SCAN N/A    ULTRASOUND (CAROTID BILAT) *VASCULAR* N/A    MRI (HEAD, NECK, SPINE) N/A    CT (HEAD, NECK, SPINE) N/A

## 2022-03-22 ENCOUNTER — MYC MEDICAL ADVICE (OUTPATIENT)
Dept: FAMILY MEDICINE | Facility: CLINIC | Age: 62
End: 2022-03-22

## 2022-03-22 ENCOUNTER — OFFICE VISIT (OUTPATIENT)
Dept: FAMILY MEDICINE | Facility: CLINIC | Age: 62
End: 2022-03-22
Payer: COMMERCIAL

## 2022-03-22 VITALS
DIASTOLIC BLOOD PRESSURE: 80 MMHG | WEIGHT: 164 LBS | BODY MASS INDEX: 28 KG/M2 | SYSTOLIC BLOOD PRESSURE: 126 MMHG | HEART RATE: 60 BPM | RESPIRATION RATE: 18 BRPM | OXYGEN SATURATION: 98 % | HEIGHT: 64 IN | TEMPERATURE: 98.4 F

## 2022-03-22 DIAGNOSIS — Z01.818 PREOP GENERAL PHYSICAL EXAM: Primary | ICD-10-CM

## 2022-03-22 DIAGNOSIS — I10 HYPERTENSIVE RESPONSE TO EXERCISE: ICD-10-CM

## 2022-03-22 PROCEDURE — 99214 OFFICE O/P EST MOD 30 MIN: CPT | Performed by: FAMILY MEDICINE

## 2022-03-22 ASSESSMENT — PATIENT HEALTH QUESTIONNAIRE - PHQ9
10. IF YOU CHECKED OFF ANY PROBLEMS, HOW DIFFICULT HAVE THESE PROBLEMS MADE IT FOR YOU TO DO YOUR WORK, TAKE CARE OF THINGS AT HOME, OR GET ALONG WITH OTHER PEOPLE: SOMEWHAT DIFFICULT
SUM OF ALL RESPONSES TO PHQ QUESTIONS 1-9: 8
SUM OF ALL RESPONSES TO PHQ QUESTIONS 1-9: 8

## 2022-03-22 ASSESSMENT — PAIN SCALES - GENERAL: PAINLEVEL: NO PAIN (0)

## 2022-03-22 NOTE — H&P (VIEW-ONLY)
Answers for HPI/ROS submitted by the patient on 3/22/2022  If you checked off any problems, how difficult have these problems made it for you to do your work, take care of things at home, or get along with other people?: Somewhat difficult  PHQ9 TOTAL SCORE: 8    M [address] [address]  Phone: [phone]  Primary Provider: Kristofer Castillo  Pre-op Performing Provider: KRISTOFER CASTILLO      PREOPERATIVE EVALUATION:  Today's date: 3/22/2022    Jeanmarie Mclean is a 62 year old male who presents for a preoperative evaluation.    Surgical Information:  Surgery/Procedure: EGD with CO2 Insufflation  Surgery Location: [facility]   Surgeon: Dr. Lissette Crockett  Surgery Date: 3/29/22  Time of Surgery: 11am  Where patient plans to recover: At home with family  Fax number for surgical facility: Note does not need to be faxed, will be available electronically in Epic.    Type of Anesthesia Anticipated: Conscious Sedation    Assessment & Plan     The proposed surgical procedure is considered LOW risk.    Preop general physical exam  Okay to proceed with scheduled procedure.    Hypertensive response to exercise  Continue with amlodipine.           RECOMMENDATION:  APPROVAL GIVEN to proceed with proposed procedure, without further diagnostic evaluation.    Subjective     HPI related to upcoming procedure: h/o GERD with epigastric pain    Preop Questions 3/22/2022   1. Have you ever had a heart attack or stroke? UNKNOWN    2. Have you ever had surgery on your heart or blood vessels, such as a stent placement, a coronary artery bypass, or surgery on an artery in your head, neck, heart, or legs? No   3. Do you have chest pain with activity? UNKNOWN   4. Do you have a history of  heart failure? No   5. Do you currently have a cold, bronchitis or symptoms of other infection? No   6. Do you have a cough, shortness of breath, or wheezing? No   7. Do you or anyone in your family have  previous history of blood clots? UNKNOWN   8. Do you or does anyone in your family have a serious bleeding problem such as prolonged bleeding following surgeries or cuts? UNKNOWN   9. Have you ever had problems with anemia or been told to take iron pills? UNKNOWN   10. Have you had any abnormal blood loss such as black, tarry or bloody stools? UNKNOWN   11. Have you ever had a blood transfusion? YES    11a. Have you ever had a transfusion reaction? UNKNOWN   12. Are you willing to have a blood transfusion if it is medically needed before, during, or after your surgery? Yes   13. Have you or any of your relatives ever had problems with anesthesia? UNKNOWN   14. Do you have sleep apnea, excessive snoring or daytime drowsiness? No   15. Do you have any artifical heart valves or other implanted medical devices like a pacemaker, defibrillator, or continuous glucose monitor? No   16. Do you have artificial joints? No   17. Are you allergic to latex? No       Health Care Directive:  Patient has a Health Care Directive on file      Review of Systems  CONSTITUTIONAL: NEGATIVE for fever, chills, change in weight  ENT/MOUTH: NEGATIVE for ear, mouth and throat problems  RESP: NEGATIVE for significant cough or SOB  CV: NEGATIVE for chest pain, palpitations or peripheral edema    Patient Active Problem List    Diagnosis Date Noted     Hypertensive response to exercise 12/11/2019     Priority: Medium     Alcohol dependence in remission (H) 01/05/2018     Priority: Medium     Low back pain without sciatica, unspecified back pain laterality, unspecified chronicity 06/13/2017     Priority: Medium     Major depressive disorder, recurrent episode, moderate (H) 09/26/2016     Priority: Medium     male stress incontinence 04/14/2016     Priority: Medium     Myalgia 04/05/2016     Priority: Medium     mid thorax, left subscapularis, latisimus dorsi Bilateral along iliac crest 4/15/16 done at [facility]        Chronic pain  08/19/2015     Priority: Medium     Chronic pain low back, S I joint, fibromyalgia.  [facility]  Clinic for procedural care only ( S I joint and TPI)   Last S I joint  09/26/16  Not on opioids for chronic pain .  [facility]  DIRE Score   DIRE Score for ongoing opioid management is calculated as follows:    Diagnosis = 2    Intractability = 2    Risk: Psych = 1  Chem Hlth = 2  Reliability = 2  Social = 2    Efficacy = 2    Total DIRE Score = 13 (14 or higher predicts good candidate for ongoing opioid management; 13 or lower predicts poor candidate for opioid management)   [place] reviewed 3/21/17 and with each pain visit     Maureen Avendaño CNP         Insomnia 10/13/2014     Priority: Medium     Problem list name updated by automated process. Provider to review       TBI (traumatic brain injury) (H) 10/12/2014     Priority: Medium     Anxiety state 08/01/2014     Priority: Medium     Problem list name updated by automated process. Provider to review       Inadequate material resources 06/27/2014     Priority: Medium     food       Low back pain 05/06/2014     Priority: Medium     Diagnosis updated by automated process. Provider to review and confirm.       Posttraumatic stress disorder 04/07/2014     Priority: Medium     Fibromyalgia syndrome 09/05/2013     Priority: Medium     Overweight (BMI 25.0-29.9) 08/16/2012     Priority: Medium     GERD (gastroesophageal reflux disease)      Priority: Medium     EGD 2003 [place]       CARDIOVASCULAR SCREENING; LDL GOAL LESS THAN 130 10/31/2010     Priority: Medium     Sadorus Risk Score: 4% (8 Total Points)     2+ risk factors.       Malignant neoplasm of prostate (H) 05/06/2010     Priority: Medium     Pervasive developmental disorder, active 04/01/2009     Priority: Medium     Depressive disorder 04/01/2009     Priority: Medium     Delusional disorder (H) 04/01/2009     Priority: Medium     Problem list name updated by automated  process. Provider to review       History of Asperger's syndrome 10/10/2014     Priority: Low      Past Medical History:   Diagnosis Date     Alcoholism (H)      Arthritis      Asperger's syndrome     Dr. Owens, [facility]. Last visit 2006/2007     GERD (gastroesophageal reflux disease) 1999    EGD 2003 [place]     History of hypercholesterolemia      Hypertension      Kidney stones      Malignant hyperthermia due to anesthesia      Melasma     forehead, has received desonide from dermatologist     MMT (medial meniscus tear) 10/01    LT     Myalgia and myositis 4/5/2016    mid thorax, left subscapularis, latisimus dorsi Bilateral along iliac crest      Posttraumatic stress disorder     per pt     Prostate cancer (H)      Recurrent genital herpes 1982     Rib fracture 1985    L 6th     Skull fracture (H) 1985    frequent vertigo     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 HEP B VAC ADULT 3 DOSE IM  1995    received all 3 vaccines     [facility] REMV PROSTATE,RETROPUB,RADICAL  10/13/04    Dr. Muñoz ([place])     Current Outpatient Medications   Medication Sig Dispense Refill     acetaminophen (TYLENOL) 500 MG tablet Take 500-1,000 mg by mouth every 6 hours as needed for mild pain 1000 mg per day       amLODIPine (NORVASC) 2.5 MG tablet Take 1 tablet (2.5 mg) by mouth daily 90 tablet 0     aspirin 81 MG EC tablet Take 81 mg by mouth daily        Blood Pressure KIT Monitor blood pressure as needed. 1 kit 1     Camphor-Menthol-Methyl Sal (SALONPAS) 3.1-6-10 % PTCH        Cholecalciferol (VITAMIN D) 1000 UNIT capsule Take 1 capsule by mouth 2 times daily.       diclofenac (VOLTAREN) 1 % topical gel Place onto the skin 4 times daily       guaiFENesin-codeine (ROBITUSSIN AC) 100-10 MG/5ML solution  Take 10 mLs by mouth every 4 hours as needed for cough 236 mL 1     medical cannabis (Patient's own supply) See Admin Instructions (The purpose of this order is to document that the patient reports taking medical cannabis.  This is not a prescription, and is not used to certify that the patient has a qualifying medical condition.)       Nerve Stimulator (TENS THERAPY PAIN RELIEF) GEORGIE        omeprazole (PRILOSEC) 20 MG DR capsule Take 1 capsule (20 mg) by mouth 2 times daily 60 capsule 1     polyethylene glycol-propylene glycol (SYSTANE ULTRA) 0.4-0.3 % SOLN ophthalmic solution Place 1 drop into both eyes 4 times daily 6 mL 12     sildenafil (VIAGRA) 100 MG tablet Take 1/4 - 1 tablet, as directed, 1-3 hours before intimacy. Maximum 1 dose per 24 hours. 10 tablet 5     valACYclovir (VALTREX) 500 MG tablet Take 500 mg by mouth daily as needed          Allergies   Allergen Reactions     Risperidone Other (See Comments)     Serve numbness      Trimethoprim Hives     Effexor [Venlafaxine Hydrochloride] Other (See Comments)     Headache, Painful scrotum and drainage from the penis     Ambien Other (See Comments)     headaches     Ambien [Zolpidem Tartrate] Nausea     Buspirone Nausea     Dizziness, headache     Celexa [Citalopram] Other (See Comments)     Headache     Duloxetine Other (See Comments)     Headache  headaches     Septra [Bactrim] Itching     Seroquel [Quetiapine] Other (See Comments)     Headache, N, V     Sulfa Drugs Itching     Sulfamethoxazole-Trimethoprim      hives     Tramadol Nausea     Nausea and headache     Ultram [Tramadol Hcl] Nausea and Vomiting     Venlafaxine      Zolpidem      headaches        Social History     Tobacco Use     Smoking status: Former Smoker     Packs/day: 0.50     Years: 10.00     Pack years: 5.00     Types: Cigarettes     Quit date: 1997     Years since quittin.2     Smokeless tobacco: Never Used   Substance Use Topics     Alcohol use: Yes     Comment: [facility]  "alcoholism quit . [facility] dependency treatment in      Family History   Problem Relation Age of Onset     Psychotic Disorder Son         autism     Prostate Cancer Father         40s     Cancer Father      Cancer Maternal Grandmother         lung     Glaucoma Maternal Grandmother      Eye Disorder Maternal Grandmother         glaucoma     Diabetes Mother          45     Blood Disease Sister         sickle trait     Hypertension Sister      Blood Disease Paternal Uncle         sickle     Blood Disease Paternal Aunt         sickle     Alzheimer Disease Paternal Grandfather         70s     Macular Degeneration Paternal Grandfather      Cerebrovascular Disease No family hx of      Thyroid Disease No family hx of      Myocardial Infarction No family hx of      C.A.D. No family hx of      History   Drug Use No     Comment: crack (last used 10/08?)         Objective     /80   Pulse 60   Temp 98.4  F (36.9  C) (Tympanic)   Resp 18   Ht 1.626 m (5' 4\")   Wt 74.4 kg (164 lb)   SpO2 98%   BMI 28.15 kg/m      Physical Exam  GENERAL APPEARANCE: healthy, alert and no distress  HENT: ear canals and TM's normal and nose and mouth without ulcers or lesions  RESP: lungs clear to auscultation - no rales, rhonchi or wheezes  CV: regular rate and rhythm, normal S1 S2, no S3 or S4 and no murmur, click or rub   ABDOMEN: soft, nontender, no HSM or masses and bowel sounds normal  NEURO: Normal strength and tone, sensory exam grossly normal, mentation intact and speech normal    Recent Labs   Lab Test 22  0950 10/01/21  0712 20  1510   HGB 13.9  --   --      --   --     140 138   POTASSIUM 3.6 3.5 3.9   CR 0.79 0.79 0.99   A1C  --  5.7* 5.7*        Diagnostics:  No labs were ordered during this visit.   No EKG required, no history of coronary heart disease, significant arrhythmia, peripheral arterial disease or other structural heart disease.    Revised Cardiac Risk Index (RCRI):  The patient " has the following serious cardiovascular risks for perioperative complications:   - No serious cardiac risks = 0 points     RCRI Interpretation: 0 points: Class I (very low risk - 0.4% complication rate)           Signed Electronically by: Milton Iglesias MD  Copy of this evaluation report is provided to requesting physician.

## 2022-03-22 NOTE — CONFIDENTIAL NOTE
Routing to provider to update, see MyC message below regarding medical cannabis that patient is currently taking. Wants to make sure these are not going to interact with any of his prescribed medications. Will discuss further at time of visit today with PCP at 1:40 pm. Patient coming in for pre-op physical.      Courtney Francois RN  [facility]

## 2022-03-22 NOTE — PROGRESS NOTES
Answers for HPI/ROS submitted by the patient on 3/22/2022  If you checked off any problems, how difficult have these problems made it for you to do your work, take care of things at home, or get along with other people?: Somewhat difficult  PHQ9 TOTAL SCORE: 8    M [address] [address]  Phone: [phone]  Primary Provider: Kristofer Castillo  Pre-op Performing Provider: KRISTOFER CASTILLO      PREOPERATIVE EVALUATION:  Today's date: 3/22/2022    Jeanmarie Mclean is a 62 year old male who presents for a preoperative evaluation.    Surgical Information:  Surgery/Procedure: EGD with CO2 Insufflation  Surgery Location: [place] [facility]   Surgeon: Dr. Lissette Crockett  Surgery Date: 3/29/22  Time of Surgery: 11am  Where patient plans to recover: At home with family  Fax number for surgical facility: Note does not need to be faxed, will be available electronically in Epic.    Type of Anesthesia Anticipated: Conscious Sedation    Assessment & Plan     The proposed surgical procedure is considered LOW risk.    Preop general physical exam  Okay to proceed with scheduled procedure.    Hypertensive response to exercise  Continue with amlodipine.           RECOMMENDATION:  APPROVAL GIVEN to proceed with proposed procedure, without further diagnostic evaluation.    Subjective     HPI related to upcoming procedure: h/o GERD with epigastric pain    Preop Questions 3/22/2022   1. Have you ever had a heart attack or stroke? UNKNOWN    2. Have you ever had surgery on your heart or blood vessels, such as a stent placement, a coronary artery bypass, or surgery on an artery in your head, neck, heart, or legs? No   3. Do you have chest pain with activity? UNKNOWN   4. Do you have a history of  heart failure? No   5. Do you currently have a cold, bronchitis or symptoms of other infection? No   6. Do you have a cough, shortness of breath, or wheezing? No   7. Do you or anyone in your family have  previous history of blood clots? UNKNOWN   8. Do you or does anyone in your family have a serious bleeding problem such as prolonged bleeding following surgeries or cuts? UNKNOWN   9. Have you ever had problems with anemia or been told to take iron pills? UNKNOWN   10. Have you had any abnormal blood loss such as black, tarry or bloody stools? UNKNOWN   11. Have you ever had a blood transfusion? YES    11a. Have you ever had a transfusion reaction? UNKNOWN   12. Are you willing to have a blood transfusion if it is medically needed before, during, or after your surgery? Yes   13. Have you or any of your relatives ever had problems with anesthesia? UNKNOWN   14. Do you have sleep apnea, excessive snoring or daytime drowsiness? No   15. Do you have any artifical heart valves or other implanted medical devices like a pacemaker, defibrillator, or continuous glucose monitor? No   16. Do you have artificial joints? No   17. Are you allergic to latex? No       Health Care Directive:  Patient has a Health Care Directive on file      Review of Systems  CONSTITUTIONAL: NEGATIVE for fever, chills, change in weight  ENT/MOUTH: NEGATIVE for ear, mouth and throat problems  RESP: NEGATIVE for significant cough or SOB  CV: NEGATIVE for chest pain, palpitations or peripheral edema    Patient Active Problem List    Diagnosis Date Noted     Hypertensive response to exercise 12/11/2019     Priority: Medium     Alcohol dependence in remission (H) 01/05/2018     Priority: Medium     Low back pain without sciatica, unspecified back pain laterality, unspecified chronicity 06/13/2017     Priority: Medium     Major depressive disorder, recurrent episode, moderate (H) 09/26/2016     Priority: Medium     male stress incontinence 04/14/2016     Priority: Medium     Myalgia 04/05/2016     Priority: Medium     mid thorax, left subscapularis, latisimus dorsi Bilateral along iliac crest 4/15/16 done at [facility]        Chronic pain  08/19/2015     Priority: Medium     Chronic pain low back, S I joint, fibromyalgia.  [facility]  Clinic for procedural care only ( S I joint and TPI)   Last S I joint  09/26/16  Not on opioids for chronic pain .  [facility]  DIRE Score   DIRE Score for ongoing opioid management is calculated as follows:    Diagnosis = 2    Intractability = 2    Risk: Psych = 1  Chem Hlth = 2  Reliability = 2  Social = 2    Efficacy = 2    Total DIRE Score = 13 (14 or higher predicts good candidate for ongoing opioid management; 13 or lower predicts poor candidate for opioid management)   [place]MP reviewed 3/21/17 and with each pain visit     Maureen Avendaño CNP         Insomnia 10/13/2014     Priority: Medium     Problem list name updated by automated process. Provider to review       TBI (traumatic brain injury) (H) 10/12/2014     Priority: Medium     Anxiety state 08/01/2014     Priority: Medium     Problem list name updated by automated process. Provider to review       Inadequate material resources 06/27/2014     Priority: Medium     food       Low back pain 05/06/2014     Priority: Medium     Diagnosis updated by automated process. Provider to review and confirm.       Posttraumatic stress disorder 04/07/2014     Priority: Medium     Fibromyalgia syndrome 09/05/2013     Priority: Medium     Overweight (BMI 25.0-29.9) 08/16/2012     Priority: Medium     GERD (gastroesophageal reflux disease)      Priority: Medium     EGD 2003 OK       CARDIOVASCULAR SCREENING; LDL GOAL LESS THAN 130 10/31/2010     Priority: Medium     [place] Risk Score: 4% (8 Total Points)     2+ risk factors.       Malignant neoplasm of prostate (H) 05/06/2010     Priority: Medium     Pervasive developmental disorder, active 04/01/2009     Priority: Medium     Depressive disorder 04/01/2009     Priority: Medium     Delusional disorder (H) 04/01/2009     Priority: Medium     Problem list name updated by automated  process. Provider to review       History of Asperger's syndrome 10/10/2014     Priority: Low      Past Medical History:   Diagnosis Date     Alcoholism (H)      Arthritis      Asperger's syndrome     Dr. Owens, [facility]. Last visit 2006/2007     GERD (gastroesophageal reflux disease) 1999    EGD 2003 OK     History of hypercholesterolemia      Hypertension      Kidney stones      Malignant hyperthermia due to anesthesia      Melasma     forehead, has received desonide from dermatologist     MMT (medial meniscus tear) 10/01    LT     Myalgia and myositis 4/5/2016    mid thorax, left subscapularis, latisimus dorsi Bilateral along iliac crest      Posttraumatic stress disorder     per pt     Prostate cancer (H)      Recurrent genital herpes 1982     Rib fracture 1985    L 6th     Skull fracture (H) 1985    frequent vertigo     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    ultrasound     Past Surgical History:   Procedure Laterality Date     CYSTOSCOPY  10/98    for renal stones     HC KNEE SCOPE,MED/LAT MENISECTOMY  6/24/11    Left, medial ([place])     HERNIA REPAIR, INGUINAL RT/LT  5/92    [facility]     [facility] HEP B VAC ADULT 3 DOSE IM  1995    received all 3 vaccines     [facility] REMV PROSTATE,RETROPUB,RADICAL  10/13/04    Dr. Muñoz ([place])     Current Outpatient Medications   Medication Sig Dispense Refill     acetaminophen (TYLENOL) 500 MG tablet Take 500-1,000 mg by mouth every 6 hours as needed for mild pain 1000 mg per day       amLODIPine (NORVASC) 2.5 MG tablet Take 1 tablet (2.5 mg) by mouth daily 90 tablet 0     aspirin 81 MG EC tablet Take 81 mg by mouth daily        Blood Pressure KIT Monitor blood pressure as needed. 1 kit 1     Camphor-Menthol-Methyl Sal (SALONPAS) 3.1-6-10 % PTCH        Cholecalciferol (VITAMIN D) 1000 UNIT capsule Take 1 capsule by mouth 2 times daily.       diclofenac (VOLTAREN) 1 % topical gel Place onto the skin 4 times daily       guaiFENesin-codeine (ROBITUSSIN AC) 100-10 MG/5ML solution  Take 10 mLs by mouth every 4 hours as needed for cough 236 mL 1     medical cannabis (Patient's own supply) See Admin Instructions (The purpose of this order is to document that the patient reports taking medical cannabis.  This is not a prescription, and is not used to certify that the patient has a qualifying medical condition.)       Nerve Stimulator (TENS THERAPY PAIN RELIEF) GEORGIE        omeprazole (PRILOSEC) 20 MG DR capsule Take 1 capsule (20 mg) by mouth 2 times daily 60 capsule 1     polyethylene glycol-propylene glycol (SYSTANE ULTRA) 0.4-0.3 % SOLN ophthalmic solution Place 1 drop into both eyes 4 times daily 6 mL 12     sildenafil (VIAGRA) 100 MG tablet Take 1/4 - 1 tablet, as directed, 1-3 hours before intimacy. Maximum 1 dose per 24 hours. 10 tablet 5     valACYclovir (VALTREX) 500 MG tablet Take 500 mg by mouth daily as needed          Allergies   Allergen Reactions     Risperidone Other (See Comments)     Serve numbness      Trimethoprim Hives     Effexor [Venlafaxine Hydrochloride] Other (See Comments)     Headache, Painful scrotum and drainage from the penis     Ambien Other (See Comments)     headaches     Ambien [Zolpidem Tartrate] Nausea     Buspirone Nausea     Dizziness, headache     Celexa [Citalopram] Other (See Comments)     Headache     Duloxetine Other (See Comments)     Headache  headaches     Septra [Bactrim] Itching     Seroquel [Quetiapine] Other (See Comments)     Headache, N, V     Sulfa Drugs Itching     Sulfamethoxazole-Trimethoprim      hives     Tramadol Nausea     Nausea and headache     Ultram [Tramadol Hcl] Nausea and Vomiting     Venlafaxine      Zolpidem      headaches        Social History     Tobacco Use     Smoking status: Former Smoker     Packs/day: 0.50     Years: 10.00     Pack years: 5.00     Types: Cigarettes     Quit date: 1997     Years since quittin.2     Smokeless tobacco: Never Used   Substance Use Topics     Alcohol use: Yes     Comment: chelsi  "alcoholism quit . [facility] dependency [facility] in      Family History   Problem Relation Age of Onset     Psychotic Disorder Son         autism     Prostate Cancer Father         40s     Cancer Father      Cancer Maternal Grandmother         lung     Glaucoma Maternal Grandmother      Eye Disorder Maternal Grandmother         glaucoma     Diabetes Mother          45     Blood Disease Sister         sickle trait     Hypertension Sister      Blood Disease Paternal Uncle         sickle     Blood Disease Paternal Aunt         sickle     Alzheimer Disease Paternal Grandfather         70s     Macular Degeneration Paternal Grandfather      Cerebrovascular Disease No family hx of      Thyroid Disease No family hx of      Myocardial Infarction No family hx of      C.A.D. No family hx of      History   Drug Use No     Comment: crack (last used 10/08?)         Objective     /80   Pulse 60   Temp 98.4  F (36.9  C) (Tympanic)   Resp 18   Ht 1.626 m (5' 4\")   Wt 74.4 kg (164 lb)   SpO2 98%   BMI 28.15 kg/m      Physical Exam  GENERAL APPEARANCE: healthy, alert and no distress  HENT: ear canals and TM's normal and nose and mouth without ulcers or lesions  RESP: lungs clear to auscultation - no rales, rhonchi or wheezes  CV: regular rate and rhythm, normal S1 S2, no S3 or S4 and no murmur, click or rub   ABDOMEN: soft, nontender, no HSM or masses and bowel sounds normal  NEURO: Normal strength and tone, sensory exam grossly normal, mentation intact and speech normal    Recent Labs   Lab Test 22  0950 10/01/21  0712 20  1510   HGB 13.9  --   --      --   --     140 138   POTASSIUM 3.6 3.5 3.9   CR 0.79 0.79 0.99   A1C  --  5.7* 5.7*        Diagnostics:  No labs were ordered during this visit.   No EKG required, no history of coronary heart disease, significant arrhythmia, peripheral arterial disease or other structural heart disease.    Revised Cardiac Risk Index (RCRI):  The patient " has the following serious cardiovascular risks for perioperative complications:   - No serious cardiac risks = 0 points     RCRI Interpretation: 0 points: Class I (very low risk - 0.4% complication rate)           Signed Electronically by: Milton Iglesias MD  Copy of this evaluation report is provided to requesting physician.

## 2022-03-22 NOTE — PATIENT INSTRUCTIONS
At [facility], we strive to deliver an exceptional experience to you, every time we see you. If you receive a survey, please complete it as we do value your feedback.  If you have MyChart, you can expect to receive results automatically within 24 hours of their completion.  Your provider will send a note interpreting your results as well.   If you do not have MyChart, you should receive your results in about a week by mail.    Your care team:                            Family Medicine Internal Medicine   MD Aaron Schulte MD Shantel Branch-Fleming, MD Srinivasa Vaka, MD Katya Belousova, RAJINDER Huitron CNP, MD ([facility]   Eliseo Riggs, MD Georgia Macedo MD Amelia Massimini APRN CNP Kim Thein, APRN CNP Bethany Templen, MD             Clinic hours: Monday - Thursday 7 am-6 pm; Fridays 7 am-5 pm.   Urgent care: Monday - Friday 10 am- 8 pm; Saturday and Sunday 9 am-5 pm.    Clinic: [phone]       [facility]: Monday - Thursday 8 am - 7 pm; Friday 8 am - 6 pm  [facility]: [phone]

## 2022-03-23 RX ORDER — OXYCODONE HYDROCHLORIDE 5 MG/1
5 TABLET ORAL EVERY 6 HOURS PRN
COMMUNITY
End: 2024-02-21

## 2022-03-25 ENCOUNTER — LAB (OUTPATIENT)
Dept: LAB | Facility: CLINIC | Age: 62
End: 2022-03-25
Payer: COMMERCIAL

## 2022-03-25 DIAGNOSIS — Z11.59 ENCOUNTER FOR SCREENING FOR OTHER VIRAL DISEASES: ICD-10-CM

## 2022-03-25 PROCEDURE — U0003 INFECTIOUS AGENT DETECTION BY NUCLEIC ACID (DNA OR RNA); SEVERE ACUTE RESPIRATORY SYNDROME CORONAVIRUS 2 (SARS-COV-2) (CORONAVIRUS DISEASE [COVID-19]), AMPLIFIED PROBE TECHNIQUE, MAKING USE OF HIGH THROUGHPUT TECHNOLOGIES AS DESCRIBED BY CMS-2020-01-R: HCPCS

## 2022-03-25 PROCEDURE — U0005 INFEC AGEN DETEC AMPLI PROBE: HCPCS

## 2022-03-26 LAB — SARS-COV-2 RNA RESP QL NAA+PROBE: NEGATIVE

## 2022-03-28 ENCOUNTER — ANESTHESIA EVENT (OUTPATIENT)
Dept: SURGERY | Facility: AMBULATORY SURGERY CENTER | Age: 62
End: 2022-03-28
Payer: COMMERCIAL

## 2022-03-29 ENCOUNTER — ANESTHESIA (OUTPATIENT)
Dept: SURGERY | Facility: AMBULATORY SURGERY CENTER | Age: 62
End: 2022-03-29
Payer: COMMERCIAL

## 2022-03-29 ENCOUNTER — HOSPITAL ENCOUNTER (OUTPATIENT)
Facility: AMBULATORY SURGERY CENTER | Age: 62
Discharge: HOME | End: 2022-03-29
Attending: INTERNAL MEDICINE
Payer: COMMERCIAL

## 2022-03-29 VITALS
SYSTOLIC BLOOD PRESSURE: 117 MMHG | HEART RATE: 68 BPM | OXYGEN SATURATION: 99 % | TEMPERATURE: 97.2 F | RESPIRATION RATE: 16 BRPM | DIASTOLIC BLOOD PRESSURE: 68 MMHG

## 2022-03-29 VITALS — HEART RATE: 64 BPM

## 2022-03-29 LAB — UPPER GI ENDOSCOPY: NORMAL

## 2022-03-29 PROCEDURE — G8918 PT W/O PREOP ORDER IV AB PRO: HCPCS

## 2022-03-29 PROCEDURE — 43239 EGD BIOPSY SINGLE/MULTIPLE: CPT

## 2022-03-29 PROCEDURE — G8907 PT DOC NO EVENTS ON DISCHARG: HCPCS

## 2022-03-29 RX ORDER — SODIUM CHLORIDE, SODIUM LACTATE, POTASSIUM CHLORIDE, CALCIUM CHLORIDE 600; 310; 30; 20 MG/100ML; MG/100ML; MG/100ML; MG/100ML
INJECTION, SOLUTION INTRAVENOUS CONTINUOUS
Status: DISCONTINUED | OUTPATIENT
Start: 2022-03-29 | End: 2022-03-30 | Stop reason: HOSPADM

## 2022-03-29 RX ORDER — LIDOCAINE 40 MG/G
CREAM TOPICAL
Status: DISCONTINUED | OUTPATIENT
Start: 2022-03-29 | End: 2022-03-30 | Stop reason: HOSPADM

## 2022-03-29 RX ORDER — ONDANSETRON 2 MG/ML
4 INJECTION INTRAMUSCULAR; INTRAVENOUS EVERY 30 MIN PRN
Status: DISCONTINUED | OUTPATIENT
Start: 2022-03-29 | End: 2022-03-30 | Stop reason: HOSPADM

## 2022-03-29 RX ORDER — ONDANSETRON 4 MG/1
4 TABLET, ORALLY DISINTEGRATING ORAL EVERY 30 MIN PRN
Status: DISCONTINUED | OUTPATIENT
Start: 2022-03-29 | End: 2022-03-30 | Stop reason: HOSPADM

## 2022-03-29 RX ORDER — METOPROLOL TARTRATE 1 MG/ML
1-2 INJECTION, SOLUTION INTRAVENOUS EVERY 5 MIN PRN
Status: DISCONTINUED | OUTPATIENT
Start: 2022-03-29 | End: 2022-03-30 | Stop reason: HOSPADM

## 2022-03-29 RX ORDER — PROPOFOL 10 MG/ML
INJECTION, EMULSION INTRAVENOUS PRN
Status: DISCONTINUED | OUTPATIENT
Start: 2022-03-29 | End: 2022-03-29

## 2022-03-29 RX ORDER — LIDOCAINE HYDROCHLORIDE 20 MG/ML
INJECTION, SOLUTION INFILTRATION; PERINEURAL PRN
Status: DISCONTINUED | OUTPATIENT
Start: 2022-03-29 | End: 2022-03-29

## 2022-03-29 RX ADMIN — PROPOFOL 50 MG: 10 INJECTION, EMULSION INTRAVENOUS at 11:05

## 2022-03-29 RX ADMIN — SODIUM CHLORIDE, SODIUM LACTATE, POTASSIUM CHLORIDE, CALCIUM CHLORIDE: 600; 310; 30; 20 INJECTION, SOLUTION INTRAVENOUS at 10:55

## 2022-03-29 RX ADMIN — PROPOFOL 100 MG: 10 INJECTION, EMULSION INTRAVENOUS at 11:03

## 2022-03-29 RX ADMIN — PROPOFOL 40 MG: 10 INJECTION, EMULSION INTRAVENOUS at 11:07

## 2022-03-29 RX ADMIN — LIDOCAINE HYDROCHLORIDE 80 MG: 20 INJECTION, SOLUTION INFILTRATION; PERINEURAL at 11:03

## 2022-03-29 ASSESSMENT — LIFESTYLE VARIABLES: TOBACCO_USE: 1

## 2022-03-29 NOTE — ANESTHESIA PREPROCEDURE EVALUATION
Anesthesia Pre-Procedure Evaluation    Patient: Jeanmarie Mclean   MRN: [id number] : 1960        Procedure : Procedure(s):  ESOPHAGOGASTRODUODENOSCOPY, WITH CO2 INSUFFLATION          Past Medical History:   Diagnosis Date     Alcoholism (H)      Arthritis      Asperger's syndrome     Dr. Owens, [facility]. 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 Muñoz ([place])      Allergies   Allergen Reactions     Risperidone Other (See Comments)     Serve numbness      Trimethoprim Hives     Effexor [Venlafaxine Hydrochloride] Other (See Comments)     Headache, Painful scrotum and drainage from the penis     Ambien Other (See Comments)     headaches     Ambien [Zolpidem Tartrate] Nausea     Buspirone Nausea     Dizziness, headache     Celexa [Citalopram] Other (See Comments)     Headache     Duloxetine Other (See Comments)     Headache  headaches     Septra [Bactrim] Itching     Seroquel [Quetiapine] Other (See Comments)     Headache, N, V     Sulfa Drugs Itching      Sulfamethoxazole-Trimethoprim      hives     Tramadol Nausea     Nausea and headache     Ultram [Tramadol Hcl] Nausea and Vomiting     Venlafaxine      Zolpidem      headaches      Social History     Tobacco Use     Smoking status: Former Smoker     Packs/day: 0.50     Years: 10.00     Pack years: 5.00     Types: Cigarettes     Quit date: 1997     Years since quittin.2     Smokeless tobacco: Never Used   Substance Use Topics     Alcohol use: Yes     Comment: hx alcoholism quit . [facility] dependency treatment in       Wt Readings from Last 1 Encounters:   22 74.4 kg (164 lb)        Anesthesia Evaluation   Pt has had prior anesthetic.     History of anesthetic complications  - malignant hyperthermia.  History of malnant hyperthermia by test.    ROS/MED HX  ENT/Pulmonary:     (+) tobacco use, Past use, 5  Pack-Year Hx,      Neurologic:  - neg neurologic ROS     Cardiovascular:     (+) Dyslipidemia hypertension-----    METS/Exercise Tolerance:     Hematologic:  - neg hematologic  ROS     Musculoskeletal: Comment: Fibromyalgia      GI/Hepatic:     (+) GERD, Symptomatic,     Renal/Genitourinary:     (+) renal disease, Pt does not require dialysis, Nephrolithiasis ,     Endo:  - neg endo ROS     Psychiatric/Substance Use: Comment: Aspberger's syndrome    (+) psychiatric history anxiety and depression (Alcohol abuse in remission) alcohol abuse Recreational drug usage: Cannabis (Medical marijuana).    Infectious Disease:  - neg infectious disease ROS     Malignancy:  - neg malignancy ROS     Other:      (+) , H/O Chronic Pain,        Physical Exam    Airway  airway exam normal           Respiratory Devices and Support         Dental  no notable dental history         Cardiovascular   cardiovascular exam normal          Pulmonary   pulmonary exam normal                OUTSIDE LABS:  CBC:   Lab Results   Component Value Date    WBC 5.4 2022    WBC 4.7 2019    HGB 13.9 2022    HGB 12.6  (L) 05/02/2019    HCT 42.5 03/03/2022    HCT 37.0 (L) 05/02/2019     03/03/2022     05/02/2019     BMP:   Lab Results   Component Value Date     03/03/2022     10/01/2021    POTASSIUM 3.6 03/03/2022    POTASSIUM 3.5 10/01/2021    CHLORIDE 108 03/03/2022    CHLORIDE 106 10/01/2021    CO2 25 03/03/2022    CO2 25 10/01/2021    BUN 11 03/03/2022    BUN 15 10/01/2021    CR 0.79 03/03/2022    CR 0.79 10/01/2021     (H) 03/03/2022     (H) 10/01/2021     COAGS: No results found for: PTT, INR, FIBR  POC: No results found for: BGM, HCG, HCGS  HEPATIC:   Lab Results   Component Value Date    ALBUMIN 3.7 03/03/2022    PROTTOTAL 7.6 03/03/2022    ALT 51 03/03/2022    AST 28 03/03/2022    GGT 24 10/24/2013    ALKPHOS 74 03/03/2022    BILITOTAL 0.5 03/03/2022     OTHER:   Lab Results   Component Value Date    A1C 5.7 (H) 10/01/2021    LUKAS 9.3 03/03/2022    LIPASE 104 03/03/2022    AMYLASE 66 03/03/2022    TSH 0.72 03/03/2022    T4 1.16 10/29/2013    CRP 3.6 06/16/2009       Anesthesia Plan    ASA Status:  3   NPO Status:  NPO Appropriate    Anesthesia Type: MAC.     - Reason for MAC: chronic cardiopulmonary disease   Induction: Intravenous, Propofol.     - Malignant Hyperthermia Precautions   Maintenance: TIVA.        Consents    Anesthesia Plan(s) and associated risks, benefits, and realistic alternatives discussed. Questions answered and patient/representative(s) expressed understanding.    - Discussed:     - Discussed with:  Patient      - Extended Intubation/Ventilatory Support Discussed: No.      - Patient is DNR/DNI Status: No    Use of blood products discussed: No .     Postoperative Care       PONV prophylaxis: Ondansetron (or other 5HT-3), Background Propofol Infusion     Comments:                Gurpreet Paiz MD

## 2022-03-29 NOTE — OR NURSING
Pt came in for an EGD/MAC with his autistic son to be his responsible adult at home. Son has  and writer was advised to call the  if his son took off while pt was in procedure. Writer spoke to manager about her concerns re: son being responsible adult for pt. Due to son's autism dx and that he is not high functioning. Ok to proceed with procedure with son as responsible adult at home per manager.

## 2022-03-29 NOTE — PROGRESS NOTES
Dr. Paiz consulted regarding the pt's son being his responsible adult at home. Dr. Paiz feels this is an appropriate decision. Discharge plan approved by manager.

## 2022-03-29 NOTE — INTERVAL H&P NOTE
"I have reviewed the surgical (or preoperative) H&P that is linked to this encounter, and examined the patient. There are no significant changes    Clinical Conditions Present on Arrival:  Clinically Significant Risk Factors Present on Admission                    # Overweight: Estimated body mass index is 28.15 kg/m  as calculated from the following:    Height as of 3/22/22: 1.626 m (5' 4\").    Weight as of 3/22/22: 74.4 kg (164 lb).       "

## 2022-03-29 NOTE — ANESTHESIA POSTPROCEDURE EVALUATION
Patient: Jeanmarie Mclean    Procedure: Procedure(s):  ESOPHAGOGASTRODUODENOSCOPY, WITH CO2 INSUFFLATION  ESOPHAGOGASTRODUODENOSCOPY, WITH BIOPSY       Anesthesia Type:  MAC    Note:  Disposition: Outpatient   Postop Pain Control: Uneventful            Sign Out: Well controlled pain   PONV: No   Neuro/Psych: Uneventful            Sign Out: Acceptable/Baseline neuro status   Airway/Respiratory: Uneventful            Sign Out: Acceptable/Baseline resp. status   CV/Hemodynamics: Uneventful            Sign Out: Acceptable CV status; No obvious hypovolemia; No obvious fluid overload   Other NRE: NONE   DID A NON-ROUTINE EVENT OCCUR? No           Last vitals:  Vitals Value Taken Time   /68 03/29/22 1200   Temp     Pulse 68 03/29/22 1200   Resp 16 03/29/22 1200   SpO2 99 % 03/29/22 1200       Electronically Signed By: Gurpreet Paiz MD  March 29, 2022  12:34 PM

## 2022-03-29 NOTE — ANESTHESIA CARE TRANSFER NOTE
Patient: Jeanmarie Mclean    Procedure: Procedure(s):  ESOPHAGOGASTRODUODENOSCOPY, WITH CO2 INSUFFLATION  ESOPHAGOGASTRODUODENOSCOPY, WITH BIOPSY       Diagnosis: Gastroesophageal reflux disease, unspecified whether esophagitis present [K21.9]  Epigastric pain [R10.13]  Diagnosis Additional Information: No value filed.    Anesthesia Type:   MAC     Note:    Oropharynx: oropharynx clear of all foreign objects and spontaneously breathing  Level of Consciousness: awake  Oxygen Supplementation: room air    Independent Airway: airway patency satisfactory and stable  Dentition: dentition unchanged  Vital Signs Stable: post-procedure vital signs reviewed and stable  Report to RN Given: handoff report given  Patient transferred to: Phase II    Handoff Report: Identifed the Patient, Identified the Reponsible Provider, Reviewed the pertinent medical history, Discussed the surgical course, Reviewed Intra-OP anesthesia mangement and issues during anesthesia, Set expectations for post-procedure period and Allowed opportunity for questions and acknowledgement of understanding      Vitals:  Vitals Value Taken Time   BP     Temp     Pulse     Resp     SpO2         Electronically Signed By: RAJINDER Vaca CRNA  March 29, 2022  11:22 AM

## 2022-03-31 LAB
PATH REPORT.COMMENTS IMP SPEC: NORMAL
PATH REPORT.COMMENTS IMP SPEC: NORMAL
PATH REPORT.FINAL DX SPEC: NORMAL
PATH REPORT.GROSS SPEC: NORMAL
PATH REPORT.MICROSCOPIC SPEC OTHER STN: NORMAL
PATH REPORT.RELEVANT HX SPEC: NORMAL
PHOTO IMAGE: NORMAL

## 2022-03-31 PROCEDURE — 88305 TISSUE EXAM BY PATHOLOGIST: CPT | Mod: GC | Performed by: PATHOLOGY

## 2022-04-19 ENCOUNTER — MYC MEDICAL ADVICE (OUTPATIENT)
Dept: FAMILY MEDICINE | Facility: CLINIC | Age: 62
End: 2022-04-19
Payer: COMMERCIAL

## 2022-04-19 DIAGNOSIS — M53.3 SI (SACROILIAC) JOINT DYSFUNCTION: Primary | ICD-10-CM

## 2022-04-19 NOTE — TELEPHONE ENCOUNTER
Routing to provider to review and advise. Please see IMGuest message below.     Would you like patient to schedule an appointment?    Jackie Rodriguez RN, BSN  [facility]

## 2022-04-21 ENCOUNTER — TRANSFERRED RECORDS (OUTPATIENT)
Dept: HEALTH INFORMATION MANAGEMENT | Facility: CLINIC | Age: 62
End: 2022-04-21
Payer: COMMERCIAL

## 2022-05-04 ENCOUNTER — TRANSFERRED RECORDS (OUTPATIENT)
Dept: HEALTH INFORMATION MANAGEMENT | Facility: CLINIC | Age: 62
End: 2022-05-04
Payer: COMMERCIAL

## 2022-05-05 ENCOUNTER — RADIOLOGY INJECTION OFFICE VISIT (OUTPATIENT)
Dept: PALLIATIVE MEDICINE | Facility: CLINIC | Age: 62
End: 2022-05-05
Attending: FAMILY MEDICINE
Payer: COMMERCIAL

## 2022-05-05 VITALS — DIASTOLIC BLOOD PRESSURE: 74 MMHG | SYSTOLIC BLOOD PRESSURE: 117 MMHG | HEART RATE: 74 BPM

## 2022-05-05 DIAGNOSIS — M53.3 SI (SACROILIAC) JOINT DYSFUNCTION: ICD-10-CM

## 2022-05-05 PROCEDURE — 27096 INJECT SACROILIAC JOINT: CPT | Mod: 50 | Performed by: PAIN MEDICINE

## 2022-05-05 RX ORDER — TRIAMCINOLONE ACETONIDE 40 MG/ML
40 INJECTION, SUSPENSION INTRA-ARTICULAR; INTRAMUSCULAR ONCE
Status: COMPLETED | OUTPATIENT
Start: 2022-05-05 | End: 2022-05-05

## 2022-05-05 RX ADMIN — TRIAMCINOLONE ACETONIDE 40 MG: 40 INJECTION, SUSPENSION INTRA-ARTICULAR; INTRAMUSCULAR at 12:03

## 2022-05-05 ASSESSMENT — PAIN SCALES - GENERAL
PAINLEVEL: SEVERE PAIN (7)
PAINLEVEL: MILD PAIN (2)

## 2022-05-05 NOTE — NURSING NOTE
Discharge Information    IV Discontiued Time:  NA    Amount of Fluid Infused:  NA    Discharge Criteria = When patient returns to baseline or as per MD order    Consciousness:  Pt is fully awake    Circulation:  BP +/- 20% of pre-procedure level    Respiration:  Patient is able to breathe deeply    O2 Sat:  Patient is able to maintain O2 Sat >92% on room air    Activity:  Moves 4 extremities on command    Ambulation:  Patient is able to stand and walk or stand and pivot into wheelchair    Dressing:  Clean/dry or No Dressing    Notes:   Discharge instructions and AVS given to patient    Patient meets criteria for discharge?  YES    Admitted to PCU?  No    Responsible adult present to accompany patient home?  Yes    Signature/Title:    Heather Damico RN  RN Care Coordinator  [facility] [place]

## 2022-05-05 NOTE — NURSING NOTE
Pre-procedure Intake  If YES to any questions or NO to having a   Please complete laminated checklist and leave on the computer keyboard for Provider, verbally inform provider if able.    For SCS Trial, RFA's or any sedation procedure:  Have you been fasting? NA    If yes, for how long? NA    Are you taking any any blood thinners such as Coumadin, Warfarin, Jantoven, Pradaxa Xarelto, Eliquis, Edoxaban, Enoxaparin, Lovenox, Heparin, Arixtra, Fondaparinux, or Fragmin? OR Antiplatelet medication such as Plavix, Brilinta, or Effient?   No     If yes, when did you take your last dose? NA    Do you take aspirin?  No    If cervical procedure, have you held aspirin for 6 days?   NA    Do you have any allergies to contrast dye, iodine, steroid and/or numbing medications?  NO    Are you currently taking antibiotics or have an active infection?  NO    Have you had a fever/elevated temperature within the past week? NO    Are you currently taking oral steroids? NO    Do you have a ? Yes    Are you pregnant or breastfeeding?  Not Applicable    Have you received the COVID-19 vaccine? Yes    If yes, was it your 1st, 2nd or only dose needed? 3rd    Date of most recent vaccine: 10/19/21    Notify provider and RNs if systolic BP >170, diastolic BP >100, P >100 or O2 sats < 90%      Emma Breaux CMA (AAMA)

## 2022-05-05 NOTE — PATIENT INSTRUCTIONS
[facility]   Procedure Discharge Instructions    Today you saw:  Dr. Barry Castellanos    You had an:   Sacroiliac joint injection      Medications used:  Lidocaine   Bupivacaine  Omnipaque  Kenalog         Be cautious when walking. Numbness and/or weakness in the lower extremities may occur for up to 6-8 hours after the procedure due to effect of the local anesthetic  Do not drive for 6 hours. The effect of the local anesthetic could slow your reflexes.   You may resume your regular activities after 24 hours  Avoid strenuous activity for the first 24 hours  You may shower, however avoid swimming, tub baths or hot tubs for 24 hours following your procedure  You may have a mild to moderate increase in pain for several days following the injection.  It may take up to 14 days for the steroid medication to start working although you may feel the effect as early as a few days after the procedure.     You may use ice packs for 10-15 minutes, 3 to 4 times a day at the injection site for comfort  Do not use heat to painful areas for 6 to 8 hours. This will give the local anesthetic time to wear off and prevent you from accidentally burning your skin.   Unless you have been directed to avoid the use of anti-inflammatory medications (NSAIDS), you may use medications such as ibuprofen, Aleve or Tylenol for pain control if needed.   If you were fasting, you may resume your normal diet and medications after the procedure  If you have diabetes, check your blood sugar more frequently than usual as your blood sugar may be higher than normal for 10-14 days following a steroid injection. Contact your doctor who manages your diabetes if your blood sugar is higher than usual  Possible side effects of steroids that you may experience include flushing, elevated blood pressure, increased appetite, mild headaches and restlessness.  All of these symptoms will get better with time.  If you experience any of the  following, call the Pain Clinic during work hours (Mon-Friday 8-4:30 pm) at [phone] or the Provider Line after hours at [phone]:  -Fever over 100 degree F  -Swelling, bleeding, redness, drainage, warmth at the injection site  -Progressive weakness or numbness in your legs   -Loss of bowel or bladder function  -Unusual headache that is not relieved by Tylenol or other pain reliever  -Unusual new onset of pain that is not improving

## 2022-05-05 NOTE — PROGRESS NOTES
Pre procedure Diagnosis: SI joint dysfunction    Post procedure Diagnosis: Same  Procedure performed: bilateral SI joint injection  Anesthesia: none  Complications: none  Operators: Barry Castellanos MD     Indications:   Jeanmarie Mclean is a 62 year old male. The patient has a history of bilateral upper buttocks pain. Other conservative treatments prior to injection include meds/pt/injections with benefit .    Options/alternatives, benefits and risks were discussed with the patient including bleeding, infection, tissue trauma, exposure to radiation, reaction to medications including seizure, nerve injury, weakness, and numbness.  Questions were answered to his satisfaction and he agrees to proceed. Voluntary informed consent was obtained and signed.     Vitals were reviewed: Yes  Allergies were reviewed:  Yes   Medications were reviewed:  Yes   Pre-procedure pain score: 8/10    Procedure:  After obtaining signed informed consent, the patient was brought into the procedure suite and was placed in a prone position on the procedure table.   A Pause for the Cause was performed.  The patient was prepped and draped in the usual sterile fashion.     After identifying the bilateral SI joint, the C-arm was rotated obliquely to obtain the best view of the inferior angle of the joint.  Then 5 ml of Lidocaine 1%  was used to anesthetize the skin at a skin entry site coaxial with the fluoroscopy beam at this location.  A 22 gauge 3.5 inch needle was advanced under intermittent fluoroscopy until it was felt to enter the SI joint.  Aspiration was negative.    A total of 1ml of Omnipaque-300 was injected, confirming appropriate position, with spread into the intraarticular space, with no intravascular uptake noted.  9ml of Omnipaque was wasted. Location was verified in lateral view.    2 ml of 0.5% bupivacaine with 40mg of Kenalog was injected.  The needle was removed.     Hemostasis was achieved, the area was cleaned, and bandaids  were placed when appropriate.  The patient tolerated the procedure well, and was taken to the recovery room.  Images were saved to PACS.    Post-procedure pain score: 0/10  Follow-up includes:   -f/u phone call in one week  -f/u with referring Physician       Barry Castellanos MD  [facility]

## 2022-05-21 ENCOUNTER — TELEPHONE (OUTPATIENT)
Dept: FAMILY MEDICINE | Facility: CLINIC | Age: 62
End: 2022-05-21
Payer: COMMERCIAL

## 2022-05-21 NOTE — TELEPHONE ENCOUNTER
Reason for Call:  Other call back    Detailed comments: patient called and is eligible to receive the 4th dose covid pzifer vaccine.  However, patient is refusing to get if his son cannot get.  Son is not eligible at this time.  Needs Dr. Iglesias at  FP/IM/PEDS approval.      Also, patient states back injection and states should get after 2 weeks.      Patient states called about this 2 months ago; no respones back yet.    Please contact patient on Monday.  Thank you.    Phone Number Patient can be reached at: Home number on file [phone] (home)    Best Time: any    Can we leave a detailed message on this number? YES    Call taken on 5/21/2022 at 2:40 PM by Hannah Jimenez

## 2022-05-23 NOTE — TELEPHONE ENCOUNTER
Writer contacted patient regarding follow up of message below.     Writer addressed patient's questions and concerns.     All questions were answered and concerns addressed.     No further questions or concerns at this time.     Jackie Rodriguez RN, BSN  [facility]

## 2022-05-27 ENCOUNTER — IMMUNIZATION (OUTPATIENT)
Dept: NURSING | Facility: CLINIC | Age: 62
End: 2022-05-27
Payer: COMMERCIAL

## 2022-05-27 PROCEDURE — 91305 COVID-19,PF,PFIZER (12+ YRS): CPT

## 2022-05-27 PROCEDURE — 0054A COVID-19,PF,PFIZER (12+ YRS): CPT

## 2022-05-29 ENCOUNTER — NURSE TRIAGE (OUTPATIENT)
Dept: NURSING | Facility: CLINIC | Age: 62
End: 2022-05-29
Payer: COMMERCIAL

## 2022-05-29 NOTE — TELEPHONE ENCOUNTER
"Pt calling with a headache and \"sharp pains in my brain\"  That started a couple days ago     States he had the same symptoms 1 year ago and had [facility] and they told him that due to age his brain was shrinking. He says that his PCP told him he had some sort of a condition but couldn't remember what it was called     States the pains waxes and waines and he often becomes dizzy at rest. He also describes that sometimes it feels like a piece of glass is rolling around in his head     Currently he has a \"very mild headache\" and states that the worse his pain has been is a 5/10     Pt advised to schedule an appointment early on Tuesday or go to the ED if symptoms come back before then. Also advised on things he can try at home to help with the pain as he has not taken anything yet     Denies any new weakness, numbness, change in vision or change in speech/mentation     Pt states he is going to send his doctor a message as he knows about what went on the last time this happened     Transferred to scheduling to set up an appointment for Tuesday but pt hung up prior to finding an appt     Will route message to PCP     Reason for Disposition    [1] New headache AND [2] age > 50    Additional Information    Negative: Difficult to awaken or acting confused (e.g., disoriented, slurred speech)    Negative: [1] Weakness of the face, arm or leg on one side of the body AND [2] new onset    Negative: [1] Numbness of the face, arm or leg on one side of the body AND [2] new onset    Negative: [1] Loss of speech or garbled speech AND [2] new onset    Negative: Passed out (i.e., lost consciousness, collapsed and was not responding)    Negative: Sounds like a life-threatening emergency to the triager    Negative: Followed a head injury    Negative: Pregnant    Negative: Postpartum (from 0 to 6 weeks after delivery)    Negative: Traumatic Brain Injury (TBI) is suspected    Negative: Unable to walk, or can only walk " "with assistance (e.g., requires support)    Negative: Stiff neck (can't touch chin to chest)    Negative: Severe pain in one eye    Negative: [1] Other family members (or roommates) with headaches AND [2] possibility of carbon monoxide exposure    Negative: [1] SEVERE headache (e.g., excruciating) AND [2] \"worst headache\" of life    Negative: [1] SEVERE headache AND [2] sudden-onset (i.e., reaching maximum intensity within seconds)    Negative: [1] SEVERE headache AND [2] fever    Negative: Loss of vision or double vision (Exception: same as prior migraines)    Negative: [1] Fever > 100.0 F (37.8 C) AND [2] diabetes mellitus or weak immune system (e.g., HIV positive, cancer chemo, splenectomy, organ transplant, chronic steroids)    Negative: Patient sounds very sick or weak to the triager    Negative: [1] SEVERE headache (e.g., excruciating) AND [2] not improved after 2 hours of pain medicine    Negative: [1] Vomiting AND [2] 2 or more times (Exception: similar to previous migraines)    Negative: Fever > 104 F (40 C)    Negative: [1] MODERATE headache (e.g., interferes with normal activities) AND [2] present > 24 hours AND [3] unexplained  (Exceptions: analgesics not tried, typical migraine, or headache part of viral illness)    Negative: [1] New headache AND [2] weak immune system (e.g., HIV positive, cancer chemo, splenectomy, organ transplant, chronic steroids)    Protocols used: HEADACHE-A-AH    COVID 19 Nurse Triage Plan/Patient Instructions    Please be aware that novel coronavirus (COVID-19) may be circulating in the community. If you develop symptoms such as fever, cough, or SOB or if you have concerns about the presence of another infection including coronavirus (COVID-19), please contact your health care provider or visit [email or web address].     Disposition/Instructions    In-Person Visit with provider recommended. Reference Visit Selection Guide.    Thank you for taking steps to prevent the " spread of this virus.  o Limit your contact with others.  o Wear a simple mask to cover your cough.  o Wash your hands well and often.    Resources    [facility]: About COVID-19: [email or web address]    CDC: What to Do If You're Sick: www.cdc.gov/coronavirus/2019-ncov/about/steps-when-sick.html    CDC: Ending Home Isolation: www.cdc.gov/coronavirus/2019-ncov/hcp/disposition-in-home-patients.html     CDC: Caring for Someone: www.cdc.gov/coronavirus/2019-ncov/if-you-are-sick/care-for-someone.html     [facility]: Interim Guidance for Hospital Discharge to Home: www.[facility].[email or web address]/diseases/coronavirus/hcp/hospdischarge.pdf    [facility] clinical trials (COVID-19 research studies): clinicalaffairs.[facility]-clinical-trials     Below are the COVID-19 hotlines at the [place] Department of [facility] ([facility]). Interpreters are available.   o For health questions: Call 932-396-4707 or 1-523.951.5066 (7 a.m. to 7 p.m.)  o For questions about schools and childcare: Call 427-280-6758 or 1-981.671.9758 (7 a.m. to 7 p.m.)         Gila Drew RN [facility] May 29, 2022 5:32 PM

## 2022-05-31 ENCOUNTER — TELEPHONE (OUTPATIENT)
Dept: FAMILY MEDICINE | Facility: CLINIC | Age: 62
End: 2022-05-31
Payer: COMMERCIAL

## 2022-05-31 DIAGNOSIS — I10 HYPERTENSIVE RESPONSE TO EXERCISE: ICD-10-CM

## 2022-05-31 RX ORDER — AMLODIPINE BESYLATE 2.5 MG/1
2.5 TABLET ORAL DAILY
Qty: 90 TABLET | Refills: 3 | Status: SHIPPED | OUTPATIENT
Start: 2022-05-31 | End: 2023-12-22

## 2022-05-31 NOTE — TELEPHONE ENCOUNTER
"Pt called because his stoped taking his amlodipine and omeprazole about 1-2 weeks ago.  States that he has been on the medications for a whle and thinks that he may be having side effects from not taking them.   States that he started having \"pain in my head\"  Like he has \"glass in his brain\", pain is \"sharp\".  Pt has noticed his vision get blurry at night.  Also, last night his right eyelid was \"fluttering\"   Pt last checked his BP when he was at the [facility] on 5/5/22 which was 118/81 and 117/74.    Pt took his BP while on the phone and it was 164/85.  Advised pt to take his Amlodipine and check his BP after 30 minutes- an hour and to call us back.   Pt agreed.  Pt understands to be seen at  if symptoms worsen.    Pt is looking for provide's advisement.    Pt asked if he needs to be on the medication for the rest of his life.     Routing to provider.      Rosalba Astorga RN  [facility]    "

## 2022-05-31 NOTE — TELEPHONE ENCOUNTER
Spoke to pt and relayed provider's note.  State that after he took his amlodipine this morning, he took his BP 2 times and the systolic number went down to 143 and 125.    He understands to call back in 1-2 weeks and will continue taking his medication.       Rosalba Astorga RN  [facility]

## 2022-05-31 NOTE — TELEPHONE ENCOUNTER
Please have patient restart the amlodipine 2.5 mg daily. Patient should monitor his blood pressure while on the amlodipine. Please have patient report readings in about 1-2 weeks and also if his symptoms has improved or not.    Milton Iglesias MD
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
## 2022-06-08 ENCOUNTER — OFFICE VISIT (OUTPATIENT)
Dept: NEUROLOGY | Facility: CLINIC | Age: 62
End: 2022-06-08
Payer: COMMERCIAL

## 2022-06-08 ENCOUNTER — PRE VISIT (OUTPATIENT)
Dept: NEUROLOGY | Facility: CLINIC | Age: 62
End: 2022-06-08

## 2022-06-08 VITALS
HEIGHT: 64 IN | BODY MASS INDEX: 25.1 KG/M2 | DIASTOLIC BLOOD PRESSURE: 77 MMHG | HEART RATE: 76 BPM | SYSTOLIC BLOOD PRESSURE: 128 MMHG | WEIGHT: 147 LBS

## 2022-06-08 DIAGNOSIS — S06.9X9S TRAUMATIC BRAIN INJURY WITH LOSS OF CONSCIOUSNESS, SEQUELA (H): ICD-10-CM

## 2022-06-08 DIAGNOSIS — R42 VERTIGO: ICD-10-CM

## 2022-06-08 DIAGNOSIS — G24.8 FOCAL DYSTONIA: Primary | ICD-10-CM

## 2022-06-08 PROCEDURE — 99215 OFFICE O/P EST HI 40 MIN: CPT | Performed by: STUDENT IN AN ORGANIZED HEALTH CARE EDUCATION/TRAINING PROGRAM

## 2022-06-08 RX ORDER — MECLIZINE HYDROCHLORIDE 25 MG/1
25 TABLET ORAL 3 TIMES DAILY PRN
Qty: 30 TABLET | Refills: 4 | Status: SHIPPED | OUTPATIENT
Start: 2022-06-08 | End: 2022-11-29

## 2022-06-08 ASSESSMENT — PAIN SCALES - GENERAL: PAINLEVEL: SEVERE PAIN (6)

## 2022-06-08 NOTE — PATIENT INSTRUCTIONS
Magnesium oxide 400 mg daily --to help with cramping  I think on the basis of your delicious smoothies you probably are getting enough B vitamins, but supplementing those can help cramping too.   Medication options: I would favor clonazepam for you, sinemet another option--I know you tolerate medicines poorly.   If this continues to be a problem, message me and we can think about sending you to movement disorders to discuss botox.
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
## 2022-06-08 NOTE — PROGRESS NOTES
"Jeanmarie Mclean's goals for this visit include:   Chief Complaint   Patient presents with     New Patient     right hand involuntary movements and fingers are locking up        He requests these members of his care team be copied on today's visit information: yes    PCP: Milton Iglesias    Referring Provider:  No referring provider defined for this encounter.    /77 (BP Location: Right arm, Patient Position: Left side)   Pulse 76   Ht 1.626 m (5' 4\")   Wt 66.7 kg (147 lb)   BMI 25.23 kg/m      Do you need any medication refills at today's visit? No  JESSI Waldrop., CMA ([facility])      "

## 2022-07-04 NOTE — TELEPHONE ENCOUNTER
Notified patient via Seen that prescription was faxed 7/30/19.      Adán Deshpande RN, BSN, PHN    
[de-identified] : Patient return to the office for routine four week follow-up. He continues to have back pain and thoracic pain with symptoms into bilateral legs that is the same since last visit. He walks with a cane at home but does utilize a wheeled walker in the community when he feels tired. He continues to take Neurontin, meloxicam, oxycodone. Patient has small area of swelling at the elbow. No injury/trauma.

## 2022-08-05 ENCOUNTER — MYC MEDICAL ADVICE (OUTPATIENT)
Dept: FAMILY MEDICINE | Facility: CLINIC | Age: 62
End: 2022-08-05

## 2022-08-05 DIAGNOSIS — M53.3 SI (SACROILIAC) JOINT DYSFUNCTION: Primary | ICD-10-CM

## 2022-08-08 ENCOUNTER — TELEPHONE (OUTPATIENT)
Dept: PALLIATIVE MEDICINE | Facility: CLINIC | Age: 62
End: 2022-08-08

## 2022-08-08 NOTE — TELEPHONE ENCOUNTER
My clinical question is:         Reason for Referral:   Procedure Order            Procedure:   Injection to be Determined by Pain Management Specialist            Scheduling Instructions:   [facility] will call you to coordinate care as prescribed your provider. If you don t hear from a representative within 2 business days, please call [phone].            Additional Information:         Routing for Review. Last injection done 5/5 w/Emanuel Bilateral sacroiliac joint injections      Maria Elena Loving    [facility]

## 2022-08-09 NOTE — TELEPHONE ENCOUNTER
RADHAM to schedule Repeat SI      Maria Elena Lvoing    [facility] [place]
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
## 2022-08-17 ENCOUNTER — MYC MEDICAL ADVICE (OUTPATIENT)
Dept: FAMILY MEDICINE | Facility: CLINIC | Age: 62
End: 2022-08-17

## 2022-08-17 NOTE — TELEPHONE ENCOUNTER
See [employer] message below.     Routing to provider to review and advise.    Naa Scott RN  [facility]

## 2022-08-18 ENCOUNTER — MYC MEDICAL ADVICE (OUTPATIENT)
Dept: FAMILY MEDICINE | Facility: CLINIC | Age: 62
End: 2022-08-18

## 2022-08-18 NOTE — TELEPHONE ENCOUNTER
See [employer] message, patient noting he doesn't have a rash (so unable to send a photo) as per [employer] messages yesterday.      Patient looking for advice on whether he can get injections, but do you advise a visit for further evaluation of his symptoms?     Lizet Vazquez RN  [facility]

## 2022-09-06 ENCOUNTER — RADIOLOGY INJECTION OFFICE VISIT (OUTPATIENT)
Dept: PALLIATIVE MEDICINE | Facility: CLINIC | Age: 62
End: 2022-09-06
Attending: FAMILY MEDICINE
Payer: COMMERCIAL

## 2022-09-06 VITALS
SYSTOLIC BLOOD PRESSURE: 124 MMHG | OXYGEN SATURATION: 100 % | RESPIRATION RATE: 16 BRPM | DIASTOLIC BLOOD PRESSURE: 71 MMHG | HEART RATE: 51 BPM

## 2022-09-06 DIAGNOSIS — M53.3 SI (SACROILIAC) JOINT DYSFUNCTION: ICD-10-CM

## 2022-09-06 PROCEDURE — 27096 INJECT SACROILIAC JOINT: CPT | Mod: 50 | Performed by: PAIN MEDICINE

## 2022-09-06 RX ORDER — TRIAMCINOLONE ACETONIDE 40 MG/ML
40 INJECTION, SUSPENSION INTRA-ARTICULAR; INTRAMUSCULAR ONCE
Status: COMPLETED | OUTPATIENT
Start: 2022-09-06 | End: 2022-09-06

## 2022-09-06 RX ADMIN — TRIAMCINOLONE ACETONIDE 40 MG: 40 INJECTION, SUSPENSION INTRA-ARTICULAR; INTRAMUSCULAR at 09:34

## 2022-09-06 ASSESSMENT — PAIN SCALES - GENERAL: PAINLEVEL: SEVERE PAIN (7)

## 2022-09-06 NOTE — NURSING NOTE
Pre-procedure Intake  If YES to any questions or NO to having a   Please complete laminated checklist and leave on the computer keyboard for Provider, verbally inform provider if able.    For SCS Trial, RFA's or any sedation procedure:  Have you been fasting? NA    If yes, for how long?     Are you taking any any blood thinners such as Coumadin, Warfarin, Jantoven, Pradaxa Xarelto, Eliquis, Edoxaban, Enoxaparin, Lovenox, Heparin, Arixtra, Fondaparinux, or Fragmin? OR Antiplatelet medication such as Plavix, Brilinta, or Effient?   No     If yes, when did you take your last dose?     Do you take aspirin?  No    If cervical procedure, have you held aspirin for 6 days?    Do you have any allergies to contrast dye, iodine, steroid and/or numbing medications?  NO    Are you currently taking antibiotics or have an active infection?  NO    Have you had a fever/elevated temperature within the past week? NO    Are you currently taking oral steroids? NO    Do you have a ? Yes    Are you pregnant or breastfeeding?  Not Applicable    Have you received the COVID-19 vaccine? Yes    If yes, was it your 1st, 2nd or only dose needed?     Date of most recent vaccine: 5/27/22    Notify provider and RNs if systolic BP >170, diastolic BP >100, P >100 or O2 sats < 90%

## 2022-09-06 NOTE — PATIENT INSTRUCTIONS
[facility]   Procedure Discharge Instructions    Today you saw:  Dr. Barry Castellanos    You had an:  sacroiliac joint injection       Medications used:  Lidocaine   Bupivacaine   Omnipaque  Kenalog            If you were holding your blood thinning medication, please restart taking it: N/A  Be cautious when walking. Numbness and/or weakness in the lower extremities may occur for up to 6-8 hours after the procedure due to effect of the local anesthetic  Do not drive for 6 hours. The effect of the local anesthetic could slow your reflexes.   You may resume your regular activities after 24 hours  Avoid strenuous activity for the first 24 hours  You may shower, however avoid swimming, tub baths or hot tubs for 24 hours following your procedure  You may have a mild to moderate increase in pain for several days following the injection.  It may take up to 14 days for the steroid medication to start working although you may feel the effect as early as a few days after the procedure.     You may use ice packs for 10-15 minutes, 3 to 4 times a day at the injection site for comfort  Do not use heat to painful areas for 6 to 8 hours. This will give the local anesthetic time to wear off and prevent you from accidentally burning your skin.   Unless you have been directed to avoid the use of anti-inflammatory medications (NSAIDS), you may use medications such as ibuprofen, Aleve or Tylenol for pain control if needed.   Possible side effects of steroids that you may experience include flushing, elevated blood pressure, increased appetite, mild headaches and restlessness.  All of these symptoms will get better with time.  If you experience any of the following, call the Pain Clinic during work hours (Mon-Friday 8-4:30 pm) at [phone] or the Provider Line after hours at [phone]:  -Fever over 100 degree F  -Swelling, bleeding, redness, drainage, warmth at the injection site  -Progressive  weakness or numbness in your legs  -Unusual new onset of pain that is not improving

## 2022-09-06 NOTE — PROGRESS NOTES
9/6/22  Pre procedure Diagnosis: SI joint dysfunction    Post procedure Diagnosis: Same  Procedure performed: bilateral SI joint injection  Anesthesia: none  Complications: none  Operators: Barry Castellanos MD     Indications:   Jeanmarie Mclean is a 62 year old male. The patient has a history of bilateral upper buttocks pain. Other conservative treatments prior to injection include meds/pt/injections with benefit .    Options/alternatives, benefits and risks were discussed with the patient including bleeding, infection, tissue trauma, exposure to radiation, reaction to medications including seizure, nerve injury, weakness, and numbness.  Questions were answered to his satisfaction and he agrees to proceed. Voluntary informed consent was obtained and signed.     Vitals were reviewed: Yes  Allergies were reviewed:  Yes   Medications were reviewed:  Yes   Pre-procedure pain score: 7/10    Procedure:  After obtaining signed informed consent, the patient was brought into the procedure suite and was placed in a prone position on the procedure table.   A Pause for the Cause was performed.  The patient was prepped and draped in the usual sterile fashion.     After identifying the bilateral SI joint, the C-arm was rotated obliquely to obtain the best view of the inferior angle of the joint.  Then 5 ml of Lidocaine 1%  was used to anesthetize the skin at a skin entry site coaxial with the fluoroscopy beam at this location.  A 22 gauge 3.5 inch needle was advanced under intermittent fluoroscopy until it was felt to enter the SI joint.  Aspiration was negative.    A total of 1ml of Omnipaque-300 was injected, confirming appropriate position, with spread into the intraarticular space, with no intravascular uptake noted.  9ml of Omnipaque was wasted. Location was verified in lateral view.    2 ml of 0.5% bupivacaine with 40mg of Kenalog was injected.  The needle was removed.     Hemostasis was achieved, the area was cleaned, and  bandaids were placed when appropriate.  The patient tolerated the procedure well, and was taken to the recovery room.  Images were saved to PACS.    Post-procedure pain score: 3/10  Follow-up includes:   -f/u phone call in one week  -f/u with referring Physician       Barry Castellanos MD  [facility] [place]

## 2022-09-24 ENCOUNTER — MYC MEDICAL ADVICE (OUTPATIENT)
Dept: FAMILY MEDICINE | Facility: CLINIC | Age: 62
End: 2022-09-24

## 2022-09-26 ENCOUNTER — TELEPHONE (OUTPATIENT)
Dept: FAMILY MEDICINE | Facility: CLINIC | Age: 62
End: 2022-09-26

## 2022-09-26 NOTE — TELEPHONE ENCOUNTER
Reason for Call:  Other appointment    Detailed comments: physical- would like to get squeezed in on 11.29 with Dr Iglesias.    Son has a physical apt as well that day at 10 am and would like to be seen for his physical as well that day. Patient stated that Dr Iglesias typically sees them back to back.    Phone Number Patient can be reached at: Home number on file [phone] (home)    Best Time: anytime    Can we leave a detailed message on this number? YES    Call taken on 9/26/2022 at 7:39 AM by Maureen Delgado

## 2022-09-27 NOTE — TELEPHONE ENCOUNTER
Called an spoke to the patient and scheduled for 11/29/2022.  Sheila Kam MA  [facility]  2nd Floor  Primary Care

## 2022-10-10 ENCOUNTER — MYC MEDICAL ADVICE (OUTPATIENT)
Dept: FAMILY MEDICINE | Facility: CLINIC | Age: 62
End: 2022-10-10

## 2022-10-11 ENCOUNTER — MYC MEDICAL ADVICE (OUTPATIENT)
Dept: FAMILY MEDICINE | Facility: CLINIC | Age: 62
End: 2022-10-11

## 2022-11-19 ENCOUNTER — HEALTH MAINTENANCE LETTER (OUTPATIENT)
Age: 62
End: 2022-11-19

## 2022-11-25 ENCOUNTER — MYC MEDICAL ADVICE (OUTPATIENT)
Dept: FAMILY MEDICINE | Facility: CLINIC | Age: 62
End: 2022-11-25

## 2022-11-25 NOTE — TELEPHONE ENCOUNTER
Routing to provider to review and advise. Patient is scheduled for annual wellness on 11/29/22 at 1000. Does patient need to fast before appointment for any labs?     Writer was unable to find any standing labs prior to appointment.     Jackie Rodriguez RN, BSN  [facility]

## 2022-11-29 ENCOUNTER — OFFICE VISIT (OUTPATIENT)
Dept: FAMILY MEDICINE | Facility: CLINIC | Age: 62
End: 2022-11-29
Payer: COMMERCIAL

## 2022-11-29 VITALS
TEMPERATURE: 98.2 F | BODY MASS INDEX: 26.63 KG/M2 | DIASTOLIC BLOOD PRESSURE: 77 MMHG | SYSTOLIC BLOOD PRESSURE: 115 MMHG | WEIGHT: 156 LBS | HEART RATE: 55 BPM | RESPIRATION RATE: 20 BRPM | HEIGHT: 64 IN | OXYGEN SATURATION: 99 %

## 2022-11-29 DIAGNOSIS — Z13.1 SCREENING FOR DIABETES MELLITUS: ICD-10-CM

## 2022-11-29 DIAGNOSIS — Z13.6 CARDIOVASCULAR SCREENING; LDL GOAL LESS THAN 130: ICD-10-CM

## 2022-11-29 DIAGNOSIS — Z00.00 ROUTINE GENERAL MEDICAL EXAMINATION AT A HEALTH CARE FACILITY: Primary | ICD-10-CM

## 2022-11-29 DIAGNOSIS — C61 MALIGNANT NEOPLASM OF PROSTATE (H): ICD-10-CM

## 2022-11-29 DIAGNOSIS — F10.21 ALCOHOL DEPENDENCE IN REMISSION (H): ICD-10-CM

## 2022-11-29 LAB
ALBUMIN SERPL-MCNC: 4.1 G/DL (ref 3.4–5)
ALP SERPL-CCNC: 91 U/L (ref 40–150)
ALT SERPL W P-5'-P-CCNC: 38 U/L (ref 0–70)
ANION GAP SERPL CALCULATED.3IONS-SCNC: 2 MMOL/L (ref 3–14)
AST SERPL W P-5'-P-CCNC: 28 U/L (ref 0–45)
BILIRUB SERPL-MCNC: 0.7 MG/DL (ref 0.2–1.3)
BUN SERPL-MCNC: 13 MG/DL (ref 7–30)
CALCIUM SERPL-MCNC: 9 MG/DL (ref 8.5–10.1)
CHLORIDE BLD-SCNC: 105 MMOL/L (ref 94–109)
CHOLEST SERPL-MCNC: 206 MG/DL
CO2 SERPL-SCNC: 30 MMOL/L (ref 20–32)
CREAT SERPL-MCNC: 0.85 MG/DL (ref 0.66–1.25)
FASTING STATUS PATIENT QL REPORTED: YES
GFR SERPL CREATININE-BSD FRML MDRD: >90 ML/MIN/1.73M2
GLUCOSE BLD-MCNC: 101 MG/DL (ref 70–99)
HDLC SERPL-MCNC: 72 MG/DL
LDLC SERPL CALC-MCNC: 121 MG/DL
NONHDLC SERPL-MCNC: 134 MG/DL
POTASSIUM BLD-SCNC: 4.5 MMOL/L (ref 3.4–5.3)
PROT SERPL-MCNC: 7.9 G/DL (ref 6.8–8.8)
PSA SERPL-MCNC: 0.03 UG/L (ref 0–4)
SODIUM SERPL-SCNC: 137 MMOL/L (ref 133–144)
TRIGL SERPL-MCNC: 63 MG/DL

## 2022-11-29 PROCEDURE — 84153 ASSAY OF PSA TOTAL: CPT | Performed by: FAMILY MEDICINE

## 2022-11-29 PROCEDURE — 36415 COLL VENOUS BLD VENIPUNCTURE: CPT | Performed by: FAMILY MEDICINE

## 2022-11-29 PROCEDURE — 99396 PREV VISIT EST AGE 40-64: CPT | Performed by: FAMILY MEDICINE

## 2022-11-29 PROCEDURE — 80053 COMPREHEN METABOLIC PANEL: CPT | Performed by: FAMILY MEDICINE

## 2022-11-29 PROCEDURE — 80061 LIPID PANEL: CPT | Performed by: FAMILY MEDICINE

## 2022-11-29 ASSESSMENT — PATIENT HEALTH QUESTIONNAIRE - PHQ9: SUM OF ALL RESPONSES TO PHQ QUESTIONS 1-9: 8

## 2022-11-29 ASSESSMENT — PAIN SCALES - GENERAL: PAINLEVEL: NO PAIN (0)

## 2022-11-29 NOTE — PATIENT INSTRUCTIONS
At [facility], we strive to deliver an exceptional experience to you, every time we see you. If you receive a survey, please complete it as we do value your feedback.  If you have MyChart, you can expect to receive results automatically within 24 hours of their completion.  Your provider will send a note interpreting your results as well.   If you do not have MyChart, you should receive your results in about a week by mail.    Your care team:                            Family Medicine Internal Medicine   MD Aaron Schulte MD Shantel Branch-Fleming, MD Srinivasa Vaka, MD Katya Belousova, PARAJINDER Solis CNP, MD ([facility]   Eliseo Riggs, MD Georgia Macedo MD Amelia Massimini APRN ZENIA Coe APRN MD Mandi Ray MD          Clinic hours: Monday - Thursday 7 am-6 pm; Fridays 7 am-5 pm.   Urgent care: Monday - Friday 10 am- 8 pm; Saturday and Sunday 9 am-5 pm.    Clinic: [phone]       [facility]: Monday - Thursday 8 am - 7 pm; Friday 8 am - 6 pm  [facility]: [phone]

## 2022-11-29 NOTE — PROGRESS NOTES
SUBJECTIVE:   CC: Jeanmarie is an 62 year old who presents for preventative health visit.   Patient has been advised of split billing requirements and indicates understanding: Yes  HPI      Today's PHQ-2 Score:   PHQ-2 (  [employer]) 3/3/2022   Q1: Little interest or pleasure in doing things -   Q2: Feeling down, depressed or hopeless -   PHQ-2 Score -   PHQ-2 Total Score (12-17 Years)- Positive if 3 or more points; Administer PHQ-A if positive -   Q1: Little interest or pleasure in doing things -   Q2: Feeling down, depressed or hopeless -   PHQ-2 Score Incomplete           Social History     Tobacco Use     Smoking status: Former     Packs/day: 0.50     Years: 10.00     Pack years: 5.00     Types: Cigarettes     Quit date: 1997     Years since quittin.9     Smokeless tobacco: Never   Substance Use Topics     Alcohol use: Yes     Comment: hx alcoholism quit March 10, 2022. [facility] dependency treatment in      If you drink alcohol do you typically have >3 drinks per day or >7 drinks per week? No    Alcohol Use 2021   Prescreen: >3 drinks/day or >7 drinks/week? Not Applicable   Prescreen: >3 drinks/day or >7 drinks/week? -   No flowsheet data found.    Last PSA:   PSA   Date Value Ref Range Status   2020 0.02 0 - 4 ug/L Final     Comment:     Assay Method:  Chemiluminescence using Siemens Vista analyzer     Prostate Specific Antigen Screen   Date Value Ref Range Status   10/01/2021 0.03 0.00 - 4.00 ug/L Final       Reviewed orders with patient. Reviewed health maintenance and updated orders accordingly - Yes  Lab work is in process  Labs reviewed in EPIC  BP Readings from Last 3 Encounters:   22 115/77   22 124/71   22 128/77    Wt Readings from Last 3 Encounters:   22 70.8 kg (156 lb)   22 66.7 kg (147 lb)   22 74.4 kg (164 lb)                  Patient Active Problem List   Diagnosis     Malignant neoplasm of prostate (H)     CARDIOVASCULAR SCREENING; LDL GOAL  LESS THAN 130     GERD (gastroesophageal reflux disease)     Overweight (BMI 25.0-29.9)     Fibromyalgia syndrome     Posttraumatic stress disorder     Low back pain     Inadequate material resources     Anxiety state     History of Asperger's syndrome     Pervasive developmental disorder, active     Depressive disorder     Delusional disorder (H)     TBI (traumatic brain injury)     Insomnia     Chronic pain     Myalgia     male stress incontinence     Major depressive disorder, recurrent episode, moderate (H)     Low back pain without sciatica, unspecified back pain laterality, unspecified chronicity     Alcohol dependence in remission (H)     Hypertensive response to exercise     Past Surgical History:   Procedure Laterality Date     COLONOSCOPY       COLONOSCOPY N/A 2021    Procedure: Colonoscopy, Flexible, With Lesion Removal Using Snare;  Surgeon: Garrick Villavicencio MD;  Location: [facility] OR     COLONOSCOPY WITH CO2 INSUFFLATION N/A 2021    Procedure: COLONOSCOPY, WITH CO2 INSUFFLATION;  Surgeon: Garrick Villavicencio MD;  Location: [facility] OR     CYSTOSCOPY  10/98    for renal stones     [facility] KNEE SCOPE,MED/LAT MENISECTOMY  11    Left, medial ([place])     HERNIA REPAIR, INGUINAL RT/LT      [facility])     Z HEP B VAC ADULT 3 DOSE IM      received all 3 vaccines     [facility] REMV PROSTATE,RETROPUB,RADICAL  10/13/04    Dr. Muñoz ([place])       Social History     Tobacco Use     Smoking status: Former     Packs/day: 0.50     Years: 10.00     Pack years: 5.00     Types: Cigarettes     Quit date: 1997     Years since quittin.9     Smokeless tobacco: Never   Substance Use Topics     Alcohol use: Yes     Comment: hx alcoholism quit March 10, 2022. [facility] dependency treatment in      Family History   Problem Relation Age of Onset     Psychotic Disorder Son         autism     Prostate Cancer Father         40s     Cancer Father      Cancer Maternal Grandmother         lung      Glaucoma Maternal Grandmother      Eye Disorder Maternal Grandmother         glaucoma     Diabetes Mother          45     Blood Disease Sister         sickle trait     Hypertension Sister      Blood Disease Paternal Uncle         sickle     Blood Disease Paternal Aunt         sickle     Alzheimer Disease Paternal Grandfather         70s     Macular Degeneration Paternal Grandfather      Cerebrovascular Disease No family hx of      Thyroid Disease No family hx of      Myocardial Infarction No family hx of      C.A.D. No family hx of          Current Outpatient Medications   Medication Sig Dispense Refill     acetaminophen (TYLENOL) 500 MG tablet Take 500-1,000 mg by mouth every 6 hours as needed for mild pain 1000 mg per day       amLODIPine (NORVASC) 2.5 MG tablet Take 1 tablet (2.5 mg) by mouth daily 90 tablet 3     medical cannabis (Patient's own supply) See Admin Instructions (The purpose of this order is to document that the patient reports taking medical cannabis.  This is not a prescription, and is not used to certify that the patient has a qualifying medical condition.)       Blood Pressure KIT Monitor blood pressure as needed. 1 kit 1     Camphor-Menthol-Methyl Sal (SALONPAS) 3.1-6-10 % PTCH        Cholecalciferol (VITAMIN D) 1000 UNIT capsule Take 1 capsule by mouth 2 times daily.       Nerve Stimulator (TENS THERAPY PAIN RELIEF) GEORGIE        omeprazole (PRILOSEC) 20 MG DR capsule Take 1 capsule (20 mg) by mouth 2 times daily 180 capsule 1     oxyCODONE (ROXICODONE) 5 MG tablet Take 5 mg by mouth every 6 hours as needed for severe pain       polyethylene glycol-propylene glycol (SYSTANE ULTRA) 0.4-0.3 % SOLN ophthalmic solution Place 1 drop into both eyes 4 times daily 6 mL 12     sildenafil (VIAGRA) 100 MG tablet Take 1/4 - 1 tablet, as directed, 1-3 hours before intimacy. Maximum 1 dose per 24 hours. 10 tablet 5     valACYclovir (VALTREX) 500 MG tablet Take 500 mg by mouth daily as needed    "    Allergies   Allergen Reactions     Risperidone Other (See Comments)     Serve numbness      Trimethoprim Hives     Effexor [Venlafaxine Hydrochloride] Other (See Comments)     Headache, Painful scrotum and drainage from the penis     Ambien Other (See Comments)     headaches     Ambien [Zolpidem Tartrate] Nausea     Buspirone Nausea     Dizziness, headache     Celexa [Citalopram] Other (See Comments)     Headache     Duloxetine Other (See Comments)     Headache  headaches     Septra [Bactrim] Itching     Seroquel [Quetiapine] Other (See Comments)     Headache, N, V     Sulfa Drugs Itching     Sulfamethoxazole-Trimethoprim      hives     Tramadol Nausea     Nausea and headache     Ultram [Tramadol Hcl] Nausea and Vomiting     Venlafaxine      Zolpidem      headaches       Reviewed and updated as needed this visit by clinical staff   Tobacco  Allergies  Meds              Reviewed and updated as needed this visit by Provider                     Review of Systems  CONSTITUTIONAL: NEGATIVE for fever, chills, change in weight  INTEGUMENTARY/SKIN: NEGATIVE for worrisome rashes, moles or lesions  EYES: NEGATIVE for vision changes or irritation  ENT: NEGATIVE for ear, mouth and throat problems  RESP: NEGATIVE for significant cough or SOB  CV: NEGATIVE for chest pain, palpitations or peripheral edema  GI: NEGATIVE for nausea, abdominal pain, heartburn, or change in bowel habits   male: negative for dysuria, hematuria, decreased urinary stream, erectile dysfunction, urethral discharge  MUSCULOSKELETAL: NEGATIVE for significant arthralgias or myalgia  NEURO: NEGATIVE for weakness, dizziness or paresthesias  PSYCHIATRIC: NEGATIVE for changes in mood or affect    OBJECTIVE:   /77 (BP Location: Left arm, Patient Position: Sitting, Cuff Size: Adult Regular)   Pulse 55   Temp 98.2  F (36.8  C) (Temporal)   Resp 20   Ht 1.626 m (5' 4\")   Wt 70.8 kg (156 lb)   SpO2 99%   BMI 26.78 kg/m      Physical " "Exam  GENERAL: healthy, alert and no distress  NECK: no adenopathy, no asymmetry, masses, or scars and thyroid normal to palpation  RESP: lungs clear to auscultation - no rales, rhonchi or wheezes  CV: regular rate and rhythm, normal S1 S2, no S3 or S4, no murmur, click or rub, no peripheral edema and peripheral pulses strong  ABDOMEN: soft, nontender, no hepatosplenomegaly, no masses and bowel sounds normal  MS: no gross musculoskeletal defects noted, no edema    Diagnostic Test Results:  Labs reviewed in Epic    ASSESSMENT/PLAN:   (Z00.00) Routine general medical examination at a health care facility  (primary encounter diagnosis)  Comment:   Plan: as below.    (Z13.6) CARDIOVASCULAR SCREENING; LDL GOAL LESS THAN 130  Comment:   Plan: Comprehensive metabolic panel (BMP + Alb, Alk         Phos, ALT, AST, Total. Bili, TP), Lipid panel         reflex to direct LDL Fasting            (Z13.1) Screening for diabetes mellitus  Comment:   Plan: Comprehensive metabolic panel (BMP + Alb, Alk         Phos, ALT, AST, Total. Bili, TP)            (C61) Malignant neoplasm of prostate (H)  Comment:   Plan: PSA, tumor marker        H/o prostatectomy    (F10.21) Alcohol dependence in remission (H)  Comment:   Plan: patient stated he sometimes drinks when feeling depressed but not often.        COUNSELING:   Reviewed preventive health counseling, as reflected in patient instructions       Regular exercise       Healthy diet/nutrition       Vision screening      BMI:   Estimated body mass index is 26.78 kg/m  as calculated from the following:    Height as of this encounter: 1.626 m (5' 4\").    Weight as of this encounter: 70.8 kg (156 lb).         He reports that he quit smoking about 24 years ago. His smoking use included cigarettes. He has a 5.00 pack-year smoking history. He has never used smokeless tobacco.        Milton Iglesias MD, MD  [facility]  "

## 2022-12-09 ENCOUNTER — TELEPHONE (OUTPATIENT)
Dept: FAMILY MEDICINE | Facility: CLINIC | Age: 62
End: 2022-12-09

## 2022-12-13 NOTE — TELEPHONE ENCOUNTER
Form faxed back to the [facility] [phone]. Copy placed in abstract and original placed in tc bin.

## 2022-12-16 ENCOUNTER — OFFICE VISIT (OUTPATIENT)
Dept: OPTOMETRY | Facility: CLINIC | Age: 62
End: 2022-12-16
Payer: COMMERCIAL

## 2022-12-16 DIAGNOSIS — H04.123 DRY EYE SYNDROME OF BOTH EYES: ICD-10-CM

## 2022-12-16 DIAGNOSIS — H52.223 REGULAR ASTIGMATISM OF BOTH EYES: ICD-10-CM

## 2022-12-16 DIAGNOSIS — H52.4 PRESBYOPIA: ICD-10-CM

## 2022-12-16 DIAGNOSIS — H52.13 MYOPIA OF BOTH EYES: ICD-10-CM

## 2022-12-16 DIAGNOSIS — Z01.00 EXAMINATION OF EYES AND VISION: Primary | ICD-10-CM

## 2022-12-16 PROCEDURE — 92014 COMPRE OPH EXAM EST PT 1/>: CPT | Performed by: OPTOMETRIST

## 2022-12-16 PROCEDURE — 92015 DETERMINE REFRACTIVE STATE: CPT | Performed by: OPTOMETRIST

## 2022-12-16 RX ORDER — POLYETHYLENE GLYCOL 400 AND PROPYLENE GLYCOL 4; 3 MG/ML; MG/ML
1 SOLUTION/ DROPS OPHTHALMIC 4 TIMES DAILY
Qty: 6 ML | Refills: 12 | Status: SHIPPED | OUTPATIENT
Start: 2022-12-16 | End: 2023-06-08

## 2022-12-16 ASSESSMENT — CONF VISUAL FIELD
OD_INFERIOR_TEMPORAL_RESTRICTION: 0
OD_NORMAL: 1
METHOD: COUNTING FINGERS
OS_INFERIOR_NASAL_RESTRICTION: 0
OD_SUPERIOR_TEMPORAL_RESTRICTION: 0
OD_SUPERIOR_NASAL_RESTRICTION: 0
OD_INFERIOR_NASAL_RESTRICTION: 0
OS_NORMAL: 1
OS_INFERIOR_TEMPORAL_RESTRICTION: 0
OS_SUPERIOR_NASAL_RESTRICTION: 0
OS_SUPERIOR_TEMPORAL_RESTRICTION: 0

## 2022-12-16 ASSESSMENT — VISUAL ACUITY
OD_SC: 20/20-1
OD_CC: 20/25
OS_SC: 20/70
OD_SC: 20/70
METHOD: SNELLEN - LINEAR
CORRECTION_TYPE: GLASSES
OS_SC: 20/20
OS_CC: 20/20

## 2022-12-16 ASSESSMENT — REFRACTION_WEARINGRX
OS_SPHERE: -1.50
OD_ADD: +2.00
OD_CYLINDER: +0.50
OS_ADD: +2.00
OS_CYLINDER: +0.25
OD_AXIS: 030
OS_AXIS: 163
OD_SPHERE: -1.75

## 2022-12-16 ASSESSMENT — TONOMETRY
IOP_METHOD: TONOPEN
OD_IOP_MMHG: 13
OS_IOP_MMHG: 15

## 2022-12-16 ASSESSMENT — REFRACTION_MANIFEST
OD_CYLINDER: +0.50
OD_SPHERE: -1.75
OS_AXIS: 163
OS_SPHERE: -1.50
OS_CYLINDER: +0.25
OD_AXIS: 030

## 2022-12-16 ASSESSMENT — KERATOMETRY
OD_AXISANGLE_DEGREES: 073
OS_AXISANGLE2_DEGREES: 020
OS_K2POWER_DIOPTERS: 44.50
OS_AXISANGLE_DEGREES: 110
OD_AXISANGLE2_DEGREES: 163
OS_K1POWER_DIOPTERS: 44.25
OD_K1POWER_DIOPTERS: 44.00
OD_K2POWER_DIOPTERS: 44.25

## 2022-12-16 ASSESSMENT — CUP TO DISC RATIO
OD_RATIO: 0.4
OS_RATIO: 0.4

## 2022-12-16 ASSESSMENT — EXTERNAL EXAM - LEFT EYE: OS_EXAM: NORMAL

## 2022-12-16 ASSESSMENT — SLIT LAMP EXAM - LIDS
COMMENTS: NORMAL
COMMENTS: NORMAL

## 2022-12-16 ASSESSMENT — EXTERNAL EXAM - RIGHT EYE: OD_EXAM: NORMAL

## 2022-12-16 NOTE — LETTER
12/16/2022         RE: Jeanmarie Mclean  [address]  [place] [address]        Dear Colleague,    Thank you for referring your patient, Jeanmarie Mclean, to the [facility]. Please see a copy of my visit note below.    Chief Complaint   Patient presents with     Annual Eye Exam      Accompanied by son  Last Eye Exam: 12/16/2021  Dilated Previously: Yes, side effects of dilation explained today    What are you currently using to see?  Glasses for driving       Distance Vision Acuity: Satisfied with vision    Near Vision Acuity: Satisfied with vision while reading  unaided    Eye Comfort: dry  Do you use eye drops? : Yes: systane   Occupation or Hobbies:     [employer], [employer]       Medical, surgical and family histories reviewed and updated 12/16/2022.       OBJECTIVE: See Ophthalmology exam    ASSESSMENT:    ICD-10-CM    1. Examination of eyes and vision  Z01.00 EYE EXAM (SIMPLE-NONBILLABLE)      2. Myopia of both eyes  H52.13 REFRACTIVE STATUS      3. Regular astigmatism of both eyes  H52.223 REFRACTIVE STATUS      4. Presbyopia  H52.4 REFRACTIVE STATUS      5. Dry eye syndrome of both eyes  H04.123 EYE EXAM (SIMPLE-NONBILLABLE)     polyethylene glycol-propylene glycol (SYSTANE ULTRA) 0.4-0.3 % SOLN ophthalmic solution          PLAN:     Patient Instructions   Eyeglass prescription given.    Systane Ultra 1 drop both eyes 2-4 x day.    Return in 1 year for a complete eye exam or sooner if needed.    Willis Jones, KATHY                 Again, thank you for allowing me to participate in the care of your patient.        Sincerely,        Willis Jones, OD

## 2022-12-16 NOTE — PROGRESS NOTES
Chief Complaint   Patient presents with     Annual Eye Exam      Accompanied by son  Last Eye Exam: 12/16/2021  Dilated Previously: Yes, side effects of dilation explained today    What are you currently using to see?  Glasses for driving       Distance Vision Acuity: Satisfied with vision    Near Vision Acuity: Satisfied with vision while reading  unaided    Eye Comfort: dry  Do you use eye drops? : Yes: systane   Occupation or Hobbies:     [employer]incess [employer], [employer]       Medical, surgical and family histories reviewed and updated 12/16/2022.       OBJECTIVE: See Ophthalmology exam    ASSESSMENT:    ICD-10-CM    1. Examination of eyes and vision  Z01.00 EYE EXAM (SIMPLE-NONBILLABLE)      2. Myopia of both eyes  H52.13 REFRACTIVE STATUS      3. Regular astigmatism of both eyes  H52.223 REFRACTIVE STATUS      4. Presbyopia  H52.4 REFRACTIVE STATUS      5. Dry eye syndrome of both eyes  H04.123 EYE EXAM (SIMPLE-NONBILLABLE)     polyethylene glycol-propylene glycol (SYSTANE ULTRA) 0.4-0.3 % SOLN ophthalmic solution          PLAN:     Patient Instructions   Eyeglass prescription given.    Systane Ultra 1 drop both eyes 2-4 x day.    Return in 1 year for a complete eye exam or sooner if needed.    Willis Jones, OD

## 2022-12-16 NOTE — PATIENT INSTRUCTIONS
Eyeglass prescription given.    Systane Ultra 1 drop both eyes 2-4 x day.    +1.00 Over the counter readers if needed for blurred vision at near.  These glasses will make the vision blurrier in the distance.    Return in 1 year for a complete eye exam or sooner if needed.    Willis Jones, KATHY    The affects of the dilating drops last for 4- 6 hours.  You will be more sensitive to light and vision will be blurry up close.  Do not drive if you do not feel comfortable.  Mydriatic sunglasses were given if needed.      Optometry Providers       Clinic Locations                                 Telephone Number   Dr. Milagro Vargas/Vidal Alfaro [phone]     [facility] Hours:                [facility] Hours:       [facility] Hours:   [address]   [address]     [address]  [place] [address]   [place] [address]    [place] [address]  Phone: [phone]                    Phone: [phone]     Phone: [phone]                      Monday 8:00-6:00                          Monday 8:00-6:00                          Monday 8:00-6:00              Tuesday 8:00-6:00                          Tuesday 8:00-6:00                          Tuesday 8:00-6:00              Wednesday 8:00-6:00                  Wednesday 8:00-6:00                   Wednesday 8:00-6:00      Thursday 8:00-6:00                        Thursday 8:00-6:00                         Thursday 8:00-6:00            Friday 8:00-5:00                              Friday 8:00-5:00                              Friday 8:00-5:00    [facility] Hours:   [address] [place] [address] [phone]    Monday 9:00-6:00  Tuesday 9:00-6:00  Wednesday 9:00-6:00  Thursday 9:00-6:00  Friday 9:00-5:00  Please log on to [email or web address] to order your contact lenses.  The link is found on the [facility]  Care [facility] Vision Services page.  As always, Thank you for trusting us with your health care needs!

## 2022-12-20 ENCOUNTER — TELEPHONE (OUTPATIENT)
Dept: FAMILY MEDICINE | Facility: CLINIC | Age: 62
End: 2022-12-20

## 2022-12-20 NOTE — TELEPHONE ENCOUNTER
General Call      Reason for Call:  Wanting to speak with provider about private matter regarding pt and pt's son's health    What are your questions or concerns:  None    Date of last appointment with provider: 11/29    Could we send this information to you in [email or web address] or would you prefer to receive a phone call?:   Patient would prefer a phone call   Okay to leave a detailed message?: Yes at Cell number on file:    Telephone Information:   Mobile [phone]

## 2022-12-21 NOTE — TELEPHONE ENCOUNTER
Patient calling to say that he needs to speak to Dr Iglesias regarding medical history on Himself and his son.  Please call patient back at [phone], Dr Iglesias can leave a voicemail message.  Sheila Kam [facility]  2nd Floor  Primary Care

## 2022-12-22 NOTE — TELEPHONE ENCOUNTER
Patient stated he is no longer taking medical marijuana and would like this discontinued. This was discontinued.    Milton Iglesias MD

## 2022-12-30 ENCOUNTER — APPOINTMENT (OUTPATIENT)
Dept: OPTOMETRY | Facility: CLINIC | Age: 62
End: 2022-12-30
Payer: COMMERCIAL

## 2022-12-30 PROCEDURE — 92340 FIT SPECTACLES MONOFOCAL: CPT | Performed by: OPTOMETRIST

## 2023-01-09 ENCOUNTER — MYC MEDICAL ADVICE (OUTPATIENT)
Dept: FAMILY MEDICINE | Facility: CLINIC | Age: 63
End: 2023-01-09

## 2023-01-09 DIAGNOSIS — M53.3 SI (SACROILIAC) JOINT DYSFUNCTION: Primary | ICD-10-CM

## 2023-01-11 ENCOUNTER — TELEPHONE (OUTPATIENT)
Dept: PALLIATIVE MEDICINE | Facility: CLINIC | Age: 63
End: 2023-01-11

## 2023-01-11 NOTE — TELEPHONE ENCOUNTER
My clinical question is:  Repeat SI (sacroiliac) joint dysfunction if apropriate                Reason for Referral:  Procedure Order                            Procedure:  Injection to be Determined by Pain Management Specialist          Ordering Provider:     Milton Iglesias MD     Please review.    Anne Dey      [facility]  Pain [facility]

## 2023-01-12 ENCOUNTER — MYC MEDICAL ADVICE (OUTPATIENT)
Dept: PALLIATIVE MEDICINE | Facility: CLINIC | Age: 63
End: 2023-01-12

## 2023-01-12 NOTE — TELEPHONE ENCOUNTER
Phoned pt to schedule Repeat SI (sacroiliac) joint dysfunction mailbox full, sent my chart message.      Maria Elena Loving    [facility] [place]

## 2023-01-12 NOTE — TELEPHONE ENCOUNTER
Screening Questions for Radiology Injections:    Injection to be done at which interventional clinic site? [facility]    Procedure ordered by Milton Iglesias MD     Procedure ordered? Repeat SI (sacroiliac) joint Injection     Transforaminal Cervical JOSE R - Send to [facility] ([facility]) - No [facility] providers perform this procedure    What insurance would patient like us to bill for this procedure? MEDICA     Worker's comp or MVA (motor vehicle accident) -Any injection DO NOT SCHEDULE and route to Maria Elena Loving.      [facility] insurance - For SI joint injections, DO NOT SCHEDULE and route to Kym Fisher.       ALL BCBS, [employer] and [employer] - DO NOT SCHEDULE and route to Kym Fisher    MEDICA- facet joint injections, route to Kym Fisher    IF SCHEDULING IN [place] PLEASE SCHEDULE AT LEAST 7-10 BUSINESS DAYS OUT SO A PA CAN BE OBTAINED    Is an  needed? No     Patient has a  home? (Review Grid) YES: Informed     Any chance of pregnancy? Not Applicable   If YES, do NOT schedule and route to RN avelino  - Dr. Khan route to Aria Arcos and PM&R Nurse  [[id number]]      Is patient actively being treated for cancer or immunocompromised? No  If YES, do NOT schedule and route to RN pool/ Dr. Khan's Team    Does the patient have a bleeding or clotting disorder? No     If YES, okay to schedule AND route to RN nurse avelino/ Dr. Khan's Team     (For any patients with platelet count <100, RN must forward to provider)    Is patient taking any Blood Thinners OR Antiplatelet medication?  No   If hold needed, do NOT schedule, route to RN pool/ Dr. Khan's Team    Examples:   o Blood Thinners: (Coumadin, Warfarin, Jantoven, Pradaxa, Xarelto, Eliquis, Edoxaban, Enoxaparin, Lovenox, Heparin, Arixtra, Fondaparinux or Fragmin)  o Antiplatelet Medications: (Plavix, Brilinta or Effient)     Is patient taking any aspirin products (includes Excedrin and Fiorinal)? No     If more than  325mg/day, OK to schedule; Instruct Pt to decrease to less than 325 mg for 7 days AND route to RN pool/ Dr. Khan's Team     For CERVICAL procedures, hold all aspirin products for 6 days.     Tell Pt that if aspirin product is not held for 6 days, the procedure WILL BE cancelled.     Any allergies to contrast dye, iodine, shellfish, or numbing and steroid medications? No    If YES, schedule and add allergy information to appointment notes AND route to the RN pool/ Dr. Khan's Team    If JOSE R and Contrast Dye / Iodine Allergy? DO NOT SCHEDULE, route to RN pool/ Dr. Khan's Team    Allergies: Risperidone, Trimethoprim, Effexor [venlafaxine hydrochloride], Ambien, Ambien [zolpidem tartrate], Buspirone, Celexa [citalopram], Duloxetine, Septra [bactrim], Seroquel [quetiapine], Sulfa drugs, Sulfamethoxazole-trimethoprim, Tramadol, Ultram [tramadol hcl], Venlafaxine, and Zolpidem     Does patient have an active infection or treated for one within the past week? No    Is patient currently taking any antibiotics or steroid medications?  No     For patients on chronic, preventative, or prophylactic antibiotics, procedures may be scheduled.     For patients on antibiotics for active or recent infection, schedule 4 days after completed.    For patients on steroid medications, schedule 4 days after completed.     Has the patient had a flu shot or any other vaccinations within the past 7 days? No  If yes, explain that for the vaccine to work best they need to:       wait 1 week before and 1 week after getting any Vaccine    wait 1 week before and 2 weeks after getting Covid Vaccine #2 or BOOSTER    If patient has concerns about the timing, send to RN pool/ Dr. Khan's Team    Does patient have an MRI/CT?  Not Applicable Include Date and Check Procedure Scheduling Grid to see if required.    Was the MRI/CT done within the last 3 years?  NA     If no route to RN Pool/ Dr. Khan's Team    If yes, where was the MRI/CT done?       Refer to PACS Transmissions list for approved external locations and route to RN Pool High Priority/ Dr. Khan's Team    If MRI was not done at approved external location do NOT schedule and route to RN pool/ Dr. Khan's Team      If patient has an imaging disc, the injection MAY be scheduled but patient must bring disc to appt or appt will be cancelled.    Is patient able to transfer to a procedure table with minimal or no assistance? Yes     If no, do NOT schedule and route to RN Pool/ Dr. Khan's Team    Procedure Specific Instructions:    If celiac plexus block, informed patient NPO for 6 hours and that it is okay to take medications with sips of water, especially blood pressure medications Not Applicable         If this is for a cervical procedure, informed patient that aspirin needs to be held for 6 days.   Not Applicable      Sedation, If Sedation is ordered for any procedure, patient must be NPO for 6 hours prior to procedure Not Applicable      If IV needed:    Do not schedule procedures requiring IV placement in the first appointment of the day or first appointment after lunch. Do NOT schedule at 0745, 0815 or 1245.       Instructed patient to arrive 30 minutes early for IV start if required. (Check Procedure Scheduling Grid)  Not Applicable    Reminders:    If you are started on any steroids or antibiotics between now and your appointment, you must contact us because the procedure may need to be cancelled.  Yes      As a reminder, receiving steroids can decrease your body's ability to fight infection.   Would you still like to move forward with scheduling the injection?  Yes      IV Sedation is not provided for procedures. If oral anti-anxiety medication is needed, the patient should request this from their referring provider.      Instruct patient to arrive as directed prior to the scheduled appointment time:  If IV needed 30 minutes before appointment time       For patients 85 or older we  recommend having an adult stay w/ them for the remainder of the day.       If the patient is Diabetic, remind them to bring their glucometer.      Does the patient have any questions?  NO  Anne Dey  [facility]

## 2023-02-14 ENCOUNTER — RADIOLOGY INJECTION OFFICE VISIT (OUTPATIENT)
Dept: PALLIATIVE MEDICINE | Facility: CLINIC | Age: 63
End: 2023-02-14
Attending: FAMILY MEDICINE
Payer: COMMERCIAL

## 2023-02-14 VITALS
SYSTOLIC BLOOD PRESSURE: 112 MMHG | DIASTOLIC BLOOD PRESSURE: 79 MMHG | HEART RATE: 51 BPM | RESPIRATION RATE: 16 BRPM | TEMPERATURE: 58 F | OXYGEN SATURATION: 100 %

## 2023-02-14 DIAGNOSIS — M53.3 SI (SACROILIAC) JOINT DYSFUNCTION: ICD-10-CM

## 2023-02-14 PROCEDURE — 27096 INJECT SACROILIAC JOINT: CPT | Mod: 50 | Performed by: PAIN MEDICINE

## 2023-02-14 RX ORDER — TRIAMCINOLONE ACETONIDE 40 MG/ML
40 INJECTION, SUSPENSION INTRA-ARTICULAR; INTRAMUSCULAR ONCE
Status: COMPLETED | OUTPATIENT
Start: 2023-02-14 | End: 2023-02-14

## 2023-02-14 RX ADMIN — TRIAMCINOLONE ACETONIDE 40 MG: 40 INJECTION, SUSPENSION INTRA-ARTICULAR; INTRAMUSCULAR at 09:29

## 2023-02-14 ASSESSMENT — PAIN SCALES - GENERAL
PAINLEVEL: SEVERE PAIN (7)
PAINLEVEL: MILD PAIN (2)

## 2023-02-14 NOTE — PROGRESS NOTES
2/14/23  Pre procedure Diagnosis: SI joint dysfunction    Post procedure Diagnosis: Same  Procedure performed: bilateral SI joint injection  Anesthesia: none  Complications: none  Operators: Barry Castellanos MD     Indications:   Jeanmarie Mclean is a 63 year old male. The patient has a history of bilateral upper buttocks pain. Other conservative treatments prior to injection include meds/pt/injections with benefit .    Options/alternatives, benefits and risks were discussed with the patient including bleeding, infection, tissue trauma, exposure to radiation, reaction to medications including seizure, nerve injury, weakness, and numbness.  Questions were answered to his satisfaction and he agrees to proceed. Voluntary informed consent was obtained and signed.     Vitals were reviewed: Yes  Allergies were reviewed:  Yes   Medications were reviewed:  Yes   Pre-procedure pain score: 7/10    Procedure:  After obtaining signed informed consent, the patient was brought into the procedure suite and was placed in a prone position on the procedure table.   A Pause for the Cause was performed.  The patient was prepped and draped in the usual sterile fashion.     After identifying the bilateral SI joint, the C-arm was rotated obliquely to obtain the best view of the inferior angle of the joint.  Then 5 ml of Lidocaine 1%  was used to anesthetize the skin at a skin entry site coaxial with the fluoroscopy beam at this location.  A 22 gauge 3.5 inch needle was advanced under intermittent fluoroscopy until it was felt to enter the SI joint.  Aspiration was negative.    A total of 1ml of Omnipaque-300 was injected, confirming appropriate position, with spread into the intraarticular space, with no intravascular uptake noted.  9ml of Omnipaque was wasted. Location was verified in lateral view.    2 ml of 0.5% bupivacaine with 40mg of Kenalog was injected.  The needle was removed.     Hemostasis was achieved, the area was cleaned, and  bandaids were placed when appropriate.  The patient tolerated the procedure well, and was taken to the recovery room.  Images were saved to PACS.    Post-procedure pain score: 2/10  Follow-up includes:     -f/u with referring Physician       Barry Castellanos MD  [facility]

## 2023-02-14 NOTE — NURSING NOTE
Discharge Information    IV Discontiued Time:  NA    Amount of Fluid Infused:  NA    Discharge Criteria = When patient returns to baseline or as per MD order    Consciousness:  Pt is fully awake    Circulation:  BP +/- 20% of pre-procedure level    Respiration:  Patient is able to breathe deeply    O2 Sat:  Patient is able to maintain O2 Sat >92% on room air    Activity:  Moves 4 extremities on command    Ambulation:  Patient is able to stand and walk or stand and pivot into wheelchair    Dressing:  Clean/dry or No Dressing    Notes:   Discharge instructions and AVS given to patient    Patient meets criteria for discharge?  YES    Admitted to PCU?  No    Responsible adult present to accompany patient home?  Yes    Signature/Title:    Enrique Rizo RN  RN Care Coordinator  [facility]

## 2023-02-14 NOTE — NURSING NOTE
Pre-procedure Intake  If YES to any questions or NO to having a   Please complete laminated checklist and leave on the computer keyboard for Provider, verbally inform provider if able.    For SCS Trial, RFA's or any sedation procedure:  Have you been fasting? NA    If yes, for how long? NA    Are you taking any any blood thinners such as Coumadin, Warfarin, Jantoven, Pradaxa Xarelto, Eliquis, Edoxaban, Enoxaparin, Lovenox, Heparin, Arixtra, Fondaparinux, or Fragmin? OR Antiplatelet medication such as Plavix, Brilinta, or Effient?   No     If yes, when did you take your last dose? NA    Do you take aspirin?  No    If cervical procedure, have you held aspirin for 6 days?   NA    Do you have any allergies to contrast dye, iodine, steroid and/or numbing medications?  NO    Are you currently taking antibiotics or have an active infection?  NO    Have you had a fever/elevated temperature within the past week? NO    Are you currently taking oral steroids? NO    Do you have a ? Yes    Are you pregnant or breastfeeding?  Not Applicable    Have you received the COVID-19 vaccine? Yes    If yes, was it your 1st, 2nd or only dose needed? 4    Date of most recent vaccine: 05/27/22    Notify provider and RNs if systolic BP >170, diastolic BP >100, P >100 or O2 sats < 90%      Emma Breaux CMA (AAMA)

## 2023-02-14 NOTE — PATIENT INSTRUCTIONS
[facility]   Procedure Discharge Instructions    Today you saw:     Dr. Barry Castellanos    You had an:  sacroiliac joint injection             If you were holding your blood thinning medication, please restart taking it: N/A  Be cautious when walking. Numbness and/or weakness in the lower extremities may occur for up to 6-8 hours after the procedure due to effect of the local anesthetic  Do not drive for 6 hours. The effect of the local anesthetic could slow your reflexes.   You may resume your regular activities after 24 hours  Avoid strenuous activity for the first 24 hours  You may shower, however avoid swimming, tub baths or hot tubs for 24 hours following your procedure  You may have a mild to moderate increase in pain for several days following the injection.  It may take up to 14 days for the steroid medication to start working although you may feel the effect as early as a few days after the procedure.     You may use ice packs for 10-15 minutes, 3 to 4 times a day at the injection site for comfort  Do not use heat to painful areas for 6 to 8 hours. This will give the local anesthetic time to wear off and prevent you from accidentally burning your skin.   Unless you have been directed to avoid the use of anti-inflammatory medications (NSAIDS), you may use medications such as ibuprofen, Aleve or Tylenol for pain control if needed.   Possible side effects of steroids that you may experience include flushing, elevated blood pressure, increased appetite, mild headaches and restlessness.  All of these symptoms will get better with time.  If you experience any of the following, call the Pain Clinic during work hours (Mon-Friday 8-4:30 pm) at [phone] or the Provider Line after hours at [phone]:  -Fever over 100 degree F  -Swelling, bleeding, redness, drainage, warmth at the injection site  -Progressive weakness or numbness in your legs   -Unusual new onset of pain that is  not improving

## 2023-03-26 ENCOUNTER — NURSE TRIAGE (OUTPATIENT)
Dept: NURSING | Facility: CLINIC | Age: 63
End: 2023-03-26
Payer: COMMERCIAL

## 2023-03-26 NOTE — TELEPHONE ENCOUNTER
Triage Call:    Patient calling with medication questions.  Patient reports that he has fibromyalgia and the pain is severe today especially in his elbows.  He is inquiring if it is okay to take acetaminophen PM with oxycodone.  Informed patient that it is contraindicated to take medications together due to respiratory depression concerns.  Advised patient to take regular acetaminophen.  He is inquiring when he is able to take acetaminophen PM after taking Oxycodone, transferred patient to [facility] pharmacy to further assist.    Lizet Carreon RN  03/26/23 5:23 PM  [facility] Nurse Advisor    Reason for Disposition    [1] Caller has medicine question about med NOT prescribed by PCP AND [2] triager unable to answer question (e.g., compatibility with other med, storage)    Additional Information    Negative: [1] Intentional drug overdose AND [2] suicidal thoughts or ideas    Negative: Drug overdose and triager unable to answer question    Negative: Caller requesting a renewal or refill of a medicine patient is currently taking    Negative: Caller requesting information unrelated to medicine    Negative: Caller requesting information about COVID-19 Vaccine    Negative: Caller requesting information about Emergency Contraception    Negative: Caller requesting information about Combined Birth Control Pills    Negative: Caller requesting information about Progestin Birth Control Pills    Negative: Caller requesting information about Post-Op pain or medicines    Negative: Caller requesting a prescription antibiotic (such as Penicillin) for Strep throat and has a positive culture result    Negative: Caller requesting a prescription anti-viral med (such as Tamiflu) and has influenza (flu) symptoms    Negative: Immunization reaction suspected    Negative: Rash while taking a medicine or within 3 days of stopping it    Negative: [1] Asthma and [2] having symptoms of asthma (cough, wheezing, etc.)    Negative: [1] Symptom  of illness (e.g., headache, abdominal pain, earache, vomiting) AND [2] more than mild    Negative: Breastfeeding questions about mother's medicines and diet    Negative: MORE THAN A DOUBLE DOSE of a prescription or over-the-counter (OTC) drug    Negative: [1] DOUBLE DOSE (an extra dose or lesser amount) of prescription drug AND [2] any symptoms (e.g., dizziness, nausea, pain, sleepiness)    Negative: [1] DOUBLE DOSE (an extra dose or lesser amount) of over-the-counter (OTC) drug AND [2] any symptoms (e.g., dizziness, nausea, pain, sleepiness)    Negative: Took another person's prescription drug    Negative: [1] DOUBLE DOSE (an extra dose or lesser amount) of prescription drug AND [2] NO symptoms (Exception: a double dose of antibiotics)    Negative: Diabetes drug error or overdose (e.g., took wrong type of insulin or took extra dose)    Negative: [1] Prescription not at pharmacy AND [2] was prescribed by PCP recently (Exception: triager has access to EMR and prescription is recorded there. Go to Home Care and confirm for pharmacy.)    Negative: [1] Pharmacy calling with prescription question AND [2] triager unable to answer question    Negative: [1] Caller has URGENT medicine question about med that PCP or specialist prescribed AND [2] triager unable to answer question    Negative: Medicine patch causing local rash or itching    Protocols used: MEDICATION QUESTION CALL-A-

## 2023-04-08 ENCOUNTER — NURSE TRIAGE (OUTPATIENT)
Dept: NURSING | Facility: CLINIC | Age: 63
End: 2023-04-08
Payer: COMMERCIAL

## 2023-04-08 NOTE — TELEPHONE ENCOUNTER
Patient is calling to report ankle pain and was wondering if he should use cold or heat on it.    He was jogging yesterday and he noticed that his left ankle started to hurt.  He then started walking with a limp.  The pain isn't always there when he walks or bears weight on it.   The pain is on the lateral malleolus and above it.  No swelling or redness.    Disposition:  Home care.  Care advice given.  Pt verbalized understanding.    Jacquie Castillo, RN, BSN Nurse Triage Advisor 4/8/2023 6:46 AM       Reason for Disposition    Caused by overuse from recent vigorous activity (e.g., vigorous activity, running)    Additional Information    Negative: [1] Swollen joint AND [2] fever    Negative: Entire foot is cool or blue in comparison to other side    Negative: [1] Red area or streak AND [2] fever    Negative: Patient sounds very sick or weak to the triager    Negative: [1] SEVERE pain (e.g., excruciating, unable to walk) AND [2] not improved after 2 hours of pain medicine    Negative: [1] Thigh or calf pain AND [2] only 1 side AND [3] present > 1 hour    Negative: [1] Calf swelling AND [2] only 1 side    Negative: [1] Looks infected (spreading redness, pus) AND [2] large red area (> 2 in. or 5 cm)    Negative: Looks like a boil, infected sore, or deep ulcer    Negative: [1] Redness of the skin AND [2] no fever    Negative: [1] Very swollen joint AND [2] no fever    Negative: [1] MODERATE pain (e.g., interferes with normal activities, limping) AND [2] present > 3 days    Negative: [1] Swollen joint AND [2] no fever or redness    Negative: [1] MILD pain (e.g., does not interfere with normal activities) AND [2] present > 7 days    Negative: Ankle pain is a chronic symptom (recurrent or ongoing AND present > 4 weeks)    Protocols used: ANKLE PAIN-A-

## 2023-04-18 ENCOUNTER — VIRTUAL VISIT (OUTPATIENT)
Dept: FAMILY MEDICINE | Facility: CLINIC | Age: 63
End: 2023-04-18
Payer: COMMERCIAL

## 2023-04-18 DIAGNOSIS — L91.0 KELOID SCAR: Primary | ICD-10-CM

## 2023-04-18 PROCEDURE — 99213 OFFICE O/P EST LOW 20 MIN: CPT | Mod: 93 | Performed by: FAMILY MEDICINE

## 2023-04-18 NOTE — PROGRESS NOTES
"Jeanmarie is a 63 year old who is being evaluated via a billable telephone visit.      What phone number would you like to be contacted at? [phone]  How would you like to obtain your AVS? [place]  Distant Location (provider location):  On-site      Subjective   Jeanmarie is a 63 year old, presenting for the following health issues:  Abdominal Pain    History of Present Illness       Reason for visit:  An intense burning sensation of an old incision.    He eats 2-3 servings of fruits and vegetables daily.He consumes 0 sweetened beverage(s) daily.He exercises with enough effort to increase his heart rate 30 to 60 minutes per day.  He exercises with enough effort to increase his heart rate 4 days per week. He is missing 2 dose(s) of medications per week.  He is not taking prescribed medications regularly due to side effects and remembering to take.     Pain History:  When did you first notice your pain? 2.5 weeks   Have you seen anyone else for your pain? No  How has your pain affected your ability to work? Not currently working - unrelated to pain  Where in your body do you have pain? L Abdominal/Flank Pain  Onset/Duration: 2.5 weeks  Description:   Character: Burning like a \"match\"  Location: left lower quadrant at hernia repair site  Radiation: None  Intensity: 6/10 now, worst at 9/10  Progression of Symptoms:  worsening and intermittent  Accompanying Signs & Symptoms:  Fever/Chills: No  Gas/Bloating: No  Nausea: No  Vomitting: No  Diarrhea: No  Constipation: No  Dysuria or Hematuria: YES - may have kidney stone per pt  History:   Trauma: No  Previous similar pain: YES- intermittent over 30 years  Previous tests done: none  Precipitating factors:   Does the pain change with:     Food: No    Bowel Movement: No    Urination: No   Other factors:  No  Therapies tried and outcome: Acetaminophen, Medical MJ, Oxycodone with helpful pain relief - but wants to discuss how to treat without medication.    Review of Systems "   Constitutional, HEENT, cardiovascular, pulmonary, GI, , musculoskeletal, neuro, skin, endocrine and psych systems are negative, except as otherwise noted.      Objective         Vitals:  No vitals were obtained today due to virtual visit.    Physical Exam   healthy, alert and no distress  PSYCH: Alert and oriented times 3; coherent speech, normal   rate and volume, able to articulate logical thoughts, able   to abstract reason, no tangential thoughts, no hallucinations   or delusions  His affect is normal  RESP: No cough, no audible wheezing, able to talk in full sentences  Remainder of exam unable to be completed due to telephone visits    A/P:  (L91.0) Keloid scar  (primary encounter diagnosis)  Comment:   Plan: Adult Dermatology Referral        Referred to Dermatology for further evaluation and recommendations.    Milton gIlesias MD          Phone call duration: 5 minutes

## 2023-05-03 ENCOUNTER — TRANSFERRED RECORDS (OUTPATIENT)
Dept: HEALTH INFORMATION MANAGEMENT | Facility: CLINIC | Age: 63
End: 2023-05-03
Payer: COMMERCIAL

## 2023-06-06 ENCOUNTER — MYC MEDICAL ADVICE (OUTPATIENT)
Dept: FAMILY MEDICINE | Facility: CLINIC | Age: 63
End: 2023-06-06
Payer: COMMERCIAL

## 2023-06-07 ENCOUNTER — OFFICE VISIT (OUTPATIENT)
Dept: URGENT CARE | Facility: URGENT CARE | Age: 63
End: 2023-06-07
Payer: COMMERCIAL

## 2023-06-07 VITALS
BODY MASS INDEX: 26.09 KG/M2 | SYSTOLIC BLOOD PRESSURE: 108 MMHG | RESPIRATION RATE: 16 BRPM | HEART RATE: 52 BPM | DIASTOLIC BLOOD PRESSURE: 69 MMHG | WEIGHT: 152 LBS | TEMPERATURE: 97.6 F | OXYGEN SATURATION: 98 %

## 2023-06-07 DIAGNOSIS — R22.0 MASS OF SKIN OF HEAD: Primary | ICD-10-CM

## 2023-06-07 PROCEDURE — 99213 OFFICE O/P EST LOW 20 MIN: CPT | Performed by: PHYSICIAN ASSISTANT

## 2023-06-07 ASSESSMENT — ENCOUNTER SYMPTOMS
WHEEZING: 0
ALLERGIC/IMMUNOLOGIC NEGATIVE: 1
CARDIOVASCULAR NEGATIVE: 1
GASTROINTESTINAL NEGATIVE: 1
NAUSEA: 0
ABDOMINAL PAIN: 0
SORE THROAT: 0
DIARRHEA: 0
CONSTITUTIONAL NEGATIVE: 1
NEUROLOGICAL NEGATIVE: 1
DYSURIA: 0
PALPITATIONS: 0
COUGH: 0
HEADACHES: 0
FEVER: 0
CHILLS: 0
SHORTNESS OF BREATH: 0
RESPIRATORY NEGATIVE: 1
FREQUENCY: 0
VOMITING: 0
HEMATURIA: 0
CHEST TIGHTNESS: 0
MYALGIAS: 0
MUSCULOSKELETAL NEGATIVE: 1

## 2023-06-07 NOTE — PROGRESS NOTES
Chief Complaint:    Chief Complaint   Patient presents with     Mass     Forehead mass that just appeared and seems to be getting bigger      Medical Decision Making:    Vital signs reviewed by Carlos Hernandez PA-C  /69   Pulse 52   Temp 97.6  F (36.4  C) (Tympanic)   Resp 16   Wt 68.9 kg (152 lb)   SpO2 98%   BMI 26.09 kg/m      Differential Diagnosis:  Boil, abscess, cellulitis.     ASSESSMENT:     1. Mass of skin of head           PLAN:       No visible abscess amenable to drainage.  Warm compresses to the area.  Patient instructed to follow up with PCP in 1 week if symptoms are not improving.  Sooner if symptoms worsen.  Worrisome symptoms discussed with instructions to go to the ED.  Patient verbalized understanding and agreed with this plan.    Labs:     No results found for any visits on 06/07/23.    Current Meds:    Current Outpatient Medications:      acetaminophen (TYLENOL) 500 MG tablet, Take 500-1,000 mg by mouth every 6 hours as needed for mild pain 1000 mg per day, Disp: , Rfl:      amLODIPine (NORVASC) 2.5 MG tablet, Take 1 tablet (2.5 mg) by mouth daily, Disp: 90 tablet, Rfl: 3     Blood Pressure KIT, Monitor blood pressure as needed., Disp: 1 kit, Rfl: 1     Camphor-Menthol-Methyl Sal (SALONPAS) 3.1-6-10 % PTCH, , Disp: , Rfl:      Cholecalciferol (VITAMIN D) 1000 UNIT capsule, Take 1 capsule by mouth 2 times daily., Disp: , Rfl:      Nerve Stimulator (TENS THERAPY PAIN RELIEF) GEORGIE, , Disp: , Rfl:      omeprazole (PRILOSEC) 20 MG DR capsule, Take 1 capsule (20 mg) by mouth 2 times daily, Disp: 180 capsule, Rfl: 1     oxyCODONE (ROXICODONE) 5 MG tablet, Take 5 mg by mouth every 6 hours as needed for severe pain, Disp: , Rfl:      polyethylene glycol-propylene glycol (SYSTANE ULTRA) 0.4-0.3 % SOLN ophthalmic solution, Place 1 drop into both eyes 4 times daily, Disp: 6 mL, Rfl: 12     sildenafil (VIAGRA) 100 MG tablet, Take 1/4 - 1 tablet, as directed, 1-3 hours before intimacy. Maximum 1  dose per 24 hours., Disp: 10 tablet, Rfl: 5    Allergies:  Allergies   Allergen Reactions     Risperidone Other (See Comments)     Serve numbness      Trimethoprim Hives     Effexor [Venlafaxine Hydrochloride] Other (See Comments)     Headache, Painful scrotum and drainage from the penis     Ambien [Zolpidem Tartrate] Nausea     Buspirone Nausea     Dizziness, headache     Celexa [Citalopram] Other (See Comments)     Headache     Duloxetine Other (See Comments)     Headache  headaches     Septra [Bactrim] Itching     Seroquel [Quetiapine] Other (See Comments)     Headache, N, V     Sulfa Antibiotics Itching     Sulfamethoxazole-Trimethoprim      hives     Tramadol Nausea     Nausea and headache     Ultram [Tramadol Hcl] Nausea and Vomiting     Venlafaxine      Zolpidem      headaches     Zolpidem Tartrate Other (See Comments)     headaches       SUBJECTIVE    HPI: Jeanmarie Mclean is an 63 year old male who presents for evaluation and treatment of mass on his forehead.  Patient noticed this roughly 1 week ago.  He states that the area has gotten larger.  He has not tried anything for the symptoms.      ROS:      Review of Systems   Constitutional: Negative.  Negative for chills and fever.   HENT: Negative.  Negative for sore throat.    Respiratory: Negative.  Negative for cough, chest tightness, shortness of breath and wheezing.    Cardiovascular: Negative.  Negative for chest pain and palpitations.   Gastrointestinal: Negative.  Negative for abdominal pain, diarrhea, nausea and vomiting.   Genitourinary: Negative for dysuria, frequency, hematuria and urgency.   Musculoskeletal: Negative.  Negative for myalgias.   Skin: Negative for rash.        Forehead Mass     Allergic/Immunologic: Negative.  Negative for immunocompromised state.   Neurological: Negative.  Negative for headaches.        Family History   Family History   Problem Relation Age of Onset     Psychotic Disorder Son         autism     Prostate Cancer Father          40s     Cancer Father      Cancer Maternal Grandmother         lung     Glaucoma Maternal Grandmother      Eye Disorder Maternal Grandmother         glaucoma     Diabetes Mother          45     Blood Disease Sister         sickle trait     Hypertension Sister      Blood Disease Paternal Uncle         sickle     Blood Disease Paternal Aunt         sickle     Alzheimer Disease Paternal Grandfather         70s     Macular Degeneration Paternal Grandfather      Cerebrovascular Disease No family hx of      Thyroid Disease No family hx of      Myocardial Infarction No family hx of      C.A.D. 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 [facility] in      Drug use: Yes     Types: Marijuana     Comment: crack (last used 10/08?)     Sexual activity: Not Currently     Partners: Female   Other Topics Concern     Parent/sibling w/ CABG, MI or angioplasty before 65F 55M? Not Asked   Social History Narrative    [employer] ([phone] [place] 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     Social Determinants of Health     Financial Resource Strain: Not on file   Food Insecurity: Not on file   Transportation Needs: Not on file   Physical Activity: Not on file   Stress: Not on file   Social Connections: Not on file   Intimate Partner Violence: Not on  file   Housing Stability: Not on file        Surgical History:  Past Surgical History:   Procedure Laterality Date     COLONOSCOPY       COLONOSCOPY N/A 2/22/2021    Procedure: Colonoscopy, Flexible, With Lesion Removal Using Snare;  Surgeon: Garrick Villavicencio MD;  Location: [facility] OR     COLONOSCOPY WITH CO2 INSUFFLATION N/A 2/22/2021    Procedure: COLONOSCOPY, WITH CO2 INSUFFLATION;  Surgeon: Garrick Villavicencio MD;  Location: [facility] OR     CYSTOSCOPY  10/98    for renal stones     HC KNEE SCOPE,MED/LAT MENISECTOMY  6/24/11    Left, medial ([place])     HERNIA REPAIR, INGUINAL RT/LT  5/92    [facility]     ZZC HEP B VAC ADULT 3 DOSE IM  1995    received all 3 vaccines     Z REMV PROSTATE,RETROPUB,RADICAL  10/13/04    Dr. Muñoz ([place])        Problem List:  Patient Active Problem List   Diagnosis     Malignant neoplasm of prostate (H)     CARDIOVASCULAR SCREENING; LDL GOAL LESS THAN 130     GERD (gastroesophageal reflux disease)     Overweight (BMI 25.0-29.9)     Fibromyalgia syndrome     Posttraumatic stress disorder     Low back pain     Inadequate material resources     Anxiety state     History of Asperger's syndrome     Pervasive developmental disorder, active     Depressive disorder     Delusional disorder (H)     TBI (traumatic brain injury) (H)     Insomnia     Chronic pain     Myalgia     male stress incontinence     Major depressive disorder, recurrent episode, moderate (H)     Low back pain without sciatica, unspecified back pain laterality, unspecified chronicity     Alcohol dependence in remission (H)     Hypertensive response to exercise           OBJECTIVE:     Vital signs noted and reviewed by Carlos Hernandez PA-C  /69   Pulse 52   Temp 97.6  F (36.4  C) (Tympanic)   Resp 16   Wt 68.9 kg (152 lb)   SpO2 98%   BMI 26.09 kg/m       PEFR:    Physical Exam  Vitals and nursing note reviewed.   Constitutional:       General: He is not in acute distress.     Appearance: He is  well-developed. He is not ill-appearing, toxic-appearing or diaphoretic.   HENT:      Head: Normocephalic and atraumatic.        Comments: Possible pimple or small abscess just under the L eyebrow on the medial side.     Right Ear: Hearing, tympanic membrane, ear canal and external ear normal. Tympanic membrane is not perforated, erythematous, retracted or bulging.      Left Ear: Hearing, tympanic membrane, ear canal and external ear normal. Tympanic membrane is not perforated, erythematous, retracted or bulging.      Nose: Nose normal. No mucosal edema, congestion or rhinorrhea.      Mouth/Throat:      Pharynx: No oropharyngeal exudate or posterior oropharyngeal erythema.      Tonsils: No tonsillar exudate or tonsillar abscesses. 0 on the right. 0 on the left.   Eyes:      Pupils: Pupils are equal, round, and reactive to light.   Cardiovascular:      Rate and Rhythm: Normal rate and regular rhythm.      Heart sounds: Normal heart sounds, S1 normal and S2 normal. Heart sounds not distant. No murmur heard.     No friction rub. No gallop.   Pulmonary:      Effort: Pulmonary effort is normal. No respiratory distress.      Breath sounds: Normal breath sounds. No decreased breath sounds, wheezing, rhonchi or rales.   Abdominal:      General: Bowel sounds are normal. There is no distension.      Palpations: Abdomen is soft.      Tenderness: There is no abdominal tenderness.   Musculoskeletal:      Cervical back: Normal range of motion and neck supple.   Lymphadenopathy:      Cervical: No cervical adenopathy.   Skin:     General: Skin is warm and dry.      Findings: No rash.   Neurological:      Mental Status: He is alert.      Cranial Nerves: No cranial nerve deficit.   Psychiatric:         Attention and Perception: He is attentive.         Speech: Speech normal.         Behavior: Behavior normal. Behavior is cooperative.         Thought Content: Thought content normal.         Judgment: Judgment normal.              Carlos Hernandez PA-C  6/7/2023, 10:25 AM

## 2023-06-08 ENCOUNTER — OFFICE VISIT (OUTPATIENT)
Dept: OPTOMETRY | Facility: CLINIC | Age: 63
End: 2023-06-08
Payer: COMMERCIAL

## 2023-06-08 DIAGNOSIS — H04.123 DRY EYE SYNDROME OF BOTH EYES: Primary | ICD-10-CM

## 2023-06-08 DIAGNOSIS — H10.13 ALLERGIC CONJUNCTIVITIS OF BOTH EYES: ICD-10-CM

## 2023-06-08 PROCEDURE — 99213 OFFICE O/P EST LOW 20 MIN: CPT | Performed by: OPTOMETRIST

## 2023-06-08 RX ORDER — OLOPATADINE HYDROCHLORIDE 2 MG/ML
1 SOLUTION/ DROPS OPHTHALMIC DAILY
Qty: 2.5 ML | Refills: 11 | Status: SHIPPED | OUTPATIENT
Start: 2023-06-08 | End: 2023-12-18

## 2023-06-08 RX ORDER — POLYETHYLENE GLYCOL 400 AND PROPYLENE GLYCOL 4; 3 MG/ML; MG/ML
1 SOLUTION/ DROPS OPHTHALMIC 4 TIMES DAILY
Qty: 6 ML | Refills: 12 | Status: SHIPPED | OUTPATIENT
Start: 2023-06-08 | End: 2023-12-18

## 2023-06-08 ASSESSMENT — VISUAL ACUITY
METHOD: SNELLEN - LINEAR
OD_CC: 20/25
OS_CC: 20/20
CORRECTION_TYPE: GLASSES
OD_CC+: -2

## 2023-06-08 ASSESSMENT — TONOMETRY
IOP_METHOD: APPLANATION
OD_IOP_MMHG: 15
OS_IOP_MMHG: 15

## 2023-06-08 ASSESSMENT — SLIT LAMP EXAM - LIDS
COMMENTS: NORMAL
COMMENTS: NORMAL

## 2023-06-08 ASSESSMENT — EXTERNAL EXAM - RIGHT EYE: OD_EXAM: NORMAL

## 2023-06-08 ASSESSMENT — EXTERNAL EXAM - LEFT EYE: OS_EXAM: NORMAL

## 2023-06-08 NOTE — PROGRESS NOTES
Chief Complaint   Patient presents with     Eye Problem Left Eye     Eye Problem Left Eye  In left eye. Onset was sudden. This started 5 days ago. Severity is moderate. Occurring constantly. Since onset it is rapidly improving. Associated symptoms include itching and burning. Negative for photophobia. Treatments tried include artificial tears. Response to treatment was moderate improvement.          Comments  Patient using systane and it made eyes feel better in the last few days. Patient states wants eyes checked to see if he has herpes in eyes.    Medical, surgical and family histories reviewed and updated 6/8/2023.       OBJECTIVE: See Ophthalmology exam    ASSESSMENT:    ICD-10-CM    1. Dry eye syndrome of both eyes  H04.123 polyethylene glycol-propylene glycol (SYSTANE ULTRA) 0.4-0.3 % SOLN ophthalmic solution      2. Allergic conjunctivitis of both eyes  H10.13 olopatadine (PATADAY) 0.2 % ophthalmic solution         PLAN:     Patient Instructions   There is no signs of herpes infection.    Continue Systane- 1 drop both eyes 2-4 x daily.    Add Pataday as needed for itchy eyes or other allergy drop.  Cold compresses and do not ramin your eyes.    Return 12/2023 for comprehensive eye exam or sooner if needed.    Willis Jones, OD

## 2023-06-08 NOTE — LETTER
6/8/2023         RE: Jeanmarie Mclean  [address]  [facility] [address]        Dear Colleague,    Thank you for referring your patient, Jeanmarie Mclean, to the [facility]. Please see a copy of my visit note below.    Chief Complaint   Patient presents with     Eye Problem Left Eye     Eye Problem Left Eye  In left eye. Onset was sudden. This started 5 days ago. Severity is moderate. Occurring constantly. Since onset it is rapidly improving. Associated symptoms include itching and burning. Negative for photophobia. Treatments tried include artificial tears. Response to treatment was moderate improvement.          Comments  Patient using systane and it made eyes feel better in the last few days. Patient states wants eyes checked to see if he has herpes in eyes.    Medical, surgical and family histories reviewed and updated 6/8/2023.       OBJECTIVE: See Ophthalmology exam    ASSESSMENT:    ICD-10-CM    1. Dry eye syndrome of both eyes  H04.123 polyethylene glycol-propylene glycol (SYSTANE ULTRA) 0.4-0.3 % SOLN ophthalmic solution      2. Allergic conjunctivitis of both eyes  H10.13 olopatadine (PATADAY) 0.2 % ophthalmic solution         PLAN:     Patient Instructions   There is no signs of herpes infection.    Continue Systane- 1 drop both eyes 2-4 x daily.    Add Pataday as needed for itchy eyes or other allergy drop.  Cold compresses and do not ramin your eyes.    Return 12/2023 for comprehensive eye exam or sooner if needed.    Willis Jones, KATHY           Again, thank you for allowing me to participate in the care of your patient.        Sincerely,        Willis Jones, OD

## 2023-06-08 NOTE — PATIENT INSTRUCTIONS
There is no signs of herpes infection.    Continue Systane- 1 drop both eyes 2-4 x daily.    Add Pataday as needed for itchy eyes or other allergy drop.  Cold compresses and do not ramin your eyes.    Return 12/2023 for comprehensive eye exam or sooner if needed.    Willis Jones OD      Optometry Providers       Clinic Locations                                 Telephone Number   Dr. Milagro Spence [place]    [facility]  [place] [phone]     [place] [facility] Hours:                [place] [facility] Hours:       [place] [facility] Hours:   [address]   [address]     [address] [place] [address]   [place] [address]    [place] [address]  Phone: [phone]                    Phone: [phone]     Phone: [phone]                      Monday 8:00-6:00                          Monday 8:00-6:00                          Monday 8:00-6:00              Tuesday 8:00-6:00                          Tuesday 8:00-6:00                          Tuesday 8:00-6:00              Wednesday 8:00-6:00                  Wednesday 8:00-6:00                   Wednesday 8:00-6:00      Thursday 8:00-6:00                        Thursday 8:00-6:00                         Thursday 8:00-6:00            Friday 8:00-5:00                              Friday 8:00-5:00                              Friday 8:00-5:00    [place] [facility] Hours:   [address] [place] [id number] [phone]    Monday 9:00-6:00  Tuesday 9:00-6:00  Wednesday 9:00-6:00  Thursday 9:00-6:00  Friday 9:00-5:00  As always, Thank you for trusting us with your health care needs!

## 2023-06-27 ENCOUNTER — MYC MEDICAL ADVICE (OUTPATIENT)
Dept: FAMILY MEDICINE | Facility: CLINIC | Age: 63
End: 2023-06-27
Payer: COMMERCIAL

## 2023-06-27 DIAGNOSIS — M53.3 SI (SACROILIAC) JOINT DYSFUNCTION: Primary | ICD-10-CM

## 2023-06-27 NOTE — TELEPHONE ENCOUNTER
Pt is requesting referral to Dr. Castellanos for SI injections. Pt stated that you've been sending the orders for the past 2 years without an appt. What you would like us to do about this?    Tyler CALDERON [facility]    Primary Care

## 2023-06-27 NOTE — TELEPHONE ENCOUNTER
Patient does not need to be seen. Referral placed. Patient can call [phone] to schedule appointment for the injection.    Milton Iglesias MD

## 2023-06-28 ENCOUNTER — TELEPHONE (OUTPATIENT)
Dept: PALLIATIVE MEDICINE | Facility: CLINIC | Age: 63
End: 2023-06-28
Payer: COMMERCIAL

## 2023-06-28 DIAGNOSIS — M53.3 SI (SACROILIAC) JOINT DYSFUNCTION: Primary | ICD-10-CM

## 2023-06-28 NOTE — TELEPHONE ENCOUNTER
"Associated Diagnoses    SI (sacroiliac) joint dysfunction [M53.3]        Pt want to repeat bilateral SI injection.  Last done: 2/14/2023 with Dr. Castellanos  He had 90% relief for 5 months.      Patient states he believes he has \"a[n] enlarged lymph node on upper inner thigh adjacent to my groin\"  \"about the size of a marble\"   Ultrasound scheduled for 7/18 to address this.     Routing to provider.     Raya Macdonald RN  [facility]  [phone]    "

## 2023-06-28 NOTE — TELEPHONE ENCOUNTER
1. Is there imaging in the Chart? No (must be within 3 years)  a. Date of Imaging:   b. Type of Imagin. Has the patient had any previous Injections? Yes  a. If YES, what injection and when? SI joint   3. Did the referring provider make any recommendations (look at provider notes)? Yes  a. If YES, what? SI (sacroiliac) joint dysfunction     After Review please route to Pain Nurse Pool for nursing to triage.          Jeana Rosario    [facility]

## 2023-06-30 NOTE — TELEPHONE ENCOUNTER
Screening Questions for Radiology Injections:    Injection to be done at which interventional clinic site? [facility]    Procedure ordered by Emanuel    Procedure ordered? bilateral SI injection     Transforaminal Cervical JOSE R - Send to [facility] - No [facility] Site providers perform this procedure    What insurance would patient like us to bill for this procedure? [employer]    IF SCHEDULING IN [place] [facility] PLEASE SCHEDULE AT LEAST 7-10 BUSINESS DAYS OUT SO A PA CAN BE OBTAINED    Worker's comp or MVA (motor vehicle accident) -Any injection DO NOT SCHEDULE and route to Maria Elena Loving.      [employer] insurance - For SI joint injections, DO NOT SCHEDULE and route to Kym Fisher.       ALL BCBS, [employer] and [employer] C[employer]NA - DO NOT SCHEDULE and route to Kym Fisher    [employer]- facet joint injections, route to Kym Fisher    Is patient scheduled at [facility]? no   If YES, route every encounter to [facility] CARE NAVIGATION POOL [[phone]]    Is an  needed? No     Patient has a  home? (Review Grid) YES: ok    Any chance of pregnancy? NO   If YES, do NOT schedule and route to RN pool  - Dr. Khan route to Aria Arcos and PM&R Nurse  [[id number]]      Is patient actively being treated for cancer or immunocompromised? No  If YES, do NOT schedule and route to RN pool/ Dr. Khan's Team    Does the patient have a bleeding or clotting disorder? No     If YES, okay to schedule AND route to RN nurse pool/ Dr. Khan's Team     (For any patients with platelet count <100, RN must forward to provider)    Is patient taking any Blood Thinners OR Antiplatelet medication?  No   If hold needed, do NOT schedule, route to RN pool/ Dr. Khan's Team    Examples:   o Blood Thinners: (Coumadin, Warfarin, Jantoven, Pradaxa, Xarelto, Eliquis, Edoxaban, Enoxaparin, Lovenox, Heparin, Arixtra, Fondaparinux or Fragmin)  o Antiplatelet Medications: (Plavix, Brilinta or  Effient)     Is patient taking any aspirin products (includes Excedrin and Fiorinal)? No     If more than 325mg/day, OK to schedule; Instruct Pt to decrease to less than 325 mg for 7 days AND route to RN pool/ Dr. Khan's Team     For CERVICAL procedures, hold all aspirin products for 6 days.     Tell Pt that if aspirin product is not held for 6 days, the procedure WILL BE cancelled.     Any allergies to contrast dye, iodine, shellfish, or numbing and steroid medications? No    If YES, schedule and add allergy information to appointment notes AND route to the RN pool/ Dr. Khan's Team    If JOSE R and Contrast Dye / Iodine Allergy? DO NOT SCHEDULE, route to RN pool/ Dr. Khan's Team    Allergies: Risperidone, Trimethoprim, Effexor [venlafaxine hydrochloride], Ambien [zolpidem tartrate], Buspirone, Celexa [citalopram], Duloxetine, Septra [bactrim], Seroquel [quetiapine], Sulfa antibiotics, Sulfamethoxazole-trimethoprim, Tramadol, Ultram [tramadol hcl], Venlafaxine, Zolpidem, and Zolpidem tartrate     Does patient have an active infection or treated for one within the past week? No    Is patient currently taking any antibiotics or steroid medications?  No     For patients on chronic, preventative, or prophylactic antibiotics, procedures may be scheduled.     For patients on antibiotics for active or recent infection, schedule 4 days after completed.    For patients on steroid medications, schedule 4 days after completed.     Has the patient had a flu shot or any other vaccinations within the past 7 days? No  If yes, explain that for the vaccine to work best they need to:       wait 1 week before and 1 week after getting any Vaccine    wait 1 week before and 2 weeks after getting Covid Vaccine #2 or BOOSTER    If patient has concerns about the timing, send to RN pool/ Dr. Khan's Team    Does patient have an MRI/CT?  Not Applicable Include Date and Check Procedure Scheduling Grid to see if required.    Was the MRI/CT  done within the last 3 years?  NA     If no route to RN Pool/ Dr. Khan's Team    If yes, where was the MRI/CT done?      Refer to PACS Transmissions list for approved external locations and route to RN Pool High Priority/ Dr. Carranzas Team    If MRI was not done at approved external location do NOT schedule and route to RN pool/ Dr. Khan's Team      If patient has an imaging disc, the injection MAY be scheduled but patient must bring disc to appt or appt will be cancelled.    Is patient able to transfer to a procedure table with minimal or no assistance? Yes     If no, do NOT schedule and route to RN Pool/ Dr. Khan's Team    Procedure Specific Instructions:    If celiac plexus block, informed patient NPO for 6 hours and that it is okay to take medications with sips of water, especially blood pressure medications Not Applicable         If this is for a cervical procedure, informed patient that aspirin needs to be held for 6 days.   Not Applicable      Sedation, If Sedation is ordered for any procedure, patient must be NPO for 6 hours prior to procedure Not Applicable      If IV needed:    Do not schedule procedures requiring IV placement in the first appointment of the day or first appointment after lunch. Do NOT schedule at 0745, 0815 or 1245.         Instructed patient to arrive 30 minutes early for IV start if required. (Check Procedure Scheduling Grid)  Not Applicable    Reminders:    If you are started on any steroids or antibiotics between now and your appointment, you must contact us because the procedure may need to be cancelled.  Yes      As a reminder, receiving steroids can decrease your body's ability to fight infection.   Would you still like to move forward with scheduling the injection?  Yes      IV Sedation is not provided for procedures. If oral anti-anxiety medication is needed, the patient should request this from their referring provider.      Instruct patient to arrive as directed prior to  the scheduled appointment time:  If IV needed 30 minutes before appointment time       For patients 85 or older we recommend having an adult stay w/ them for the remainder of the day.       If the patient is Diabetic, remind them to bring their glucometer.      Does the patient have any questions?  NO  Nan Loving  [facility] [place]

## 2023-07-06 ENCOUNTER — NURSE TRIAGE (OUTPATIENT)
Dept: NURSING | Facility: CLINIC | Age: 63
End: 2023-07-06
Payer: COMMERCIAL

## 2023-07-06 NOTE — TELEPHONE ENCOUNTER
Patient is calling with questions for provider.  He is [facility].  He has an ultrasound scheduled for 7/18 for a lump in his right upper leg.  He is wondering if getting a biopsy will help determine if it's an infectious source.  He is also wondering if he should hold off on his SI joint injection if it is an infectious lump.  Advised patient to send [employer] message to his PCP.    He also wanted to seek out a second opinion.   Helped patient locate another [facility].    Jacquie Castillo, RN, BSN Nurse Triage Advisor 7/6/2023 2:39 AM     Reason for Disposition    [1] Caller requesting NON-URGENT health information AND [2] PCP's office is the best resource    Additional Information    Negative: Requesting regular office appointment    Negative: RN needs further essential information from caller in order to complete triage    Protocols used: INFORMATION ONLY CALL - NO TRIAGE-A-

## 2023-07-18 ENCOUNTER — MYC MEDICAL ADVICE (OUTPATIENT)
Dept: FAMILY MEDICINE | Facility: CLINIC | Age: 63
End: 2023-07-18
Payer: COMMERCIAL

## 2023-07-18 ENCOUNTER — MYC MEDICAL ADVICE (OUTPATIENT)
Dept: PALLIATIVE MEDICINE | Facility: CLINIC | Age: 63
End: 2023-07-18
Payer: COMMERCIAL

## 2023-07-18 NOTE — TELEPHONE ENCOUNTER
Reason for call:  Other   Patient called regarding (reason for call): appointment and call back  Additional comments: Patient called requesting to talk to nursing to see if it will still be safe for him to have his procedure on Thursday 7/20 with Dr. Castellanos. Pt had an ultrasound today and he was told that there was something abnormal in his lymph nodes and they want him to get a biopsy on the same day as the procedure. Please review and call pt back for advice as soon as available     Phone number to reach patient:  Home number on file [phone] (home)    Best Time:  ASAP    Can we leave a detailed message on this number?  YES    Anne Dey      [facility]  Pain Management

## 2023-07-18 NOTE — TELEPHONE ENCOUNTER
Will keep encounter open at this time for pt communication and nursing response.    Iona VALERO RN Care Coordinator  [facility]      Pt will plan to [facility] to [facility] to discuss their comfort level around him getting steroids close to biopsy date (still TBD) and if they have any estimate for timing    Pt will call back when he is able to speak to [facility] clinical staff    Jeanmarie FERNANDO Pain Nurse (supporting Barry Castellanos MD)          7/18/23  1:52 PM  Good afternoon Dr. Castellanos,     Today I had an ultrasound done on a lymph node I discovered on my right upper thigh. [facility] for sometime in the near future. The question I have is: with having this lymph node is it safe for me to have the SI joint injections on Thursday, July 20, 2023?      Please let know as soon as possible, so I can keep this appointment with you, or should I cancel and hold off on the procedure until I get the results of my biopsy. Thanks, hope to hear from you soon.    Iona VALERO RN Care Coordinator  [facility]

## 2023-07-18 NOTE — TELEPHONE ENCOUNTER
Patient called back after speaking with [facility] MD, they have no concerns about pt proceeding with SI injections 7/20. He will plan to keep appt as scheduled    Iona VALERO RN Care Coordinator  [facility]

## 2023-07-18 NOTE — TELEPHONE ENCOUNTER
This is being handled in TE, closing    Iona VALERO RN Care Coordinator  [facility]

## 2023-07-20 ENCOUNTER — RADIOLOGY INJECTION OFFICE VISIT (OUTPATIENT)
Dept: PALLIATIVE MEDICINE | Facility: CLINIC | Age: 63
End: 2023-07-20
Attending: PAIN MEDICINE
Payer: COMMERCIAL

## 2023-07-20 VITALS
DIASTOLIC BLOOD PRESSURE: 73 MMHG | HEART RATE: 50 BPM | SYSTOLIC BLOOD PRESSURE: 133 MMHG | RESPIRATION RATE: 16 BRPM | OXYGEN SATURATION: 100 %

## 2023-07-20 DIAGNOSIS — M53.3 SI (SACROILIAC) JOINT DYSFUNCTION: ICD-10-CM

## 2023-07-20 PROCEDURE — 27096 INJECT SACROILIAC JOINT: CPT | Mod: 50 | Performed by: PAIN MEDICINE

## 2023-07-20 RX ORDER — TRIAMCINOLONE ACETONIDE 40 MG/ML
40 INJECTION, SUSPENSION INTRA-ARTICULAR; INTRAMUSCULAR ONCE
Status: COMPLETED | OUTPATIENT
Start: 2023-07-20 | End: 2023-07-20

## 2023-07-20 RX ADMIN — TRIAMCINOLONE ACETONIDE 40 MG: 40 INJECTION, SUSPENSION INTRA-ARTICULAR; INTRAMUSCULAR at 10:09

## 2023-07-20 ASSESSMENT — PAIN SCALES - GENERAL
PAINLEVEL: NO PAIN (0)
PAINLEVEL: SEVERE PAIN (7)

## 2023-07-20 NOTE — NURSING NOTE
Pre-procedure Intake  If YES to any questions or NO to having a   Please complete laminated checklist and leave on the computer keyboard for Provider, verbally inform provider if able.    For SCS Trial, RFA's or any sedation procedure:  Have you been fasting? NA    If yes, for how long?     Are you taking any any blood thinners such as Coumadin, Warfarin, Jantoven, Pradaxa Xarelto, Eliquis, Edoxaban, Enoxaparin, Lovenox, Heparin, Arixtra, Fondaparinux, or Fragmin? OR Antiplatelet medication such as Plavix, Brilinta, or Effient?   No     If yes, when did you take your last dose?     Do you take aspirin?  No    If cervical procedure, have you held aspirin for 6 days?      Do you have any allergies to contrast dye, iodine, steroid and/or numbing medications?  NO    Are you currently taking antibiotics or have an active infection?  NO    Have you had a fever/elevated temperature within the past week? NO    Are you currently taking oral steroids? NO    Do you have a ? Yes    Are you pregnant or breastfeeding?  Not Applicable    Have you received the COVID-19 vaccine? Yes    If yes, was it your 1st, 2nd or only dose needed?     Date of most recent vaccine: 9/24/22    Notify provider and RNs if systolic BP >170, diastolic BP >100, P >100 or O2 sats < 90%

## 2023-07-20 NOTE — PROGRESS NOTES
7/20/23  Pre procedure Diagnosis: SI joint dysfunction    Post procedure Diagnosis: Same  Procedure performed: bilateral SI joint injection  Anesthesia: none  Complications: none  Operators: Barry Castellanos MD     Indications:   Jeanmarie Mclean is a 63 year old male. The patient has a history of bilateral upper buttocks pain. Other conservative treatments prior to injection include meds/pt/injections with benefit .    Options/alternatives, benefits and risks were discussed with the patient including bleeding, infection, tissue trauma, exposure to radiation, reaction to medications including seizure, nerve injury, weakness, and numbness.  Questions were answered to his satisfaction and he agrees to proceed. Voluntary informed consent was obtained and signed.     Vitals were reviewed: Yes  Allergies were reviewed:  Yes   Medications were reviewed:  Yes   Pre-procedure pain score: 7/10    Procedure:  After obtaining signed informed consent, the patient was brought into the procedure suite and was placed in a prone position on the procedure table.   A Pause for the Cause was performed.  The patient was prepped and draped in the usual sterile fashion.     After identifying the bilateral SI joint, the C-arm was rotated obliquely to obtain the best view of the inferior angle of the joint.  Then 5 ml of Lidocaine 1%  was used to anesthetize the skin at a skin entry site coaxial with the fluoroscopy beam at this location.  A 22 gauge 3.5 inch needle was advanced under intermittent fluoroscopy until it was felt to enter the SI joint.  Aspiration was negative.    A total of 1ml of Omnipaque-300 was injected, confirming appropriate position, with spread into the intraarticular space, with no intravascular uptake noted.  9ml of Omnipaque was wasted. Location was verified in lateral view.    2 ml of 0.5% bupivacaine with 40mg of Kenalog was injected.  The needle was removed.     Hemostasis was achieved, the area was cleaned, and  bandaids were placed when appropriate.  The patient tolerated the procedure well, and was taken to the recovery room.  Images were saved to PACS.    Post-procedure pain score: 0/10  Follow-up includes:     -f/u with referring Physician       Barry Castellanos MD  [facility] [place]

## 2023-07-20 NOTE — NURSING NOTE
Discharge Information    IV Discontiued Time:  NA    Amount of Fluid Infused:  NA    Discharge Criteria = When patient returns to baseline or as per MD order    Consciousness:  Pt is fully awake    Circulation:  BP +/- 20% of pre-procedure level    Respiration:  Patient is able to breathe deeply    O2 Sat:  Patient is able to maintain O2 Sat >92% on room air    Activity:  Moves 4 extremities on command    Ambulation:  Patient is able to stand and walk or stand and pivot into wheelchair    Dressing:  Clean/dry or No Dressing    Notes:   Discharge instructions and AVS given to patient    Patient meets criteria for discharge?  YES    Admitted to PCU?  No    Responsible adult present to accompany patient home?  Yes    Signature/Title:    Enrique Rizo RN  RN Care Coordinator  [facility] [place]

## 2023-07-28 DIAGNOSIS — M53.3 SI (SACROILIAC) JOINT DYSFUNCTION: Primary | ICD-10-CM

## 2023-07-28 NOTE — TELEPHONE ENCOUNTER
[facility] Call Center    Phone Message    May a detailed message be left on voicemail: yes     Reason for Call: Symptoms or Concerns     If patient has red-flag symptoms, warm transfer to triage line    Current symptom or concern: Pain, stiffness and soreness after injection    Symptoms have been present for:  1 week(s)    Has patient previously been seen for this? Yes    By : Dr. Castellanos    Date: 7/20    Are there any new or worsening symptoms? Yes: Patient has had injections done before with relief from pain. This time the injections is not helping and seems to be making his pain worse. Please call back to discuss further.     Action Taken: Message routed to:  Other: [facility]    Travel Screening: Not Applicable

## 2023-07-29 NOTE — TELEPHONE ENCOUNTER
Patient called via the answering service 10:08 AM. Had Bilateral SI joint injections on 7/20 with 100% relief for about 2 days but then pain returned worse than prior to injections. He has had several injections in the past which have always provided several months relief.     Explained that sometimes pain will escalate but should calm down. Also that even though previous injections have worked, it is not unusal that one may not work.      Advised that he alternate Ibuprofen 400 mg and Tylenol 100 mg q4h, continue ice packs and TENS unit. INstructed to eat before ibuprofen doses and stay well hydrated.     I will route high priority to Pain RNs to review on Monday and check with Dr Castellanos.     RAJINDER Mcmahon, NP-C  [facility]

## 2023-07-31 RX ORDER — KETOROLAC TROMETHAMINE 10 MG/1
10 TABLET, FILM COATED ORAL EVERY 6 HOURS PRN
Qty: 20 TABLET | Refills: 0 | Status: SHIPPED | OUTPATIENT
Start: 2023-07-31 | End: 2023-12-18

## 2023-07-31 NOTE — TELEPHONE ENCOUNTER
"Temporary relief with \"like 8 salonpases on my back\" but other interventions not noted to be particularly effective.     Patient states that he would like medication prescribed to [facility] pharmacy in [place].     Routing prepped order to provider.    Raya Macdonald RN  [facility] [place]  [phone]    "

## 2023-07-31 NOTE — TELEPHONE ENCOUNTER
Could be a flare from a steroid would still give it more time. Could consider short rx of toradol 3-5 days

## 2023-08-12 ENCOUNTER — OFFICE VISIT (OUTPATIENT)
Dept: URGENT CARE | Facility: URGENT CARE | Age: 63
End: 2023-08-12
Payer: COMMERCIAL

## 2023-08-12 VITALS
RESPIRATION RATE: 16 BRPM | DIASTOLIC BLOOD PRESSURE: 78 MMHG | BODY MASS INDEX: 25.62 KG/M2 | TEMPERATURE: 98 F | HEART RATE: 64 BPM | SYSTOLIC BLOOD PRESSURE: 130 MMHG | OXYGEN SATURATION: 98 % | WEIGHT: 149.25 LBS

## 2023-08-12 DIAGNOSIS — F51.04 CHRONIC INSOMNIA: Primary | ICD-10-CM

## 2023-08-12 PROCEDURE — 99213 OFFICE O/P EST LOW 20 MIN: CPT | Performed by: PHYSICIAN ASSISTANT

## 2023-08-12 RX ORDER — TRAZODONE HYDROCHLORIDE 50 MG/1
50 TABLET, FILM COATED ORAL AT BEDTIME
Qty: 30 TABLET | Refills: 0 | Status: SHIPPED | OUTPATIENT
Start: 2023-08-12 | End: 2023-12-18

## 2023-08-12 ASSESSMENT — ENCOUNTER SYMPTOMS
FATIGUE: 0
SINUS PRESSURE: 0
PALPITATIONS: 0
SORE THROAT: 0
HYPERACTIVE: 0
CONFUSION: 0
RHINORRHEA: 0
NERVOUS/ANXIOUS: 1
CHEST TIGHTNESS: 0
DYSPHORIC MOOD: 0
RESPIRATORY NEGATIVE: 1
FEVER: 0
SINUS PAIN: 0
COUGH: 0
CHILLS: 0
SHORTNESS OF BREATH: 0
DECREASED CONCENTRATION: 0
GASTROINTESTINAL NEGATIVE: 1
AGITATION: 0
SLEEP DISTURBANCE: 1
HALLUCINATIONS: 0
CARDIOVASCULAR NEGATIVE: 1
WHEEZING: 0

## 2023-08-12 ASSESSMENT — PATIENT HEALTH QUESTIONNAIRE - PHQ9
5. POOR APPETITE OR OVEREATING: SEVERAL DAYS
SUM OF ALL RESPONSES TO PHQ QUESTIONS 1-9: 12

## 2023-08-12 ASSESSMENT — ANXIETY QUESTIONNAIRES
6. BECOMING EASILY ANNOYED OR IRRITABLE: SEVERAL DAYS
7. FEELING AFRAID AS IF SOMETHING AWFUL MIGHT HAPPEN: NEARLY EVERY DAY
5. BEING SO RESTLESS THAT IT IS HARD TO SIT STILL: SEVERAL DAYS
1. FEELING NERVOUS, ANXIOUS, OR ON EDGE: NEARLY EVERY DAY
2. NOT BEING ABLE TO STOP OR CONTROL WORRYING: NEARLY EVERY DAY
GAD7 TOTAL SCORE: 15
IF YOU CHECKED OFF ANY PROBLEMS ON THIS QUESTIONNAIRE, HOW DIFFICULT HAVE THESE PROBLEMS MADE IT FOR YOU TO DO YOUR WORK, TAKE CARE OF THINGS AT HOME, OR GET ALONG WITH OTHER PEOPLE: SOMEWHAT DIFFICULT
GAD7 TOTAL SCORE: 15
3. WORRYING TOO MUCH ABOUT DIFFERENT THINGS: NEARLY EVERY DAY

## 2023-08-12 NOTE — PROGRESS NOTES
Julio Cesar Murry is a 63 year old, presenting for the following health issues:  Urgent Care (Urgent care visit for difficulty sleeping for over one week.) and Insomnia (Sleeping difficulty for over one week. He has a history of anxiety and insomnia per the patient's report. He thinks he cannot sleep because of using medical marijuana. He took it every day for a week and stopped suddenly and that is when he became unable to sleep per the patient's report. He took Nyquil last night and that helped him have about 1/2 hour of sleep.)    HPI   Insomnia  Onset/Duration: chronic with worsening over the past 1week since taking his medical marijaunia daily.  He has since stopped the daily medical marijaunia but he continues to have difficulty sleeping.  Description:   Frequency of insomnia:  nightly  Time to fall asleep (sleep latency): unknown  Middle of night awakening:  YES  Early morning awakening:  No  Progression of Symptoms:  worsening  Accompanying Signs & Symptoms:  Daytime sleepiness/napping: No  Excessive snoring/apnea: No  Restless legs: No  Waking to urinate: No  Chronic pain:  No  Depression symptoms (if yes, do PHQ9): YES  Anxiety symptoms (if yes, do STEFANI-7): YES  History:  Prior Insomnia: YES  New stressful situation: No  Precipitating factors:   Caffeine intake: No  OTC decongestants: No  Any new medications: No  Alleviating factors:  Self medicating (alcohol, etc.):  No  Stress-reduction (exercise, yoga, meditation etc): No  Therapies tried and outcome: tylenol pm with minimal relief    Patient Active Problem List   Diagnosis    Malignant neoplasm of prostate (H)    CARDIOVASCULAR SCREENING; LDL GOAL LESS THAN 130    GERD (gastroesophageal reflux disease)    Overweight (BMI 25.0-29.9)    Fibromyalgia syndrome    Posttraumatic stress disorder    Low back pain    Inadequate material resources    Anxiety state    History of Asperger's syndrome    Pervasive developmental disorder, active    Depressive  disorder    Delusional disorder (H)    TBI (traumatic brain injury) (H)    Insomnia    Chronic pain    Myalgia    male stress incontinence    Major depressive disorder, recurrent episode, moderate (H)    Low back pain without sciatica, unspecified back pain laterality, unspecified chronicity    Alcohol dependence in remission (H)    Hypertensive response to exercise     Current Outpatient Medications   Medication    acetaminophen (TYLENOL) 500 MG tablet    amLODIPine (NORVASC) 2.5 MG tablet    Blood Pressure KIT    Camphor-Menthol-Methyl Sal (SALONPAS) 3.1-6-10 % PTCH    Cholecalciferol (VITAMIN D) 1000 UNIT capsule    ketorolac (TORADOL) 10 MG tablet    Nerve Stimulator (TENS THERAPY PAIN RELIEF) GEORGIE    olopatadine (PATADAY) 0.2 % ophthalmic solution    omeprazole (PRILOSEC) 20 MG DR capsule    oxyCODONE (ROXICODONE) 5 MG tablet    polyethylene glycol-propylene glycol (SYSTANE ULTRA) 0.4-0.3 % SOLN ophthalmic solution    Pseudoeph-Doxylamine-DM-APAP (NYQUIL PO)    sildenafil (VIAGRA) 100 MG tablet     No current facility-administered medications for this visit.       Review of Systems   Constitutional:  Negative for chills, fatigue and fever.   HENT:  Negative for congestion, ear discharge, ear pain, hearing loss, rhinorrhea, sinus pressure, sinus pain and sore throat.    Respiratory: Negative.  Negative for cough, chest tightness, shortness of breath and wheezing.    Cardiovascular: Negative.  Negative for chest pain, palpitations and peripheral edema.   Gastrointestinal: Negative.    Psychiatric/Behavioral:  Positive for mood changes and sleep disturbance. Negative for agitation, behavioral problems, confusion, decreased concentration, dysphoric mood, hallucinations, self-injury and suicidal ideas. The patient is nervous/anxious. The patient is not hyperactive.    All other systems reviewed and are negative.           Objective    /78 (BP Location: Left arm, Patient Position: Sitting, Cuff Size: Adult  Regular)   Pulse 64   Temp 98  F (36.7  C) (Tympanic)   Resp 16   Wt 67.7 kg (149 lb 4 oz)   SpO2 98%   BMI 25.62 kg/m    Body mass index is 25.62 kg/m .  Physical Exam  Vitals and nursing note reviewed.   Constitutional:       General: He is not in acute distress.     Appearance: Normal appearance. He is well-developed and normal weight. He is not ill-appearing.   Cardiovascular:      Rate and Rhythm: Normal rate and regular rhythm.      Pulses: Normal pulses.      Heart sounds: Normal heart sounds, S1 normal and S2 normal. No murmur heard.     No friction rub. No gallop.   Pulmonary:      Effort: Pulmonary effort is normal. No respiratory distress.      Breath sounds: Normal breath sounds and air entry. No stridor. No wheezing or rales.   Skin:     General: Skin is warm.      Capillary Refill: Capillary refill takes less than 2 seconds.   Neurological:      Mental Status: He is alert and oriented to person, place, and time.      Sensory: Sensation is intact.      Motor: Motor function is intact.   Psychiatric:         Mood and Affect: Mood normal. Affect is flat.         Behavior: Behavior normal.         Thought Content: Thought content normal. Thought content is not paranoid. Thought content does not include homicidal or suicidal ideation. Thought content does not include suicidal plan.         Judgment: Judgment normal.          Assessment/Plan:  Chronic insomnia:  This has worsened since taking his medical marijaunia daily which he has since stopped.  Will give trazodone for insomnia, discussed sleep hygiene.  No active SI/HI.  Recheck in clinic with his PCP if symptoms worsen or if symptoms do not improve.  He also has appointment with the [facility] next week as well.  -     traZODone (DESYREL) 50 MG tablet; Take 1 tablet (50 mg) by mouth At Bedtime        Oralia Moseley PA-C

## 2023-08-13 ENCOUNTER — NURSE TRIAGE (OUTPATIENT)
Dept: NURSING | Facility: CLINIC | Age: 63
End: 2023-08-13
Payer: COMMERCIAL

## 2023-08-13 ENCOUNTER — MYC MEDICAL ADVICE (OUTPATIENT)
Dept: FAMILY MEDICINE | Facility: CLINIC | Age: 63
End: 2023-08-13
Payer: COMMERCIAL

## 2023-08-13 NOTE — TELEPHONE ENCOUNTER
Nurse Triage SBAR    Is this a 2nd Level Triage? NO    Situation: Medication Question    Background: :Patient was prescribed Trazodone yesterday at .    Assessment: He reports that he had taken a dose of Trazodone at 2130 and is wondering if able to take an additional dose.    Protocol Recommended Disposition:   According to the protocol, patient should call PCP when clinic is open.  Care advice given to contact provider by E-Visit with next available provider to discuss with provider as unable to contact on-call providers for non essential medications. Patient verbalizes understanding and agrees with plan of care. Transferred to scheduling to assist with E-Visit.    Lizet Carreon RN  08/13/23 1:33 AM  [facility] Nurse Advisor      Reason for Disposition   [1] Caller has NON-URGENT medicine question about med that PCP prescribed AND [2] triager unable to answer question    Additional Information   Negative: [1] Intentional drug overdose AND [2] suicidal thoughts or ideas   Negative: Drug overdose and triager unable to answer question   Negative: Caller requesting a renewal or refill of a medicine patient is currently taking   Negative: Caller requesting information unrelated to medicine   Negative: Caller requesting information about COVID-19 Vaccine   Negative: Caller requesting information about Emergency Contraception   Negative: Caller requesting information about Combined Birth Control Pills   Negative: Caller requesting information about Progestin Birth Control Pills   Negative: Caller requesting information about Post-Op pain or medicines   Negative: Caller requesting a prescription antibiotic (such as Penicillin) for Strep throat and has a positive culture result   Negative: Caller requesting a prescription anti-viral med (such as Tamiflu) and has influenza (flu) symptoms   Negative: Immunization reaction suspected   Negative: Rash while taking a medicine or within 3 days of stopping it    Negative: [1] Asthma and [2] having symptoms of asthma (cough, wheezing, etc.)   Negative: [1] Symptom of illness (e.g., headache, abdominal pain, earache, vomiting) AND [2] more than mild   Negative: Breastfeeding questions about mother's medicines and diet   Negative: MORE THAN A DOUBLE DOSE of a prescription or over-the-counter (OTC) drug   Negative: [1] DOUBLE DOSE (an extra dose or lesser amount) of prescription drug AND [2] any symptoms (e.g., dizziness, nausea, pain, sleepiness)   Negative: [1] DOUBLE DOSE (an extra dose or lesser amount) of over-the-counter (OTC) drug AND [2] any symptoms (e.g., dizziness, nausea, pain, sleepiness)   Negative: Took another person's prescription drug   Negative: [1] DOUBLE DOSE (an extra dose or lesser amount) of prescription drug AND [2] NO symptoms (Exception: a double dose of antibiotics)   Negative: Diabetes drug error or overdose (e.g., took wrong type of insulin or took extra dose)   Negative: [1] Prescription not at pharmacy AND [2] was prescribed by PCP recently (Exception: triager has access to EMR and prescription is recorded there. Go to Home Care and confirm for pharmacy.)   Negative: [1] Pharmacy calling with prescription question AND [2] triager unable to answer question   Negative: [1] Caller has URGENT medicine question about med that PCP or specialist prescribed AND [2] triager unable to answer question   Negative: Medicine patch causing local rash or itching   Negative: [1] Caller has medicine question about med NOT prescribed by PCP AND [2] triager unable to answer question (e.g., compatibility with other med, storage)   Negative: Prescription request for new medicine (not a refill)    Protocols used: Medication Question Call-A-

## 2023-08-13 NOTE — TELEPHONE ENCOUNTER
Patient with persistent insomnia, was prescribed Trazadone yesterday at . Took at 2130 and is wondering if he can take an additional dose because he is not even remotely tired. Patient reports he hasn't slept in 8 days. Spoke to triage nurse previously, but really would like to hear from a provider on whether he can take an additional dose of trazadone.    Dr. Murray paged [phone] - OK to take 2nd 50 mg tab this evening and OK to take 2 tabs as needed going forward, but patient needs to be seen by PCP to go over this change/ need and for a new script for this down the line.     Call back to patient to report the physician's recommendations; stressed to patient to not take more than recommended. Patient agrees, will follow instructions and will schedule appointment. Encouraged patient to call back if symptoms fail to improve/worsen.     Reason for Disposition   Nursing judgment or information in reference    Protocols used: No Guideline [id number]

## 2023-08-28 ENCOUNTER — TELEPHONE (OUTPATIENT)
Dept: PALLIATIVE MEDICINE | Facility: CLINIC | Age: 63
End: 2023-08-28
Payer: COMMERCIAL

## 2023-08-28 NOTE — TELEPHONE ENCOUNTER
Contacted patient and informed of medications used for 7//20/2023 SI joint injection.  Patient denies any further questions at this time.     Raya Macdonald RN  [facility]  [phone]

## 2023-08-28 NOTE — TELEPHONE ENCOUNTER
Reason for call:  Other   Patient called regarding (reason for call): call back  Additional comments: pt requesting the medication that is given in his SI Joint injections. Please call to advise.    Phone number to reach patient:  Home number on file [phone] (home)    Can we leave a detailed message on this number?  YES    Travel screening: Not Applicable        Maria Elena Loving    [facility] [place]

## 2023-09-02 ENCOUNTER — MYC MEDICAL ADVICE (OUTPATIENT)
Dept: FAMILY MEDICINE | Facility: CLINIC | Age: 63
End: 2023-09-02
Payer: COMMERCIAL

## 2023-09-12 ENCOUNTER — MYC MEDICAL ADVICE (OUTPATIENT)
Dept: FAMILY MEDICINE | Facility: CLINIC | Age: 63
End: 2023-09-12
Payer: COMMERCIAL

## 2023-10-03 ENCOUNTER — NURSE TRIAGE (OUTPATIENT)
Dept: NURSING | Facility: CLINIC | Age: 63
End: 2023-10-03
Payer: COMMERCIAL

## 2023-10-03 NOTE — TELEPHONE ENCOUNTER
"  Nurse Triage SBAR    Is this a 2nd Level Triage? YES, LICENSED PRACTITIONER REVIEW IS REQUIRED    Situation: Abdominal pain.    Background: Patient reports he ate some baked chicken he purchased at [employer] around 2 pm today. Around 4 pm, he began to have abdominal pain. He states the chicken had been in the freezer for a while, so he is concerned. He reports the chicken had a \"chemical teste\" to it.    Assessment: Rates abdominal pain 6/10, consistent since 4 pm. Denies any vomiting or diarrhea. Reports he has Prilosec at home d/t having stomach problems in the past. He is wondering if he should take it. States he cannot go to ED d/t son with special needs.    Protocol Recommended Disposition:   See HCP Within 4 Hours (Or PCP Triage)    Recommendation: Page sent to on-call provider for recommendation. Gave care advice and call back instructions. Patient plans to follow advice.     Paged to provider    On-call provider recommended taking Prilosec BID, and TUMS PRN. If patient is able to keep fluids down and stay hydrated, it is ok to manage symptoms at home. If he starts having diarrhea and abdominal pain gets worse, then he should go in to be seen.    Provider Recommendation Follow Up:   Reached patient/caregiver. Informed of provider's recommendations. Patient verbalized understanding and agrees with the plan.         Does the patient meet one of the following criteria for ADS visit consideration? 16+ years old, with an [facility] PCP     TIP  Providers, please consider if this condition is appropriate for management at one of our Acute and Diagnostic Services sites.     If patient is a good candidate, please use dotphrase <dot>triageresponse and select Refer to ADS to document.      Reason for Disposition   [1] MILD-MODERATE pain AND [2] constant AND [3] present > 2 hours    Additional Information   Negative: Shock suspected (e.g., cold/pale/clammy skin, too weak to stand, low BP, rapid pulse)   Negative: Difficult to " "awaken or acting confused (e.g., disoriented, slurred speech)   Negative: Passed out (i.e., lost consciousness, collapsed and was not responding)   Negative: Sounds like a life-threatening emergency to the triager   Negative: Chest pain   Negative: Pain is mainly in upper abdomen  (if needed ask: \"is it mainly above the belly button?\")   Negative: Followed an abdomen (stomach) injury   Negative: Abdomen bloating or swelling are main symptoms   Negative: [1] SEVERE pain (e.g., excruciating) AND [2] present > 1 hour   Negative: [1] SEVERE pain AND [2] age > 60 years   Negative: [1] Vomiting AND [2] contains red blood or black (\"coffee ground\") material  (Exception: Few red streaks in vomit that only happened once.)   Negative: Blood in bowel movements  (Exception: Blood on surface of BM with constipation.)   Negative: Black or tarry bowel movements  (Exception: Chronic-unchanged black-grey BMs AND is taking iron pills or Pepto-Bismol.)   Negative: [1] Unable to urinate (or only a few drops) > 4 hours AND [2] bladder feels very full (e.g., palpable bladder or strong urge to urinate)   Negative: [1] Vomiting AND [2] contains bile (green color)   Negative: [1] Pain in the scrotum or testicle AND [2] present > 1 hour   Negative: Patient sounds very sick or weak to the triager    Protocols used: Abdominal Pain - Male-A-AH    "

## 2023-10-17 ENCOUNTER — MYC MEDICAL ADVICE (OUTPATIENT)
Dept: FAMILY MEDICINE | Facility: CLINIC | Age: 63
End: 2023-10-17
Payer: COMMERCIAL

## 2023-11-02 ENCOUNTER — MYC MEDICAL ADVICE (OUTPATIENT)
Dept: FAMILY MEDICINE | Facility: CLINIC | Age: 63
End: 2023-11-02
Payer: COMMERCIAL

## 2023-11-02 ENCOUNTER — TELEPHONE (OUTPATIENT)
Dept: FAMILY MEDICINE | Facility: CLINIC | Age: 63
End: 2023-11-02
Payer: COMMERCIAL

## 2023-11-02 DIAGNOSIS — K76.9 LIVER LESION: Primary | ICD-10-CM

## 2023-11-02 DIAGNOSIS — R19.09 RIGHT GROIN MASS: ICD-10-CM

## 2023-11-02 DIAGNOSIS — E04.1 THYROID NODULE: ICD-10-CM

## 2023-11-02 NOTE — TELEPHONE ENCOUNTER
Test Results    Contacts         Type Contact Phone/Fax    11/02/2023 10:56 AM CDT Phone (Incoming) VinayJeanmarie (Self) [phone] (H)     Ok to leave a detailed            Who ordered the test:  Haider Austin at the [facility]     Type of test: Lab and Cat scan     Date of test:  10/26/23    Where was the test performed:  [facility]    What are your questions/concerns?:  If he has cancer     Could we [facility] or would you prefer to receive a phone call?:   Patient would prefer a phone call   Okay to leave a detailed message?: Yes at Home number on file [phone] (home)

## 2023-11-02 NOTE — TELEPHONE ENCOUNTER
Reviewed the chart and I don't see that we have [facility] the [facility].  Called and spoke to the patient and he states that he has the results of the labs and CT and he will fax this over to us. He would like Dr Iglesias to review the results and advise and maybe order an MRI (patient requesting outside [facility], it will still be covered per patient). [facility] to go over the results. Patient states that he can not wait that long, patient is afraid his cancer is back. Waiting for results to be faxed.  Sheila Kam MA  [facility]   Primary Care

## 2023-11-02 NOTE — TELEPHONE ENCOUNTER
Received [facility] of the the [facility] results:  Consultation Report   Patient signed KIERA  Radiology reports  Lab results  CT Chest w contrast report  Placed in Dr Iglesias's box to advise. Please send to abstracting after review.    Sheila Kam [facility]   Primary Care

## 2023-11-03 ENCOUNTER — TELEPHONE (OUTPATIENT)
Dept: FAMILY MEDICINE | Facility: CLINIC | Age: 63
End: 2023-11-03
Payer: COMMERCIAL

## 2023-11-03 NOTE — TELEPHONE ENCOUNTER
Patient calling back in regards to a missed call from provider. Read patient Deep Driverhart message from Dr. Iglesias today: also given patient imaging scheduling number. Patient transferred to  to discuss medical [facility].      Dr. Iglesias message from today 11/3/23:  Nikolas Murry,     I reviewed the results. Nothing conclusive. I ordered the MRI scan and thyroid ultrasound. I am not sure if we have the open MRI or not. You can call [phone] to schedule. If we do not have the open MRI, we will need to make a referral out of "[facility] [place] for this. You can request the [facility] provider to do this as well if they have it.     Sincerely,  Milton Iglesias MD    Patient also reports he has been having chest pain for the past two months. States been taking aspirin to help thin the blood. Advised if having chest pain now, to call 911 or go to the ER immediately. Patient reports he has a plan to be evaluated in the urgent care, and urgent care will call 911 and ambulance will transport to hospital. Encouraged patient to go to the ER now and advise against patient current plan. Patient understands and declines advice.    Lizet Chinchilla RN

## 2023-11-04 ENCOUNTER — MYC MEDICAL ADVICE (OUTPATIENT)
Dept: FAMILY MEDICINE | Facility: CLINIC | Age: 63
End: 2023-11-04
Payer: COMMERCIAL

## 2023-11-04 DIAGNOSIS — F40.240 CLAUSTROPHOBIA: Primary | ICD-10-CM

## 2023-11-06 ENCOUNTER — MYC MEDICAL ADVICE (OUTPATIENT)
Dept: FAMILY MEDICINE | Facility: CLINIC | Age: 63
End: 2023-11-06
Payer: COMMERCIAL

## 2023-11-06 NOTE — TELEPHONE ENCOUNTER
See MyChart encounter below. Routing to provider to review and advise.    Patient requesting meds to help him relax for MRI, pharmacy pended. Patient also asking if MRI will detect thyroid concerns or asking if he still needs CT.      SONNY PhilipN, RN  [facility]

## 2023-11-07 RX ORDER — LORAZEPAM 1 MG/1
1 TABLET ORAL ONCE
Qty: 1 TABLET | Refills: 0 | Status: SHIPPED | OUTPATIENT
Start: 2023-11-07 | End: 2023-11-07

## 2023-11-08 ENCOUNTER — MYC MEDICAL ADVICE (OUTPATIENT)
Dept: FAMILY MEDICINE | Facility: CLINIC | Age: 63
End: 2023-11-08
Payer: COMMERCIAL

## 2023-11-09 ENCOUNTER — MYC MEDICAL ADVICE (OUTPATIENT)
Dept: FAMILY MEDICINE | Facility: CLINIC | Age: 63
End: 2023-11-09
Payer: COMMERCIAL

## 2023-11-14 ENCOUNTER — VIRTUAL VISIT (OUTPATIENT)
Dept: FAMILY MEDICINE | Facility: CLINIC | Age: 63
End: 2023-11-14
Payer: COMMERCIAL

## 2023-11-14 DIAGNOSIS — R07.9 NONSPECIFIC CHEST PAIN: ICD-10-CM

## 2023-11-14 DIAGNOSIS — F10.10 ALCOHOL ABUSE: ICD-10-CM

## 2023-11-14 DIAGNOSIS — K76.9 LIVER LESION: ICD-10-CM

## 2023-11-14 DIAGNOSIS — E04.1 THYROID NODULE: ICD-10-CM

## 2023-11-14 DIAGNOSIS — R19.09 RIGHT GROIN MASS: Primary | ICD-10-CM

## 2023-11-14 PROCEDURE — 99441 PR PHYSICIAN TELEPHONE EVALUATION 5-10 MIN: CPT | Mod: 93 | Performed by: FAMILY MEDICINE

## 2023-11-14 ASSESSMENT — PATIENT HEALTH QUESTIONNAIRE - PHQ9
SUM OF ALL RESPONSES TO PHQ QUESTIONS 1-9: 10
10. IF YOU CHECKED OFF ANY PROBLEMS, HOW DIFFICULT HAVE THESE PROBLEMS MADE IT FOR YOU TO DO YOUR WORK, TAKE CARE OF THINGS AT HOME, OR GET ALONG WITH OTHER PEOPLE: NOT DIFFICULT AT ALL
SUM OF ALL RESPONSES TO PHQ QUESTIONS 1-9: 10

## 2023-11-14 NOTE — PROGRESS NOTES
Jeanmarie is a 63 year old who is being evaluated via a billable telephone visit.      What phone number would you like to be contacted at? [phone]   How would you like to obtain your AVS? [place]    Distant Location (provider location):  On-site      Subjective   Jeanmarie is a 63 year old, presenting for the following health issues:  No chief complaint on file.        11/14/2023     1:18 PM   Additional Questions   Roomed by shon       Patient has questions on MRI and the medications about it and if it will interfere with other medications  Patient would like to discuss High blood pressure   Patient would like a referral to get arteries blockage looked at that might lead to stroke or heart attack. Patient was recently in ER and EKG and troponin was negative.    Patient would like to talk about what was found about thyroid     Patient would like to check his liver. Patient has been self-medicating with alcohol for anxiety.    History of Present Illness       Reason for visit:  MRI,  referral, Med questions, Liver    He eats 0-1 servings of fruits and vegetables daily.He consumes 0 sweetened beverage(s) daily.He exercises with enough effort to increase his heart rate 9 or less minutes per day.  He exercises with enough effort to increase his heart rate 3 or less days per week.   He is taking medications regularly.       Review of Systems   Constitutional, HEENT, cardiovascular, pulmonary, GI, , musculoskeletal, neuro, skin, endocrine and psych systems are negative, except as otherwise noted.      Objective           Vitals:  No vitals were obtained today due to virtual visit.    Physical Exam   healthy, alert, and no distress  PSYCH: Alert and oriented times 3; coherent speech, normal   rate and volume, able to articulate logical thoughts, able   to abstract reason, no tangential thoughts, no hallucinations   or delusions  His affect is normal  RESP: No cough, no audible wheezing, able to talk in full  sentences  Remainder of exam unable to be completed due to telephone visits    A/P:  (R19.09) Right groin mass  (primary encounter diagnosis)  Comment:   [facility]. Patient had biopsy with inconclusive findings. MRI scan ordered for further evaluation.    (K76.9) Liver lesion  Comment:   Plan: MRI scan ordered for further evaluation.    (E04.1) Thyroid nodule  Comment:   Plan: thyroid ultrasound ordered.    (R07.9) Nonspecific chest pain  Comment:   Plan: Echocardiogram Exercise Stress        Likely non-cardiac. Will check exercise stress test.    (F10.10) Alcohol abuse  Comment:   Plan: Comprehensive metabolic panel (BMP + Alb, Alk         Phos, ALT, AST, Total. Bili, TP)        Recheck liver tests.    Milton Iglesias MD          Phone call duration: 9 minutes

## 2023-11-17 ENCOUNTER — LAB (OUTPATIENT)
Dept: LAB | Facility: CLINIC | Age: 63
End: 2023-11-17
Payer: COMMERCIAL

## 2023-11-17 DIAGNOSIS — F10.10 ALCOHOL ABUSE: ICD-10-CM

## 2023-11-17 LAB
ALBUMIN SERPL BCG-MCNC: 4.5 G/DL (ref 3.5–5.2)
ALP SERPL-CCNC: 96 U/L (ref 40–150)
ALT SERPL W P-5'-P-CCNC: 26 U/L (ref 0–70)
ANION GAP SERPL CALCULATED.3IONS-SCNC: 8 MMOL/L (ref 7–15)
AST SERPL W P-5'-P-CCNC: 33 U/L (ref 0–45)
BILIRUB SERPL-MCNC: 0.6 MG/DL
BUN SERPL-MCNC: 13.2 MG/DL (ref 8–23)
CALCIUM SERPL-MCNC: 10.1 MG/DL (ref 8.8–10.2)
CHLORIDE SERPL-SCNC: 103 MMOL/L (ref 98–107)
CREAT SERPL-MCNC: 0.9 MG/DL (ref 0.67–1.17)
DEPRECATED HCO3 PLAS-SCNC: 28 MMOL/L (ref 22–29)
EGFRCR SERPLBLD CKD-EPI 2021: >90 ML/MIN/1.73M2
GLUCOSE SERPL-MCNC: 96 MG/DL (ref 70–99)
POTASSIUM SERPL-SCNC: 5 MMOL/L (ref 3.4–5.3)
PROT SERPL-MCNC: 7.7 G/DL (ref 6.4–8.3)
SODIUM SERPL-SCNC: 139 MMOL/L (ref 135–145)

## 2023-11-17 PROCEDURE — 80053 COMPREHEN METABOLIC PANEL: CPT

## 2023-11-17 PROCEDURE — 36415 COLL VENOUS BLD VENIPUNCTURE: CPT

## 2023-12-06 ENCOUNTER — ANCILLARY PROCEDURE (OUTPATIENT)
Dept: ULTRASOUND IMAGING | Facility: CLINIC | Age: 63
End: 2023-12-06
Attending: FAMILY MEDICINE
Payer: COMMERCIAL

## 2023-12-06 ENCOUNTER — ANCILLARY PROCEDURE (OUTPATIENT)
Dept: MRI IMAGING | Facility: CLINIC | Age: 63
End: 2023-12-06
Attending: FAMILY MEDICINE
Payer: COMMERCIAL

## 2023-12-06 DIAGNOSIS — R19.09 RIGHT GROIN MASS: ICD-10-CM

## 2023-12-06 DIAGNOSIS — K76.9 LIVER LESION: ICD-10-CM

## 2023-12-06 DIAGNOSIS — E04.1 THYROID NODULE: ICD-10-CM

## 2023-12-06 PROCEDURE — 74183 MRI ABD W/O CNTR FLWD CNTR: CPT | Mod: GC | Performed by: RADIOLOGY

## 2023-12-06 PROCEDURE — 72197 MRI PELVIS W/O & W/DYE: CPT | Mod: GC | Performed by: RADIOLOGY

## 2023-12-06 PROCEDURE — A9585 GADOBUTROL INJECTION: HCPCS | Mod: JZ | Performed by: RADIOLOGY

## 2023-12-06 PROCEDURE — 76536 US EXAM OF HEAD AND NECK: CPT

## 2023-12-06 RX ORDER — GADOBUTROL 604.72 MG/ML
7.5 INJECTION INTRAVENOUS ONCE
Status: COMPLETED | OUTPATIENT
Start: 2023-12-06 | End: 2023-12-06

## 2023-12-06 RX ADMIN — GADOBUTROL 7 ML: 604.72 INJECTION INTRAVENOUS at 09:54

## 2023-12-07 ENCOUNTER — MYC MEDICAL ADVICE (OUTPATIENT)
Dept: FAMILY MEDICINE | Facility: CLINIC | Age: 63
End: 2023-12-07
Payer: COMMERCIAL

## 2023-12-07 DIAGNOSIS — R22.41 MASS OF RIGHT THIGH: ICD-10-CM

## 2023-12-07 DIAGNOSIS — N52.31 ERECTILE DYSFUNCTION FOLLOWING RADICAL PROSTATECTOMY: ICD-10-CM

## 2023-12-07 DIAGNOSIS — E04.1 THYROID NODULE: Primary | ICD-10-CM

## 2023-12-07 RX ORDER — SILDENAFIL 100 MG/1
TABLET, FILM COATED ORAL
Qty: 10 TABLET | Refills: 5 | Status: SHIPPED | OUTPATIENT
Start: 2023-12-07 | End: 2024-06-06

## 2023-12-07 NOTE — TELEPHONE ENCOUNTER
Patient called and notified of results and plan. FNA for thyroid nodules. Referred to Orthopedics for right thigh mass.     Patient wanted viagra. This was sent.    Milton Iglesias MD

## 2023-12-11 ENCOUNTER — NURSE TRIAGE (OUTPATIENT)
Dept: NURSING | Facility: CLINIC | Age: 63
End: 2023-12-11
Payer: COMMERCIAL

## 2023-12-11 NOTE — TELEPHONE ENCOUNTER
Patient has questions concerning the biopsy he is suppose to have to his thigh, referral is still pending to orthopedics, he states he was told by the  it needs to be done within the next three days. 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 He is requesting a phone call (OK to leave message) from his PCP to discuss this matter today. He also uses my chart for communication.   Rachelle Johnson RN on 12/11/2023 at 10:55 AM    Reason for Disposition   Nursing judgment    Additional Information   Negative: New-onset or worsening symptoms, see that protocol (e.g., diarrhea, runny nose, sore throat)   Negative: Medicine question not related to refill or renewal   Negative: Requesting a renewal or refill of a medicine patient is currently taking   Negative: Questions or concerns about high blood pressure   Negative: Nursing judgment   Negative: Nursing judgment    Protocols used: Information Only Call - No Triage-A-OH

## 2023-12-12 NOTE — TELEPHONE ENCOUNTER
Patient having difficulty in getting care for Phi. Patient stated he will be able to get this after the New Year. Discussed since [facility] that did not show anything concerning but inconclusive, okay to wait after the New Year to get this done.    Milton Iglesias MD

## 2023-12-18 ENCOUNTER — OFFICE VISIT (OUTPATIENT)
Dept: OPTOMETRY | Facility: CLINIC | Age: 63
End: 2023-12-18
Payer: COMMERCIAL

## 2023-12-18 DIAGNOSIS — H52.13 MYOPIA OF BOTH EYES: ICD-10-CM

## 2023-12-18 DIAGNOSIS — H10.13 ALLERGIC CONJUNCTIVITIS OF BOTH EYES: ICD-10-CM

## 2023-12-18 DIAGNOSIS — H04.123 DRY EYE SYNDROME OF BOTH EYES: ICD-10-CM

## 2023-12-18 DIAGNOSIS — Z01.00 EXAMINATION OF EYES AND VISION: Primary | ICD-10-CM

## 2023-12-18 DIAGNOSIS — H52.4 PRESBYOPIA: ICD-10-CM

## 2023-12-18 DIAGNOSIS — H52.223 REGULAR ASTIGMATISM OF BOTH EYES: ICD-10-CM

## 2023-12-18 PROCEDURE — 92014 COMPRE OPH EXAM EST PT 1/>: CPT | Performed by: OPTOMETRIST

## 2023-12-18 PROCEDURE — 92015 DETERMINE REFRACTIVE STATE: CPT | Performed by: OPTOMETRIST

## 2023-12-18 RX ORDER — POLYETHYLENE GLYCOL 400 AND PROPYLENE GLYCOL 4; 3 MG/ML; MG/ML
1 SOLUTION/ DROPS OPHTHALMIC 4 TIMES DAILY
Qty: 6 ML | Refills: 12 | Status: SHIPPED | OUTPATIENT
Start: 2023-12-18 | End: 2024-02-21

## 2023-12-18 RX ORDER — OLOPATADINE HYDROCHLORIDE 2 MG/ML
1 SOLUTION/ DROPS OPHTHALMIC DAILY
Qty: 2.5 ML | Refills: 11 | Status: SHIPPED | OUTPATIENT
Start: 2023-12-18 | End: 2024-05-06

## 2023-12-18 ASSESSMENT — CUP TO DISC RATIO
OS_RATIO: 0.4
OD_RATIO: 0.4

## 2023-12-18 ASSESSMENT — SLIT LAMP EXAM - LIDS
COMMENTS: NORMAL
COMMENTS: NORMAL

## 2023-12-18 ASSESSMENT — REFRACTION_WEARINGRX
OD_AXIS: 030
OD_SPHERE: -1.75
OS_CYLINDER: +0.25
OS_SPHERE: -1.50
OD_CYLINDER: +0.50
OS_AXIS: 163
SPECS_TYPE: DIDNT BRING

## 2023-12-18 ASSESSMENT — VISUAL ACUITY
OS_SC+: -1
OD_SC: 20/20
METHOD: SNELLEN - LINEAR
OD_SC: 20/40
OS_SC: 20/20-2
OS_SC: 20/40

## 2023-12-18 ASSESSMENT — EXTERNAL EXAM - RIGHT EYE: OD_EXAM: NORMAL

## 2023-12-18 ASSESSMENT — REFRACTION_MANIFEST
OS_ADD: +2.00
OD_SPHERE: -1.75
OD_ADD: +2.00
OS_CYLINDER: +0.25
OD_AXIS: 030
OS_AXIS: 163
OS_SPHERE: -1.50
OD_CYLINDER: +0.50

## 2023-12-18 ASSESSMENT — KERATOMETRY
OD_AXISANGLE2_DEGREES: 160
OD_K2POWER_DIOPTERS: 44.25
OD_K1POWER_DIOPTERS: 44.00
OD_AXISANGLE_DEGREES: 070
OS_AXISANGLE_DEGREES: 094
OS_K1POWER_DIOPTERS: 44.25
OS_AXISANGLE2_DEGREES: 004
OS_K2POWER_DIOPTERS: 44.75

## 2023-12-18 ASSESSMENT — CONF VISUAL FIELD
OD_SUPERIOR_TEMPORAL_RESTRICTION: 0
OD_SUPERIOR_NASAL_RESTRICTION: 0
OS_SUPERIOR_NASAL_RESTRICTION: 0
OS_INFERIOR_NASAL_RESTRICTION: 0
OS_NORMAL: 1
OS_INFERIOR_TEMPORAL_RESTRICTION: 0
OD_INFERIOR_NASAL_RESTRICTION: 0
OD_INFERIOR_TEMPORAL_RESTRICTION: 0
OD_NORMAL: 1
OS_SUPERIOR_TEMPORAL_RESTRICTION: 0

## 2023-12-18 ASSESSMENT — TONOMETRY
OS_IOP_MMHG: 15
IOP_METHOD: TONOPEN
OD_IOP_MMHG: 13

## 2023-12-18 ASSESSMENT — EXTERNAL EXAM - LEFT EYE: OS_EXAM: NORMAL

## 2023-12-18 NOTE — PROGRESS NOTES
Chief Complaint   Patient presents with    Annual Eye Exam      Accompanied by son  Last Eye Exam: 12-  Dilated Previously: Yes    What are you currently using to see?  glasses       Distance Vision Acuity: Satisfied with vision    Near Vision Acuity: Satisfied with vision while reading  unaided    Eye Comfort: good  Do you use eye drops? : Yes: systane-   Occupation or Hobbies: retired     Phuong Oakes Optometric Assistant, A.B[employer]      Medical, surgical and family histories reviewed and updated 12/18/2023.       OBJECTIVE: See Ophthalmology exam    ASSESSMENT:    ICD-10-CM    1. Examination of eyes and vision  Z01.00 EYE EXAM (SIMPLE-NONBILLABLE)      2. Myopia of both eyes  H52.13 REFRACTION      3. Regular astigmatism of both eyes  H52.223 REFRACTION      4. Presbyopia  H52.4 REFRACTION      5. Dry eye syndrome of both eyes  H04.123 EYE EXAM (SIMPLE-NONBILLABLE)     polyethylene glycol-propylene glycol (SYSTANE ULTRA) 0.4-0.3 % SOLN ophthalmic solution      6. Allergic conjunctivitis of both eyes  H10.13 EYE EXAM (SIMPLE-NONBILLABLE)     olopatadine (PATADAY) 0.2 % ophthalmic solution          PLAN:     Patient Instructions   Eyeglass prescription given.  No change in eyeglass prescription.    Artificial tears- 1 drop both eyes once before bedtime and once in the morning then 2 x day as needed.       Pataday to be used once daily for itchy eyes.  Use as needed. Contact your pharmacy for refills.     Return in 1 year for a complete eye exam or sooner if needed.    Willis Jones, OD

## 2023-12-18 NOTE — LETTER
12/18/2023         RE: Jeanmarie Mclean  [address]  [place] [address]        Dear Colleague,    Thank you for referring your patient, Jeanmarie Mclean, to the [facility]. Please see a copy of my visit note below.    Chief Complaint   Patient presents with     Annual Eye Exam      Accompanied by son  Last Eye Exam: 12-  Dilated Previously: Yes    What are you currently using to see?  glasses       Distance Vision Acuity: Satisfied with vision    Near Vision Acuity: Satisfied with vision while reading  unaided    Eye Comfort: good  Do you use eye drops? : Yes: systane-   Occupation or Hobbies: retired     Phuong Oakes Optometric Assistant, A.B.O.C.      Medical, surgical and family histories reviewed and updated 12/18/2023.       OBJECTIVE: See Ophthalmology exam    ASSESSMENT:    ICD-10-CM    1. Examination of eyes and vision  Z01.00 EYE EXAM (SIMPLE-NONBILLABLE)      2. Myopia of both eyes  H52.13 REFRACTION      3. Regular astigmatism of both eyes  H52.223 REFRACTION      4. Presbyopia  H52.4 REFRACTION      5. Dry eye syndrome of both eyes  H04.123 EYE EXAM (SIMPLE-NONBILLABLE)     polyethylene glycol-propylene glycol (SYSTANE ULTRA) 0.4-0.3 % SOLN ophthalmic solution      6. Allergic conjunctivitis of both eyes  H10.13 EYE EXAM (SIMPLE-NONBILLABLE)     olopatadine (PATADAY) 0.2 % ophthalmic solution          PLAN:     Patient Instructions   Eyeglass prescription given.  No change in eyeglass prescription.    Artificial tears- 1 drop both eyes once before bedtime and once in the morning then 2 x day as needed.       Pataday to be used once daily for itchy eyes.  Use as needed. Contact your pharmacy for refills.     Return in 1 year for a complete eye exam or sooner if needed.    Willis Jones, KATHY         Again, thank you for allowing me to participate in the care of your patient.        Sincerely,        Willis Jones, OD

## 2023-12-18 NOTE — PATIENT INSTRUCTIONS
Eyeglass prescription given.  No change in eyeglass prescription.    Artificial tears- 1 drop both eyes once before bedtime and once in the morning then 2 x day as needed.       Pataday to be used once daily for itchy eyes.  Use as needed. Contact your pharmacy for refills.     Return in 1 year for a complete eye exam or sooner if needed.    Willis Jones, OD    The affects of the dilating drops last for 4- 6 hours.  You will be more sensitive to light and vision will be blurry up close.  Do not drive if you do not feel comfortable.  Mydriatic sunglasses were given if needed.      Optometry Providers       Clinic Locations                                 Telephone Number   Dr. Milagro Spence [place]    [facility]  [place] [phone]     [facility] Hours:                [place] [facility] Hours:       [place] [facility] Hours:   [address]   [address]      [address] [place] [address]   [place] [address]    [place] [address]  Phone: [phone]                    Phone: [phone]     Phone: [phone]                      Monday 8:00-6:00                          Monday 8:00-6:00                          Monday 8:00-6:00              Tuesday 8:00-6:00                          Tuesday 8:00-6:00                          Tuesday 8:00-6:00              Wednesday 8:00-6:00                  Wednesday 8:00-6:00                   Wednesday 8:00-6:00      Thursday 8:00-6:00                        Thursday 8:00-6:00                         Thursday 8:00-6:00            Friday 8:00-5:00                              Friday 8:00-5:00                              Friday 8:00-5:00    [facility] Hours:   [address] [id number] [phone]    Monday 9:00-6:00  Tuesday 9:00-6:00  Wednesday 9:00-6:00  Thursday  9:00-6:00  Friday 9:00-5:00  As always, Thank you for trusting us with your health care needs!

## 2023-12-22 ENCOUNTER — NURSE TRIAGE (OUTPATIENT)
Dept: NURSING | Facility: CLINIC | Age: 63
End: 2023-12-22

## 2023-12-22 ENCOUNTER — ANCILLARY PROCEDURE (OUTPATIENT)
Dept: CARDIOLOGY | Facility: CLINIC | Age: 63
End: 2023-12-22
Attending: FAMILY MEDICINE
Payer: COMMERCIAL

## 2023-12-22 ENCOUNTER — TELEPHONE (OUTPATIENT)
Dept: ORTHOPEDICS | Facility: CLINIC | Age: 63
End: 2023-12-22

## 2023-12-22 DIAGNOSIS — I10 HYPERTENSIVE RESPONSE TO EXERCISE: ICD-10-CM

## 2023-12-22 DIAGNOSIS — M79.89 SOFT TISSUE MASS: Primary | ICD-10-CM

## 2023-12-22 DIAGNOSIS — R07.9 NONSPECIFIC CHEST PAIN: ICD-10-CM

## 2023-12-22 PROCEDURE — 93325 DOPPLER ECHO COLOR FLOW MAPG: CPT | Performed by: INTERNAL MEDICINE

## 2023-12-22 PROCEDURE — 93018 CV STRESS TEST I&R ONLY: CPT | Performed by: INTERNAL MEDICINE

## 2023-12-22 PROCEDURE — 93321 DOPPLER ECHO F-UP/LMTD STD: CPT | Performed by: INTERNAL MEDICINE

## 2023-12-22 PROCEDURE — 93017 CV STRESS TEST TRACING ONLY: CPT | Performed by: INTERNAL MEDICINE

## 2023-12-22 PROCEDURE — 93350 STRESS TTE ONLY: CPT | Performed by: INTERNAL MEDICINE

## 2023-12-22 PROCEDURE — 93016 CV STRESS TEST SUPVJ ONLY: CPT | Performed by: INTERNAL MEDICINE

## 2023-12-22 RX ORDER — AMLODIPINE BESYLATE 2.5 MG/1
2.5 TABLET ORAL DAILY
Qty: 90 TABLET | Refills: 3 | Status: SHIPPED | OUTPATIENT
Start: 2023-12-22

## 2023-12-22 RX ADMIN — Medication 5 ML: at 13:35

## 2023-12-22 NOTE — TELEPHONE ENCOUNTER
Pt called back, returning your call.     Pt would like to discuss the following concerns:    Care for his son during pt's appointment    That he will not be able to get to an appointment until after 1.1.24    Pt called your VM and left a message.    Please CALL pt to discuss. Thank you.

## 2023-12-22 NOTE — TELEPHONE ENCOUNTER
Pt calling that he is having chest pain which he states he has all the time.  Pt states he had a stress test today and PCP Dr. Iglesias scheduled pt for a nuclear stress test which will be 1/26/23.  Pt wanting to know if taking viagra will be a problem with his cardiac status.  Pt education provide, pt is currently not on any nitrates or nitroglycerin.  Pt aware for chest pain he needs to be seen in ED, pt verbalizing understanding.    Lesvia Nunez RN  FNA Nurse Advisor    Reason for Disposition   Caller has medicine question only, adult not sick, AND triager answers question   [1] Chest pain from known angina comes and goes AND [2] is NOT happening more often (increasing in frequency) or getting worse (increasing in severity)    Additional Information   Negative: [1] Intentional drug overdose AND [2] suicidal thoughts or ideas   Negative: Drug overdose and triager unable to answer question   Negative: Caller requesting a renewal or refill of a medicine patient is currently taking   Negative: Caller requesting information unrelated to medicine   Negative: Caller requesting information about COVID-19 Vaccine   Negative: Caller requesting information about Emergency Contraception   Negative: Caller requesting information about Combined Birth Control Pills   Negative: Caller requesting information about Progestin Birth Control Pills   Negative: Caller requesting information about Post-Op pain or medicines   Negative: Caller requesting a prescription antibiotic (such as Penicillin) for Strep throat and has a positive culture result   Negative: Caller requesting a prescription anti-viral med (such as Tamiflu) and has influenza (flu) symptoms   Negative: Immunization reaction suspected   Negative: Rash while taking a medicine or within 3 days of stopping it   Negative: [1] Asthma and [2] having symptoms of asthma (cough, wheezing, etc.)   Negative: [1] Symptom of illness (e.g., headache, abdominal pain, earache, vomiting) AND  [2] more than mild   Negative: Breastfeeding questions about mother's medicines and diet   Negative: MORE THAN A DOUBLE DOSE of a prescription or over-the-counter (OTC) drug   Negative: [1] DOUBLE DOSE (an extra dose or lesser amount) of prescription drug AND [2] any symptoms (e.g., dizziness, nausea, pain, sleepiness)   Negative: [1] DOUBLE DOSE (an extra dose or lesser amount) of over-the-counter (OTC) drug AND [2] any symptoms (e.g., dizziness, nausea, pain, sleepiness)   Negative: Took another person's prescription drug   Negative: [1] DOUBLE DOSE (an extra dose or lesser amount) of prescription drug AND [2] NO symptoms  (Exception: A double dose of antibiotics.)   Negative: Diabetes drug error or overdose (e.g., took wrong type of insulin or took extra dose)   Negative: [1] Prescription not at pharmacy AND [2] was prescribed by PCP recently (Exception: Triager has access to EMR and prescription is recorded there. Go to Home Care and confirm for pharmacy.)   Negative: [1] Pharmacy calling with prescription question AND [2] triager unable to answer question   Negative: [1] Caller has URGENT medicine question about med that PCP or specialist prescribed AND [2] triager unable to answer question   Negative: Medicine patch causing local rash or itching   Negative: [1] Caller has medicine question about med NOT prescribed by PCP AND [2] triager unable to answer question (e.g., compatibility with other med, storage)   Negative: Prescription request for new medicine (not a refill)   Negative: [1] Caller has NON-URGENT medicine question about med that PCP prescribed AND [2] triager unable to answer question   Negative: Caller wants to use a complementary or alternative medicine   Negative: [1] Prescription prescribed recently is not at pharmacy AND [2] triager has access to patient's EMR AND [3] prescription is recorded in the EMR   Negative: [1] DOUBLE DOSE (an extra dose or lesser amount) of over-the-counter (OTC) drug  "AND [2] NO symptoms   Negative: [1] DOUBLE DOSE (an extra dose or lesser amount) of antibiotic drug AND [2] NO symptoms   Negative: Followed a chest injury   Negative: SEVERE difficulty breathing (e.g., struggling for each breath, speaks in single words)   Negative: Difficult to awaken or acting confused (e.g., disoriented, slurred speech)   Negative: Shock suspected (e.g., cold/pale/clammy skin, too weak to stand, low BP, rapid pulse)   Negative: Passed out (i.e., lost consciousness, collapsed and was not responding)   Negative: Chest pain lasting longer than 5 minutes and ANY of the following:    history of heart disease  (i.e., heart attack, bypass surgery, angina, angioplasty, CHF; not just a heart murmur)    described as crushing, pressure-like, or heavy    age > 50    age > 30 AND at least one cardiac risk factor (i.e., hypertension, diabetes, obesity, smoker or strong family history of heart disease)    not relieved with nitroglycerin   Negative: Heart beating < 50 beats per minute OR > 140 beats per minute   Negative: Visible sweat on face or sweat dripping down face   Negative: Sounds like a life-threatening emergency to the triager   Negative: SEVERE chest pain   Negative: [1] Chest pain (or \"angina\") comes and goes AND [2] is happening more often (increasing in frequency) or getting worse (increasing in severity)  (Exception: Chest pains that last only a few seconds.)   Negative: Pain also in shoulder(s) or arm(s) or jaw  (Exception: Pain is clearly made worse by movement.)   Negative: Difficulty breathing   Negative: Dizziness or lightheadedness   Negative: Coughing up blood   Negative: Cocaine use within last 3 days   Negative: Major surgery in past month   Negative: Hip or leg fracture (broken bone) in past month (or had cast on leg or ankle in past month)   Negative: Illness requiring prolonged bedrest in past month (e.g., immobilization, long hospital stay)   Negative: Long-distance travel in past " "month (e.g., car, bus, train, plane; with trip lasting 6 or more hours)   Negative: History of prior \"blood clot\" in leg or lungs (i.e., deep vein thrombosis, pulmonary embolism)   Negative: History of inherited increased risk of blood clots (e.g., Factor 5 Leiden, Anti-thrombin 3, Protein C or Protein S deficiency, Prothrombin mutation)   Negative: Cancer treatment in past six months (or has cancer now)   Negative: [1] Chest pain lasts > 5 minutes AND [2] occurred in past 3 days (72 hours) (Exception: Feels exactly the same as previously diagnosed heartburn and has accompanying sour taste in mouth.)   Negative: Taking a deep breath makes pain worse   Negative: Patient sounds very sick or weak to the triager   Negative: [1] Chest pain lasts > 5 minutes AND [2] occurred > 3 days ago (72 hours) AND [3] NO chest pain or cardiac symptoms now   Negative: [1] Chest pain lasts < 5 minutes AND [2] NO chest pain or cardiac symptoms (e.g., breathing difficulty, sweating) now  (Exception: Chest pains that last only a few seconds.)   Negative: Fever > 100.4 F (38.0 C)   Negative: Rash in same area as pain (may be described as \"small blisters\")   Negative: [1] Patient says chest pain feels exactly the same as previously diagnosed \"heartburn\" AND [2] describes burning in chest AND [3] accompanying sour taste in mouth   Negative: [1] Chest pain lasting < 5 minutes AND [2] has not taken prescribed nitroglycerin   Negative: [1] Chest pain lasting < 5 minutes AND [2] completely gone after taking nitroglycerin    Protocols used: Medication Question Call-A-AH, Chest Pain-A-AH    "

## 2023-12-22 NOTE — TELEPHONE ENCOUNTER
MARINE left requesting patient call my direct number to get scheduled with an orthopedic oncologist.     Sarina Head LPN

## 2023-12-22 NOTE — TELEPHONE ENCOUNTER
"Patient being referred to Orthopedic Oncology for mass of right thigh. Patient states he is a  and has a TBI. He is also the only care giver to an 29 year old son with special needs (autism and DD.)     Patient has had a mass on thigh since June. [facility] but [facility] were unable to [facility] enough of the sample to use for pathology. Patient has missed appointments because he has no one to care for his son. He states he cannot leave his son alone because he is an elopement risk. He used to use respite services offered though his son's  at [facility]. He said \"They used to be good, but they have changed.\" He states the last time he brought his son there he injured his eye and the staff never informed him. His sone had a metal fragment in his eye.    Patient is in need of someone to care for his son during medical appointments. He also expressed worry about what will happen to his son when he can no longer care for him. His most urgent need is to have someone care for his son for his 12/28 biopsy and appointment with Dr. Tyler on 1/3 at the [facility].    Writer asked Dr. Jayson Figueroa's PA to place a social work referral.     Sarina Head LPN       "

## 2023-12-22 NOTE — TELEPHONE ENCOUNTER
Patient has a referral regarding mass of the right thigh.     Please call patient to schedule appt. I came across this referral today in a list of orders that were part of our auto . Referral from 12/7/23.

## 2023-12-26 ENCOUNTER — PATIENT OUTREACH (OUTPATIENT)
Dept: CARE COORDINATION | Facility: CLINIC | Age: 63
End: 2023-12-26
Payer: COMMERCIAL

## 2023-12-26 NOTE — PROGRESS NOTES
Social Work - Intervention  [facility]  Data/Intervention:    Patient Name: Jeanmarie Mclean Goes By: Jeanmarie    /Age: 1960 (63 year old)     Visit Type: telephone  Referral Source: Ortho  Reason for Referral: Pt/caregiver support, support needed for children    Collaborated With:    -Jeanmarie- [phone]     Psychosocial Information/Concerns:  Referral received indicating the above need. Per RAJINDER Branch's documentation from , Jeanmarie is the only caregiver for his 29 year old son with autism and a developmental disability who cannot be left alone due to being an elopement risk. It is noted that Jeanmarie's son has a [facility] . It is also noted that Jeanmarie's urgent need is locating a caregiver for his son for an appt on  and 1/3.      Intervention/Education/Resources Provided:  I contacted Jeanmarie and introduced myself and noted the reason for my outreach. Jeanmarie explained that his girlfriend is watching his son, Phi, on  and she may be able to on 1/3 but he doesn't know yet.   I explained seeing that Phi has a  and explained that this person really is the go to person for arranging services for Phi. Jeanmarie reported being well aware of this and noted he does contact Phi's  and knows to contact the supervisor if needed. I let Jeanmarie know he can certainly let me know if he is having difficulty coordinating with the  and I would be happy to help navigate things.     I noted also seeing that Jeanmarie expressed having worries about what will happen to Phi when he cannot care for him anymore. Jeanmarie reported he has a health care directive for himself and one for Phi as well and this outlines care directions etc. Jeanmarie noted he has no family or friends in [place] and will not move back to the state he is from because [place] is a better place for his son. I inquired if Jeanmarie has worked with Phi's  regarding options for long  term plans and supports, and if any options can be looked into now vs having to scramble at any point. Jeanmarie reported he has everything written out regarding long term plans for his son and reported he has extensive paperwork and instructions put together and has put a lot of thought and time into this and started this whole process when Phi was 3 and Jeanmarie found out he has autism. Jeanmarie discussed the negative experience (4 years ago?) with respite at [facility] in [place], when Jeanmarie wasn't being watched closely enough and got a metal fragment in his eye. Jeanmarie said he reported this incident to Phi's .     Jeanmarie denied any further SW needs or questions at this time but knows he can call me if needed going forward.      Assessment/Plan:  No follow up is planned as all needs have currently been addressed.      Provided patient/family with contact information and availability.    SAMINA Harding, [facility]    [facility]  Ph: [phone], Pgr: [phone]  12/26/2023

## 2023-12-27 NOTE — TELEPHONE ENCOUNTER
Action December 27, 2023 1:39 PM MT   Action Taken [facility]     DIAGNOSIS: Mass of RT Thigh   APPOINTMENT DATE: 01/03/2024   NOTES STATUS DETAILS   OFFICE NOTE from referring provider Internal 11/14/2023 - Milton Iglesias MD - [facility] FP   OFFICE NOTE from other specialist Care Everywhere 12/08/2023 - [facility] Vil[facility]    10/06/2023 - Haider [facility]    [facility] Pain   MEDICATION LIST Care Everywhere    LABS     INJECTIONS DONE IN RADIOLOGY PACS Internal   MRI PACS Internal   CT SCAN PACS [facility]:  09/13/2018 - Abd/Pel   XRAYS (IMAGES & REPORTS) PACS Internal

## 2023-12-28 ENCOUNTER — ANCILLARY PROCEDURE (OUTPATIENT)
Dept: ULTRASOUND IMAGING | Facility: CLINIC | Age: 63
End: 2023-12-28
Attending: FAMILY MEDICINE
Payer: COMMERCIAL

## 2023-12-28 DIAGNOSIS — E04.1 THYROID NODULE: ICD-10-CM

## 2023-12-28 PROCEDURE — 88173 CYTOPATH EVAL FNA REPORT: CPT | Performed by: PATHOLOGY

## 2023-12-28 PROCEDURE — 10005 FNA BX W/US GDN 1ST LES: CPT

## 2023-12-29 LAB
PATH REPORT.COMMENTS IMP SPEC: ABNORMAL
PATH REPORT.COMMENTS IMP SPEC: ABNORMAL
PATH REPORT.COMMENTS IMP SPEC: YES
PATH REPORT.FINAL DX SPEC: ABNORMAL
PATH REPORT.GROSS SPEC: ABNORMAL
PATH REPORT.MICROSCOPIC SPEC OTHER STN: ABNORMAL
PATH REPORT.RELEVANT HX SPEC: ABNORMAL

## 2024-01-02 ENCOUNTER — TELEPHONE (OUTPATIENT)
Dept: ENDOCRINOLOGY | Facility: CLINIC | Age: 64
End: 2024-01-02
Payer: COMMERCIAL

## 2024-01-02 NOTE — TELEPHONE ENCOUNTER
Spoke with pt and scheduled sooner appt than 8/2024; [facility]2024 [facility] 1/2/2024 at 4:49 PM

## 2024-01-03 ENCOUNTER — PRE VISIT (OUTPATIENT)
Dept: ORTHOPEDICS | Facility: CLINIC | Age: 64
End: 2024-01-03

## 2024-01-03 ENCOUNTER — OFFICE VISIT (OUTPATIENT)
Dept: ORTHOPEDICS | Facility: CLINIC | Age: 64
End: 2024-01-03
Payer: COMMERCIAL

## 2024-01-03 VITALS — HEIGHT: 64 IN | WEIGHT: 158 LBS | BODY MASS INDEX: 26.98 KG/M2

## 2024-01-03 DIAGNOSIS — R22.41 MASS OF RIGHT THIGH: ICD-10-CM

## 2024-01-03 PROCEDURE — 99204 OFFICE O/P NEW MOD 45 MIN: CPT | Mod: GC | Performed by: ORTHOPAEDIC SURGERY

## 2024-01-03 NOTE — PROGRESS NOTES
Mr. Mclean is a 63-year-old male with a history of prostate cancer status post prostatectomy in 2004 who presents today for evaluation of a right groin mass.  He states that he first noticed this mass in June 2023 while taking a shower.  The mass has not particularly associated with the pain although he does endorse some leg pain after strenuous exercise.  He feels that the mass may have slightly increased in size since June.      He states that a few months prior to him noticing the mass he [facility] and this mass was noted on the ultrasound report however the patient states he was not notified of this finding.  He underwent workup for this mass at the [facility] where an ultrasound-guided core needle biopsy was done 7/20/2023.  Pathology report revealed myxoid mesenchymal proliferation but was unable to provide a definitive tissue diagnosis.  He was then referred to orthopedic oncology at the [facility] where an MRI was ordered and completed on 12/5/2023.  The recommendation at that time was for the patient to undergo a wide excisional biopsy.    On examination today the patient is alert and oriented in no acute distress.  There is then approximately 2 cm firm mass in the right groin region.  No surrounding skin changes.  CMS intact distally.    Imaging including MRI of the pelvis reviewed which demonstrates a T1 hypointense and T2 hyperintense homogenous appearing mass measuring 2.3 x 2.7 x 3.1 cm in the right adductor longus muscle belly.     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  We will attempt to have pathology slides from the [facility] sent to our clinic for review with our pathologist.  If definitive tissue diagnosis cannot be made at that time we will plan for repeat core needle biopsy.    Patient seen and examined with Dr. Tyler who agrees with the above assessment and plan.     Manan Horta,  MD PGY4  Orthopedic Surgery

## 2024-01-03 NOTE — LETTER
1/3/2024         RE: Jeanmarie Mclean  [address]  [place] [address]        Dear Colleague,    Thank you for referring your patient, Jeanmarie Mclean, to the [facility] [place]. Please see a copy of my visit note below.    Mr. Mclean is a 63-year-old male with a history of prostate cancer status post prostatectomy in 2004 who presents today for evaluation of a right groin mass.  He states that he first noticed this mass in June 2023 while taking a shower.  The mass has not particularly associated with the pain although he does endorse some leg pain after strenuous exercise.  He feels that the mass may have slightly increased in size since June.      He states that a few months prior to him noticing the mass he [facility] and this mass was noted on the ultrasound report however the patient states he was not notified of this finding.  He underwent workup for this mass at the [facility] where an ultrasound-guided core needle biopsy was done 7/20/2023.  Pathology report revealed myxoid mesenchymal proliferation but was unable to provide a definitive tissue diagnosis.  He was then referred to orthopedic oncology at the [facility] where an MRI was ordered and completed on 12/5/2023.  The recommendation at that time was for the patient to undergo a wide excisional biopsy.    On examination today the patient is alert and oriented in no acute distress.  There is then approximately 2 cm firm mass in the right groin region.  No surrounding skin changes.  CMS intact distally.    Imaging including MRI of the pelvis reviewed which demonstrates a T1 hypointense and T2 hyperintense homogenous appearing mass measuring 2.3 x 2.7 x 3.1 cm in the right adductor longus muscle belly.     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  We will attempt to have pathology slides from the [facility]  sent to our clinic for review with our pathologist.  If definitive tissue diagnosis cannot be made at that time we will plan for repeat core needle biopsy.    Patient seen and examined with Dr. Tyler who agrees with the above assessment and plan.     Manan Horta MD PGY4  Orthopedic Surgery      I was present with the resident during the history and exam.  I discussed the case with the resident and agree with the findings as documented in the assessment and plan.      Pedro Tyler MD

## 2024-01-03 NOTE — NURSING NOTE
"Reason For Visit:   Chief Complaint   Patient presents with    Consult     Right thigh mass referred by Dr. Milton Iglesias // first noticed about 9 months and has gotten a little bigger        Ht 1.63 m (5' 4.17\")   Wt 71.7 kg (158 lb)   BMI 26.97 kg/m      Pain Assessment  Patient Currently in Pain: [place]      Jc Domingo ATC    "

## 2024-01-09 ENCOUNTER — MYC MEDICAL ADVICE (OUTPATIENT)
Dept: FAMILY MEDICINE | Facility: CLINIC | Age: 64
End: 2024-01-09
Payer: COMMERCIAL

## 2024-01-15 ENCOUNTER — TELEPHONE (OUTPATIENT)
Dept: ORTHOPEDICS | Facility: CLINIC | Age: 64
End: 2024-01-15
Payer: COMMERCIAL

## 2024-01-15 NOTE — CONFIDENTIAL NOTE
[facility] LVM requesting update on slides. Provided direct callback.    Tara Holter, RNCC

## 2024-01-19 ENCOUNTER — TELEPHONE (OUTPATIENT)
Dept: ORTHOPEDICS | Facility: CLINIC | Age: 64
End: 2024-01-19
Payer: COMMERCIAL

## 2024-01-19 NOTE — TELEPHONE ENCOUNTER
[facility] every day since we saw this patient to see if they have received their slides back from send out. They have not. I will keep attempting starting Monday of next week.     Spoke with Dr. Tyler about how I have been waiting for these slides to come in for 16 days so far. He is aware of the wait. We will continue to wait for them to come to us.

## 2024-01-19 NOTE — TELEPHONE ENCOUNTER
Reason for Call: Request for an order or referral:    Order or referral being requested: Trigger point injections at [facility] in [facility].    Date needed: as soon as possible    Has the patient been seen by the PCP for this problem? YES    Additional comments: Please zeferino when approved.    Phone number Patient can be reached at:  [phone].    Best Time:  any    Can we leave a detailed message on this number?  YES    Call taken on 11/20/2019 at 11:49 AM by Lori Watt     MOLECULAR PCR

## 2024-01-26 ENCOUNTER — ANCILLARY PROCEDURE (OUTPATIENT)
Dept: NUCLEAR MEDICINE | Facility: CLINIC | Age: 64
End: 2024-01-26
Attending: FAMILY MEDICINE
Payer: COMMERCIAL

## 2024-01-26 DIAGNOSIS — R07.9 NONSPECIFIC CHEST PAIN: Primary | ICD-10-CM

## 2024-01-26 DIAGNOSIS — R07.9 NONSPECIFIC CHEST PAIN: ICD-10-CM

## 2024-01-26 LAB
CV STRESS MAX HR HE: 75
RATE PRESSURE PRODUCT: 8850
STRESS ECHO BASELINE DIASTOLIC HE: 82
STRESS ECHO BASELINE HR: 51 BPM
STRESS ECHO BASELINE SYSTOLIC BP: 143
STRESS ECHO CALCULATED PERCENT HR: 47 %
STRESS ECHO LAST STRESS DIASTOLIC BP: 72
STRESS ECHO LAST STRESS SYSTOLIC BP: 118
STRESS ECHO TARGET HR: 158

## 2024-01-26 PROCEDURE — A9502 TC99M TETROFOSMIN: HCPCS | Performed by: RADIOLOGY

## 2024-01-26 PROCEDURE — 93017 CV STRESS TEST TRACING ONLY: CPT | Performed by: INTERNAL MEDICINE

## 2024-01-26 PROCEDURE — 93015 CV STRESS TEST SUPVJ I&R: CPT | Performed by: RADIOLOGY

## 2024-01-26 PROCEDURE — 93018 CV STRESS TEST I&R ONLY: CPT

## 2024-01-26 PROCEDURE — 78452 HT MUSCLE IMAGE SPECT MULT: CPT | Performed by: RADIOLOGY

## 2024-01-26 PROCEDURE — 93016 CV STRESS TEST SUPVJ ONLY: CPT | Performed by: INTERNAL MEDICINE

## 2024-01-26 RX ORDER — REGADENOSON 0.08 MG/ML
0.4 INJECTION, SOLUTION INTRAVENOUS ONCE
Status: COMPLETED | OUTPATIENT
Start: 2024-01-26 | End: 2024-01-26

## 2024-01-26 RX ADMIN — REGADENOSON 0.4 MG: 0.08 INJECTION, SOLUTION INTRAVENOUS at 14:04

## 2024-01-27 ENCOUNTER — HEALTH MAINTENANCE LETTER (OUTPATIENT)
Age: 64
End: 2024-01-27

## 2024-01-27 ENCOUNTER — MYC MEDICAL ADVICE (OUTPATIENT)
Dept: FAMILY MEDICINE | Facility: CLINIC | Age: 64
End: 2024-01-27
Payer: COMMERCIAL

## 2024-01-27 DIAGNOSIS — R07.9 NONSPECIFIC CHEST PAIN: Primary | ICD-10-CM

## 2024-01-29 NOTE — TELEPHONE ENCOUNTER
"\"I don't want an appointment.  I want to see cardiologist.    I still chest pain and shortness of breath like I said in [employer] Message.   Left hand and arm tingling and numb.  When I was walking to the store, I felt like I would pass out.\"    Please review and advise when able.  Debbie Rodrigues RN    "

## 2024-02-08 ENCOUNTER — TELEPHONE (OUTPATIENT)
Dept: CARDIOLOGY | Facility: CLINIC | Age: 64
End: 2024-02-08

## 2024-02-08 ENCOUNTER — DOCUMENTATION ONLY (OUTPATIENT)
Dept: CARDIOLOGY | Facility: CLINIC | Age: 64
End: 2024-02-08

## 2024-02-08 ENCOUNTER — TELEPHONE (OUTPATIENT)
Dept: ORTHOPEDICS | Facility: CLINIC | Age: 64
End: 2024-02-08

## 2024-02-08 NOTE — PROGRESS NOTES
Mr. Mclean unfortunately left before he could be seen today. The following is preserved for a future cardiology visit.    Cardiac history of note:  Jeanmarie Mclean is a 63 year old male with history of HTN, HL referred for evaluation with chest pain and abnormal echocardiogram.    Studies of note:  Exercise stress echocardiogram 12/22/23:   Mildly reduced LV function with mildly diffuse hypokinesis at rest, LVEF=45-50%. Normal RV function. LVEF failed to augment with exercise and LV cavity size failed to decrease.   Target HR achieved (87% MPHR.) Hypertensive BP response (/84->230/80.)   Normal ECG at baseline. Inferolateral ST depression with exercise.     Regadenoson SPECT 1/26/24: Normal rest and stress perfusion.     Shukri Eng MD  Cardiology

## 2024-02-08 NOTE — TELEPHONE ENCOUNTER
----- Message from Mandie Cordoba CMA sent at 2/8/2024 11:13 AM CST -----  Regarding: Reschedule with Dr. Eng  Good morning,    Please reach out to this patient and reschedule his appointment with Dr. Eng. The doctor was running more than 30 minutes behind today and the patient didn't want to wait to be seen.     Thanks!  Mandie FORBES

## 2024-02-08 NOTE — TELEPHONE ENCOUNTER
2/8 Called and was unable to leave vociemail,     [employer] message was sent to patient to schedule a cardiology appointment with phone number [phone] to schedule.     [facility]   Orthopedics, Podiatry, Sports Medicine, Ent ,Eye , Audiology, Adult Endocrine & Diabetes, Nutrition & Medication Therapy Management Specialties   [facility]          ----- Message from Ciarra Kelly RN sent at 2/8/2024  1:08 PM CST -----  Regarding: reschedule new patient  Nikolas Tineo,    He is a new cardiology patient who was to see Dr. Eng. When we updated him that he was running behind, patient wanted to cancel appointment and reschedule. I've been trying to assist but cannot see anything sooner than March. I was wondering if you could see if there is anything sooner. He is open to other locations and seeing another provider. Thanks for your help with this!    Ciarra

## 2024-02-08 NOTE — TELEPHONE ENCOUNTER
02/08 Called patient (1st attempt) left voicemail and provided [phone] for patient to call back and reschedule new patient appointment with . Due to provider running behind, patient cancelled original appointment.       [facility]   Gastroenterology, Infectious Diseases, Nephrology, Pulmonology and Rheumatology Specialties  [facility]

## 2024-02-08 NOTE — TELEPHONE ENCOUNTER
Let patient know that we can have his son in the room with him as long as his son is able to sit still with his ipad. Patient states this won't be a problem. Set him up for an appointment with Jori on the next Monday that the patient is available.

## 2024-02-08 NOTE — TELEPHONE ENCOUNTER
Called patient and he was not happy that Dr. Eng was running and hour behind he needs to be seen asap and next opening is March spoke with Ciarra and we will figure something out

## 2024-02-08 NOTE — TELEPHONE ENCOUNTER
- A call was placed to the patient.     - Offered patient to get a biopsy here in clinic with Jori since we have been unable to [facility] for over a month now. He is agreeable to this and would like to get the biopsy done. He stated that he has a son with severe autism and developmental delay that would need to be watched during the procedure if it will be sterile. I told him I wasn't sure what we could offer for that.     - Patient states that his son would be able to sit with him in the same room. He will have his ipad and will sit there and play his ipad. He states that his son is 29. He won't get in the sterile field. He states he has done this many times before and that patient.     - I will review this with the provider doing the biopsy and get back to the patient.     - Patient verbalized understanding of plan and all questions were answered. Call back number to clinic was given and patient was told to call if they had an further questions.

## 2024-02-12 NOTE — TELEPHONE ENCOUNTER
2/12 Patient is currently scheduled for an appointment with Dr. Somers.     [facility]   Orthopedics, Podiatry, Sports Medicine, Ent ,Eye , Audiology, Adult Endocrine & Diabetes, Nutrition & Medication Therapy Management Specialties   [facility]

## 2024-02-16 ENCOUNTER — TELEPHONE (OUTPATIENT)
Dept: ORTHOPEDICS | Facility: CLINIC | Age: 64
End: 2024-02-16
Payer: COMMERCIAL

## 2024-02-16 NOTE — TELEPHONE ENCOUNTER
- A call was placed to the patient.     - [facility] to see if they have the [facility] from send out for Jeanmarie Yuanas. I have called them almost every work day since 1/5 and they do not have them still. They still do not have any idea when they will get [facility]. We cannot get them reviewed due to this. This patient will get the biopsy here Monday 2/19 with Jori.     - Patient verbalized understanding of plan and all questions were answered. Call back number to clinic was given and patient was told to call if they had an further questions.   
02-Jun-2023 12:06

## 2024-02-19 ENCOUNTER — OFFICE VISIT (OUTPATIENT)
Dept: ORTHOPEDICS | Facility: CLINIC | Age: 64
End: 2024-02-19
Payer: COMMERCIAL

## 2024-02-19 DIAGNOSIS — M79.89 SOFT TISSUE MASS: ICD-10-CM

## 2024-02-19 DIAGNOSIS — R22.41 MASS OF RIGHT THIGH: Primary | ICD-10-CM

## 2024-02-19 DIAGNOSIS — R22.9 LOCALIZED SWELLING, MASS AND LUMP, UNSPECIFIED: ICD-10-CM

## 2024-02-19 PROCEDURE — 88307 TISSUE EXAM BY PATHOLOGIST: CPT | Mod: TC | Performed by: PHYSICIAN ASSISTANT

## 2024-02-19 PROCEDURE — 20206 BIOPSY MUSCLE PERQ NEEDLE: CPT | Mod: RT | Performed by: PHYSICIAN ASSISTANT

## 2024-02-19 PROCEDURE — 76942 ECHO GUIDE FOR BIOPSY: CPT | Performed by: PHYSICIAN ASSISTANT

## 2024-02-19 PROCEDURE — 88307 TISSUE EXAM BY PATHOLOGIST: CPT | Mod: 26 | Performed by: PATHOLOGY

## 2024-02-19 NOTE — NURSING NOTE
Reason For Visit:   Chief Complaint   Patient presents with    Procedure     Right thigh biopsy        64 year old  1960    Primary MD: Milton Iglesias      There were no vitals taken for this visit.    Pain Assessment  Patient Currently in Pain: Jesus Domingo, ATC

## 2024-02-19 NOTE — LETTER
2/19/2024         RE: Jeanmarie Mclean  [address]  [place] [address]        Dear Colleague,    Thank you for referring your patient, Jeanmarie Mclean, to the [facility] [place]. Please see a copy of my visit note below.        [facility]  Orthopaedic Surgery  by Jori Hernandes PA-C    Jeanmarie Mclean MRN# [id number]    YOB: 1960               Clinical History:   64 year old male who presents today for corneal biopsy of a right groin lesion per referral from Dr. Tyler           Data:   All imaging studies reviewed by me            Procedure:   After informed consent repeat scanning was performed to identify the mass and adjacent anatomical structures. The skin was prepped and draped in sterile fashion.  1% lidocaine was used for local anesthesia.  Ultrasound was necessary to assure intralesional positioning and to avoid vital adjacent structures. Ultrasound guidance was used to pass a 14-gauge core biopsy needle into the mass for tissue biopsy. 5 passes were made. Images were permanently stored for the patient's record.The specimen was immediately placed in formalin and sent to the lab. The patient tolerated the procedure well and there were no immediate complications following the procedure.    1% lidocaine: Lot# [id number]    expiration 5/2027         Assessment and Plan:   Assessment:  64-year-old male who presents to clinic today for biopsy of right groin soft tissue mass.  Tolerated procedure well     Plan:  After consent was given the procedure was performed as above.  The patient tolerated it well.  Steri-Strips and a bandage were placed.  The patient can remove the bandage tomorrow and start showering but should avoid soaking for 1 week.  The Steri-Strips will fall off on their own.  They should watch for signs of infection including erythema, discharge and increased pain.  If they have any concerns I instructed them to call.  When the pathology  results have been finalized in 7 to 14 days a member of our team will reach out to them with further instruction.  All questions were answered and the patient was in agreement the plan.     Jori Hernandes PA-C  Physician Assistant   Oncology and Adult Reconstructive Surgery  Dept Orthopaedic Surgery, [facility]

## 2024-02-19 NOTE — PROGRESS NOTES
[facility]  Orthopaedic Surgery  by Jori Hernandes PA-C    Jeanmarie Mclean MRN# [id number]    YOB: 1960               Clinical History:   64 year old male who presents today for corneal biopsy of a right groin lesion per referral from Dr. Tyler           Data:   All imaging studies reviewed by me            Procedure:   After informed consent repeat scanning was performed to identify the mass and adjacent anatomical structures. The skin was prepped and draped in sterile fashion.  1% lidocaine was used for local anesthesia.  Ultrasound was necessary to assure intralesional positioning and to avoid vital adjacent structures. Ultrasound guidance was used to pass a 14-gauge core biopsy needle into the mass for tissue biopsy. 5 passes were made. Images were permanently stored for the patient's record.The specimen was immediately placed in formalin and sent to the lab. The patient tolerated the procedure well and there were no immediate complications following the procedure.    1% lidocaine: Lot# [id number]    expiration 5/2027         Assessment and Plan:   Assessment:  64-year-old male who presents to clinic today for biopsy of right groin soft tissue mass.  Tolerated procedure well     Plan:  After consent was given the procedure was performed as above.  The patient tolerated it well.  Steri-Strips and a bandage were placed.  The patient can remove the bandage tomorrow and start showering but should avoid soaking for 1 week.  The Steri-Strips will fall off on their own.  They should watch for signs of infection including erythema, discharge and increased pain.  If they have any concerns I instructed them to call.  When the pathology results have been finalized in 7 to 14 days a member of our team will reach out to them with further instruction.  All questions were answered and the patient was in agreement the plan.     Jori Hernandes PA-C  Physician Assistant   Oncology and Adult Reconstructive  Surgery  Dept Orthopaedic Surgery, [facility]

## 2024-02-20 ENCOUNTER — TELEPHONE (OUTPATIENT)
Dept: ORTHOPEDICS | Facility: CLINIC | Age: 64
End: 2024-02-20
Payer: COMMERCIAL

## 2024-02-20 NOTE — TELEPHONE ENCOUNTER
Health Call Center    Phone Message    May a detailed message be left on voicemail: yes     Reason for Call: Other: Jeanmarie called he has an appointment for a telephone visit with Dr. Tyler on Friday 2/23 but he is not able to keep the appointment. I tried to reschedule but the first availability is 3/13/24. Please contact patient to assist with scheduling. Thank you    Action Taken: Other: [facility]    Travel Screening: Not Applicable

## 2024-02-21 ENCOUNTER — PATIENT OUTREACH (OUTPATIENT)
Dept: CARE COORDINATION | Facility: CLINIC | Age: 64
End: 2024-02-21

## 2024-02-21 ENCOUNTER — OFFICE VISIT (OUTPATIENT)
Dept: ENDOCRINOLOGY | Facility: CLINIC | Age: 64
End: 2024-02-21
Attending: FAMILY MEDICINE
Payer: COMMERCIAL

## 2024-02-21 ENCOUNTER — VIRTUAL VISIT (OUTPATIENT)
Dept: ORTHOPEDICS | Facility: CLINIC | Age: 64
End: 2024-02-21
Payer: COMMERCIAL

## 2024-02-21 VITALS
DIASTOLIC BLOOD PRESSURE: 85 MMHG | SYSTOLIC BLOOD PRESSURE: 134 MMHG | WEIGHT: 163 LBS | RESPIRATION RATE: 18 BRPM | BODY MASS INDEX: 27.83 KG/M2 | HEART RATE: 61 BPM | OXYGEN SATURATION: 99 %

## 2024-02-21 DIAGNOSIS — E04.2 MULTIPLE THYROID NODULES: Primary | ICD-10-CM

## 2024-02-21 DIAGNOSIS — E04.1 THYROID NODULE: ICD-10-CM

## 2024-02-21 DIAGNOSIS — R22.41 MASS OF RIGHT THIGH: Primary | ICD-10-CM

## 2024-02-21 LAB — TSH SERPL DL<=0.005 MIU/L-ACNC: 0.9 UIU/ML (ref 0.3–4.2)

## 2024-02-21 PROCEDURE — 99204 OFFICE O/P NEW MOD 45 MIN: CPT | Performed by: INTERNAL MEDICINE

## 2024-02-21 PROCEDURE — 36415 COLL VENOUS BLD VENIPUNCTURE: CPT | Performed by: INTERNAL MEDICINE

## 2024-02-21 PROCEDURE — 84443 ASSAY THYROID STIM HORMONE: CPT | Performed by: INTERNAL MEDICINE

## 2024-02-21 PROCEDURE — 99441 PR PHYSICIAN TELEPHONE EVALUATION 5-10 MIN: CPT | Mod: 93 | Performed by: ORTHOPAEDIC SURGERY

## 2024-02-21 NOTE — LETTER
2/21/2024         RE: Jeanmarie Mclean  [address]  [facility] [address]        Dear Colleague,    Thank you for referring your patient, Jeanmarie Mclean, to the [facility]. Please see a copy of my visit note below.    =======================================================  Assessment     Jeanmarie Mclean is a 64 year old male with a past medical history significant for schizoaffective disorder, nephrolithiasis, prostate cancer, lung nodules, seen for evaluation of incidentally discovered thyroid nodules.    The biopsy of the left #3 thyroid nodule from December 2023 revealed AUS, with a risk of cancer around 22%.    I counseled the patient on the high prevalence of thyroid nodules in general patient, their risk of cancer of approximately 10% and the overall good prognosis (low mortality) of thyroid cancer.    Recommendations:  Check reflex TSH today  Pursue Afirma analysis on the cytopathology specimen  Follow-up to be determined based on the above results.    Skin hyperpigmentation   Per patient, the itchiness responds to treatment with topical steroids  Advised to f/up with dermatology.      Orders Placed This Encounter   Procedures     TSH with free T4 reflex     Afirma Thyroid FNA Analysis     =======================================================  The patient is seen in consultation at the request of Dr. Milton Iglesias.     History of Present Illness:  Jeanmarie Mclean is a 64 year old male with a past medical history significant for alcoholism, Asperger's syndrome, GERD, hypertension, nephrolithiasis, prostate cancer, hypercholesterolemia, seen for evaluation of thyroid nodules.    [facility] abnormalities.   The thyroid ultrasound from 12/6/2023 revealed a total of 5 thyroid nodules, bilaterally.  The most suspicious nodule was a nodule located in the left thyroid lobe, with slightly irregular borders, heterogeneously hypoechoic, no  definite microcalcifications.  The isthmus was thicker and it harbored a 1 cm mostly cystic nodule.  The biopsy of the left #3 thyroid nodule, measuring 1.9 cm in maximum diameter, from 12/28/2023 revealed AUS.  I reviewed the ultrasound images.  The chest CT is from 2014 and 2021 revealed a heterogeneous thyroid gland.    Most recent TSH was normal at 0.72, in March 2022.    The patient endorses increased appetite for the last several years and he attributes this to his thyroid gland.    On questioning, he denies dysphagia, voice hoarseness or cough unless he has an upper respiratory infection or seasonal allergies.  There is no prior history of radiation exposure or known family history of thyroid disease.    Past Medical History   Past Medical History:   Diagnosis Date     Alcoholism (H)      Arthritis      Asperger's syndrome     Dr. Owens, [facility]. Last visit 2006/2007     GERD (gastroesophageal reflux disease) 1999    EGD 2003 [place]     History of hypercholesterolemia      Hypertension      Kidney stones      Malignant hyperthermia due to anesthesia      Melasma     forehead, has received desonide from dermatologist     MMT (medial meniscus tear) 10/01    LT     Myalgia and myositis 4/5/2016    mid thorax, left subscapularis, latisimus dorsi Bilateral along iliac crest      Posttraumatic stress disorder     per pt     Prostate cancer (H)      Recurrent genital herpes 1982     Rib fracture 1985    L 6th     Skull fracture (H) 1985    frequent vertigo     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  R arm fracture - 1985   Lung nodules - [facility]    Past Surgical History   Past Surgical History:   Procedure Laterality Date     COLONOSCOPY       COLONOSCOPY N/A 2/22/2021    Procedure: Colonoscopy, Flexible, With Lesion Removal Using Snare;  Surgeon: Garrick Villavicencio MD;  Location: [facility] OR     COLONOSCOPY WITH CO2 INSUFFLATION N/A 2/22/2021     Procedure: COLONOSCOPY, WITH CO2 INSUFFLATION;  Surgeon: Garrick Villavicencio MD;  Location: [facility] OR     CYSTOSCOPY  10/98    for renal stones     [facility] KNEE SCOPE,MED/LAT MENISECTOMY  11    Left, medial ([place])     HERNIA REPAIR, INGUINAL RT/LT      Right, [facility] (epidural)     [facility] HEP B VAC ADULT 3 DOSE IM      received all 3 vaccines     [facility] REMV PROSTATE,RETROPUB,RADICAL  10/13/04    Dr. Muñoz ([place])   Prostate surgery   Schizoaffective disorder     Current Medications    Current Outpatient Medications:      acetaminophen (TYLENOL) 500 MG tablet, Take 500-1,000 mg by mouth every 6 hours as needed for mild pain 1000 mg per day, Disp: , Rfl:      amLODIPine (NORVASC) 2.5 MG tablet, Take 1 tablet (2.5 mg) by mouth daily, Disp: 90 tablet, Rfl: 3     Blood Pressure KIT, Monitor blood pressure as needed., Disp: 1 kit, Rfl: 1     Cholecalciferol (VITAMIN D) 1000 UNIT capsule, Take 1 capsule by mouth 2 times daily., Disp: , Rfl:      Nerve Stimulator (TENS THERAPY PAIN RELIEF) GEORGIE, , Disp: , Rfl:      olopatadine (PATADAY) 0.2 % ophthalmic solution, Place 0.05 mLs (1 drop) into both eyes daily, Disp: 2.5 mL, Rfl: 11     omeprazole (PRILOSEC) 20 MG DR capsule, Take 1 capsule (20 mg) by mouth 2 times daily, Disp: 180 capsule, Rfl: 1     Pseudoeph-Doxylamine-DM-APAP (NYQUIL PO), , Disp: , Rfl:      sildenafil (VIAGRA) 100 MG tablet, Take 1/4 - 1 tablet, as directed, 1-3 hours before intimacy. Maximum 1 dose per 24 hours., Disp: 10 tablet, Rfl: 5    Family History   Problem Relation Age of Onset     Psychotic Disorder Son         autism     Prostate Cancer Father         40s     Cancer Father      Cancer Maternal Grandmother         lung     Glaucoma Maternal Grandmother      Eye Disorder Maternal Grandmother         glaucoma     Diabetes Mother          45     Blood Disease Sister         sickle trait     Hypertension Sister      Blood Disease Paternal Uncle         sickle     Blood Disease Paternal Aunt          sickle     Alzheimer Disease Paternal Grandfather         70s     Macular Degeneration Paternal Grandfather      Cerebrovascular Disease No family hx of      Thyroid Disease No family hx of      Myocardial Infarction No family hx of      C.A.D. No family hx of      Social History  . He has 2 children. He quit smoking in 1998. Smoked for 10 years, ~ 10 ciggs a day. H/o alcoholism. He drinks alcohol to relieved the paranoia and depression. Denies using illicit drugs. He has a prescription for medical marihuana. Occupation: unemployed.     Review of Systems   Systemic:             no fatigue; weight stable   Eye:                      No eye symptoms   Freedom-Laryngeal:     as above   Breast:                  No breast symptoms  Cardiovascular:    CP - in the process of being evaluated; no palpitations   Pulmonary:           intermittent SOB present both at rest and with exercise  Gastrointestinal:   episodes of diarrhea present once a week; sometimes related to dairies; he is lactose intolerant   Genitourinary:      overactive bladder with increased urination; urinates 3 times a night - with no recent changes   Endocrine:            cold intolerance for a couple of years   Neurological:        occasional headaches, no tremor, numbness and tingling sensation in the left arm, describes one episodes of dizziness associated with CP   Musculoskeletal:  shoulders and back joint pain   Skin:                      dry and itchy skin on his back, no hair falling out   Psychological:      anxiety and depression, not suicidal               Vital Signs     Previous Weights:    Wt Readings from Last 10 Encounters:   02/21/24 73.9 kg (163 lb)   01/03/24 71.7 kg (158 lb)   08/12/23 67.7 kg (149 lb 4 oz)   06/07/23 68.9 kg (152 lb)   11/29/22 70.8 kg (156 lb)   06/08/22 66.7 kg (147 lb)   03/22/22 74.4 kg (164 lb)   03/03/22 71.4 kg (157 lb 6.4 oz)   09/29/21 75.8 kg (167 lb)   02/03/21 73 kg (161 lb)        /85 (BP  Location: Left arm, Patient Position: Sitting, Cuff Size: Adult Regular)   Pulse 61   Resp 18   Wt 73.9 kg (163 lb)   SpO2 99%   BMI 27.83 kg/m      Physical Exam  General Appearance: he is well developed, well nourished and in no distress     Eyes:  conjutivae and extra-ocular motions are normal.                                    pupils round and reactive to light, no lid lag, no stare    HEENT:   oropharynx clear and moist, no JVD, no bruits     Thyroid low positioned, only able to feel the upper poles, no palpable nodules  Cardiovascular:  regular rhythm, no murmurs, distal pulse palpable, no edema  Respiratory:        chest clear, no rales, no rhonchi   Gastrointestinal:  abdomen soft, non-tender, non-distended, normal bowel sounds,    no organomegaly  Musculoskeletal:  normal tone and strength  Psychological:          affect normal  Skin: Areas of mild hyperpigmentation in a patchy distribution on the back with 2 areas where the skin is coarse and darker, located underneath the scapulas, bilaterally  Neurological: Unable to elicit knee flexes, no resting tremor.     Lab Results  I reviewed prior lab results documented in Epic.  TSH   Date Value Ref Range Status   03/03/2022 0.72 0.40 - 4.00 mU/L Final   10/01/2021 1.57 0.40 - 4.00 mU/L Final   10/13/2017 0.40 0.40 - 4.00 mU/L Final   05/07/2015 0.43 mcU/mL Final   10/24/2013 0.36 (L) 0.4 - 5.0 mU/L Final     55 minutes spent on the date of the encounter doing chart review, history and exam, documentation and further activities as noted above.                            Again, thank you for allowing me to participate in the care of your patient.        Sincerely,        Stacey Oseguera MD

## 2024-02-21 NOTE — PROGRESS NOTES
"Social Work - Intervention  [facility]  Data/Intervention:    Patient Name: Jeanmarie Mclean Goes By: Jeanmarie    /Age: 1960 (64 year old)     Visit Type: telephone  Referral Source: Ortho  Reason for Referral: \"He stated that he has a 29 year old son with severe autism and developmental delay that would need to be watched during the procedure.\"  \"Patient states he is unable to get surgery since he has no one available to watch his son.\"      Collaborated With:    -Jeanmarie- [phone] and My Chart     Psychosocial Information/Concerns:  Referral received indicating the above.     I am familiar with Jeanmarie from a previous phone call for the same reason.      Intervention/Education/Resources Provided:  I contacted Jeanmarie and introduced myself and noted the reason for my outreach today. Jeanmarie asked what procedure I was referring to and he explained that for one prior procedure his son was allowed to be in the room with him, and for another his son's / was present and with his son. I explained it appears to be a procedure related to his virtual visit with Dr. Rowland today and Jeanmarie expressed understanding.     I noted that we previously discussed this in December, and that I wanted to [place] back and re-iterate again that unfortunately there is nothing directly that I or the clinic can do to set up services or placement for Jeanmarie's son, and that he would have to coordinate with his son's  on this. Jeanmarie expressed understanding and said he wants it on record that he has already been doing that and asking for help and doesn't want anyone saying he isn't taking care of his son. I explained that no one at the clinic is saying he isn't doing a good job taking care of his son, and explained that from my experiences talking with Jeanmarie he is a great advocate for his son and does everything he can for his son. Jeanmarie said he wants it on record that he has asked for help and state officials " are not helping his son. Jeanmarie asked that I send a My Chart message reviewing what we discussed today. Jeanmarie denied any further SW needs or questions at this time and was thankful for my call.     I sent Jeanmarie a My Chart message re-iterating that there is not anything that I or the clinic can directly to do put services or placement in place for his son, and he would need to coordinate with his son's  on this. I noted that as indicated in our call, Jeanmarie has already been doing this. I let Jeanmarie know he can notify me if he is unable to get in contact with the  and I can contact them and their supervisor if needed to escalate the matter.      Assessment/Plan:  No further follow up is planned. I will remain available if Jeanmarie needs further assistance with contacting his son's .      Provided patient/family with contact information and availability.    SAMINA Harding, [facility]    [facility]  Ph: [phone], Pgr: [phone]  2/21/2024

## 2024-02-21 NOTE — PROGRESS NOTES
Jeanmarie is a 64 year old who is being evaluated via a billable telephone visit.      What phone number would you like to be contacted at? [phone]  How would you like to obtain your AVS? [place]    Distant Location (provider location):  On-site    Phone call duration: 5 minutes  Signed Electronically by: Pedro Tyler MD      This patient has a mass in his right proximal medial thigh that he has recently noticed.  Fortunately it is not painful.  He recently underwent a needle biopsy.  The needle biopsy demonstrated an intramuscular myxoma.  This is consistent with the MRI imaging which I reviewed today as well.    This patient has a 29-year-old autistic son and does not have someone that he can leave this child with as he is a single parent.  He is not sure how to take care of this problem or thyroid nodules which he has been recommended to address.    Our textbook recommendation would be to excise this tumor to confirm that it is 100% benign.  This is an outpatient surgery.  In order to get this done the patient would have to have some support in the form of someone watching his son.  The patient will take this recommendation and see what resources are available to help him out and let us know if he is able to get something arranged.  I have answered all of his questions today.  He has a low risk of infection bleeding pain numbness and tingling weakness and limp from this type of procedure.

## 2024-02-21 NOTE — NURSING NOTE
Reason For Visit:   Chief Complaint   Patient presents with    RECHECK     Review bx results and discuss treatment plan       64 year old  1960    Primary MD: Milton Iglesias      There were no vitals taken for this visit.    Pain Assessment  Patient Currently in Pain: Yes  0-10 Pain Scale: 3  Primary Pain Location:  (right thigh)      Jc Domingo ATC

## 2024-02-21 NOTE — LETTER
[facility] [address]  4TH FLOOR  [facility] [address]  [phone]        February 21, 2024    Regarding:  Jeanmarie Mclean  [address] [place]  [facility] [address]              To Whom It May Concern;    I talked to Mr. Mclean today in my clinic.  He recently had a soft tissue tumor in his right leg biopsied.  It demonstrated evidence of a myxoma.  Our typical recommendation in these tumors is to have them removed to confirm that they are 100% benign and that there is no sign of cancer.  This patient has a autistic 29-year-old son and is a single parent.  He does not know how he can take time for a procedure of this length given the care needs of his son and his lack of support.  I am hopeful that he will be able to find some means of caring for his son during this outpatient surgery.  I am writing this letter in support of any help that he could get in the form of watching over his son while he has this procedure done.  I be happy to respond to inquiries about further details.    Sincerely,                        Pedro Tyler MD

## 2024-02-21 NOTE — LETTER
2/21/2024         RE: Jeanmarie Mclean  [address]  [facility] [address]        Dear Colleague,    Thank you for referring your patient, Jeanmarie Mclean, to the [facility] [place]. Please see a copy of my visit note below.    Jeanmarie is a 64 year old who is being evaluated via a billable telephone visit.      What phone number would you like to be contacted at? [phone]  How would you like to obtain your AVS? MyChart    Distant Location (provider location):  On-site    Phone call duration: 5 minutes  Signed Electronically by: Pedro Tyler MD      This patient has a mass in his right proximal medial thigh that he has recently noticed.  Fortunately it is not painful.  He recently underwent a needle biopsy.  The needle biopsy demonstrated an intramuscular myxoma.  This is consistent with the MRI imaging which I reviewed today as well.    This patient has a 29-year-old autistic son and does not have someone that he can leave this child with as he is a single parent.  He is not sure how to take care of this problem or thyroid nodules which he has been recommended to address.    Our textbook recommendation would be to excise this tumor to confirm that it is 100% benign.  This is an outpatient surgery.  In order to get this done the patient would have to have some support in the form of someone watching his son.  The patient will take this recommendation and see what resources are available to help him out and let us know if he is able to get something arranged.  I have answered all of his questions today.  He has a low risk of infection bleeding pain numbness and tingling weakness and limp from this type of procedure.        Pedro Tyler MD

## 2024-02-21 NOTE — NURSING NOTE
Jeanmarie Mclean's goals for this visit include:   Chief Complaint   Patient presents with    New Patient     Thyroid nodule       He requests these members of his care team be copied on today's visit information: yes    PCP: Milton Iglesias    Referring Provider:  Milton Iglesias MD  [address] [place] [address]    /85 (BP Location: Left arm, Patient Position: Sitting, Cuff Size: Adult Regular)   Pulse 61   Resp 18   Wt 73.9 kg (163 lb)   SpO2 99%   BMI 27.83 kg/m      Do you need any medication refills at today's visit? None    Jamar Russo, EMT

## 2024-02-22 ENCOUNTER — PATIENT OUTREACH (OUTPATIENT)
Dept: CARE COORDINATION | Facility: CLINIC | Age: 64
End: 2024-02-22
Payer: COMMERCIAL

## 2024-02-22 ENCOUNTER — MYC MEDICAL ADVICE (OUTPATIENT)
Dept: FAMILY MEDICINE | Facility: CLINIC | Age: 64
End: 2024-02-22
Payer: COMMERCIAL

## 2024-02-22 ENCOUNTER — TELEPHONE (OUTPATIENT)
Dept: ORTHOPEDICS | Facility: CLINIC | Age: 64
End: 2024-02-22
Payer: COMMERCIAL

## 2024-02-22 DIAGNOSIS — Z76.2: Primary | ICD-10-CM

## 2024-02-22 NOTE — TELEPHONE ENCOUNTER
- A call was placed to the patient.     - Clarified that his son could not be in the OR. Let him know his tumor is benign and not urgent to be removed.     - Patient verbalized understanding of plan and all questions were answered. Call back number to clinic was given and patient was told to call if they had an further questions.

## 2024-02-23 ENCOUNTER — TELEPHONE (OUTPATIENT)
Dept: ORTHOPEDICS | Facility: CLINIC | Age: 64
End: 2024-02-23

## 2024-02-23 ENCOUNTER — OFFICE VISIT (OUTPATIENT)
Dept: CARDIOLOGY | Facility: CLINIC | Age: 64
End: 2024-02-23
Payer: COMMERCIAL

## 2024-02-23 VITALS
OXYGEN SATURATION: 100 % | WEIGHT: 168 LBS | HEIGHT: 65 IN | DIASTOLIC BLOOD PRESSURE: 79 MMHG | HEART RATE: 54 BPM | BODY MASS INDEX: 27.99 KG/M2 | SYSTOLIC BLOOD PRESSURE: 123 MMHG

## 2024-02-23 DIAGNOSIS — F41.9 ANXIETY: ICD-10-CM

## 2024-02-23 DIAGNOSIS — R07.9 NONSPECIFIC CHEST PAIN: Primary | ICD-10-CM

## 2024-02-23 DIAGNOSIS — F10.29 ALCOHOL DEPENDENCE WITH UNSPECIFIED ALCOHOL-INDUCED DISORDER (H): ICD-10-CM

## 2024-02-23 DIAGNOSIS — F10.10 ALCOHOL ABUSE: ICD-10-CM

## 2024-02-23 PROCEDURE — 93242 EXT ECG>48HR<7D RECORDING: CPT | Performed by: INTERNAL MEDICINE

## 2024-02-23 PROCEDURE — 99204 OFFICE O/P NEW MOD 45 MIN: CPT | Mod: 25 | Performed by: INTERNAL MEDICINE

## 2024-02-23 PROCEDURE — 93000 ELECTROCARDIOGRAM COMPLETE: CPT | Performed by: INTERNAL MEDICINE

## 2024-02-23 RX ORDER — LORAZEPAM 0.5 MG/1
0.5 TABLET ORAL PRN
Qty: 2 TABLET | Refills: 0 | Status: SHIPPED | OUTPATIENT
Start: 2024-02-23 | End: 2024-06-21

## 2024-02-23 NOTE — PROGRESS NOTES
Jeanmarie Mclean arrived here on 2/23/2024 9:13 AM for 7 Days  Zio monitor placement per ordering provider  Dr. Bjorn Somers  for the diagnosis Nonspecific chest pain .  Patient s skin was prepped per protocol.  Zio monitor was placed.  Instructions were reviewed with and given to the patient.  Patient verbalized understanding of wear, troubleshooting and monitor return instructions.     Dolly

## 2024-02-23 NOTE — TELEPHONE ENCOUNTER
Patient is scheduled for surgery with Dr. Tyler    Spoke with: Jeanmarie    Date of Surgery: 3/25/24    Location: [facility] OR    Informed patient they will need an adult  Yes    Pre op with Provider PCP, patient will schedule    Additional imaging/appointments: POP Made    Surgery packet: Mailed     Additional comments: Asking for c/b from team to further discuss options regarding his son during surgery. Will route to team for further assistance.         Lena Roberts on 2/23/2024 at 12:45 PM

## 2024-02-23 NOTE — TELEPHONE ENCOUNTER
- A call was placed to the patient.     - Let him know that we can't watch his son during his procedure.     - Patient verbalized understanding of plan and all questions were answered. Call back number to clinic was given and patient was told to call if they had an further questions.

## 2024-02-23 NOTE — PROGRESS NOTES
[facility]  Advanced Heart Failure/ Cardiology Clinic    Patient Name: Jeanmarie Mclean   : 1960   Date of Visit: 2024    Primary Care Physician: Milton Iglesias MD,    Referring Physician: Dr. Iglesias   Reason for Visit: Chest pain    Dear Dr. Iglesias,     I had the pleasure of seeing Jeanmarie Mclean today in the [facility] Advanced Heart Failure/ Cardiology Clinic. As you know Jeanmarie Mclean is a pleasant 64 year old year old male, who presents today for further evaluation of chest pain.    Jeanmarie reports chest pain on and off for years. It feels like a sharp pain, lasting seconds and resolves on its own. He has been to the ER 3 times and reports he was given nitroglycerin, but never admitted. It radiates to his left arm, left neck, left shoulder and he states that his left arm tingles and his hand goes numb. It occurs at rest and reaching for something, but sometimes also with activity. He walks/ runs on the treadmill 4-5 times/ week, 90 minutes, 3.6 - 4.1 mph at an incline. He lifts light weights (40 lbs chest), high reps. He used to be a runner but has not been able to do so due to back pain from degenerative disc disease. He gets only 3 hours of sleep.   His left arm is numb/ achy/ tingling frequently, even in the absence of chest pain and he has been told he has cervical radiculopathy.   A couple of weeks ago, when walking to the store, he felt lightheaded and short of breath, but kept walking and was able to complete his shopping. Symptoms returned when walking home and resolved on its own.   He sometimes feels fluttering in his chest.    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 He tells me he drinks alcohol in place of psychotropic medications for h/o TBI, PTSD, hypervigilance, paranoid. He is also taking care of his autistic son, and this is  quite stressful to him as well.    He does not take amlodipine regularly as he does not like to mix it with alcohol.     Home BPs - 110s/70s - 140/90  Home HRs - not checking  Home weights - increased weight with increased appetite    ROS:  A complete 10-point ROS was negative except as above.    PAST MEDICAL HISTORY:  Past Medical History:   Diagnosis Date    Alcoholism (H)     Arthritis     Asperger's syndrome     Dr. Owens, [facility]. Last visit 2006/2007    GERD (gastroesophageal reflux disease) 1999    EGD 2003 OK    History of hypercholesterolemia     Hypertension     Kidney stones     Malignant hyperthermia due to anesthesia     Melasma     forehead, has received desonide from dermatologist    MMT (medial meniscus tear) 10/01    LT    Myalgia and myositis 4/5/2016    mid thorax, left subscapularis, latisimus dorsi Bilateral along iliac crest     Posttraumatic stress disorder     per pt    Prostate cancer (H)     Recurrent genital herpes 1982    Rib fracture 1985    L 6th    Skull fracture (H) 1985    frequent vertigo    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    ultrasound       PAST SURGICAL HISTORY:  Past Surgical History:   Procedure Laterality Date    COLONOSCOPY      COLONOSCOPY N/A 2/22/2021    Procedure: Colonoscopy, Flexible, With Lesion Removal Using Snare;  Surgeon: Garrick Villavicencio MD;  Location: [facility] OR    COLONOSCOPY WITH CO2 INSUFFLATION N/A 2/22/2021    Procedure: COLONOSCOPY, WITH CO2 INSUFFLATION;  Surgeon: Garrick Villavicencio MD;  Location: [facility] OR    CYSTOSCOPY  10/98    for renal stones    HC KNEE SCOPE,MED/LAT MENISECTOMY  6/24/11    Left, medial ([place])    HERNIA REPAIR, INGUINAL RT/LT  5/92    [facility]    ZZC HEP B VAC ADULT 3 DOSE IM  1995    received all 3 vaccines    ZZ REMV PROSTATE,RETROPUB,RADICAL  10/13/04    Dr. Muñoz ([place])       FAMILY HISTORY:  Family History   Problem Relation Age of Onset    Psychotic Disorder Son         autism    Prostate Cancer Father          40s    Cancer Father     Cancer Maternal Grandmother         lung    Glaucoma Maternal Grandmother     Eye Disorder Maternal Grandmother         glaucoma    Diabetes Mother          45    Blood Disease Sister         sickle trait    Hypertension Sister     Blood Disease Paternal Uncle         sickle    Blood Disease Paternal Aunt         sickle    Alzheimer Disease Paternal Grandfather         70s    Macular Degeneration Paternal Grandfather     Cerebrovascular Disease No family hx of     Thyroid Disease No family hx of     Myocardial Infarction No family hx of     C.A.D. 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 [facility] in     Drug use: Yes     Types: Marijuana     Comment: crack (last used 10/08?)    Sexual activity: Not Currently     Partners: Female   Social History Narrative    [employer] ([id number] [place] 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     Social Determinants of Health     Financial Resource Strain: Unknown (2023)    Financial Resource Strain     Within the past 12 months, have you or your family members you live with been unable to get utilities (heat, electricity) when it was really needed?: Patient refused   Food Insecurity: Unknown  (11/14/2023)    Food Insecurity     Within the past 12 months, did you worry that your food would run out before you got money to buy more?: Patient refused     Within the past 12 months, did the food you bought just not last and you didn t have money to get more?: Patient refused   Transportation Needs: Unknown (11/14/2023)    Transportation Needs     Within the past 12 months, has lack of transportation kept you from medical appointments, getting your medicines, non-medical meetings or appointments, work, or from getting things that you need?: Patient refused   Housing Stability: Unknown (11/14/2023)    Housing Stability     Do you have housing? : Patient refused     Are you worried about losing your housing?: Patient refused   He says he is an alcoholic - half to whole six pack of malt liquor (strong beer) 4-5 times per week  Quit smoking 1997  Crack cocaine - quit in 1998  Medical marijuana - made chest hurt so stopped using it    MEDICATIONS:  Current Outpatient Medications   Medication Sig    acetaminophen (TYLENOL) 500 MG tablet Take 500-1,000 mg by mouth every 6 hours as needed for mild pain 1000 mg per day    amLODIPine (NORVASC) 2.5 MG tablet Take 1 tablet (2.5 mg) by mouth daily    Blood Pressure KIT Monitor blood pressure as needed.    Cholecalciferol (VITAMIN D) 1000 UNIT capsule Take 1 capsule by mouth 2 times daily.    Nerve Stimulator (TENS THERAPY PAIN RELIEF) GEORGIE     omeprazole (PRILOSEC) 20 MG DR capsule Take 1 capsule (20 mg) by mouth 2 times daily    Pseudoeph-Doxylamine-DM-APAP (NYQUIL PO)     sildenafil (VIAGRA) 100 MG tablet Take 1/4 - 1 tablet, as directed, 1-3 hours before intimacy. Maximum 1 dose per 24 hours.    olopatadine (PATADAY) 0.2 % ophthalmic solution Place 0.05 mLs (1 drop) into both eyes daily (Patient not taking: Reported on 2/23/2024)     No current facility-administered medications for this visit.        ALLERGIES:     Allergies   Allergen Reactions    Risperidone Other (See  "Comments)     Serve numbness     Trimethoprim Hives    Effexor [Venlafaxine Hydrochloride] Other (See Comments)     Headache, Painful scrotum and drainage from the penis    Ambien [Zolpidem Tartrate] Nausea    Buspirone Nausea     Dizziness, headache    Celexa [Citalopram] Other (See Comments)     Headache    Duloxetine Other (See Comments)     Headache  headaches    Septra [Bactrim] Itching    Seroquel [Quetiapine] Other (See Comments)     Headache, N, V    Sulfa Antibiotics Itching    Sulfamethoxazole-Trimethoprim      hives    Tramadol Nausea     Nausea and headache    Ultram [Tramadol Hcl] Nausea and Vomiting    Venlafaxine     Zolpidem      headaches    Zolpidem Tartrate Other (See Comments)     headaches       PHYSICAL EXAM:  /79 (BP Location: Right arm, Patient Position: Chair, Cuff Size: Adult Regular)   Pulse 54   Ht 1.651 m (5' 5\")   Wt 76.2 kg (168 lb)   SpO2 100%   BMI 27.96 kg/m    Gen: alert, interactive, NAD  HEENT: atraumatic, EOMI, MMM  Neck: supple, no JVD  CV: RRR, no m/r/g  Chest: CTAB, no wheezes or crackles  Abd: soft, NT, ND, +BS  Ext: no LE edema, 2+ peripheral pulses  Skin: warm and dry, no rashes on exposed surfaces  Neuro: alert and oriented, no focal deficits    LABS:    CMP  Last Comprehensive Metabolic Panel:  Sodium   Date Value Ref Range Status   11/17/2023 139 135 - 145 mmol/L Final     Comment:     Reference intervals for this test were updated on 09/26/2023 to more accurately reflect our healthy population. There may be differences in the flagging of prior results with similar values performed with this method. Interpretation of those prior results can be made in the context of the updated reference intervals.    12/28/2020 138 133 - 144 mmol/L Final     Potassium   Date Value Ref Range Status   11/17/2023 5.0 3.4 - 5.3 mmol/L Final   11/29/2022 4.5 3.4 - 5.3 mmol/L Final   12/28/2020 3.9 3.4 - 5.3 mmol/L Final     Chloride   Date Value Ref Range Status   11/17/2023 103 " 98 - 107 mmol/L Final   11/29/2022 105 94 - 109 mmol/L Final   12/28/2020 103 94 - 109 mmol/L Final     Carbon Dioxide   Date Value Ref Range Status   12/28/2020 29 20 - 32 mmol/L Final     Carbon Dioxide (CO2)   Date Value Ref Range Status   11/17/2023 28 22 - 29 mmol/L Final   11/29/2022 30 20 - 32 mmol/L Final     Anion Gap   Date Value Ref Range Status   11/17/2023 8 7 - 15 mmol/L Final   11/29/2022 2 (L) 3 - 14 mmol/L Final   12/28/2020 6 3 - 14 mmol/L Final     Glucose   Date Value Ref Range Status   11/17/2023 96 70 - 99 mg/dL Final   11/29/2022 101 (H) 70 - 99 mg/dL Final   12/28/2020 100 (H) 70 - 99 mg/dL Final     Comment:     Non Fasting     Urea Nitrogen   Date Value Ref Range Status   11/17/2023 13.2 8.0 - 23.0 mg/dL Final   11/29/2022 13 7 - 30 mg/dL Final   12/28/2020 10 7 - 30 mg/dL Final     Creatinine   Date Value Ref Range Status   11/17/2023 0.90 0.67 - 1.17 mg/dL Final   12/28/2020 0.99 0.66 - 1.25 mg/dL Final     GFR Estimate   Date Value Ref Range Status   11/17/2023 >90 >60 mL/min/1.73m2 Final   12/28/2020 83 >60 mL/min/[1.73_m2] Final     Comment:     Non  GFR Calc  Starting 12/18/2018, serum creatinine based estimated GFR (eGFR) will be   calculated using the Chronic Kidney Disease Epidemiology Collaboration   (CKD-EPI) equation.       Calcium   Date Value Ref Range Status   11/17/2023 10.1 8.8 - 10.2 mg/dL Final   12/28/2020 9.6 8.5 - 10.1 mg/dL Final     Bilirubin Total   Date Value Ref Range Status   11/17/2023 0.6 <=1.2 mg/dL Final   08/14/2020 1.2 0.2 - 1.3 mg/dL Final     Alkaline Phosphatase   Date Value Ref Range Status   11/17/2023 96 40 - 150 U/L Final     Comment:     Reference intervals for this test were updated on 11/14/2023 to more accurately reflect our healthy population. There may be differences in the flagging of prior results with similar values performed with this method. Interpretation of those prior results can be made in the context of the updated  "reference intervals.   08/14/2020 79 40 - 150 U/L Final     ALT   Date Value Ref Range Status   11/17/2023 26 0 - 70 U/L Final     Comment:     Reference intervals for this test were updated on 6/12/2023 to more accurately reflect our healthy population. There may be differences in the flagging of prior results with similar values performed with this method. Interpretation of those prior results can be made in the context of the updated reference intervals.     08/14/2020 32 0 - 70 U/L Final     AST   Date Value Ref Range Status   11/17/2023 33 0 - 45 U/L Final     Comment:     Reference intervals for this test were updated on 6/12/2023 to more accurately reflect our healthy population. There may be differences in the flagging of prior results with similar values performed with this method. Interpretation of those prior results can be made in the context of the updated reference intervals.   08/14/2020 18 0 - 45 U/L Final       NT-proBNP - none available    TROP  No results found for: \"TROPI\", \"TROPONIN\", \"TROPR\", \"TROPN\"    CBC  CBC RESULTS:   Recent Labs   Lab Test 03/03/22  0950   WBC 5.4   RBC 4.81   HGB 13.9   HCT 42.5   MCV 88   MCH 28.9   MCHC 32.7   RDW 13.1          LIPIDS  Recent Labs   Lab Test 11/29/22  1032 10/01/21  0712   CHOL 206* 251*   HDL 72 74   * 155*   TRIG 63 108       TSH  TSH   Date Value Ref Range Status   02/21/2024 0.90 0.30 - 4.20 uIU/mL Final   03/03/2022 0.72 0.40 - 4.00 mU/L Final   10/13/2017 0.40 0.40 - 4.00 mU/L Final       HBA1C  Lab Results   Component Value Date    A1C 5.7 10/01/2021    A1C 5.7 12/28/2020    A1C 5.7 11/10/2017         CARDIAC DATA:    EKG:  Today, 2/23/2024: Heart rate 55 bpm, sinus bradycardia, otherwise normal ECG    Echo: None available    Exercise Echo Stress Test:  2013  Normal    12/22/23  Abnormal exercise stress echocardiogram.  The patient exercised for 4 minutes 58 seconds achieving 87% of the maximal predicted heart rate, the target " heart rate was achieved. Normal heart rate response to exercise. BP response was hypertensive with resting blood pressure 134/84 mmHg and peak exercise blood pressure 230/80 mmHg.  Normal biventricular size and thickness. Global systolic function at baseline is mildly reduced at LVEF 45-50%.  With exercise, LVEF did not augment and LV cavity size remained the same (did not decrease).  No regional wall motion abnormalities are present at rest or with exercise.  No angina was elicited.  ECG at baseline: sinus rhythm,. With exercise slow upsloping ST segment  depression noted in inferolateral leads.  Functional capacity is reduced for age.  No significant valvular abnormalities are noted on screening Doppler exam.  The aortic root and visualized ascending aorta are normal.      Pharmacologic Nuclear Stress Test:  1/26/24    The nuclear stress test is negative for inducible myocardial ischemia or infarction.    Left ventricular function is normal.    Cardiac MRI:  None available    Cardiac Catheterization:  None available      ASSESSMENT/PLAN:  In summary, Jeanmarie Mclean is here today with the following -      1.  Multiple cardiac symptoms - Exertional chest pain, Shortness of breath, Palpitations, lightheadedness  2. Possible Cardiomyopathy - Echocardiogram with mildly reduced EF 45- 50% - I reviewed images and am not convinced of the concerns and would like to evaluate further. Risk factors for cardiomyopathy include alcoholism and hypertension  3. Thyroid nodules and lung nodules, being monitored for possible cancer, no formal diagnosis  4. Thigh myxoma  5. Significant psychosocial concerns - schizoaffective disorder, PTSD, anxiety  6. Alcohol dependence       Plans-  -Fasting lipid panel  -BNP  -Cardiac MRI with stress and contrast with pre-medication (prescribed lorazepam)  -Zio patch  -Aggressive risk factor modification including optimal control of blood pressure,  -Advised on following a heart healthy diet and  pursuing at least 150 minutes/week of moderate intensity aerobic exercise  - Counseled on cessation of alcohol. Refer to addiction medicine, offered referral to psychiatry/ psychology - he says he has tried this and has not helped, but would be willing to see an  psychologist  - Follow up after Cardiac MRI      I spent 47 minutes in care of the patient today including obtaining recent medical history, personally reviewing recent cardiac testing and/or lab results, today's examination, discussion of testing results and care recommendations with patient.       Thank you for the opportunity to participate in this patient's care. Please feel free to reach out with any questions or concerns.      Bjorn Somers MD, FACC  Associate Professor of Medicine  Advanced Heart Failure, LVAD & Cardiac Transplant  Director, Cardio-Obstetrics  Cardio-Oncology  [facility]

## 2024-02-23 NOTE — TELEPHONE ENCOUNTER
Phoned patient to get him scheduled for surgery with Dr. Tyler    Call went to [facility].  Provided call back number in voicemail:   [phone] & [phone] for care team.   Will try again.

## 2024-02-23 NOTE — PATIENT INSTRUCTIONS
Cardiology Providers you saw during your visit: Dr. Bjorn Somers      Medication changes:  1- none today         Follow up:  1- Fasting labs  2- Cardiac MRI   3- Follow up with Dr Somers  4- Referral placed for addiction medicine    To Do:  -Aggressive risk factor modification including optimal control of blood pressure    -Follow a heart healthy diet and pursuing at least 150 minutes/week of moderate intensity aerobic exercise    Follow the American Heart Association Diet and Lifestyle recommendations:  Limit saturated fat, trans fat, sodium, red meat, sweets and sugar-sweetened beverages. If you choose to eat red meat, compare labels and select the leanest cuts available.  Aim for at least 150 minutes of moderate physical activity or 75 minutes of vigorous physical activity - or an equal combination of both - each week.     During business hours: [phone], press option # 1 to schedule an appointment or send a message to your care team     After hours, weekends or holidays: On Call Cardiologist- [phone]   option #4 and ask to speak to the on-call Cardiologist.      Riya RN  Cardiology Nurse Care Coordinator       Pre-procedure instructions - Cardiac MRI with Contrast, Stress, and Flow  Patient Education    Your arrival time is 07:30AM.  Location is [address] [place] [address]      How do I prepare for my exam? (Food and drink instructions)  Stop all caffeine 12 hours before the test. This includes coffee, tea, soda, chocolate and certain medicines (such as Anacin, Excedrin and NoDoz). Also avoid decaf coffee and tea, as these contain small amounts of caffeine.  You may drink water and take your morning medicines.  (Other Instructions)  You may need to stop some medicines before the test. Follow your doctor's orders.  You will need to arrive 1 hour early if you will be taking an anxiety medication for the test.     *2 days before:  Stop taking dipyridamole (Aggrenox, Persantine,  Permole)  Stop taking Sildenafil (Viagra), Tadalafil (Cialis) and Vardenafil (Levitra).  If you are taking the medication for Pulmonary Hypertesnion DO NOT hold.        What Should I wear: The MRI machine uses a strong magnet. Due to increased risk of metallic materials/threading in clothing, for your safety, you will be requested to change into a hospital gown. Please remove any body piercings and hair or magnetic eyelash extensions before you arrive. You will also remove watches, jewelry, hairpins, wallets, dentures, partial dental plates and hearing aids. You may wear contact lenses, and you may be able to wear your rings. We have a safe place to keep your personal items, but it is safer to leave them at home.     How long does the Exam Take: Most tests take up to 90 minutes.       What Should I Bring: If you have any implants please bring a card or surgical report with the implant information.  We may be unable to scan you if we do not have this information. Bring the results of similar scans you may have had previously. If you are a minor (under age 18) you will need to bring a parent or legal.     Do I need a : No     What Should I do after the Exam:  No restrictions, you may resume normal activities.     What is this test:  MRI (magnetic resonance imaging) uses a strong magnet and radio waves to look inside the body. An MRA (magnetic resonance angiogram) does the same thing, but it lets us look at your blood vessels. A computer turns the radio waves into pictures showing cross sections of the body, much like slices of bread. This helps us see any problems more clearly. You may receive fluid (called  contrast ) before or during your scan. The fluid helps us see the pictures better. We give the fluid through an IV (small needle in your arm).

## 2024-02-23 NOTE — NURSING NOTE
"Chief Complaint   Patient presents with    New Patient     non-specific chest pain       Initial /79 (BP Location: Right arm, Patient Position: Chair, Cuff Size: Adult Regular)   Pulse 54   Ht 1.651 m (5' 5\")   Wt 76.2 kg (168 lb)   SpO2 100%   BMI 27.96 kg/m   Estimated body mass index is 27.96 kg/m  as calculated from the following:    Height as of this encounter: 1.651 m (5' 5\").    Weight as of this encounter: 76.2 kg (168 lb)..  BP completed using cuff size: regular    Dolly REGAN CNA  "

## 2024-02-23 NOTE — NURSING NOTE
Medication changes:  1- none today         Follow up:  1- Fasting labs  2- Cardiac MRI   3- Follow up with Dr Somers  4- Referral placed for addiction medicine    To Do:  -Aggressive risk factor modification including optimal control of blood pressure    -Follow a heart healthy diet and pursuing at least 150 minutes/week of moderate intensity aerobic exercise    Email sent to state  for Adult son, who pt has guardianship of, without  (for MRI and appts)    Jocelyn  [phone]    Email- [email or web address]

## 2024-02-26 ENCOUNTER — LAB (OUTPATIENT)
Dept: LAB | Facility: CLINIC | Age: 64
End: 2024-02-26
Payer: COMMERCIAL

## 2024-02-26 DIAGNOSIS — R07.9 NONSPECIFIC CHEST PAIN: ICD-10-CM

## 2024-02-26 LAB
ATRIAL RATE - MUSE: 55 BPM
CHOLEST SERPL-MCNC: 240 MG/DL
DIASTOLIC BLOOD PRESSURE - MUSE: NORMAL MMHG
FASTING STATUS PATIENT QL REPORTED: YES
HDLC SERPL-MCNC: 77 MG/DL
INTERPRETATION ECG - MUSE: NORMAL
LDLC SERPL CALC-MCNC: 140 MG/DL
NONHDLC SERPL-MCNC: 163 MG/DL
NT-PROBNP SERPL-MCNC: <36 PG/ML (ref 0–900)
P AXIS - MUSE: 55 DEGREES
PR INTERVAL - MUSE: 186 MS
QRS DURATION - MUSE: 92 MS
QT - MUSE: 418 MS
QTC - MUSE: 399 MS
R AXIS - MUSE: 14 DEGREES
SYSTOLIC BLOOD PRESSURE - MUSE: NORMAL MMHG
T AXIS - MUSE: 30 DEGREES
TRIGL SERPL-MCNC: 117 MG/DL
VENTRICULAR RATE- MUSE: 55 BPM

## 2024-02-26 PROCEDURE — 83880 ASSAY OF NATRIURETIC PEPTIDE: CPT

## 2024-02-26 PROCEDURE — 36415 COLL VENOUS BLD VENIPUNCTURE: CPT

## 2024-02-26 PROCEDURE — 80061 LIPID PANEL: CPT

## 2024-02-27 ENCOUNTER — MYC MEDICAL ADVICE (OUTPATIENT)
Dept: CARDIOLOGY | Facility: CLINIC | Age: 64
End: 2024-02-27
Payer: COMMERCIAL

## 2024-02-28 ENCOUNTER — PATIENT OUTREACH (OUTPATIENT)
Dept: CARE COORDINATION | Facility: CLINIC | Age: 64
End: 2024-02-28
Payer: COMMERCIAL

## 2024-02-28 NOTE — PROGRESS NOTES
"Social Work - Follow-Up  [facility]    Data/Intervention:    Patient Name: Jeanmarie Mclean Goes By: Jeanmarie    /Age: 1960 (64 year old)    Reason for Follow-Up:  Help with son for procedure    Collaborated With:    -Jeanmarie- [phone]  -Ortho RN Missy    Intervention/Education/Resources Provided:  I received notice that Jeanmarie sent the following My Chart message:   \"Care Coordinators haven't contacted me yet about watching Phi as I have this medical procedure. Do I need a referral to engage them, or can I just contact them to see if they can help us? Thanks.\"    I contacted Jeanmarie and explained that since we have already been in contact about care for his son during his Ortho procedure, there won't be anyone from his PCP clinic outreaching to him about the same thing, as this wouldn't change the options at all. Jeanmarie explained he thinks something was lost in translation and explained that it \"dawned on me\" that his son has a care coordinator through [facility], who he contacted already today, so doesn't need a call from anyone with the clinic. Jeanmarie said he has talked with his son's  through [facility] and they told Jeanmarie that all of the respite facilities have closed, which he said is why his son's  has been present for a couple of procedures to be with his son. Jeanmarie explained that several people are working on trying to help regarding care for his son and he needs to know the procedure time at least two weeks ahead of time in order for his son's  to coordinate anything. I offered to call his son's  and Jeanmarie did not provide their info and said various people have already called the , who he said finally replied to him after he started giving out their phone and email address to people. Jeanmarie noted that he himself is also on the CADI waiver and explained that he is also a [place] and explained that things should not be this complicated for him. " I validated Jeanmarie's thoughts and Jeanmarie said he is thinking about calling his state rep. I explained that would not be a bad idea, because that person would then have a first hand example of how broken things are.     I explained that I will reach out to the Ortho clinic to explain the procedure time issue.     I spoke with Ortho RN Missy and explained Jeanmarie needing procedure time information at least 2 weeks ahead of time in order to have any arrangements for his son. Missy explained having the following time information already:  - arrival time 10:40am  - procedure time 12:40pm  - estimated time that Jeanmarie will be done and ready to leave is 4pm    I advocated for this time being locked and not moved due to the needs of care for Jeanmarie's son. I explained that if things get moved and plans for Jeanmarie's son the day of fall through, it will continue to be the same issue regardless of when the procedure is rescheduled for. Missy said she will connect with the scheduling team to request the time not be moved.     I updated Jeanmarie as to the above time information and that nothing is locked yet. Jeanmarie plans to call the clinic in the next few days to confirm details prior to handing off information to his son's . Jeanmarie was very appreciative of my call and help.     Assessment/Plan:  No further follow up is planned at this time but I will remain available if further assistance is needed.     Previously provided patient/family with writer's contact information and availability.      SAMINA Harding, [facility]    [facility]  Ph: [phone], Pgr: [phone]  2/28/2024

## 2024-03-03 ENCOUNTER — NURSE TRIAGE (OUTPATIENT)
Dept: NURSING | Facility: CLINIC | Age: 64
End: 2024-03-03
Payer: COMMERCIAL

## 2024-03-03 NOTE — TELEPHONE ENCOUNTER
"BX done 2/19/24 for lymph node or tumor removed from upper right thigh-groin area. 5\" down from incision it is painful, bruised black and blue and purple. The bruise is the size of his fist--he just noticed it now. The pain radiates from 5\" down and straddles the inner and outer thigh to the back of the thigh towards the buttock. Sore and aching. Pain currently= \"with movement or walking, running etc= \"7\", and it is tender to touch =\"moderate\".  No fever noted. No drainage..He is not taking anything for pain. He states he is sensitive to medications causing side effects.  He is scheduled for further removal on 3/25/2024. 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 The biopsy area is \"fine\" according to the patient.   Jori Hernandes PA-C Orthopedic surgery @ [facility]. [phone]  Preop visit scheduled 3/6/2024 Dr Milton Iglesias.     Triaged to a disposition of Call PCP now: advised to call oncall provider for Orthopedics: phone number given. He does not want to go to the ER--no transportation and he has a disabled son. Advised if he is not seen tonight, or cannot reach oncall provider tonight to be seen tomorrow, which he agrees to do.    Beverly Whitaker RN Triage Nurse Advisor 4:15 PM 3/3/2024  Reason for Disposition   [1] Caller has URGENT question AND [2] triager unable to answer question    Additional Information   Negative: [1] Major abdominal surgical incision AND [2] wound gaping open AND [3] visible internal organs   Negative: Sounds like a life-threatening emergency to the triager   Negative: Patient has a Negative Pressure Wound Therapy device   Negative: Patient is followed by a wound clinic or wound specialist for this wound   Negative: [1] Bleeding from incision AND [2] won't stop after 10 minutes of direct pressure   Negative: [1] Bleeding (more than a few drops) from incision AND [2] tracheostomy or blood vessel surgery (e.g., carotid endarterectomy, femoral bypass graft, " kidney dialysis fistula)   Negative: [1] Widespread rash AND [2] bright red, sunburn-like   Negative: Severe pain in the incision   Negative: [1] Incision gaping open AND [2] < 48 hours since wound re-opened   Negative: [1] Incision gaping open AND [2] length of opening > 2 inches (5 cm)   Negative: Patient sounds very sick or weak to the triager   Negative: Sounds like a serious complication to the triager   Negative: Fever > 100.4 F (38.0 C)   Negative: [1] Incision looks infected (spreading redness, pain) AND [2] fever > 99.5 F (37.5 C)   Negative: [1] Incision looks infected (spreading redness, pain) AND [2] large red area (> 2 in. or 5 cm)   Negative: [1] Incision looks infected (spreading redness, pain) AND [2] face wound   Negative: [1] Red streak runs from the incision AND [2] longer than 1 inch (2.5 cm)   Negative: [1] Pus or bad-smelling fluid draining from incision AND [2] no fever   Negative: [1] Post-op pain AND [2] not controlled with pain medications   Negative: Dressing soaked with blood or body fluid (e.g., drainage)   Negative: [1] Scant bleeding (e.g., few drops) from incision AND AND [2] tracheostomy or blood vessel surgery (e.g., carotid endarterectomy, femoral bypass graft, kidney dialysis fistula)   Negative: [1] Raised bruise and [2] size > 2 inches (5 cm) and expanding    Protocols used: Post-Op Incision Symptoms and [id number]

## 2024-03-04 ENCOUNTER — VIRTUAL VISIT (OUTPATIENT)
Dept: ADDICTION MEDICINE | Facility: CLINIC | Age: 64
End: 2024-03-04
Payer: COMMERCIAL

## 2024-03-04 DIAGNOSIS — F10.29 ALCOHOL DEPENDENCE WITH UNSPECIFIED ALCOHOL-INDUCED DISORDER (H): ICD-10-CM

## 2024-03-04 LAB — SCANNED LAB RESULT: NORMAL

## 2024-03-04 PROCEDURE — G2211 COMPLEX E/M VISIT ADD ON: HCPCS | Mod: 95

## 2024-03-04 PROCEDURE — 99417 PROLNG OP E/M EACH 15 MIN: CPT | Mod: 95

## 2024-03-04 PROCEDURE — 99205 OFFICE O/P NEW HI 60 MIN: CPT | Mod: 95

## 2024-03-04 ASSESSMENT — ANXIETY QUESTIONNAIRES
5. BEING SO RESTLESS THAT IT IS HARD TO SIT STILL: SEVERAL DAYS
4. TROUBLE RELAXING: SEVERAL DAYS
GAD7 TOTAL SCORE: 14
GAD7 TOTAL SCORE: 14
8. IF YOU CHECKED OFF ANY PROBLEMS, HOW DIFFICULT HAVE THESE MADE IT FOR YOU TO DO YOUR WORK, TAKE CARE OF THINGS AT HOME, OR GET ALONG WITH OTHER PEOPLE?: VERY DIFFICULT
2. NOT BEING ABLE TO STOP OR CONTROL WORRYING: NEARLY EVERY DAY
GAD7 TOTAL SCORE: 14
IF YOU CHECKED OFF ANY PROBLEMS ON THIS QUESTIONNAIRE, HOW DIFFICULT HAVE THESE PROBLEMS MADE IT FOR YOU TO DO YOUR WORK, TAKE CARE OF THINGS AT HOME, OR GET ALONG WITH OTHER PEOPLE: VERY DIFFICULT
7. FEELING AFRAID AS IF SOMETHING AWFUL MIGHT HAPPEN: NEARLY EVERY DAY
6. BECOMING EASILY ANNOYED OR IRRITABLE: NOT AT ALL
1. FEELING NERVOUS, ANXIOUS, OR ON EDGE: NEARLY EVERY DAY
3. WORRYING TOO MUCH ABOUT DIFFERENT THINGS: NEARLY EVERY DAY
7. FEELING AFRAID AS IF SOMETHING AWFUL MIGHT HAPPEN: NEARLY EVERY DAY

## 2024-03-04 ASSESSMENT — PAIN SCALES - GENERAL: PAINLEVEL: EXTREME PAIN (8)

## 2024-03-04 ASSESSMENT — PATIENT HEALTH QUESTIONNAIRE - PHQ9
SUM OF ALL RESPONSES TO PHQ QUESTIONS 1-9: 15
SUM OF ALL RESPONSES TO PHQ QUESTIONS 1-9: 15
10. IF YOU CHECKED OFF ANY PROBLEMS, HOW DIFFICULT HAVE THESE PROBLEMS MADE IT FOR YOU TO DO YOUR WORK, TAKE CARE OF THINGS AT HOME, OR GET ALONG WITH OTHER PEOPLE: SOMEWHAT DIFFICULT
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
## 2024-03-04 NOTE — RESULT ENCOUNTER NOTE
Please let the patient know the thyroid hormone level is normal and the genetic analysis came back negative, with a risk of cancer of 4%.  Considering this result, I recommend a follow-up ultrasound prior to his visit with me in August.

## 2024-03-04 NOTE — PATIENT INSTRUCTIONS
Patient Education         Call [phone] to schedule counseling for alcohol use    Addiction Medicine  What to Expect  Here's what to expect from our Addiction Medicine program.  About Addiction Medicine  Addiction Medicine clinics help you with substance use problems. You set your own goals. We try to help you reach your goals. Your care plan can include:  Medicine  Creating a recovery plan  Helping you find local resources  Helping with treatment options  Clinic phone number and addresses  Clinic Phone: [phone]  Mental Health and Addiction Clinic  [facility]  [address] [place] [address], [address]  [place] [address]  [facility]  [address] [facility], [address]  [place] [address]  Walk-in services  We offer walk-in care for patients at the Recovery Clinic. This is only for patients with Opioid Use Disorder (OUD). Anyone with OUD is welcome. Our providers will refer you to the Recovery Clinic if you're struggling to keep up with your medicines or appointments.  Recovery Clinic ([facility])  [address] [facility], [address]  [place] [address]  Phone: [phone]  The Recovery Clinic is open for walk-ins Monday to Friday 9 a.m. to 11:30 a.m. and 12:30 p.m. to 3 p.m.  How it works  Come to your visits every time. The treatment works better when you do.   You can have as many visits as you need. When you're better, we'll refer you back to being cared for by your family doctor.   If you need it, we'll send you to doctors, psychiatrists, therapists, and other providers. We focus on treating addiction. We don't treat other problems, like managing other medicines or non-addiction issues.  About visits  Urine drug testing  We'll often test your pee (urine) for drugs. This is the only way we can know for sure whether or not you're using drugs. It helps us treat you without judgement.   Suboxone (buprenorphine)  If you're taking buprenorphine, you'll have a  "lot of visits at first. If your problem is getting worse, or you're using substances, we may schedule you for extra visits.   Cancelling visits  If you can't come to your visit, please call us right away at [phone]. If you don't cancel at least 24 hours (1 full day) before your visit, that's \"late cancellation.\"  Being late to visits  If you come late, you may not be seen. This will count as a \"no-show.\"  Please call the clinic if you're running late. This will help us plan, but it doesn't mean you'll be seen.   Being late is:  More than 15 minutes late for a return visit.  More than 30 minutes late for your first visit.  If you cancel late or don't show up 2 times within 6 months, we may transfer you to another clinic.   Getting help between visits  If you need help between visits, you can call us Monday to Friday from 8 a.m. to 4:30 p.m. at [phone]. You can also send us a message on [employer].  Medicine refills  If you miss or cancel a visit, you can still ask for a refill. But we can only refill your medicines if you've made a new appointment.  Please call your pharmacy for medicine refills. If you have a question about your refill, call us at [phone].  It takes up to 2 business days to refill your drugs. Let us know 2 to 3 days before you run out. Don't call more than 1 week before you run out. That's too early.   Please make sure we have your right phone number.  If we have a problem with your refill, we'll call you. If we call you, please call us back right away. If you don't, you may not get your medicines quickly.   Call your pharmacy to find out if your medicines are ready.   Keep your medicines in a safe place. Keep them away from pets and children. If your medicines are lost or stolen, we usually don't replace them. We recommend you file a police report if your medicines are stolen. Your insurance may not pay for early refills, even if you have a prescription.  Forms  Please give us at " least 3 business days to fill out any forms. Bring the forms to your visits if you can. We may refer you to other members of your care team to complete the forms.   Emergency care   Call 911 or go to the nearest emergency room if your life or someone else's life is in danger.  Call 988 anytime for the Suicide and Crisis Lifeline.  If you need care when we're closed, call your family doctor to see if they can help. You can also go to urgent care or an emergency room. [facility] emergency rooms may be able to give you buprenorphine or other medicine refills.  Thank you for choosing us for your care.  For informational purposes only. Not to replace the advice of your health care provider. Copyright   2023 [facility]. All rights reserved. [employer] 245694 - REV 05/23.

## 2024-03-04 NOTE — NURSING NOTE
Is the patient currently in the state of [place]? YES    Visit mode:VIDEO    If the visit is dropped, the patient can be reconnected by: VIDEO VISIT: Send to e-mail at: [email or web address]    Will anyone else be joining the visit? NO  (If patient encounters technical issues they should call [phone]    How would you like to obtain your AVS? MyChart    Are changes needed to the allergy or medication list? No    Reason for visit: Consult    Rashard PRECIADO

## 2024-03-05 ENCOUNTER — TELEPHONE (OUTPATIENT)
Dept: ORTHOPEDICS | Facility: CLINIC | Age: 64
End: 2024-03-05
Payer: COMMERCIAL

## 2024-03-05 ENCOUNTER — TELEPHONE (OUTPATIENT)
Dept: ENDOCRINOLOGY | Facility: CLINIC | Age: 64
End: 2024-03-05
Payer: COMMERCIAL

## 2024-03-05 NOTE — TELEPHONE ENCOUNTER
[facility] Call Center    Phone Message    May a detailed message be left on voicemail: yes     Reason for Call: Other: Patient needs to discuss his upcoming surgery asap.  He needs to arrange care for his son with ASD.  Please call him to discuss. Thank you so much!     Action Taken: Message routed to:  [facility] ([facility] Ortho    Travel Screening: Not Applicable

## 2024-03-05 NOTE — TELEPHONE ENCOUNTER
3/5 Called and left voicemail, provided phone number [phone] to schedule ultrasound before August appointment with Dr. Oseguera.     [facility]   Orthopedics, Podiatry, Sports Medicine, Ent ,Eye , Audiology, Adult Endocrine & Diabetes, Nutrition & Medication Therapy Management Specialties   [facility]          ----- Message from Kaci Hodges RN sent at 3/5/2024  8:30 AM CST -----  Regarding: Thyroid ultrasounf  Hello,    Please schedule patient for thyroid ultrasound before his visit with Dr. Oseguera in August.    Thank you~

## 2024-03-05 NOTE — TELEPHONE ENCOUNTER
- A call was placed to the patient. Patient did not answer phone so a voicemail was left.     - Call back number to clinic was given and patient was told to call back and ask for Dr. Jayson Louis's nurse.

## 2024-03-05 NOTE — TELEPHONE ENCOUNTER
Returning call- please c/b- scheduled for surgery on 25th- told to be there at 10:40 am and procedure starts 12:40 pm and surgery conclude by 4 pm or before. Needs to know definite times so he can get . Needs to know the address for the surgery as well. Please leave detailed msg if he doesn't answer.

## 2024-03-05 NOTE — TELEPHONE ENCOUNTER
- A call was placed to the patient.     - Gave the address:   [address] [address]    - confirmed he had the correct time estimates.     - Patient verbalized understanding of plan and all questions were answered. Call back number to clinic was given and patient was told to call if they had an further questions.

## 2024-03-06 ENCOUNTER — OFFICE VISIT (OUTPATIENT)
Dept: FAMILY MEDICINE | Facility: CLINIC | Age: 64
End: 2024-03-06
Payer: COMMERCIAL

## 2024-03-06 ENCOUNTER — TELEPHONE (OUTPATIENT)
Dept: BEHAVIORAL HEALTH | Facility: CLINIC | Age: 64
End: 2024-03-06

## 2024-03-06 VITALS
SYSTOLIC BLOOD PRESSURE: 137 MMHG | BODY MASS INDEX: 27.82 KG/M2 | RESPIRATION RATE: 18 BRPM | HEIGHT: 65 IN | HEART RATE: 51 BPM | OXYGEN SATURATION: 100 % | TEMPERATURE: 97.5 F | DIASTOLIC BLOOD PRESSURE: 86 MMHG | WEIGHT: 167 LBS

## 2024-03-06 DIAGNOSIS — Z01.818 PREOP GENERAL PHYSICAL EXAM: Primary | ICD-10-CM

## 2024-03-06 DIAGNOSIS — I10 HYPERTENSIVE RESPONSE TO EXERCISE: ICD-10-CM

## 2024-03-06 LAB
ERYTHROCYTE [DISTWIDTH] IN BLOOD BY AUTOMATED COUNT: 13 % (ref 10–15)
HCT VFR BLD AUTO: 40.5 % (ref 40–53)
HGB BLD-MCNC: 13.4 G/DL (ref 13.3–17.7)
MCH RBC QN AUTO: 29.3 PG (ref 26.5–33)
MCHC RBC AUTO-ENTMCNC: 33.1 G/DL (ref 31.5–36.5)
MCV RBC AUTO: 88 FL (ref 78–100)
PLATELET # BLD AUTO: 246 10E3/UL (ref 150–450)
RBC # BLD AUTO: 4.58 10E6/UL (ref 4.4–5.9)
WBC # BLD AUTO: 5.1 10E3/UL (ref 4–11)

## 2024-03-06 PROCEDURE — 99214 OFFICE O/P EST MOD 30 MIN: CPT | Performed by: FAMILY MEDICINE

## 2024-03-06 PROCEDURE — 80053 COMPREHEN METABOLIC PANEL: CPT | Performed by: FAMILY MEDICINE

## 2024-03-06 PROCEDURE — 85027 COMPLETE CBC AUTOMATED: CPT | Performed by: FAMILY MEDICINE

## 2024-03-06 PROCEDURE — 36415 COLL VENOUS BLD VENIPUNCTURE: CPT | Performed by: FAMILY MEDICINE

## 2024-03-06 ASSESSMENT — PATIENT HEALTH QUESTIONNAIRE - PHQ9
SUM OF ALL RESPONSES TO PHQ QUESTIONS 1-9: 12
SUM OF ALL RESPONSES TO PHQ QUESTIONS 1-9: 12
10. IF YOU CHECKED OFF ANY PROBLEMS, HOW DIFFICULT HAVE THESE PROBLEMS MADE IT FOR YOU TO DO YOUR WORK, TAKE CARE OF THINGS AT HOME, OR GET ALONG WITH OTHER PEOPLE: NOT DIFFICULT AT ALL

## 2024-03-06 NOTE — TELEPHONE ENCOUNTER
The peer  (Pt) contacted the individual to check on their recovery and inquire about the surgery date. During the conversation,  chen requested the name and contact information for his  .      The Pt expressed hesitation in providing the  s contact information at that time.     In response to the peer s request, the specialist decided not to pressure the individual for the information, as  I  didn't want to risk upsetting him.     Chen stated that  would call back to check on the Pt  progress.

## 2024-03-06 NOTE — PROGRESS NOTES
Preoperative Evaluation  [address] [address]  Phone: [phone]  Primary Provider: Kristofer Castillo  Pre-op Performing Provider: KRISTOFER CASTILLO  Mar 6, 2024     Jeanmarie is a 64 year old, presenting for the following:  Pre-Op Exam      Surgical Information  Surgery/Procedure: Excision Right Thigh Mass  [facility]   Surgeon: Dr. Pedro Tyler  Surgery Date: 3/25/24  Time of Surgery: 12:40 Pm  Where patient plans to recover: At home with family  Fax number for surgical facility: Note does not need to be faxed, will be available electronically in Epic.    Assessment & Plan     The proposed surgical procedure is considered INTERMEDIATE risk.    Problem List Items Addressed This Visit       Hypertensive response to exercise     Other Visit Diagnoses       Preop general physical exam    -  Primary    Relevant Orders    CBC with platelets    Comprehensive metabolic panel (BMP + Alb, Alk Phos, ALT, AST, Total. Bili, TP)              Depression Screening Follow Up        3/6/2024    10:50 AM   PHQ   PHQ-9 Total Score 12   Q9: Thoughts of better off dead/self-harm past 2 weeks Several days   F/U: Thoughts of suicide or self-harm No   F/U: Safety concerns Yes           - No identified additional risk factors other than previously addressed    Antiplatelet or Anticoagulation Medication Instructions   - Patient is on no antiplatelet or anticoagulation medications.    Additional Medication Instructions  Patient is to take all scheduled medications on the day of surgery    Recommendation  APPROVAL GIVEN to proceed with proposed procedure, without further diagnostic evaluation.      Subjective     HPI related to upcoming procedure: history of right proximal thigh mass          3/6/2024    10:52 AM   Preop Questions   1. Have you ever had a heart attack or stroke? UNKNOWN   2. Have you ever had surgery on your heart or blood vessels, such as a stent  placement, a coronary artery bypass, or surgery on an artery in your head, neck, heart, or legs? No   3. Do you have chest pain with activity? YES   4. Do you have a history of  heart failure? UNKNOWN   5. Do you currently have a cold, bronchitis or symptoms of other infection? No   6. Do you have a cough, shortness of breath, or wheezing? No   7. Do you or anyone in your family have previous history of blood clots? UNKNOWN    8. Do you or does anyone in your family have a serious bleeding problem such as prolonged bleeding following surgeries or cuts? UNKNOWN   9. Have you ever had problems with anemia or been told to take iron pills? UNKNOWN   10. Have you had any abnormal blood loss such as black, tarry or bloody stools? UNKNOWN   11. Have you ever had a blood transfusion? UNKNOWN   12. Are you willing to have a blood transfusion if it is medically needed before, during, or after your surgery? Yes   13. Have you or any of your relatives ever had problems with anesthesia? UNKNOWN   14. Do you have sleep apnea, excessive snoring or daytime drowsiness? UNKNOWN   15. Do you have any artifical heart valves or other implanted medical devices like a pacemaker, defibrillator, or continuous glucose monitor? No   16. Do you have artificial joints? No   17. Are you allergic to latex? No     Health Care Directive  Patient has a Health Care Directive on file        Patient Active Problem List    Diagnosis Date Noted    Hypertensive response to exercise 12/11/2019     Priority: Medium    Alcohol dependence in remission (H) 01/05/2018     Priority: Medium    Low back pain without sciatica, unspecified back pain laterality, unspecified chronicity 06/13/2017     Priority: Medium    Major depressive disorder, recurrent episode, moderate (H) 09/26/2016     Priority: Medium    male stress incontinence 04/14/2016     Priority: Medium    Myalgia 04/05/2016     Priority: Medium     mid thorax, left subscapularis, latisimus dorsi  Bilateral along iliac crest 4/15/16 done at [facility]       Chronic pain 08/19/2015     Priority: Medium     Chronic pain low back, S I joint, fibromyalgia.  [facility]  Clinic for procedural care only ( S I joint and TPI)   Last S I joint  09/26/16  Not on opioids for chronic pain .  [facility]  DIRE Score   DIRE Score for ongoing opioid management is calculated as follows:    Diagnosis = 2    Intractability = 2    Risk: Psych = 1  Chem Hlth = 2  Reliability = 2  Social = 2    Efficacy = 2    Total DIRE Score = 13 (14 or higher predicts good candidate for ongoing opioid management; 13 or lower predicts poor candidate for opioid management)   [place] reviewed 3/21/17 and with each pain visit     Maureen Avendaño CNP        Insomnia 10/13/2014     Priority: Medium     Problem list name updated by automated process. Provider to review      TBI (traumatic brain injury) (H) 10/12/2014     Priority: Medium    Anxiety state 08/01/2014     Priority: Medium     Problem list name updated by automated process. Provider to review      Inadequate material resources 06/27/2014     Priority: Medium     food      Low back pain 05/06/2014     Priority: Medium     Diagnosis updated by automated process. Provider to review and confirm.      Posttraumatic stress disorder 04/07/2014     Priority: Medium    Fibromyalgia syndrome 09/05/2013     Priority: Medium    Overweight (BMI 25.0-29.9) 08/16/2012     Priority: Medium    GERD (gastroesophageal reflux disease)      Priority: Medium     EGD 2003 OK      CARDIOVASCULAR SCREENING; LDL GOAL LESS THAN 130 10/31/2010     Priority: Medium     Jaffrey Risk Score: 4% (8 Total Points)     2+ risk factors.      Malignant neoplasm of prostate (H) 05/06/2010     Priority: Medium    Pervasive developmental disorder, active 04/01/2009     Priority: Medium    Depressive disorder 04/01/2009     Priority: Medium    Delusional disorder (H)  04/01/2009     Priority: Medium     Problem list name updated by automated process. Provider to review      History of Asperger's syndrome 10/10/2014     Priority: Low      Past Medical History:   Diagnosis Date    Alcoholism (H)     Arthritis     Asperger's syndrome     Dr. Owens, [facility]. Last visit 2006/2007    GERD (gastroesophageal reflux disease) 1999    EGD 2003 OK    History of hypercholesterolemia     Hypertension     Kidney stones     Malignant hyperthermia due to anesthesia     Melasma     forehead, has received desonide from dermatologist    MMT (medial meniscus tear) 10/01    LT    Myalgia and myositis 4/5/2016    mid thorax, left subscapularis, latisimus dorsi Bilateral along iliac crest     Posttraumatic stress disorder     per pt    Prostate cancer (H)     Recurrent genital herpes 1982    Rib fracture 1985    L 6th    Skull fracture (H) 1985    frequent vertigo    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    ultrasound     Past Surgical History:   Procedure Laterality Date    COLONOSCOPY      COLONOSCOPY N/A 2/22/2021    Procedure: Colonoscopy, Flexible, With Lesion Removal Using Snare;  Surgeon: Garrick Villavicencio MD;  Location: [facility] OR    COLONOSCOPY WITH CO2 INSUFFLATION N/A 2/22/2021    Procedure: COLONOSCOPY, WITH CO2 INSUFFLATION;  Surgeon: Garrick Villavicencio MD;  Location: [facility] OR    CYSTOSCOPY  10/98    for renal stones    HC KNEE SCOPE,MED/LAT MENISECTOMY  6/24/11    Left, medial ([place])    HERNIA REPAIR, INGUINAL RT/LT  5/92    [facility])    Z HEP B VAC ADULT 3 DOSE IM  1995    received all 3 vaccines    [facility] REMV PROSTATE,RETROPUB,RADICAL  10/13/04    Dr. Muñoz ([place])     Current Outpatient Medications   Medication Sig Dispense Refill    acetaminophen (TYLENOL) 500 MG tablet Take 500-1,000 mg by mouth every 6 hours as needed for mild pain 1000 mg per day      amLODIPine (NORVASC) 2.5 MG tablet Take 1 tablet (2.5 mg) by mouth daily 90 tablet 3    Blood Pressure KIT Monitor blood  pressure as needed. 1 kit 1    Cholecalciferol (VITAMIN D) 1000 UNIT capsule Take 1 capsule by mouth 2 times daily.      LORazepam (ATIVAN) 0.5 MG tablet Take 1 tablet (0.5 mg) by mouth as needed for anxiety (One dose 30 minutes before MRI and one dose during MRI if needed) 2 tablet 0    Nerve Stimulator (TENS THERAPY PAIN RELIEF) GEORGIE       olopatadine (PATADAY) 0.2 % ophthalmic solution Place 0.05 mLs (1 drop) into both eyes daily 2.5 mL 11    omeprazole (PRILOSEC) 20 MG DR capsule Take 1 capsule (20 mg) by mouth 2 times daily 180 capsule 1    Pseudoeph-Doxylamine-DM-APAP (NYQUIL PO)       sildenafil (VIAGRA) 100 MG tablet Take 1/4 - 1 tablet, as directed, 1-3 hours before intimacy. Maximum 1 dose per 24 hours. 10 tablet 5       Allergies   Allergen Reactions    Risperidone Other (See Comments)     Serve numbness     Trimethoprim Hives    Effexor [Venlafaxine Hydrochloride] Other (See Comments)     Headache, Painful scrotum and drainage from the penis    Ambien [Zolpidem Tartrate] Nausea    Buspirone Nausea     Dizziness, headache    Celexa [Citalopram] Other (See Comments)     Headache    Duloxetine Other (See Comments)     Headache  headaches    Septra [Bactrim] Itching    Seroquel [Quetiapine] Other (See Comments)     Headache, N, V    Sulfa Antibiotics Itching    Sulfamethoxazole-Trimethoprim      hives    Tramadol Nausea     Nausea and headache    Ultram [Tramadol Hcl] Nausea and Vomiting    Venlafaxine     Zolpidem      headaches    Zolpidem Tartrate Other (See Comments)     headaches        Social History     Tobacco Use    Smoking status: Former     Packs/day: 0.50     Years: 10.00     Additional pack years: 0.00     Total pack years: 5.00     Types: Cigarettes     Quit date: 1997     Years since quittin.1     Passive exposure: Past (His mother smoked while growing up.)    Smokeless tobacco: Never   Substance Use Topics    Alcohol use: Yes     Comment: hx alcoholism quit March 10, 2022. [facility]  "dependency treatment in      Family History   Problem Relation Age of Onset    Psychotic Disorder Son         autism    Prostate Cancer Father         40s    Cancer Father     Cancer Maternal Grandmother         lung    Glaucoma Maternal Grandmother     Eye Disorder Maternal Grandmother         glaucoma    Diabetes Mother          45    Blood Disease Sister         sickle trait    Hypertension Sister     Blood Disease Paternal Uncle         sickle    Blood Disease Paternal Aunt         sickle    Alzheimer Disease Paternal Grandfather         70s    Macular Degeneration Paternal Grandfather     Cerebrovascular Disease No family hx of     Thyroid Disease No family hx of     Myocardial Infarction No family hx of     C.A.D. No family hx of      History   Drug Use    Types: Marijuana     Comment: crack (last used 10/08?)         Review of Systems    Review of Systems  Constitutional, HEENT, cardiovascular, pulmonary, GI, , musculoskeletal, neuro, skin, endocrine and psych systems are negative, except as otherwise noted.    Objective    BP (!) 162/82 (BP Location: Left arm, Patient Position: Chair, Cuff Size: Adult Large)   Pulse 51   Temp 97.5  F (36.4  C) (Tympanic)   Resp 18   Ht 1.651 m (5' 5\")   Wt 75.8 kg (167 lb)   SpO2 100%   BMI 27.79 kg/m     Estimated body mass index is 27.79 kg/m  as calculated from the following:    Height as of this encounter: 1.651 m (5' 5\").    Weight as of this encounter: 75.8 kg (167 lb).  Physical Exam  GENERAL: alert and no distress  NECK: no adenopathy, no asymmetry, masses, or scars  RESP: lungs clear to auscultation - no rales, rhonchi or wheezes  CV: regular rate and rhythm, normal S1 S2, no S3 or S4, no murmur, click or rub, no peripheral edema  ABDOMEN: soft, nontender, no hepatosplenomegaly, no masses and bowel sounds normal  MS: no gross musculoskeletal defects noted, no edema    Recent Labs   Lab Test 23  0828 22  1032    137   POTASSIUM 5.0 " 4.5   CR 0.90 0.85        Diagnostics  Labs pending at this time.  Results will be reviewed when available.   No EKG this visit, completed in the last 90 days. 1/26/2024: nuclear cardiac stress test normal.    Revised Cardiac Risk Index (RCRI)  The patient has the following serious cardiovascular risks for perioperative complications:   - No serious cardiac risks = 0 points     RCRI Interpretation: 0 points: Class I (very low risk - 0.4% complication rate)         Signed Electronically by: Milton Iglesias MD, MD  Copy of this evaluation report is provided to requesting physician.

## 2024-03-06 NOTE — PATIENT INSTRUCTIONS
At [facility], we strive to deliver an exceptional experience to you, every time we see you. If you receive a survey, please complete it as we do value your feedback.  If you have MyChart, you can expect to receive results automatically within 24 hours of their completion.  Your provider will send a note interpreting your results as well.   If you do not have MyChart, you should receive your results in about a week by mail.    Your care team:                            Family Medicine Internal Medicine   MD Aaron Schulte, MD Melinda Hays, MD Amberly Gonzalez, PALeoC    Milton Iglesias, MD Pediatrics   Eliseo Riggs, PA-C  Pilar Han, MD Georgia Oakes, MD Sarita Iniguez APRN RAJINDER Mtz CNP, MD Mandi Reyna, MD Fatimah Lee, CNP  Same-Day (No follow up visit)   Serafin Shankar, ZENIA Grimaldo, PAJOSÉ Zamarripa PA-C     Clinic hours: Monday - Thursday 7 am-6 pm; Fridays 7 am-5 pm.   Urgent care: Monday - Friday 10 am- 8 pm; Saturday and Sunday 9 am-5 pm.    Clinic: [phone]       [facility]: Monday - Thursday 8 am - 7 pm; Friday 8 am - 6 pm  [facility]: [phone]     Preparing for Your Surgery  Getting started  A nurse will call you to review your health history and instructions. They will give you an arrival time based on your scheduled surgery time. Please be ready to share:  Your doctor's clinic name and phone number  Your medical, surgical, and anesthesia history  A list of allergies and sensitivities  A list of medicines, including herbal treatments and over-the-counter drugs  Whether the patient has a legal guardian (ask how to send us the papers in advance)  Please tell us if you're pregnant--or if there's any chance you might be pregnant. Some surgeries may injure a fetus (unborn baby), so they require a  pregnancy test. Surgeries that are safe for a fetus don't always need a test, and you can choose whether to have one.   If you have a child who's having surgery, please ask for a copy of Preparing for Your Child's Surgery.    Preparing for surgery  Within 10 to 30 days of surgery: Have a pre-op exam (sometimes called an H&P, or History and Physical). This can be done at a clinic or pre-operative center.  If you're having a , you may not need this exam. Talk to your care team.  At your pre-op exam, talk to your care team about all medicines you take. If you need to stop any medicines before surgery, ask when to start taking them again.  We do this for your safety. Many medicines can make you bleed too much during surgery. Some change how well surgery (anesthesia) drugs work.  Call your insurance company to let them know you're having surgery. (If you don't have insurance, call [phone].)  Call your clinic if there's any change in your health. This includes signs of a cold or flu (sore throat, runny nose, cough, rash, fever). It also includes a scrape or scratch near the surgery site.  If you have questions on the day of surgery, call your hospital or surgery center.  Eating and drinking guidelines  For your safety: Unless your surgeon tells you otherwise, follow the guidelines below.  Eat and drink as usual until 8 hours before you arrive for surgery. After that, no food or milk.  Drink clear liquids until 2 hours before you arrive. These are liquids you can see through, like water, Gatorade, and Propel Water. They also include plain black coffee and tea (no cream or milk), candy, and breath mints. You can spit out gum when you arrive.  If you drink alcohol: Stop drinking it the night before surgery.  If your care team tells you to take medicine on the morning of surgery, it's okay to take it with a sip of water.  Preventing infection  Shower or bathe the night before and morning of your surgery. Follow  the instructions your clinic gave you. (If no instructions, use regular soap.)  Don't shave or clip hair near your surgery site. We'll remove the hair if needed.  Don't smoke or vape the morning of surgery. You may chew nicotine gum up to 2 hours before surgery. A nicotine patch is okay.  Note: Some surgeries require you to completely quit smoking and nicotine. Check with your surgeon.  Your care team will make every effort to keep you safe from infection. We will:  Clean our hands often with soap and water (or an alcohol-based hand rub).  Clean the skin at your surgery site with a special soap that kills germs.  Give you a special gown to keep you warm. (Cold raises the risk of infection.)  Wear special hair covers, masks, gowns and gloves during surgery.  Give antibiotic medicine, if prescribed. Not all surgeries need antibiotics.  What to bring on the day of surgery  Photo ID and insurance card  Copy of your health care directive, if you have one  Glasses and hearing aids (bring cases)  You can't wear contacts during surgery  Inhaler and eye drops, if you use them (tell us about these when you arrive)  CPAP machine or breathing device, if you use them  A few personal items, if spending the night  If you have . . .  A pacemaker, ICD (cardiac defibrillator) or other implant: Bring the ID card.  An implanted stimulator: Bring the remote control.  A legal guardian: Bring a copy of the certified (court-stamped) guardianship papers.  Please remove any jewelry, including body piercings. Leave jewelry and other valuables at home.  If you're going home the day of surgery  You must have a responsible adult drive you home. They should stay with you overnight as well.  If you don't have someone to stay with you, and you aren't safe to go home alone, we may keep you overnight. Insurance often won't pay for this.  After surgery  If it's hard to control your pain or you need more pain medicine, please call your surgeon's  office.  Questions?   If you have any questions for your care team, list them here: _________________________________________________________________________________________________________________________________________________________________________ ____________________________________ ____________________________________ ____________________________________  For informational purposes only. Not to replace the advice of your health care provider. Copyright   2003, 2019 [facility]. All rights reserved. Clinically reviewed by Lenora Kwok MD. SMARTworks 841911 - REV 12/22.    How to Take Your Medication Before Surgery  - Take all of your medications before surgery as usual

## 2024-03-07 ENCOUNTER — TELEPHONE (OUTPATIENT)
Dept: ORTHOPEDICS | Facility: CLINIC | Age: 64
End: 2024-03-07
Payer: COMMERCIAL

## 2024-03-07 LAB
ALBUMIN SERPL BCG-MCNC: 4.5 G/DL (ref 3.5–5.2)
ALP SERPL-CCNC: 86 U/L (ref 40–150)
ALT SERPL W P-5'-P-CCNC: 26 U/L (ref 0–70)
ANION GAP SERPL CALCULATED.3IONS-SCNC: 8 MMOL/L (ref 7–15)
AST SERPL W P-5'-P-CCNC: 31 U/L (ref 0–45)
BILIRUB SERPL-MCNC: 0.8 MG/DL
BUN SERPL-MCNC: 12.8 MG/DL (ref 8–23)
CALCIUM SERPL-MCNC: 9.4 MG/DL (ref 8.8–10.2)
CHLORIDE SERPL-SCNC: 106 MMOL/L (ref 98–107)
CREAT SERPL-MCNC: 0.88 MG/DL (ref 0.67–1.17)
DEPRECATED HCO3 PLAS-SCNC: 27 MMOL/L (ref 22–29)
EGFRCR SERPLBLD CKD-EPI 2021: >90 ML/MIN/1.73M2
GLUCOSE SERPL-MCNC: 90 MG/DL (ref 70–99)
POTASSIUM SERPL-SCNC: 4.3 MMOL/L (ref 3.4–5.3)
PROT SERPL-MCNC: 7.5 G/DL (ref 6.4–8.3)
SODIUM SERPL-SCNC: 141 MMOL/L (ref 135–145)

## 2024-03-07 NOTE — TELEPHONE ENCOUNTER
- A call was placed to the patient.     - Let the  know we cannot watch Jeanmarie's son during the surgery.     - Patient verbalized understanding of plan and all questions were answered. Call back number to clinic was given and patient was told to call if they had an further questions.

## 2024-03-07 NOTE — TELEPHONE ENCOUNTER
Health Call Center    Phone Message    May a detailed message be left on voicemail: yes     Reason for Call: Other: Bro from medical behavorial health is a care coordinator calling to confirm appt for patient per his request.Need appt time and date and location. Per [facility] [employer] unable to provide patient information due to HIPAA.        -of Note:  Bro mentioned he needs a patient advocate or someone to contact them regarding Patients son during the appt time to help watch him . He has no other family members or friends that can help watch his son during the appt time.Patient relations number was provided to Bro to call them.Patient has asked Bro who is a care coordinator to reach out and ask information regarding his appts and to ask if at the time of his appt if anyone can help watch his son? Or if there is a patient advocate that can reach out to them please. Please call Bro back at [phone] [phone].  Again patient relations number was provided to Bro.    Action Taken: Other: [facility] [[id number]]    Travel Screening: Not Applicable

## 2024-03-11 PROCEDURE — 93244 EXT ECG>48HR<7D REV&INTERPJ: CPT | Performed by: INTERNAL MEDICINE

## 2024-03-11 NOTE — TELEPHONE ENCOUNTER
3/11 Called and left voicemail, provided phone number [phone] to schedule ultrasound before August appointment with Dr. Oseguera.      [facility]   Orthopedics, Podiatry, Sports Medicine, Ent ,Eye , Audiology, Adult Endocrine & Diabetes, Nutrition & Medication Therapy Management Specialties   [facility]

## 2024-03-18 ENCOUNTER — TELEPHONE (OUTPATIENT)
Dept: ORTHOPEDICS | Facility: CLINIC | Age: 64
End: 2024-03-18
Payer: COMMERCIAL

## 2024-03-18 NOTE — TELEPHONE ENCOUNTER
- A call was placed to the patient.     - let him know his surgery time is changing a lot but it is back to 1220 as of right now and it's subject to change.     - Patient verbalized understanding of plan and all questions were answered. Call back number to clinic was given and patient was told to call if they had an further questions.

## 2024-03-18 NOTE — TELEPHONE ENCOUNTER
A call was placed to the patient and pre-op teaching was performed over the phone.    Teaching Flowsheet   Relevant Diagnosis: Pre-Op Teaching  Teaching Topic:      Person(s) involved in teaching:   Patient     Motivation Level:  Asks Questions: Yes  Eager to Learn: Yes  Cooperative: Yes  Receptive (willing/able to accept information): Yes  Any cultural factors/Mandaeism beliefs that may influence understanding or compliance? No     Patient demonstrates understanding of the following:  Reason for the appointment, diagnosis and treatment plan: Yes  Knowledge of proper use of medications and conditions for which they are ordered (with special attention to potential side effects or drug interactions): Yes  Which situations necessitate calling provider and whom to contact: Yes- discussed the stoplight tool to help assist with this.      Teaching Concerns Addressed:      Proper use of surgical scrub explain: Yes    Nutritional needs and diet plan: Yes  Pain management techniques: Yes  Wound Care: Yes  How and/when to access community resources: Yes     Instructional Materials Used/Given:  2 bottle of chlorhexidine and a surgery packet given to patient in clinic. Instructed patient to buy or get 8 ounces of antiseptic surgical soap called 4% CHG. Common name brand of this soap are Hibiclens and Exidine. I told them they can find this at their local pharmacy, clinic or retail store. If they have trouble finding it, I told them to ask their pharmacist to help them find a substitute.      - Important contact info/ phone numbers: emphasizing clinic number and after hours number  - Map/ location of surgery  - Medications to avoid  - Showering instructions  - Stop light tool    Additionally the following was discussed with patient:  - Medical transportation will be driving the patient to surgery and staying with them for 24 hours.     -Next step: PCP visit completed, Surgery 3/25     Time spent with patient: 30 minutes.

## 2024-03-25 ENCOUNTER — ANESTHESIA (OUTPATIENT)
Dept: SURGERY | Facility: CLINIC | Age: 64
End: 2024-03-25
Payer: COMMERCIAL

## 2024-03-25 ENCOUNTER — ANESTHESIA EVENT (OUTPATIENT)
Dept: SURGERY | Facility: CLINIC | Age: 64
End: 2024-03-25
Payer: COMMERCIAL

## 2024-03-25 ENCOUNTER — HOSPITAL ENCOUNTER (OUTPATIENT)
Facility: CLINIC | Age: 64
Discharge: HOME | End: 2024-03-25
Attending: ORTHOPAEDIC SURGERY | Admitting: ORTHOPAEDIC SURGERY
Payer: COMMERCIAL

## 2024-03-25 VITALS
SYSTOLIC BLOOD PRESSURE: 141 MMHG | HEART RATE: 57 BPM | RESPIRATION RATE: 16 BRPM | BODY MASS INDEX: 27.11 KG/M2 | OXYGEN SATURATION: 99 % | DIASTOLIC BLOOD PRESSURE: 93 MMHG | TEMPERATURE: 97.6 F | HEIGHT: 65 IN | WEIGHT: 162.7 LBS

## 2024-03-25 DIAGNOSIS — C61 MALIGNANT NEOPLASM OF PROSTATE (H): ICD-10-CM

## 2024-03-25 DIAGNOSIS — M79.89 SOFT TISSUE MASS: Primary | ICD-10-CM

## 2024-03-25 PROCEDURE — 272N000001 HC OR GENERAL SUPPLY STERILE: Performed by: ORTHOPAEDIC SURGERY

## 2024-03-25 PROCEDURE — 999N000141 HC STATISTIC PRE-PROCEDURE NURSING ASSESSMENT: Performed by: ORTHOPAEDIC SURGERY

## 2024-03-25 PROCEDURE — 250N000013 HC RX MED GY IP 250 OP 250 PS 637: Performed by: ANESTHESIOLOGY

## 2024-03-25 PROCEDURE — 88307 TISSUE EXAM BY PATHOLOGIST: CPT | Mod: TC | Performed by: ORTHOPAEDIC SURGERY

## 2024-03-25 PROCEDURE — 710N000012 HC RECOVERY PHASE 2, PER MINUTE: Performed by: ORTHOPAEDIC SURGERY

## 2024-03-25 PROCEDURE — 250N000009 HC RX 250: Performed by: NURSE ANESTHETIST, CERTIFIED REGISTERED

## 2024-03-25 PROCEDURE — 710N000010 HC RECOVERY PHASE 1, LEVEL 2, PER MIN: Performed by: ORTHOPAEDIC SURGERY

## 2024-03-25 PROCEDURE — 27328 EXC THIGH/KNEE TUM DEEP <5CM: CPT | Performed by: ANESTHESIOLOGY

## 2024-03-25 PROCEDURE — 250N000011 HC RX IP 250 OP 636: Performed by: ORTHOPAEDIC SURGERY

## 2024-03-25 PROCEDURE — 250N000011 HC RX IP 250 OP 636: Performed by: PHYSICIAN ASSISTANT

## 2024-03-25 PROCEDURE — 250N000011 HC RX IP 250 OP 636: Performed by: NURSE ANESTHETIST, CERTIFIED REGISTERED

## 2024-03-25 PROCEDURE — 258N000003 HC RX IP 258 OP 636: Performed by: NURSE ANESTHETIST, CERTIFIED REGISTERED

## 2024-03-25 PROCEDURE — 250N000011 HC RX IP 250 OP 636: Performed by: ANESTHESIOLOGY

## 2024-03-25 PROCEDURE — 27328 EXC THIGH/KNEE TUM DEEP <5CM: CPT | Performed by: NURSE ANESTHETIST, CERTIFIED REGISTERED

## 2024-03-25 PROCEDURE — 88307 TISSUE EXAM BY PATHOLOGIST: CPT | Mod: 26 | Performed by: PATHOLOGY

## 2024-03-25 PROCEDURE — 370N000017 HC ANESTHESIA TECHNICAL FEE, PER MIN: Performed by: ORTHOPAEDIC SURGERY

## 2024-03-25 PROCEDURE — 250N000013 HC RX MED GY IP 250 OP 250 PS 637: Performed by: STUDENT IN AN ORGANIZED HEALTH CARE EDUCATION/TRAINING PROGRAM

## 2024-03-25 PROCEDURE — 360N000075 HC SURGERY LEVEL 2, PER MIN: Performed by: ORTHOPAEDIC SURGERY

## 2024-03-25 RX ORDER — BUPIVACAINE HYDROCHLORIDE 2.5 MG/ML
INJECTION, SOLUTION INFILTRATION; PERINEURAL PRN
Status: DISCONTINUED | OUTPATIENT
Start: 2024-03-25 | End: 2024-03-25 | Stop reason: HOSPADM

## 2024-03-25 RX ORDER — ACETAMINOPHEN 325 MG/1
650 TABLET ORAL
Status: DISCONTINUED | OUTPATIENT
Start: 2024-03-25 | End: 2024-03-25 | Stop reason: HOSPADM

## 2024-03-25 RX ORDER — NALOXONE HYDROCHLORIDE 0.4 MG/ML
0.1 INJECTION, SOLUTION INTRAMUSCULAR; INTRAVENOUS; SUBCUTANEOUS
Status: DISCONTINUED | OUTPATIENT
Start: 2024-03-25 | End: 2024-03-25 | Stop reason: HOSPADM

## 2024-03-25 RX ORDER — FENTANYL CITRATE 50 UG/ML
INJECTION, SOLUTION INTRAMUSCULAR; INTRAVENOUS PRN
Status: DISCONTINUED | OUTPATIENT
Start: 2024-03-25 | End: 2024-03-25

## 2024-03-25 RX ORDER — PROPOFOL 10 MG/ML
INJECTION, EMULSION INTRAVENOUS CONTINUOUS PRN
Status: DISCONTINUED | OUTPATIENT
Start: 2024-03-25 | End: 2024-03-25

## 2024-03-25 RX ORDER — OXYCODONE HYDROCHLORIDE 5 MG/1
5 TABLET ORAL ONCE
Status: COMPLETED | OUTPATIENT
Start: 2024-03-25 | End: 2024-03-25

## 2024-03-25 RX ORDER — SODIUM CHLORIDE, SODIUM LACTATE, POTASSIUM CHLORIDE, CALCIUM CHLORIDE 600; 310; 30; 20 MG/100ML; MG/100ML; MG/100ML; MG/100ML
INJECTION, SOLUTION INTRAVENOUS CONTINUOUS PRN
Status: DISCONTINUED | OUTPATIENT
Start: 2024-03-25 | End: 2024-03-25

## 2024-03-25 RX ORDER — FENTANYL CITRATE 50 UG/ML
50 INJECTION, SOLUTION INTRAMUSCULAR; INTRAVENOUS EVERY 5 MIN PRN
Status: DISCONTINUED | OUTPATIENT
Start: 2024-03-25 | End: 2024-03-25 | Stop reason: HOSPADM

## 2024-03-25 RX ORDER — HYDROMORPHONE HYDROCHLORIDE 1 MG/ML
0.2 INJECTION, SOLUTION INTRAMUSCULAR; INTRAVENOUS; SUBCUTANEOUS EVERY 5 MIN PRN
Status: DISCONTINUED | OUTPATIENT
Start: 2024-03-25 | End: 2024-03-25 | Stop reason: HOSPADM

## 2024-03-25 RX ORDER — ONDANSETRON 4 MG/1
4 TABLET, ORALLY DISINTEGRATING ORAL EVERY 30 MIN PRN
Status: DISCONTINUED | OUTPATIENT
Start: 2024-03-25 | End: 2024-03-25 | Stop reason: HOSPADM

## 2024-03-25 RX ORDER — CEFAZOLIN SODIUM/WATER 2 G/20 ML
2 SYRINGE (ML) INTRAVENOUS
Status: COMPLETED | OUTPATIENT
Start: 2024-03-25 | End: 2024-03-25

## 2024-03-25 RX ORDER — OXYCODONE HYDROCHLORIDE 5 MG/1
5 TABLET ORAL EVERY 6 HOURS PRN
Qty: 6 TABLET | Refills: 0 | Status: SHIPPED | OUTPATIENT
Start: 2024-03-25 | End: 2024-03-28

## 2024-03-25 RX ORDER — HYDROMORPHONE HYDROCHLORIDE 1 MG/ML
0.4 INJECTION, SOLUTION INTRAMUSCULAR; INTRAVENOUS; SUBCUTANEOUS EVERY 5 MIN PRN
Status: DISCONTINUED | OUTPATIENT
Start: 2024-03-25 | End: 2024-03-25 | Stop reason: HOSPADM

## 2024-03-25 RX ORDER — SODIUM CHLORIDE, SODIUM LACTATE, POTASSIUM CHLORIDE, CALCIUM CHLORIDE 600; 310; 30; 20 MG/100ML; MG/100ML; MG/100ML; MG/100ML
INJECTION, SOLUTION INTRAVENOUS CONTINUOUS
Status: DISCONTINUED | OUTPATIENT
Start: 2024-03-25 | End: 2024-03-25 | Stop reason: HOSPADM

## 2024-03-25 RX ORDER — ONDANSETRON 2 MG/ML
4 INJECTION INTRAMUSCULAR; INTRAVENOUS EVERY 30 MIN PRN
Status: DISCONTINUED | OUTPATIENT
Start: 2024-03-25 | End: 2024-03-25 | Stop reason: HOSPADM

## 2024-03-25 RX ORDER — CEFAZOLIN SODIUM/WATER 2 G/20 ML
2 SYRINGE (ML) INTRAVENOUS SEE ADMIN INSTRUCTIONS
Status: DISCONTINUED | OUTPATIENT
Start: 2024-03-25 | End: 2024-03-25 | Stop reason: HOSPADM

## 2024-03-25 RX ORDER — LIDOCAINE HYDROCHLORIDE 20 MG/ML
INJECTION, SOLUTION INFILTRATION; PERINEURAL PRN
Status: DISCONTINUED | OUTPATIENT
Start: 2024-03-25 | End: 2024-03-25

## 2024-03-25 RX ORDER — ACETAMINOPHEN 325 MG/1
650 TABLET ORAL EVERY 4 HOURS PRN
Qty: 50 TABLET | Refills: 0 | Status: SHIPPED | OUTPATIENT
Start: 2024-03-25 | End: 2024-06-06

## 2024-03-25 RX ORDER — BUPIVACAINE HYDROCHLORIDE 7.5 MG/ML
INJECTION, SOLUTION INTRASPINAL
Status: COMPLETED | OUTPATIENT
Start: 2024-03-25 | End: 2024-03-25

## 2024-03-25 RX ORDER — AMOXICILLIN 250 MG
1-2 CAPSULE ORAL 2 TIMES DAILY
Qty: 30 TABLET | Refills: 0 | Status: SHIPPED | OUTPATIENT
Start: 2024-03-25 | End: 2024-05-06

## 2024-03-25 RX ORDER — FENTANYL CITRATE 50 UG/ML
25 INJECTION, SOLUTION INTRAMUSCULAR; INTRAVENOUS EVERY 5 MIN PRN
Status: DISCONTINUED | OUTPATIENT
Start: 2024-03-25 | End: 2024-03-25 | Stop reason: HOSPADM

## 2024-03-25 RX ADMIN — ACETAMINOPHEN 650 MG: 325 TABLET, FILM COATED ORAL at 15:25

## 2024-03-25 RX ADMIN — MIDAZOLAM 2 MG: 1 INJECTION INTRAMUSCULAR; INTRAVENOUS at 13:25

## 2024-03-25 RX ADMIN — PROPOFOL 100 MCG/KG/MIN: 10 INJECTION, EMULSION INTRAVENOUS at 13:43

## 2024-03-25 RX ADMIN — LIDOCAINE HYDROCHLORIDE 40 MG: 20 INJECTION, SOLUTION INFILTRATION; PERINEURAL at 13:43

## 2024-03-25 RX ADMIN — BUPIVACAINE HYDROCHLORIDE IN DEXTROSE 1 ML: 7.5 INJECTION, SOLUTION SUBARACHNOID at 13:38

## 2024-03-25 RX ADMIN — SODIUM CHLORIDE, POTASSIUM CHLORIDE, SODIUM LACTATE AND CALCIUM CHLORIDE: 600; 310; 30; 20 INJECTION, SOLUTION INTRAVENOUS at 13:25

## 2024-03-25 RX ADMIN — Medication 2 G: at 13:25

## 2024-03-25 RX ADMIN — OXYCODONE HYDROCHLORIDE 5 MG: 5 TABLET ORAL at 15:41

## 2024-03-25 RX ADMIN — FENTANYL CITRATE 25 MCG: 50 INJECTION INTRAMUSCULAR; INTRAVENOUS at 13:41

## 2024-03-25 RX ADMIN — PHENYLEPHRINE HYDROCHLORIDE 0.2 MCG/KG/MIN: 10 INJECTION INTRAVENOUS at 13:44

## 2024-03-25 ASSESSMENT — ACTIVITIES OF DAILY LIVING (ADL)
ADLS_ACUITY_SCORE: 33

## 2024-03-25 NOTE — ANESTHESIA PREPROCEDURE EVALUATION
Anesthesia Pre-Procedure Evaluation    Patient: Jeanmarie Mclean   MRN: [id number] : 1960        Procedure : Procedure(s):  Excision right thigh mass          Past Medical History:   Diagnosis Date     Alcoholism (H)      Arthritis      Asperger's syndrome     Dr. Owens, [facility]. 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    ultrasound      Past Surgical History:   Procedure Laterality Date     COLONOSCOPY       COLONOSCOPY N/A 2021    Procedure: Colonoscopy, Flexible, With Lesion Removal Using Snare;  Surgeon: Garrick Villavicencio MD;  Location: [facility] OR     COLONOSCOPY WITH CO2 INSUFFLATION N/A 2021    Procedure: COLONOSCOPY, WITH CO2 INSUFFLATION;  Surgeon: Garrick Villavicencio MD;  Location: [facility] OR     CYSTOSCOPY  10/98    for renal stones     HC KNEE SCOPE,MED/LAT MENISECTOMY  11    Left, medial ([place])     HERNIA REPAIR, INGUINAL RT/LT      [facility]     Z HEP B VAC ADULT 3 DOSE IM      received all 3 vaccines     RUST REMV PROSTATE,RETROPUB,RADICAL  10/13/04    Dr. Muñoz ([place])      Allergies   Allergen Reactions     Risperidone Other (See Comments)     Serve numbness      Trimethoprim Hives     Effexor [Venlafaxine Hydrochloride] Other (See Comments)     Headache, Painful scrotum and drainage from the penis     Ambien [Zolpidem Tartrate] Nausea     Buspirone Nausea      Dizziness, headache     Celexa [Citalopram] Other (See Comments)     Headache     Duloxetine Other (See Comments)     Headache  headaches     Septra [Bactrim] Itching     Seroquel [Quetiapine] Other (See Comments)     Headache, N, V     Sulfa Antibiotics Itching     Sulfamethoxazole-Trimethoprim      hives     Tramadol Nausea     Nausea and headache     Ultram [Tramadol Hcl] Nausea and Vomiting     Venlafaxine      Zolpidem      headaches     Zolpidem Tartrate Other (See Comments)     headaches      Social History     Tobacco Use     Smoking status: Former     Packs/day: 0.50     Years: 10.00     Additional pack years: 0.00     Total pack years: 5.00     Types: Cigarettes     Quit date: 1997     Years since quittin.2     Passive exposure: Past (His mother smoked while growing up.)     Smokeless tobacco: Never   Substance Use Topics     Alcohol use: Yes     Comment: hx alcoholism quit March 10, 2022. [facility] in       Wt Readings from Last 1 Encounters:   24 75.8 kg (167 lb)        Anesthesia Evaluation   Pt has had prior anesthetic. Type: General and Regional.    No history of anesthetic complications       ROS/MED HX  ENT/Pulmonary:       Neurologic:       Cardiovascular:     (+)  hypertension- -   -  - -                                      METS/Exercise Tolerance:     Hematologic:       Musculoskeletal:       GI/Hepatic:     (+) GERD,                   Renal/Genitourinary:       Endo:       Psychiatric/Substance Use:       Infectious Disease:       Malignancy:       Other:          Physical Exam    Airway        Mallampati: II   TM distance: > 3 FB   Neck ROM: full   Mouth opening: > 3 cm    Respiratory Devices and Support         Dental       (+) Completely normal teeth      Cardiovascular   cardiovascular exam normal          Pulmonary   pulmonary exam normal            OUTSIDE LABS:  CBC:   Lab Results   Component Value Date    WBC 5.1 2024    WBC 5.4 2022  "   HGB 13.4 03/06/2024    HGB 13.9 03/03/2022    HCT 40.5 03/06/2024    HCT 42.5 03/03/2022     03/06/2024     03/03/2022     BMP:   Lab Results   Component Value Date     03/06/2024     11/17/2023    POTASSIUM 4.3 03/06/2024    POTASSIUM 5.0 11/17/2023    CHLORIDE 106 03/06/2024    CHLORIDE 103 11/17/2023    CO2 27 03/06/2024    CO2 28 11/17/2023    BUN 12.8 03/06/2024    BUN 13.2 11/17/2023    CR 0.88 03/06/2024    CR 0.90 11/17/2023    GLC 90 03/06/2024    GLC 96 11/17/2023     COAGS: No results found for: \"PTT\", \"INR\", \"FIBR\"  POC: No results found for: \"BGM\", \"HCG\", \"HCGS\"  HEPATIC:   Lab Results   Component Value Date    ALBUMIN 4.5 03/06/2024    PROTTOTAL 7.5 03/06/2024    ALT 26 03/06/2024    AST 31 03/06/2024    GGT 24 10/24/2013    ALKPHOS 86 03/06/2024    BILITOTAL 0.8 03/06/2024     OTHER:   Lab Results   Component Value Date    A1C 5.7 (H) 10/01/2021    LUKAS 9.4 03/06/2024    LIPASE 104 03/03/2022    AMYLASE 66 03/03/2022    TSH 0.90 02/21/2024    T4 1.16 10/29/2013    CRP 3.6 06/16/2009       Anesthesia Plan    ASA Status:  2       Anesthesia Type: Spinal.              Consents    Anesthesia Plan(s) and associated risks, benefits, and realistic alternatives discussed. Questions answered and patient/representative(s) expressed understanding.     - Discussed: Risks, Benefits and Alternatives for BOTH SEDATION and the PROCEDURE were discussed     - Discussed with:  Patient       Use of blood products discussed: No .     Postoperative Care    Pain management: IV analgesics, Multi-modal analgesia.   PONV prophylaxis: Ondansetron (or other 5HT-3)     Comments:    Other Comments: After a blood workup he was told to be in risk of MH. No further workup, no family history. Apparently the MH dx was ruled out on his kid through muscular biopsy           James Cerda MD    I have reviewed the pertinent notes and labs in the chart from the past 30 days and (re)examined the patient. " " Any updates or changes from those notes are reflected in this note.              # Overweight: Estimated body mass index is 27.79 kg/m  as calculated from the following:    Height as of 3/6/24: 1.651 m (5' 5\").    Weight as of 3/6/24: 75.8 kg (167 lb).      "

## 2024-03-25 NOTE — ANESTHESIA PROCEDURE NOTES
"Intrathecal injection Procedure Note    Pre-Procedure   Staff -        Anesthesiologist:  Rell Tolbert MD       Performed By: anesthesiologist       Location: OR       Procedure Start/Stop Times: 3/25/2024 1:38 PM       Pre-Anesthestic Checklist: patient identified, IV checked, risks and benefits discussed, informed consent, monitors and equipment checked, pre-op evaluation, at physician/surgeon's request and post-op pain management  Timeout:       Correct Patient: Yes        Correct Procedure: Yes        Correct Site: Yes        Correct Position: Yes   Procedure Documentation  Procedure: intrathecal injection       Diagnosis: Thigh mass       Patient Position: sitting       Skin prep: Chloraprep       Insertion Site: L2-3. (midline approach).       Needle Gauge: 27.        Needle Length (Inches): 3.5        Spinal Needle Type: QuinckeNo introducer used       # of attempts: 1 and  # of redirects:  0    Assessment/Narrative         CSF fluid: clear.    Medication(s) Administered   0.75% Hyperbaric Bupivacaine (Intrathecal) - Intrathecal   1 mL - 3/25/2024 1:38:00 PM  Medication Administration Time: 3/25/2024 1:38 PM      FOR [facility]) ONLY:   Pain Team Contact information: please page the Pain Team Via [employer]. Search \"Pain\". During daytime hours, please page the attending first. At night please page the resident first.      "

## 2024-03-25 NOTE — OR NURSING
Pt ambulates independentlyto bathroom with stand-by assistance, unable to pee.Pt denies pressure or feeling urgency to pee. Bladder scanned for 170ml. Pt reports previous history of total prostatectomy and no problems urinating post resection. Ferdinand Mcfarlane md anes called and discussed I+Os in pacu, pt stable vs, bladder scan results and previous history. PT feels comfortable going home and returning to ED with son's PCA adult Tomás if feeling pressure, urgency or unable to urinate before going to bed tonight. Ferdinand Mcfarlane Md ok to discharge with above information.

## 2024-03-25 NOTE — ANESTHESIA POSTPROCEDURE EVALUATION
Patient: Jeanmarie Mclean    Procedure: Procedure(s):  Excision right thigh mass       Anesthesia Type:  Spinal    Note:  Disposition: Outpatient   Postop Pain Control: Uneventful            Sign Out: Well controlled pain   PONV: No   Neuro/Psych: Uneventful            Sign Out: Acceptable/Baseline neuro status   Airway/Respiratory: Uneventful            Sign Out: Acceptable/Baseline resp. status   CV/Hemodynamics: Uneventful            Sign Out: Acceptable CV status; No obvious hypovolemia; No obvious fluid overload   Other NRE: NONE   DID A NON-ROUTINE EVENT OCCUR? No       Last vitals:  Vitals Value Taken Time   /87 03/25/24 1600   Temp 36.7  C (98  F) 03/25/24 1545   Pulse 50 03/25/24 1609   Resp 11 03/25/24 1610   SpO2 100 % 03/25/24 1611   Vitals shown include unfiled device data.    Electronically Signed By: Marta Sherman MD  March 25, 2024  5:17 PM

## 2024-03-25 NOTE — BRIEF OP NOTE
[facility]    Brief Operative Note    Pre-operative diagnosis: Mass of right thigh [R22.41]  Post-operative diagnosis Same as pre-operative diagnosis    Procedure: Excision right thigh mass, Right - Leg    Surgeon: Surgeon(s) and Role:     * Pedro Tyler MD - Primary     * Karla Sharif MD - Assisting  Anesthesia: General   Estimated Blood Loss: 5 mL from 3/25/2024  1:27 PM to 3/25/2024  2:31 PM      Drains: None  Specimens:   ID Type Source Tests Collected by Time Destination   1 : right Thigh Mass Tissue Thigh, Right SURGICAL PATHOLOGY EXAM Pedro Tyler MD 3/25/2024  2:12 PM      Findings:  See full op note .  Complications: None.  Implants: * No implants in log *    Discharge today per SDS criteria  WBAT   Okay to shower three days after surgery.   Keep dressing on until post operative day 5, replace and keep covered for at least 7 days or until drainage stops.   Oxycodone and tylenol for pain control   Follow up on 4/12/24 as scheduled for a wound check.     Karla Sharif MD, PGY4  Orthopedic Surgery

## 2024-03-25 NOTE — PROGRESS NOTES
"Care Management Follow Up    Length of Stay (days): 0    Expected Discharge Date: 03/25/2024     Concerns to be Addressed:       Patient plan of care discussed at interdisciplinary rounds: No    Anticipated Discharge Disposition:       Anticipated Discharge Services:    Anticipated Discharge DME:      Patient/family educated on Medicare website which has current facility and service quality ratings:    Education Provided on the Discharge Plan:    Patient/Family in Agreement with the Plan:      Referrals Placed by CM/SW:    Private pay costs discussed: transportation costs (covered by insurance)    Additional Information:      THOMAS received a page from DELORES Ibarra  [phone] regarding pt needing a ride.     1322 THOMAS called Pre-OP and spoke with DELORES Mendiola who explained that pt's procedure was delayed and his scheduled transportation would be unable to transport him after his procedure due to the expected transport time was outside their operating hours. SW agreed to arrange transportation.    1616 [facility] [phone], spoke with Kyle, and was able to secure transportation for \"Will-call\" pt pick-up with [employer] ([phone]) service.     1636   THOMAS updated Unit RN and provided agency contact information to call when pt ready to discharge.    Care Management will continue to follow as needed.    Fatimah Baker, SAMINA, LGSW  ED/OBS      Social Work and Care Management Department       SEARCHABLE in CREAT - search SOCIAL WORK       [place] ([phone]) Saturday and Sunday     Units: 4A Vocera, 4C Vocera, & 4E Vocera    Pager: [phone]   Units: 5A 3060-8386 Vocera, 5A 1060-9036 Vocera & 5C Vocera Pager: [phone]   Units: 6A Vocera & 6B Vocera  Pager: [phone]   Units: 6C Vocera Pager: [phone]   Units: 7A Vocera & 7B Vocera  Pager: [phone]   Units: 7C Vocera Pager: [phone]   Unit: [place] ED Vocera & [place] Obs Vocera Pager: [phone]  "       [facility] ([phone]) Saturday and Sunday      Units: 5 Ortho Vocera, 5 Med Surg Vocera & WB ED Vocera Pager:[phone]   Units: 6 Med Surg Vocera, 8 Med Surg Vocera, & 10 ICU Vocera Pager: [phone]        After hours Vocera [facility] and After Hours Vocera [place]     Saturday & Sunday (1630 - 0000)  Mon-Fri (2058-1788)   [facility] Recognized Holidays  ([phone])  Units: ALL  Pager: [phone]

## 2024-03-25 NOTE — DISCHARGE INSTRUCTIONS
Discharge today per SDS criteria  WBAT   Okay to shower three days after surgery.   Keep dressing on until post operative day 5, replace and keep covered for at least 7 days or until drainage stops.   Oxycodone and tylenol for pain control   Follow up on 4/12/24 as scheduled for a wound check.          Same-Day Surgery   Adult Discharge Orders & Instructions     For 24 hours after surgery:  Get plenty of rest.  A responsible adult must stay with you for at least 24 hours after you leave the hospital.   Pain medication can slow your reflexes. Do not drive or use heavy equipment.  If you have weakness or tingling, don't drive or use heavy equipment until this feeling goes away.  Mixing alcohol and pain medication can cause dizziness and slow your breathing. It can even be fatal. Do not drink alcohol while taking pain medication.  Avoid strenuous or risky activities.  Ask for help when climbing stairs.   You may feel lightheaded.  If so, sit for a few minutes before standing.  Have someone help you get up.   If you have nausea (feel sick to your stomach), drink only clear liquids such as apple juice, ginger ale, broth or 7-Up.  Rest may also help.  Be sure to drink enough fluids.  Move to a regular diet as you feel able. Take pain medications with a small amount of solid food, such as toast or crackers, to avoid nausea.   A slight fever is normal. Call the doctor if your fever is over 100 F (37.7 C) (taken under the tongue) or lasts longer than 24 hours.  You may have a dry mouth, muscle aches, trouble sleeping or a sore throat.  These symptoms should go away after 24 hours.  Do not make important or legal decisions.   Pain Management:      1. Take pain medication (if prescribed) for pain as directed by your physician.        2. WARNING: If the pain medication you have been prescribed contains Tylenol  (acetaminophen), DO NOT take additional doses of Tylenol (acetaminophen).     Call your doctor for any of the  followin.  Signs of infection (fever, growing tenderness at the surgery site, severe pain, a large amount of drainage or bleeding, foul-smelling drainage, redness, swelling).    2.  It has been over 8 to 10 hours since surgery and you are still not able to urinate (pee).    3.  Headache for over 24 hours.    4.  Numbness, tingling or weakness the day after surgery (if you had spinal anesthesia).  To contact a doctor, call ______[phone] _______________________________ or:  '   [phone] and ask for the Resident On Call for:          ______Orthopedics____________________________________ (answered 24 hours a day)  '   Emergency Department:  [facility]: [phone]  [facility]: [phone]               Rev. 10/2014

## 2024-03-25 NOTE — ANESTHESIA CARE TRANSFER NOTE
Patient: Jeanmarie Mclean    Procedure: Procedure(s):  Excision right thigh mass       Diagnosis: Mass of right thigh [R22.41]  Diagnosis Additional Information: No value filed.    Anesthesia Type:   Spinal     Note:    Oropharynx: oropharynx clear of all foreign objects  Level of Consciousness: drowsy  Oxygen Supplementation: face mask  Level of Supplemental Oxygen (L/min / FiO2): 8  Independent Airway: airway patency satisfactory and stable  Dentition: dentition unchanged  Vital Signs Stable: post-procedure vital signs reviewed and stable  Report to RN Given: handoff report given  Patient transferred to: PACU  Comments: Anesthesia Care Transfer Note    Patient: Jeanmarie Mclean    Transferred to: PACU with supplemental O2    Patient vital signs: stable    Airway: none    Monitors on, VSS, breathing spontaneously, report to RN      Shilpa Son CRNA   3/25/2024 2:36 PM   Handoff Report: Identifed the Patient, Identified the Reponsible Provider, Reviewed the pertinent medical history, Discussed the surgical course, Reviewed Intra-OP anesthesia mangement and issues during anesthesia, Set expectations for post-procedure period and Allowed opportunity for questions and acknowledgement of understanding      Vitals:  Vitals Value Taken Time   BP     Temp     Pulse 56 03/25/24 1434   Resp 8 03/25/24 1434   SpO2 98 % 03/25/24 1434   Vitals shown include unfiled device data.    Electronically Signed By: RAJINDER Jeronimo CRNA  March 25, 2024  2:36 PM

## 2024-03-26 ENCOUNTER — TELEPHONE (OUTPATIENT)
Dept: OTHER | Facility: CLINIC | Age: 64
End: 2024-03-26
Payer: COMMERCIAL

## 2024-03-26 NOTE — OP NOTE
DATE OF SURGERY: 3/25/2024    PREOPERATIVE DIAGNOSIS: Right thigh myxoma    POSTOPERATIVE DIAGNOSIS: Right thigh myxoma    PROCEDURE: Excision right thigh tumor, deep, 4 cm    SURGEON: Pedro Tyler MD     ASSISTANT: Karla Escamilla MD    PATIENT HISTORY: This patient has noticed a soft tissue mass.  The biopsy was consistent with a myxoma.  We have recommended excision and he presents now to have this done understand the risks of bleed infection pain numbness tingling weakness and limp.    DESCRIPTION OF PROCEDURE: The patient underwent successful induction of anesthesia.  The right leg was washed and sterilely prepped and draped.  We made an incision over the palpable mass just distal to the inguinal crease on the anterior medial aspect of the right leg.  After incising skin sharply I divided the subcutaneous tissue with cautery and opened up the muscle fascia.  I divided the muscle fascia bursa proximally distally to the mass and was able to remove the mass from the muscle belly.  We irrigated and then closed the fascia with 0 Vicryl the subcutaneous tissue with 2-0 Vicryl and we used 4-0 Monocryl and the skin followed by Steri-Strips and a sterile dry dressing.  The patient was extubated and taken to the recovery room.  There were no complications.  I was present for all critical portions of the procedure.  The estimated blood loss is 5 mL.  The specimen sent in formalin to pathology.    Pedro Tyler MD

## 2024-03-26 NOTE — TELEPHONE ENCOUNTER
Ortho Phone Visit    Time: 6:43 PM    Jeanmarie called triage line feeling feverish. Has not taken temperature. No other symptoms. Pain controlled in leg. Bandage clean and dry.     Advised tylenol, 3 tabs (975 mg) and call back at 2100 if still feeling feverish. Likely a postop inflammatory response.    Answered Qs. Patient satisfied with plan.    Gina Pate, PGY-4

## 2024-04-02 ENCOUNTER — TRANSFERRED RECORDS (OUTPATIENT)
Dept: HEALTH INFORMATION MANAGEMENT | Facility: CLINIC | Age: 64
End: 2024-04-02

## 2024-04-04 ENCOUNTER — MYC MEDICAL ADVICE (OUTPATIENT)
Dept: FAMILY MEDICINE | Facility: CLINIC | Age: 64
End: 2024-04-04
Payer: COMMERCIAL

## 2024-04-04 DIAGNOSIS — M53.3 SI (SACROILIAC) JOINT DYSFUNCTION: Primary | ICD-10-CM

## 2024-04-08 ENCOUNTER — VIRTUAL VISIT (OUTPATIENT)
Dept: ADDICTION MEDICINE | Facility: CLINIC | Age: 64
End: 2024-04-08
Payer: COMMERCIAL

## 2024-04-08 DIAGNOSIS — F10.29 ALCOHOL DEPENDENCE WITH UNSPECIFIED ALCOHOL-INDUCED DISORDER (H): Primary | ICD-10-CM

## 2024-04-08 PROCEDURE — G2211 COMPLEX E/M VISIT ADD ON: HCPCS | Mod: 95

## 2024-04-08 PROCEDURE — 99214 OFFICE O/P EST MOD 30 MIN: CPT | Mod: 95

## 2024-04-08 ASSESSMENT — ANXIETY QUESTIONNAIRES
3. WORRYING TOO MUCH ABOUT DIFFERENT THINGS: NEARLY EVERY DAY
6. BECOMING EASILY ANNOYED OR IRRITABLE: NOT AT ALL
7. FEELING AFRAID AS IF SOMETHING AWFUL MIGHT HAPPEN: NEARLY EVERY DAY
GAD7 TOTAL SCORE: 15
4. TROUBLE RELAXING: MORE THAN HALF THE DAYS
2. NOT BEING ABLE TO STOP OR CONTROL WORRYING: NEARLY EVERY DAY
GAD7 TOTAL SCORE: 15
1. FEELING NERVOUS, ANXIOUS, OR ON EDGE: NEARLY EVERY DAY
8. IF YOU CHECKED OFF ANY PROBLEMS, HOW DIFFICULT HAVE THESE MADE IT FOR YOU TO DO YOUR WORK, TAKE CARE OF THINGS AT HOME, OR GET ALONG WITH OTHER PEOPLE?: SOMEWHAT DIFFICULT
7. FEELING AFRAID AS IF SOMETHING AWFUL MIGHT HAPPEN: NEARLY EVERY DAY
IF YOU CHECKED OFF ANY PROBLEMS ON THIS QUESTIONNAIRE, HOW DIFFICULT HAVE THESE PROBLEMS MADE IT FOR YOU TO DO YOUR WORK, TAKE CARE OF THINGS AT HOME, OR GET ALONG WITH OTHER PEOPLE: SOMEWHAT DIFFICULT
5. BEING SO RESTLESS THAT IT IS HARD TO SIT STILL: SEVERAL DAYS
GAD7 TOTAL SCORE: 15

## 2024-04-08 ASSESSMENT — PATIENT HEALTH QUESTIONNAIRE - PHQ9
SUM OF ALL RESPONSES TO PHQ QUESTIONS 1-9: 12
10. IF YOU CHECKED OFF ANY PROBLEMS, HOW DIFFICULT HAVE THESE PROBLEMS MADE IT FOR YOU TO DO YOUR WORK, TAKE CARE OF THINGS AT HOME, OR GET ALONG WITH OTHER PEOPLE: SOMEWHAT DIFFICULT
SUM OF ALL RESPONSES TO PHQ QUESTIONS 1-9: 12

## 2024-04-08 ASSESSMENT — PAIN SCALES - GENERAL: PAINLEVEL: MODERATE PAIN (5)

## 2024-04-08 NOTE — NURSING NOTE
Is the patient currently in the state of [place]? YES    Visit mode:VIDEO    If the visit is dropped, the patient can be reconnected by: VIDEO VISIT: Send to e-mail at: [email or web address]    Will anyone else be joining the visit? NO  (If patient encounters technical issues they should call [phone]    How would you like to obtain your AVS? MyChart    Are changes needed to the allergy or medication list? Pt stated no changes to allergies and Pt stated no med changes    Reason for visit: MARJ PRECIADO
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
## 2024-04-09 ENCOUNTER — MYC MEDICAL ADVICE (OUTPATIENT)
Dept: PALLIATIVE MEDICINE | Facility: CLINIC | Age: 64
End: 2024-04-09
Payer: COMMERCIAL

## 2024-04-09 NOTE — TELEPHONE ENCOUNTER
Carried from other provider's encounter:     Nan Loving  SignedYesterday     Addend     Delete     Copy     Reason for call:  Order   Order or referral being requested: SI Joint injection  Reason for request: Pt requesting a repeat SI Joint injection, per pt his PCP informed him that he has a standing order that is good until  the 26th. Pt was informed order are only good for 6 months and his last injection was 7/2023. Please review and place orders if needed.  Date needed: as soon as possible  Has the patient been seen by the PCP for this problem? YES     Additional comments:      Phone number to reach patient:  Home number on file [phone] (home)     Can we leave a detailed message on this number?  YES     Travel screening: Not Applicable

## 2024-04-09 NOTE — TELEPHONE ENCOUNTER
Most recent injection: 7/20/2023 Procedure performed: bilateral SI joint injection     Further information requested from patient.     Raya Macdonald RN  [facility]  [phone]

## 2024-04-10 NOTE — TELEPHONE ENCOUNTER
Patient hasTBD injection referral from Dr. Iglesias on file.     TBD injection order received:  Appears this order is intended  for repeat injection.     Order for dx:  SI (sacroiliac) joint dysfunction     Per chart review: Patient requested repeat injection and then received referral from Dr. Iglesias.     Last injection noted 7/20/2023 bilateral SI joint injection with Dr Castellanos    Contacted patient who identifies he appreciated significant (%) relief for 6 months.     Patient identifies he  does not have new symptoms not present prior to last injection.     Patient has already been scheduled for 4/26/2024.    Raya Macdonald RN  [facility]  [phone]

## 2024-04-12 ENCOUNTER — OFFICE VISIT (OUTPATIENT)
Dept: ORTHOPEDICS | Facility: CLINIC | Age: 64
End: 2024-04-12
Payer: COMMERCIAL

## 2024-04-12 DIAGNOSIS — D21.9 MYXOMA: Primary | ICD-10-CM

## 2024-04-12 PROCEDURE — 99024 POSTOP FOLLOW-UP VISIT: CPT | Performed by: ORTHOPAEDIC SURGERY

## 2024-04-12 NOTE — PROGRESS NOTES
This patient is status post excision of a myxoma from the right thigh.  He notices some soreness in the medial thigh when attempting to run or do extensive walking.    On examination he is alert oriented has normal mood and affect and is in no acute distress.  The incision is clean dry and intact.  It still thickened somewhat and I expect that this will flatten out once the dissolvable sutures have gone away.  The patient is able to ambulate without a limp and has no edema or erythema in the right lower extremity.    The patient is aware that there is a minimal risk of local recurrence for this tumor and he knows that it is a benign tumor.  I have answered all of his questions today.  He will return to see me as needed

## 2024-04-12 NOTE — NURSING NOTE
Reason For Visit:   Chief Complaint   Patient presents with    Surgical Followup     2 wk post-op right thigh mass excision DOS 3/25/24        64 year old  1960    Primary MD: Milton Iglesias      There were no vitals taken for this visit.    Pain Assessment  Patient Currently in Pain: Yes  0-10 Pain Scale: 3  Primary Pain Location:  (right posterior thigh area)      Jc Domingo ATC

## 2024-04-12 NOTE — LETTER
4/12/2024         RE: Jeanmarie Mclean  [address]  [place] [address]        Dear Colleague,    Thank you for referring your patient, Jeanmarie Mclean, to the [facility] [place]. Please see a copy of my visit note below.    This patient is status post excision of a myxoma from the right thigh.  He notices some soreness in the medial thigh when attempting to run or do extensive walking.    On examination he is alert oriented has normal mood and affect and is in no acute distress.  The incision is clean dry and intact.  It still thickened somewhat and I expect that this will flatten out once the dissolvable sutures have gone away.  The patient is able to ambulate without a limp and has no edema or erythema in the right lower extremity.    The patient is aware that there is a minimal risk of local recurrence for this tumor and he knows that it is a benign tumor.  I have answered all of his questions today.  He will return to see me as needed        Pedro Tyler MD

## 2024-04-26 ENCOUNTER — MYC MEDICAL ADVICE (OUTPATIENT)
Dept: FAMILY MEDICINE | Facility: CLINIC | Age: 64
End: 2024-04-26

## 2024-04-26 ENCOUNTER — RADIOLOGY INJECTION OFFICE VISIT (OUTPATIENT)
Dept: PALLIATIVE MEDICINE | Facility: CLINIC | Age: 64
End: 2024-04-26
Payer: COMMERCIAL

## 2024-04-26 VITALS — HEART RATE: 50 BPM | DIASTOLIC BLOOD PRESSURE: 76 MMHG | SYSTOLIC BLOOD PRESSURE: 125 MMHG | OXYGEN SATURATION: 100 %

## 2024-04-26 DIAGNOSIS — L98.9 SKIN LESION: Primary | ICD-10-CM

## 2024-04-26 DIAGNOSIS — M53.3 SI (SACROILIAC) JOINT DYSFUNCTION: ICD-10-CM

## 2024-04-26 PROCEDURE — 27096 INJECT SACROILIAC JOINT: CPT | Mod: 50 | Performed by: PAIN MEDICINE

## 2024-04-26 RX ORDER — TRIAMCINOLONE ACETONIDE 40 MG/ML
40 INJECTION, SUSPENSION INTRA-ARTICULAR; INTRAMUSCULAR ONCE
Status: COMPLETED | OUTPATIENT
Start: 2024-04-26 | End: 2024-04-26

## 2024-04-26 RX ADMIN — TRIAMCINOLONE ACETONIDE 40 MG: 40 INJECTION, SUSPENSION INTRA-ARTICULAR; INTRAMUSCULAR at 09:08

## 2024-04-26 ASSESSMENT — PAIN SCALES - GENERAL: PAINLEVEL: EXTREME PAIN (8)

## 2024-04-26 NOTE — PATIENT INSTRUCTIONS
[facility]   Procedure Discharge Instructions    Today you saw:      Dr. Barry Castellanos,        You had an:    sacroiliac joint injection     Be cautious when walking. Numbness and/or weakness in the lower extremities may occur for up to 6-8 hours after the procedure due to effect of the local anesthetic  Do not drive for 6 hours. The effect of the local anesthetic could slow your reflexes.   You may resume your regular activities after 24 hours  Avoid strenuous activity for the first 24 hours  You may shower, however avoid swimming, tub baths or hot tubs for 24 hours following your procedure  You may have a mild to moderate increase in pain for several days following the injection.  It may take up to 14 days for the steroid medication to start working although you may feel the effect as early as a few days after the procedure.     You may use ice packs for 10-15 minutes, 3 to 4 times a day at the injection site for comfort  Do not use heat to painful areas for 6 to 8 hours. This will give the local anesthetic time to wear off and prevent you from accidentally burning your skin.   Unless you have been directed to avoid the use of anti-inflammatory medications (NSAIDS), you may use medications such as ibuprofen, Aleve or Tylenol for pain control if needed.   If you were fasting, you may resume your normal diet and medications after the procedure  If you have diabetes, check your blood sugar more frequently than usual as your blood sugar may be higher than normal for 10-14 days following a steroid injection. Contact your doctor who manages your diabetes if your blood sugar is higher than usual  Possible side effects of steroids that you may experience include flushing, elevated blood pressure, increased appetite, mild headaches and restlessness.  All of these symptoms will get better with time.  If you experience any of the following, call the Pain Clinic during work hours (Mon-Friday  8-4:30 pm) at [phone] or the Provider Line after hours at [phone]:  -Fever over 100 degree F  -Swelling, bleeding, redness, drainage, warmth at the injection site  -Progressive weakness or numbness in your legs or arms  -Loss of bowel or bladder function  -Unusual headache that is not relieved by Tylenol or other pain reliever  -Unusual new onset of pain that is not improving

## 2024-04-26 NOTE — PROGRESS NOTES
4/26/24  Pre procedure Diagnosis: SI joint dysfunction    Post procedure Diagnosis: Same  Procedure performed: bilateral SI joint injection  Anesthesia: none  Complications: none  Operators: Barry Castellanos MD     Indications:   Jeanmarie Mclean is a 64 year old male. The patient has a history of bilateral upper buttocks pain. Other conservative treatments prior to injection include meds/pt/injections with benefit .    Options/alternatives, benefits and risks were discussed with the patient including bleeding, infection, tissue trauma, exposure to radiation, reaction to medications including seizure, nerve injury, weakness, and numbness.  Questions were answered to his satisfaction and he agrees to proceed. Voluntary informed consent was obtained and signed.     Vitals were reviewed: Yes  Allergies were reviewed:  Yes   Medications were reviewed:  Yes   Pre-procedure pain score: 8/10    Procedure:  After obtaining signed informed consent, the patient was brought into the procedure suite and was placed in a prone position on the procedure table.   A Pause for the Cause was performed.  The patient was prepped and draped in the usual sterile fashion.     After identifying the bilateral SI joint, the C-arm was rotated obliquely to obtain the best view of the inferior angle of the joint.  Then 5 ml of Lidocaine 1%  was used to anesthetize the skin at a skin entry site coaxial with the fluoroscopy beam at this location.  A 22 gauge 3.5 inch needle was advanced under intermittent fluoroscopy until it was felt to enter the SI joint.  Aspiration was negative.    A total of 1ml of Omnipaque-300 was injected, confirming appropriate position, with spread into the intraarticular space, with no intravascular uptake noted.  9ml of Omnipaque was wasted. Location was verified in lateral view.    2 ml of 0.5% bupivacaine with 40mg of Kenalog was injected.  The needle was removed.     Hemostasis was achieved, the area was cleaned, and  bandaids were placed when appropriate.  The patient tolerated the procedure well, and was taken to the recovery room.  Images were saved to PACS.    Post-procedure pain score: 8/10  Follow-up includes:     -f/u with referring Physician       Barry Castellanos MD  [facility] [place]

## 2024-04-26 NOTE — NURSING NOTE
Discharge Information    IV Discontiued Time:  NA    Amount of Fluid Infused:  NA    Discharge Criteria = When patient returns to baseline or as per MD order    Consciousness:  Pt is fully awake    Circulation:  BP +/- 20% of pre-procedure level    Respiration:  Patient is able to breathe deeply    O2 Sat:  Patient is able to maintain O2 Sat >92% on room air    Activity:  Moves 4 extremities on command    Ambulation:  Patient is able to stand and walk or stand and pivot into wheelchair    Dressing:  Clean/dry or No Dressing    Notes:   Discharge instructions and AVS given to patient    Patient meets criteria for discharge?  YES    Admitted to PCU?  No    Responsible adult present to accompany patient home?  Yes    Signature/Title:    tyler moore RN  RN Care Coordinator  [facility] [place]

## 2024-04-29 ENCOUNTER — TRANSFERRED RECORDS (OUTPATIENT)
Dept: HEALTH INFORMATION MANAGEMENT | Facility: CLINIC | Age: 64
End: 2024-04-29
Payer: COMMERCIAL

## 2024-05-02 ENCOUNTER — MYC MEDICAL ADVICE (OUTPATIENT)
Dept: CARE COORDINATION | Facility: CLINIC | Age: 64
End: 2024-05-02
Payer: COMMERCIAL

## 2024-05-02 ENCOUNTER — PATIENT OUTREACH (OUTPATIENT)
Dept: CARE COORDINATION | Facility: CLINIC | Age: 64
End: 2024-05-02
Payer: COMMERCIAL

## 2024-05-06 ENCOUNTER — VIRTUAL VISIT (OUTPATIENT)
Dept: ADDICTION MEDICINE | Facility: CLINIC | Age: 64
End: 2024-05-06
Payer: COMMERCIAL

## 2024-05-06 ENCOUNTER — TELEPHONE (OUTPATIENT)
Dept: ADDICTION MEDICINE | Facility: CLINIC | Age: 64
End: 2024-05-06

## 2024-05-06 DIAGNOSIS — F10.29 ALCOHOL DEPENDENCE WITH UNSPECIFIED ALCOHOL-INDUCED DISORDER (H): Primary | ICD-10-CM

## 2024-05-06 PROCEDURE — 99214 OFFICE O/P EST MOD 30 MIN: CPT | Mod: 95

## 2024-05-06 PROCEDURE — G2211 COMPLEX E/M VISIT ADD ON: HCPCS | Mod: 95

## 2024-05-06 ASSESSMENT — PAIN SCALES - GENERAL: PAINLEVEL: NO PAIN (0)

## 2024-05-06 NOTE — NURSING NOTE
Is the patient currently in the state of [place]? YES    Visit mode:VIDEO    If the visit is dropped, the patient can be reconnected by: VIDEO VISIT: Text to cell phone:   Telephone Information:   Mobile [phone]       Will anyone else be joining the visit? NO  (If patient encounters technical issues they should call [phone]    How would you like to obtain your AVS? MyChart    Are changes needed to the allergy or medication list? No    Are refills needed on medications prescribed by this physician? NO    Reason for visit: RECHECK    Rashard PRECIADO
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
## 2024-05-06 NOTE — PROGRESS NOTES
Virtual Visit Details    Type of service:  Video Visit   Video Start Time:  1000  Video End Time: 1035    Originating Location (pt. Location): Home    Distant Location (provider location):  Off-site  Platform used for Video Visit: Linekong
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
## 2024-05-10 ENCOUNTER — VIRTUAL VISIT (OUTPATIENT)
Dept: BEHAVIORAL HEALTH | Facility: CLINIC | Age: 64
End: 2024-05-10
Payer: COMMERCIAL

## 2024-05-10 DIAGNOSIS — F19.10 POLYDRUG ABUSE (H): ICD-10-CM

## 2024-05-10 DIAGNOSIS — F43.10 PTSD (POST-TRAUMATIC STRESS DISORDER): ICD-10-CM

## 2024-05-10 DIAGNOSIS — F41.1 GENERALIZED ANXIETY DISORDER: ICD-10-CM

## 2024-05-10 DIAGNOSIS — F33.1 MAJOR DEPRESSIVE DISORDER, RECURRENT EPISODE, MODERATE (H): Primary | ICD-10-CM

## 2024-05-10 PROCEDURE — 90834 PSYTX W PT 45 MINUTES: CPT | Mod: 93 | Performed by: SOCIAL WORKER

## 2024-05-10 NOTE — PROGRESS NOTES
"[facility]  May 10, 2024      Behavioral Health Clinician Progress Note    Patient Name: Jeanmarie Mclean           Service Type:  Individual      Service Location:   MyChart / Email (patient reached)     Session Start Time: 9:05am  Session End Time: 9:50am      Session Length: 38 - 52      Attendees: Patient     Service Modality:  Phone Visit:      Provider verified identity through the following two step process.  Patient provided:  Patient is known previously to provider    Telephone Visit: The patient's condition can be safely assessed and treated via synchronous audio telemedicine encounter.      Reason for Audio Telemedicine Visit: Patient has requested telehealth visit    Originating Site (Patient Location): Patient's home    Distant Site (Provider Location): [facility]    Consent:  The patient/guardian has verbally consented to:     1. The potential risks and benefits of telemedicine (telephone visit) versus in person care;    The patient has been notified of the following:      \"We have found that certain health care needs can be provided without the need for a face to face visit.  This service lets us provide the care you need with a phone conversation.       I will have full access to your [facility] medical record during this entire phone call.   I will be taking notes for your medical record.      Since this is like an office visit, we will bill your insurance company for this service.       There are potential benefits and risks of telephone visits (e.g. limits to patient confidentiality) that differ from in-person visits.?Confidentiality still applies for telephone services, and nobody will record the visit.  It is important to be in a quiet, private space that is free of distractions (including cell phone or other devices) during the visit.??      If during the course of the call I believe a telephone visit is not appropriate, you will not be " "charged for this service\"     Consent has been obtained for this service by care team member: Yes     Visit Activities (Refresh list every visit): [facility] Only    Diagnostic Assessment Date: Not completed yet  Treatment Plan Review Date: Not completed yet  See Flowsheets for today's PHQ-9 and STEFANI-7 results  Previous PHQ-9:       3/4/2024    12:16 AM 3/6/2024    10:50 AM 4/8/2024     8:52 AM   PHQ-9 SCORE   PHQ-9 Total Score MyChart 15 (Moderately severe depression) 12 (Moderate depression) 12 (Moderate depression)   PHQ-9 Total Score 15 12 12     Previous STEFANI-7:       8/12/2023    10:00 AM 3/4/2024    12:18 AM 4/8/2024     8:53 AM   STEFANI-7 SCORE   Total Score  14 (moderate anxiety) 15 (severe anxiety)   Total Score 15 14 15       SUZANNA LEVEL:      5/26/2011    12:00 PM   SUZANNA Score (Last Two)   SUZANNA Raw Score 47   Activation Score 77.5   SUZANNA Level 4       DATA  Extended Session (60+ minutes): No  Interactive Complexity: No  Crisis: No  [facility] Patient: No    Treatment Objective(s) Addressed in This Session:  Target Behavior(s):  Mental and Recovery    Depressed Mood: Increase interest, engagement, and pleasure in doing things  Decrease frequency and intensity of feeling down, depressed, hopeless  Improve concentration, focus, and mindfulness in daily activities   Feel less fidgety, restless or slow in daily activities / interpersonal interactions  Anxiety: will experience a reduction in anxiety, will develop more effective coping skills to manage anxiety symptoms, and will increase ability to function adaptively  Alcohol / Substance Use: continue to make healthy choices regarding substance use and engage in activities / supportive services that promote sobriety    Current Stressors / Issues:  9:05am to 9:50am    The patient talked about the frustration of accessing his Mychart video visits-he stated that his day is going frustrated as he talked about his appointment with the medication provider he was having the same issues with " meeting with her on video visit-it is too much of my personal business with the Kei-you can use the emotions as a source of fulfilling a need and that he will reach out to Kei to satisfy the need to be able to have contact with his medical provider without the barriers-he talked about having racial issues in his neighborhood-he stated that he has PTSD due to past attempts on his life-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-if it was not for my son I would have been lost-I am grateful to my son as he saved his father (me)-he stated that he has done a good job for his son and that he is there for him it helps the patient-I don't know if it will get better-I focus my attention on other things-writing and teach self to play the bass guitar-he has learned to direct his attention elsewhere-find peace inside with focusing on something else-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-writer is a gift-no family and no friends in [place]-he needs help accessing the equipment that need retype the screen play into novel-need to get in the room with others who are thinking and moving like you-he stated that he use to go to a group in [facility]-so he now needs a place or supervision for his son so he can get away to nurture his interest and get in the room-he stated that there is nothing to help watch my son-the patient's son has -he goes to workers and puts pressure-we talked about holding onto the hope that he can-son has Autism-ASD worker-needs PCA worker    He has GRANT baugh and I have TESSA dwyer-      Progress on Treatment Objective(s) / Homework:  No improvement - PREPARATION (Decided to change - considering how); Intervened by negotiating a change plan and determining options / strategies for behavior change, identifying triggers, exploring social supports, and working towards setting a date to begin behavior change    Motivational Interviewing    MI Intervention: Expressed Empathy/Understanding,  Supported Autonomy, Collaboration, Evocation, Permission to raise concern or advise, Open-ended questions, Reflections: simple and complex, and Change talk (evoked)     Change Talk Expressed by the Patient: Desire to change Reasons to change Committment to change Activation Taking steps    Provider Response to Change Talk: E - Evoked more info from patient about behavior change, A - Affirmed patient's thoughts, decisions, or attempts at behavior change, R - Reflected patient's change talk, and S - Summarized patient's change talk statements    Assessments completed prior to visit:  The following assessments were completed by patient for this visit:  PHQ9:       3/22/2022     1:16 PM 11/29/2022     9:43 AM 8/12/2023    10:00 AM 11/14/2023     1:08 PM 3/4/2024    12:16 AM 3/6/2024    10:50 AM 4/8/2024     8:52 AM   PHQ-9 SCORE   PHQ-9 Total Score MyChart 8 (Mild depression)   10 (Moderate depression) 15 (Moderately severe depression) 12 (Moderate depression) 12 (Moderate depression)   PHQ-9 Total Score 8 8 12 10 15 12 12       Care Plan review completed: No    Medication Review:  No changes to current psychiatric medication(s)    Medication Compliance:  NA    Changes in Health Issues:   None reported    Chemical Use Review:   Substance Use: Chemical use reviewed, no active concerns identified      Tobacco Use: Not reported     Assessment: Current Emotional / Mental Status (status of significant symptoms):  Risk status (Self / Other harm or suicidal ideation)  Patient denies a history of suicidal ideation, suicide attempts, self-injurious behavior, homicidal ideation, homicidal behavior, and and other safety concerns  Patient denies current fears or concerns for personal safety.  Patient denies current or recent suicidal ideation or behaviors.  Patient denies current or recent homicidal ideation or behaviors.  Patient denies current or recent self injurious behavior or ideation.  Patient denies other safety  concerns.  A safety and risk management plan has not been developed at this time, however patient was encouraged to call [place] [phone] should there be a change in any of these risk factors.    Appearance:   N/A   Eye Contact:   N/A   Psychomotor Behavior: N/A   Attitude:   Cooperative   Orientation:   All  Speech   Rate / Production: Normal/ Responsive Normal    Volume:  Normal   Mood:    Anxious  Depressed  Normal  Affect:    N/A   Thought Content:  Clear  Rumination   Thought Form:  Coherent  Goal Directed  Logical  Obsessive   Insight:    Fair     Diagnoses:  1. Major depressive disorder, recurrent episode, moderate (H)    2. Generalized anxiety disorder    3. PTSD (post-traumatic stress disorder)    4. Polydrug abuse (H)        Collateral Reports Completed:  Not Applicable    Plan: (Homework, other):  Patient was given information about behavioral services and encouraged to schedule a follow up appointment with the clinic [facility] as needed.  He was also given information about mental health symptoms and treatment options  and strategies to maintain sobriety.  CD Recommendations: Maintain Sobriety.       JUAN GuSW

## 2024-05-16 ASSESSMENT — PATIENT HEALTH QUESTIONNAIRE - PHQ9
SUM OF ALL RESPONSES TO PHQ QUESTIONS 1-9: 14
10. IF YOU CHECKED OFF ANY PROBLEMS, HOW DIFFICULT HAVE THESE PROBLEMS MADE IT FOR YOU TO DO YOUR WORK, TAKE CARE OF THINGS AT HOME, OR GET ALONG WITH OTHER PEOPLE: SOMEWHAT DIFFICULT
SUM OF ALL RESPONSES TO PHQ QUESTIONS 1-9: 14

## 2024-05-17 ENCOUNTER — MYC MEDICAL ADVICE (OUTPATIENT)
Dept: FAMILY MEDICINE | Facility: CLINIC | Age: 64
End: 2024-05-17

## 2024-05-17 ENCOUNTER — VIRTUAL VISIT (OUTPATIENT)
Dept: BEHAVIORAL HEALTH | Facility: CLINIC | Age: 64
End: 2024-05-17
Payer: COMMERCIAL

## 2024-05-17 DIAGNOSIS — F19.10 POLYDRUG ABUSE (H): ICD-10-CM

## 2024-05-17 DIAGNOSIS — R20.2 PARESTHESIAS: Primary | ICD-10-CM

## 2024-05-17 DIAGNOSIS — F33.1 MAJOR DEPRESSIVE DISORDER, RECURRENT EPISODE, MODERATE (H): Primary | ICD-10-CM

## 2024-05-17 DIAGNOSIS — F41.1 GENERALIZED ANXIETY DISORDER: ICD-10-CM

## 2024-05-17 PROCEDURE — 90834 PSYTX W PT 45 MINUTES: CPT | Mod: 93 | Performed by: SOCIAL WORKER

## 2024-05-17 ASSESSMENT — COLUMBIA-SUICIDE SEVERITY RATING SCALE - C-SSRS
6. HAVE YOU EVER DONE ANYTHING, STARTED TO DO ANYTHING, OR PREPARED TO DO ANYTHING TO END YOUR LIFE?: YES
1. IN THE PAST MONTH, HAVE YOU WISHED YOU WERE DEAD OR WISHED YOU COULD GO TO SLEEP AND NOT WAKE UP?: YES
REASONS FOR IDEATION LIFETIME: MOSTLY TO END OR STOP THE PAIN (YOU COULDN'T GO ON LIVING WITH THE PAIN OR HOW YOU WERE FEELING)
2. HAVE YOU ACTUALLY HAD ANY THOUGHTS OF KILLING YOURSELF LIFETIME?: YES
1. IN THE PAST MONTH, HAVE YOU WISHED YOU WERE DEAD OR WISHED YOU COULD GO TO SLEEP AND NOT WAKE UP?: YES
2. HAVE YOU ACTUALLY HAD ANY THOUGHTS OF KILLING YOURSELF IN THE PAST MONTH?: NO

## 2024-05-17 NOTE — PROGRESS NOTES
"[facility]  May 17, 2024      Behavioral Health Clinician Progress Note    Patient Name: Jeanmarie Mclean           Service Type:  Individual      Service Location:   MyChart / Email (patient reached)     Session Start Time: 9:00am  Session End Time: 9:40am      Session Length: 38 - 52      Attendees: Patient     Service Modality:  Phone Visit:      Provider verified identity through the following two step process.  Patient provided:  Patient is known previously to provider    Telephone Visit: The patient's condition can be safely assessed and treated via synchronous audio telemedicine encounter.      Reason for Audio Telemedicine Visit: Patient has requested telehealth visit    Originating Site (Patient Location): Patient's home    Distant Site (Provider Location): [facility]    Consent:  The patient/guardian has verbally consented to:     1. The potential risks and benefits of telemedicine (telephone visit) versus in person care;    The patient has been notified of the following:      \"We have found that certain health care needs can be provided without the need for a face to face visit.  This service lets us provide the care you need with a phone conversation.       I will have full access to your [facility] medical record during this entire phone call.   I will be taking notes for your medical record.      Since this is like an office visit, we will bill your insurance company for this service.       There are potential benefits and risks of telephone visits (e.g. limits to patient confidentiality) that differ from in-person visits.?Confidentiality still applies for telephone services, and nobody will record the visit.  It is important to be in a quiet, private space that is free of distractions (including cell phone or other devices) during the visit.??      If during the course of the call I believe a telephone visit is not appropriate, you will not be " "charged for this service\"     Consent has been obtained for this service by care team member: Yes     Visit Activities (Refresh list every visit): [facility] Only    Diagnostic Assessment Date: Not completed yet  Treatment Plan Review Date: Not completed yet  See Flowsheets for today's PHQ-9 and STEFANI-7 results  Previous PHQ-9:       3/6/2024    10:50 AM 4/8/2024     8:52 AM 5/16/2024     3:24 PM   PHQ-9 SCORE   PHQ-9 Total Score MyChart 12 (Moderate depression) 12 (Moderate depression) 14 (Moderate depression)   PHQ-9 Total Score 12 12 14     Previous STEFANI-7:       8/12/2023    10:00 AM 3/4/2024    12:18 AM 4/8/2024     8:53 AM   STEFANI-7 SCORE   Total Score  14 (moderate anxiety) 15 (severe anxiety)   Total Score 15 14 15       SUZANNA LEVEL:      5/26/2011    12:00 PM   SUZANNA Score (Last Two)   SUZANNA Raw Score 47   Activation Score 77.5   SUZANNA Level 4       DATA  Extended Session (60+ minutes): No  Interactive Complexity: No  Crisis: No  [facility] Patient: No    Treatment Objective(s) Addressed in This Session:  Target Behavior(s):  Mental and Recovery    Depressed Mood: Increase interest, engagement, and pleasure in doing things  Decrease frequency and intensity of feeling down, depressed, hopeless  Improve concentration, focus, and mindfulness in daily activities   Feel less fidgety, restless or slow in daily activities / interpersonal interactions  Anxiety: will experience a reduction in anxiety, will develop more effective coping skills to manage anxiety symptoms, and will increase ability to function adaptively  Alcohol / Substance Use: continue to make healthy choices regarding substance use and engage in activities / supportive services that promote sobriety    Current Stressors / Issues:  9:00am to 9:40am    We completed the [place] and he has low risk issues-he talked about how he has called the crisis line in the past and he was encouraged to call [phone] if he was having suicidal/or crisis emotional state-he stated that he has " memory issues-he stated that his memory issues are compromising his life because he has to adjust to memory issues-and he has a he has a way that he grounds his memory-he use to be so good with memory-he stated that he learned the skill from the self and it has been helpful with helping him know that he is not with developmental brain disorder-it is confirmation that he is not fall gone-that he is able to reduces the anxiety about the experience-maybe this is the way that things are to reassure the self and to be more relaxed and put the self in the best state to respond to the situation-he stated that he credits the good to a higher power-and we talked about being in a more relaxed state-when nothing is going on that is to harm us he is not relating to the higher power-can you go to the helper before the bad happens-he stated that he does not believe until he is pulled out of the mess-there are lesser things that are traumatic for me that make me doubt (why God)-the knowledge of being able to pull him out of major messes-we are not exposed to danger-yes and no and he has guilt about how his son was born-what is going to happen to him when I am gone-He would not be here without me-her parents did not like him being with a black man-Pouring Love-sacrifice for his son-my son was born to be my savor-you both have been each others savior-I am alive with him now and he threatens other with fighting to protect his son-I am worried about what is going to happen with him when I pass away-he has left instructions for how to take care of his son when he passes away to make sure that he is taken care off-this depression makes me want to use chemicals-he stated that he has a bass guitar-so that your emotions can inform you of what to do instead of being assaulting to you-  Answers submitted by the patient for this visit:  Patient Health Questionnaire (Submitted on 5/16/2024)  If you checked off any problems, how difficult have  these problems made it for you to do your work, take care of things at home, or get along with other people?: Somewhat difficult  PHQ9 TOTAL SCORE: 14      He has GRANT baugh and I have TESSA dwyer-      Progress on Treatment Objective(s) / Homework:  Minimal progress - ACTION (Actively working towards change); Intervened by reinforcing change plan / affirming steps taken    Motivational Interviewing    MI Intervention: Expressed Empathy/Understanding, Supported Autonomy, Collaboration, Evocation, Permission to raise concern or advise, Open-ended questions, Reflections: simple and complex, and Change talk (evoked)     Change Talk Expressed by the Patient: Desire to change Reasons to change Need to change Committment to change Activation Taking steps    Provider Response to Change Talk: E - Evoked more info from patient about behavior change, A - Affirmed patient's thoughts, decisions, or attempts at behavior change, R - Reflected patient's change talk, and S - Summarized patient's change talk statements    Assessments completed prior to visit:  The following assessments were completed by patient for this visit:  PHQ9:       11/29/2022     9:43 AM 8/12/2023    10:00 AM 11/14/2023     1:08 PM 3/4/2024    12:16 AM 3/6/2024    10:50 AM 4/8/2024     8:52 AM 5/16/2024     3:24 PM   PHQ-9 SCORE   PHQ-9 Total Score MyChart   10 (Moderate depression) 15 (Moderately severe depression) 12 (Moderate depression) 12 (Moderate depression) 14 (Moderate depression)   PHQ-9 Total Score 8 12 10 15 12 12 14       Care Plan review completed: No    Medication Review:  No changes to current psychiatric medication(s)    Medication Compliance:  NA    Changes in Health Issues:   None reported    Chemical Use Review:   Substance Use: Chemical use reviewed, no active concerns identified      Tobacco Use: Not reported     Assessment: Current Emotional / Mental Status (status of significant symptoms):  Risk status (Self / Other harm or suicidal  ideation)  Patient denies a history of suicidal ideation, suicide attempts, self-injurious behavior, homicidal ideation, homicidal behavior, and and other safety concerns  Patient denies current fears or concerns for personal safety.  Patient denies current or recent suicidal ideation or behaviors.  Patient denies current or recent homicidal ideation or behaviors.  Patient denies current or recent self injurious behavior or ideation.  Patient denies other safety concerns.  A safety and risk management plan has not been developed at this time, however patient was encouraged to call [place] [phone] should there be a change in any of these risk factors.    Appearance:   Appropriate   Eye Contact:   Good   Psychomotor Behavior: Normal   Attitude:   Cooperative   Orientation:   All  Speech   Rate / Production: Normal/ Responsive Normal    Volume:  Normal   Mood:    Anxious  Depressed  Normal  Affect:    Normal and appropriate   Thought Content:  Clear   Thought Form:  Coherent  Goal Directed  Logical   Insight:    Fair     Diagnoses:  1. Major depressive disorder, recurrent episode, moderate (H)    2. Generalized anxiety disorder    3. Polydrug abuse (H)          Collateral Reports Completed:  Not Applicable    Plan: (Homework, other):  Patient was given information about behavioral services and encouraged to schedule a follow up appointment with the clinic [facility] as needed.  He was also given information about mental health symptoms and treatment options  and strategies to maintain sobriety.  CD Recommendations: Maintain Sobriety.       LILIA Gu

## 2024-05-24 ENCOUNTER — TELEPHONE (OUTPATIENT)
Dept: FAMILY MEDICINE | Facility: CLINIC | Age: 64
End: 2024-05-24

## 2024-05-24 ENCOUNTER — VIRTUAL VISIT (OUTPATIENT)
Dept: BEHAVIORAL HEALTH | Facility: CLINIC | Age: 64
End: 2024-05-24
Payer: COMMERCIAL

## 2024-05-24 DIAGNOSIS — F33.1 MAJOR DEPRESSIVE DISORDER, RECURRENT EPISODE, MODERATE (H): Primary | ICD-10-CM

## 2024-05-24 DIAGNOSIS — F41.1 GENERALIZED ANXIETY DISORDER: ICD-10-CM

## 2024-05-24 DIAGNOSIS — F19.10 POLYDRUG ABUSE (H): ICD-10-CM

## 2024-05-24 DIAGNOSIS — F43.10 PTSD (POST-TRAUMATIC STRESS DISORDER): ICD-10-CM

## 2024-05-24 PROCEDURE — 90834 PSYTX W PT 45 MINUTES: CPT | Mod: 93 | Performed by: SOCIAL WORKER
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
## 2024-05-24 NOTE — TELEPHONE ENCOUNTER
Order/Referral Request    Who is requesting: PT    Orders being requested: Neurology     Reason service is needed/diagnosis: NA    When are orders needed by: asap    Has this been discussed with Provider: No    Does patient have a preference on a Group/Provider/Facility? NA    Does patient have an appointment scheduled?: No    Where to send orders: Place orders within Epic    Could we send this information to you in [facility] or would you prefer to receive a phone call?:   Patient would prefer a phone call   Okay to leave a detailed message?: No at Home number on file [phone] (home)

## 2024-05-24 NOTE — TELEPHONE ENCOUNTER
"Per chart review, Dr. Iglesias placed neurology referral on 5/17 to [facility]. Pt will need to call [facility] to schedule.     RN called pt. Pt states he is aware referral was placed and already has appointment scheduled with [facility].     Gema Keyes, RN      \"Pt need a referral to Neurology. Yuridia FLANNERY care coordinator called in for [facility] Phone: [phone] ext [phone] wanting to arrange this. Pt has jet black discoloration on his back and saw a dermatogist at [facility] and they advise the pt it was neurological, this is why the pt is requesting for this referral. Pt said he faxed in the [facility] medical records to Dr Iglesias at  clinic already. Pt has appt with dermatology on 12/2/24 but pt wants to go directly to neurology. Call Yuridia or PT back.\"  "

## 2024-05-31 ENCOUNTER — VIRTUAL VISIT (OUTPATIENT)
Dept: BEHAVIORAL HEALTH | Facility: CLINIC | Age: 64
End: 2024-05-31
Payer: COMMERCIAL

## 2024-05-31 DIAGNOSIS — F19.10 POLYDRUG ABUSE (H): ICD-10-CM

## 2024-05-31 DIAGNOSIS — F33.1 MAJOR DEPRESSIVE DISORDER, RECURRENT EPISODE, MODERATE (H): Primary | ICD-10-CM

## 2024-05-31 DIAGNOSIS — F43.10 PTSD (POST-TRAUMATIC STRESS DISORDER): ICD-10-CM

## 2024-05-31 DIAGNOSIS — F41.1 GENERALIZED ANXIETY DISORDER: ICD-10-CM

## 2024-05-31 PROCEDURE — 90791 PSYCH DIAGNOSTIC EVALUATION: CPT | Mod: 95 | Performed by: SOCIAL WORKER

## 2024-05-31 ASSESSMENT — ANXIETY QUESTIONNAIRES
GAD7 TOTAL SCORE: 11
5. BEING SO RESTLESS THAT IT IS HARD TO SIT STILL: NOT AT ALL
6. BECOMING EASILY ANNOYED OR IRRITABLE: NOT AT ALL
2. NOT BEING ABLE TO STOP OR CONTROL WORRYING: MORE THAN HALF THE DAYS
GAD7 TOTAL SCORE: 11
7. FEELING AFRAID AS IF SOMETHING AWFUL MIGHT HAPPEN: NEARLY EVERY DAY
3. WORRYING TOO MUCH ABOUT DIFFERENT THINGS: NEARLY EVERY DAY
1. FEELING NERVOUS, ANXIOUS, OR ON EDGE: NEARLY EVERY DAY

## 2024-05-31 ASSESSMENT — PATIENT HEALTH QUESTIONNAIRE - PHQ9: 5. POOR APPETITE OR OVEREATING: NOT AT ALL
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
## 2024-06-01 ASSESSMENT — ANXIETY QUESTIONNAIRES
3. WORRYING TOO MUCH ABOUT DIFFERENT THINGS: NEARLY EVERY DAY
6. BECOMING EASILY ANNOYED OR IRRITABLE: NOT AT ALL
2. NOT BEING ABLE TO STOP OR CONTROL WORRYING: NEARLY EVERY DAY
GAD7 TOTAL SCORE: 14
8. IF YOU CHECKED OFF ANY PROBLEMS, HOW DIFFICULT HAVE THESE MADE IT FOR YOU TO DO YOUR WORK, TAKE CARE OF THINGS AT HOME, OR GET ALONG WITH OTHER PEOPLE?: SOMEWHAT DIFFICULT
7. FEELING AFRAID AS IF SOMETHING AWFUL MIGHT HAPPEN: NEARLY EVERY DAY
7. FEELING AFRAID AS IF SOMETHING AWFUL MIGHT HAPPEN: NEARLY EVERY DAY
GAD7 TOTAL SCORE: 14
4. TROUBLE RELAXING: SEVERAL DAYS
1. FEELING NERVOUS, ANXIOUS, OR ON EDGE: NEARLY EVERY DAY
GAD7 TOTAL SCORE: 14
5. BEING SO RESTLESS THAT IT IS HARD TO SIT STILL: SEVERAL DAYS
IF YOU CHECKED OFF ANY PROBLEMS ON THIS QUESTIONNAIRE, HOW DIFFICULT HAVE THESE PROBLEMS MADE IT FOR YOU TO DO YOUR WORK, TAKE CARE OF THINGS AT HOME, OR GET ALONG WITH OTHER PEOPLE: SOMEWHAT DIFFICULT

## 2024-06-03 ENCOUNTER — HOSPITAL ENCOUNTER (OUTPATIENT)
Dept: MRI IMAGING | Facility: CLINIC | Age: 64
Discharge: HOME | End: 2024-06-03
Attending: INTERNAL MEDICINE | Admitting: INTERNAL MEDICINE
Payer: COMMERCIAL

## 2024-06-03 VITALS — HEART RATE: 63 BPM | OXYGEN SATURATION: 96 % | SYSTOLIC BLOOD PRESSURE: 97 MMHG | DIASTOLIC BLOOD PRESSURE: 63 MMHG

## 2024-06-03 DIAGNOSIS — R07.9 NONSPECIFIC CHEST PAIN: ICD-10-CM

## 2024-06-03 PROBLEM — F19.10 POLYDRUG ABUSE (H): Status: ACTIVE | Noted: 2024-06-03

## 2024-06-03 LAB
ATRIAL RATE - MUSE: 53 BPM
ATRIAL RATE - MUSE: 53 BPM
DIASTOLIC BLOOD PRESSURE - MUSE: NORMAL MMHG
DIASTOLIC BLOOD PRESSURE - MUSE: NORMAL MMHG
INTERPRETATION ECG - MUSE: NORMAL
INTERPRETATION ECG - MUSE: NORMAL
P AXIS - MUSE: 34 DEGREES
P AXIS - MUSE: 43 DEGREES
PR INTERVAL - MUSE: 198 MS
PR INTERVAL - MUSE: 208 MS
QRS DURATION - MUSE: 76 MS
QRS DURATION - MUSE: 88 MS
QT - MUSE: 442 MS
QT - MUSE: 446 MS
QTC - MUSE: 414 MS
QTC - MUSE: 418 MS
R AXIS - MUSE: 1 DEGREES
R AXIS - MUSE: 12 DEGREES
SYSTOLIC BLOOD PRESSURE - MUSE: NORMAL MMHG
SYSTOLIC BLOOD PRESSURE - MUSE: NORMAL MMHG
T AXIS - MUSE: 12 DEGREES
T AXIS - MUSE: 16 DEGREES
VENTRICULAR RATE- MUSE: 53 BPM
VENTRICULAR RATE- MUSE: 53 BPM

## 2024-06-03 PROCEDURE — 93010 ELECTROCARDIOGRAM REPORT: CPT | Mod: 76 | Performed by: INTERNAL MEDICINE

## 2024-06-03 PROCEDURE — 99207 PR SATISFY VISIT NUMBER: CPT | Performed by: RADIOLOGY

## 2024-06-03 PROCEDURE — 75563 CARD MRI W/STRESS IMG & DYE: CPT | Mod: 26 | Performed by: INTERNAL MEDICINE

## 2024-06-03 PROCEDURE — 250N000011 HC RX IP 250 OP 636: Performed by: RADIOLOGY

## 2024-06-03 PROCEDURE — 93016 CV STRESS TEST SUPVJ ONLY: CPT | Performed by: RADIOLOGY

## 2024-06-03 PROCEDURE — 93005 ELECTROCARDIOGRAM TRACING: CPT

## 2024-06-03 PROCEDURE — 255N000002 HC RX 255 OP 636: Performed by: INTERNAL MEDICINE

## 2024-06-03 PROCEDURE — A9585 GADOBUTROL INJECTION: HCPCS | Performed by: INTERNAL MEDICINE

## 2024-06-03 PROCEDURE — 75563 CARD MRI W/STRESS IMG & DYE: CPT

## 2024-06-03 PROCEDURE — 999N000127 HC STATISTIC PERIPHERAL IV START W US GUIDANCE

## 2024-06-03 RX ORDER — DIPHENHYDRAMINE HCL 25 MG
25 CAPSULE ORAL
Status: DISCONTINUED | OUTPATIENT
Start: 2024-06-03 | End: 2024-06-04 | Stop reason: HOSPADM

## 2024-06-03 RX ORDER — ONDANSETRON 2 MG/ML
4 INJECTION INTRAMUSCULAR; INTRAVENOUS
Status: DISCONTINUED | OUTPATIENT
Start: 2024-06-03 | End: 2024-06-04 | Stop reason: HOSPADM

## 2024-06-03 RX ORDER — AMINOPHYLLINE 25 MG/ML
100 INJECTION, SOLUTION INTRAVENOUS ONCE
Status: COMPLETED | OUTPATIENT
Start: 2024-06-03 | End: 2024-06-03

## 2024-06-03 RX ORDER — GADOBUTROL 604.72 MG/ML
10 INJECTION INTRAVENOUS ONCE
Status: COMPLETED | OUTPATIENT
Start: 2024-06-03 | End: 2024-06-03

## 2024-06-03 RX ORDER — DIAZEPAM 5 MG
5 TABLET ORAL EVERY 30 MIN PRN
Status: DISCONTINUED | OUTPATIENT
Start: 2024-06-03 | End: 2024-06-04 | Stop reason: HOSPADM

## 2024-06-03 RX ORDER — DIPHENHYDRAMINE HYDROCHLORIDE 50 MG/ML
25-50 INJECTION INTRAMUSCULAR; INTRAVENOUS
Status: DISCONTINUED | OUTPATIENT
Start: 2024-06-03 | End: 2024-06-04 | Stop reason: HOSPADM

## 2024-06-03 RX ORDER — REGADENOSON 0.08 MG/ML
0.4 INJECTION, SOLUTION INTRAVENOUS ONCE
Status: COMPLETED | OUTPATIENT
Start: 2024-06-03 | End: 2024-06-03

## 2024-06-03 RX ORDER — ACYCLOVIR 200 MG/1
0-1 CAPSULE ORAL
Status: DISCONTINUED | OUTPATIENT
Start: 2024-06-03 | End: 2024-06-04 | Stop reason: HOSPADM

## 2024-06-03 RX ORDER — METHYLPREDNISOLONE SODIUM SUCCINATE 125 MG/2ML
125 INJECTION, POWDER, LYOPHILIZED, FOR SOLUTION INTRAMUSCULAR; INTRAVENOUS
Status: DISCONTINUED | OUTPATIENT
Start: 2024-06-03 | End: 2024-06-04 | Stop reason: HOSPADM

## 2024-06-03 RX ORDER — ALBUTEROL SULFATE 90 UG/1
2 AEROSOL, METERED RESPIRATORY (INHALATION) EVERY 5 MIN PRN
Status: DISCONTINUED | OUTPATIENT
Start: 2024-06-03 | End: 2024-06-04 | Stop reason: HOSPADM

## 2024-06-03 RX ORDER — CAFFEINE CITRATE 20 MG/ML
60 SOLUTION INTRAVENOUS
Status: DISCONTINUED | OUTPATIENT
Start: 2024-06-03 | End: 2024-06-04 | Stop reason: HOSPADM

## 2024-06-03 RX ADMIN — REGADENOSON 0.4 MG: 0.08 INJECTION, SOLUTION INTRAVENOUS at 09:20

## 2024-06-03 RX ADMIN — GADOBUTROL 10 ML: 604.72 INJECTION INTRAVENOUS at 09:45

## 2024-06-03 RX ADMIN — AMINOPHYLLINE 100 MG: 25 INJECTION, SOLUTION INTRAVENOUS at 09:21

## 2024-06-03 RX ADMIN — GADOBUTROL 10 ML: 604.72 INJECTION INTRAVENOUS at 09:44

## 2024-06-05 ENCOUNTER — DOCUMENTATION ONLY (OUTPATIENT)
Dept: OTHER | Facility: CLINIC | Age: 64
End: 2024-06-05

## 2024-06-06 ENCOUNTER — VIRTUAL VISIT (OUTPATIENT)
Dept: ADDICTION MEDICINE | Facility: CLINIC | Age: 64
End: 2024-06-06
Payer: COMMERCIAL

## 2024-06-06 DIAGNOSIS — F10.29 ALCOHOL DEPENDENCE WITH UNSPECIFIED ALCOHOL-INDUCED DISORDER (H): Primary | ICD-10-CM

## 2024-06-06 PROBLEM — F19.10 POLYDRUG ABUSE (H): Status: RESOLVED | Noted: 2024-06-03 | Resolved: 2024-06-06

## 2024-06-06 PROBLEM — F10.21 ALCOHOL DEPENDENCE IN REMISSION (H): Status: RESOLVED | Noted: 2018-01-05 | Resolved: 2024-06-06

## 2024-06-06 PROCEDURE — 99213 OFFICE O/P EST LOW 20 MIN: CPT | Mod: 24

## 2024-06-06 PROCEDURE — G2211 COMPLEX E/M VISIT ADD ON: HCPCS | Mod: 95

## 2024-06-06 ASSESSMENT — PATIENT HEALTH QUESTIONNAIRE - PHQ9
10. IF YOU CHECKED OFF ANY PROBLEMS, HOW DIFFICULT HAVE THESE PROBLEMS MADE IT FOR YOU TO DO YOUR WORK, TAKE CARE OF THINGS AT HOME, OR GET ALONG WITH OTHER PEOPLE: SOMEWHAT DIFFICULT
SUM OF ALL RESPONSES TO PHQ QUESTIONS 1-9: 13
SUM OF ALL RESPONSES TO PHQ QUESTIONS 1-9: 13

## 2024-06-06 NOTE — NURSING NOTE
Is the patient currently in the state of [place]? YES    Visit mode:VIDEO    If the visit is dropped, the patient can be reconnected by: VIDEO VISIT: Text to cell phone:   Telephone Information:   Mobile [phone]       Will anyone else be joining the visit? No  (If patient encounters technical issues they should call [phone])    How would you like to obtain your AVS? MyChart    Are changes needed to the allergy or medication list? No    Rooming Documentation: Patient will complete qnrs in mychart.    Reason for visit: OZZY Will
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
## 2024-06-06 NOTE — PROGRESS NOTES
Virtual Visit Details    Type of service:  Video Visit   Joined the call at 6/6/2024, 10:26:08 am.  Left the call at 6/6/2024, 11:20:58 am.    Originating Location (pt. Location): Home    Distant Location (provider location):  Off-site  Platform used for Video Visit: DocTree

## 2024-06-07 ENCOUNTER — OFFICE VISIT (OUTPATIENT)
Dept: CARDIOLOGY | Facility: CLINIC | Age: 64
End: 2024-06-07
Payer: COMMERCIAL

## 2024-06-07 VITALS
WEIGHT: 166.1 LBS | HEIGHT: 65 IN | BODY MASS INDEX: 27.67 KG/M2 | HEART RATE: 50 BPM | OXYGEN SATURATION: 100 % | DIASTOLIC BLOOD PRESSURE: 85 MMHG | SYSTOLIC BLOOD PRESSURE: 141 MMHG

## 2024-06-07 DIAGNOSIS — F10.29 ALCOHOL DEPENDENCE WITH UNSPECIFIED ALCOHOL-INDUCED DISORDER (H): ICD-10-CM

## 2024-06-07 DIAGNOSIS — R07.9 NONSPECIFIC CHEST PAIN: ICD-10-CM

## 2024-06-07 DIAGNOSIS — I10 BENIGN ESSENTIAL HYPERTENSION: Primary | ICD-10-CM

## 2024-06-07 PROCEDURE — 99214 OFFICE O/P EST MOD 30 MIN: CPT | Mod: 24 | Performed by: INTERNAL MEDICINE

## 2024-06-07 ASSESSMENT — PAIN SCALES - GENERAL: PAINLEVEL: NO PAIN (0)

## 2024-06-07 NOTE — PROGRESS NOTES
[facility]  Advanced Heart Failure/ Cardiology Clinic    Patient Name: Jeanmarie Mclean   : 1960   Date of Visit: 2024    Primary Care Physician: Milton Iglesias MD,    Referring Physician: Dr. Iglesias   Reason for Visit: Chest pain    Dear Dr. Iglesias,     I had the pleasure of seeing Jeanmarie Mclean today in the [facility] Advanced Heart Failure/ Cardiology Clinic. As you know Jeanmarie Mclean is a pleasant 64 year old year old male, who presents today for follow up.    Jaenmarie saw me in 2024 with multiple cardiac symptoms including chest pain, palpitations, dizziness.  Despite the symptoms he told me that he walks/ runs on the treadmill 4-5 times/ week, 90 minutes, 3.6 - 4.1 mph at an incline. He lifts light weights (40 lbs chest), high reps. He used to be a runner but has not been able to do so due to back pain from degenerative disc disease. His risk factors for cardiac disease include hypertension and significant alcohol dependence.  He also had significant issues with anxiety and PTSD and we made a referral to mental health.      An echocardiogram with stress prior to his visit with me, had reported EF 45 to 50% without augmentation of LV function with stress.  On my review of images I was not convinced that these findings were accurate. He also had a nuclear stress test following that was normal. I evaluated him further with a cardiac MRI with stress.  The cardiac MRI was completed in 2024 and showed normal biventricular function and no evidence of ischemia on stress perfusion.  A Zio patch did not demonstrate any significant arrhythmias.    He presents today for follow-up. He was recently in the ER for dizziness and prescribed meclizine. He is continuing to exercise 4-5 times per week. He walks/ runs on the treadmill 4-5 times/ week, 90 minutes, 3.6 - 4.1 mph at an incline. He lifts light weights (50 lbs chest), higher reps. His left arm is numb/ achy/ tingling frequently, even in  the absence of chest pain and he has been told he has cervical radiculopathy. He had a biopsy of a thigh mass earlier this year - returned as myxoma    Last visit he told me that he calls himself an alcoholic, starting in the morning, half to whole six pack of malt liquor (strong beer) 4-5 times per week. Since last visit, he has cut back on frequency but drinks quite a bit when he does drink. He tells me he drinks alcohol in place of psychotropic medications for h/o TBI, PTSD, hypervigilance, paranoid. He is also taking care of his autistic son, and this is quite stressful to him as well. He has started seeing addiction medicine and is seeing benefits from that.     He does not take amlodipine regularly as he does not like to mix it with alcohol or forgets to do so.  He did not take it this morning as he was rushing to get here.    Home BPs - 110s/70s - 130/80, no significant hypertension in past few months but was previously quite hypertensive  Home HRs - not checking but known to run in the 50s chronically  Home weights - increased weight with increased appetite    ROS:  A complete 10-point ROS was negative except as above.    PAST MEDICAL HISTORY:  Past Medical History:   Diagnosis Date    Alcoholism (H)     Arthritis     Asperger's syndrome     Dr. Owens, [facility]. Last visit 2006/2007    GERD (gastroesophageal reflux disease) 1999    EGD 2003 [place]    History of hypercholesterolemia     Hypertension     Kidney stones     Malignant hyperthermia due to anesthesia     Melasma     forehead, has received desonide from dermatologist    MMT (medial meniscus tear) 10/01    LT    Myalgia and myositis 4/5/2016    mid thorax, left subscapularis, latisimus dorsi Bilateral along iliac crest     Posttraumatic stress disorder     per pt    Prostate cancer (H)     Recurrent genital herpes 1982    Rib fracture 1985    L 6th    Skull fracture (H) 1985    frequent vertigo    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    ultrasound        PAST SURGICAL HISTORY:  Past Surgical History:   Procedure Laterality Date    COLONOSCOPY      COLONOSCOPY N/A 2021    Procedure: Colonoscopy, Flexible, With Lesion Removal Using Snare;  Surgeon: Garrick Villavicencio MD;  Location: [facility] OR    COLONOSCOPY WITH CO2 INSUFFLATION N/A 2021    Procedure: COLONOSCOPY, WITH CO2 INSUFFLATION;  Surgeon: Garrick Villavicencio MD;  Location: [facility] OR    CYSTOSCOPY  10/98    for renal stones    EXCISE MASS LOWER EXTREMITY Right 3/25/2024    Procedure: Excision right thigh mass;  Surgeon: Pedro Tyler MD;  Location: [facility] OR    HC KNEE SCOPE,MED/LAT MENISECTOMY  11    Left, medial ([place])    HERNIA REPAIR, INGUINAL RT/LT      [facility])    ZZC HEP B VAC ADULT 3 DOSE IM      received all 3 vaccines    Z REMV PROSTATE,RETROPUB,RADICAL  10/13/04    Dr. Muñoz ([place])       FAMILY HISTORY:  Family History   Problem Relation Age of Onset    Psychotic Disorder Son         autism    Prostate Cancer Father         40s    Cancer Father     Cancer Maternal Grandmother         lung    Glaucoma Maternal Grandmother     Eye Disorder Maternal Grandmother         glaucoma    Diabetes Mother          45    Blood Disease Sister         sickle trait    Hypertension Sister     Blood Disease Paternal Uncle         sickle    Blood Disease Paternal Aunt         sickle    Alzheimer Disease Paternal Grandfather         70s    Macular Degeneration Paternal Grandfather     Cerebrovascular Disease No family hx of     Thyroid Disease No family hx of     Myocardial Infarction No family hx of     C.A.D. 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 [facility] dependency [facility] in     Drug use: Yes     Types: Marijuana     Comment: crack (last used 10/08?)    Sexual activity: Not Currently     Partners: Female   Social History Narrative    [employer] ([id number] [place] 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     Social Determinants of Health     Financial Resource Strain: Unknown (2023)    Financial Resource Strain     Within the past 12 months, have you or your family members you live with been unable to get utilities (heat, electricity) when it was really needed?: Patient refused   Food Insecurity: Unknown (2023)    Food Insecurity     Within the past 12 months, did you worry that your food would run out before you got money to buy more?: Patient refused     Within the past 12 months, did the food you bought just not last and you didn t have money to get more?: Patient refused   Transportation Needs: Unknown (2023)    Transportation Needs     Within the past 12 months, has lack of transportation kept you from medical appointments, getting your medicines, non-medical meetings or appointments, work, or from getting things that you need?: Patient refused   Housing Stability: Unknown (2023)    Housing Stability     Do you have housing? : Patient refused     Are you worried about losing your housing?: Patient refused   He says he is an alcoholic - half to whole six pack of malt liquor (strong beer) 4-5 times per week  Quit smoking   Crack cocaine - quit in   Medical marijuana - made chest hurt so stopped using  "it    MEDICATIONS:  Current Outpatient Medications   Medication Sig Dispense Refill    acetaminophen (TYLENOL) 500 MG tablet Take 500-1,000 mg by mouth every 6 hours as needed for mild pain 1000 mg per day      amLODIPine (NORVASC) 2.5 MG tablet Take 1 tablet (2.5 mg) by mouth daily 90 tablet 3    Blood Pressure KIT Monitor blood pressure as needed. 1 kit 1    Cholecalciferol (VITAMIN D) 1000 UNIT capsule Take 1 capsule by mouth 2 times daily      Nerve Stimulator (TENS THERAPY PAIN RELIEF) GEORGIE       LORazepam (ATIVAN) 0.5 MG tablet Take 1 tablet (0.5 mg) by mouth as needed for anxiety (One dose 30 minutes before MRI and one dose during MRI if needed) (Patient not taking: Reported on 6/7/2024) 2 tablet 0    omeprazole (PRILOSEC) 20 MG DR capsule Take 1 capsule (20 mg) by mouth 2 times daily (Patient not taking: Reported on 4/26/2024) 180 capsule 1     No current facility-administered medications for this visit.        ALLERGIES:     Allergies   Allergen Reactions    Risperidone Other (See Comments)     Serve numbness     Trimethoprim Hives    Effexor [Venlafaxine Hydrochloride] Other (See Comments)     Headache, Painful scrotum and drainage from the penis    Ambien [Zolpidem Tartrate] Nausea    Buspirone Nausea     Dizziness, headache    Celexa [Citalopram] Other (See Comments)     Headache    Duloxetine Other (See Comments)     Headache  headaches    Septra [Bactrim] Itching    Seroquel [Quetiapine] Other (See Comments)     Headache, N, V    Sulfa Antibiotics Itching    Sulfamethoxazole-Trimethoprim      hives    Tramadol Nausea     Nausea and headache    Ultram [Tramadol Hcl] Nausea and Vomiting    Venlafaxine     Zolpidem      headaches    Zolpidem Tartrate Other (See Comments)     headaches       PHYSICAL EXAM:  BP (!) 141/85 (BP Location: Right arm, Patient Position: Sitting, Cuff Size: Adult Large)   Pulse 50   Ht 1.651 m (5' 5\")   Wt 75.3 kg (166 lb 1.6 oz)   SpO2 100%   BMI 27.64 kg/m    Gen: alert, " interactive, NAD  Neck: supple, no JVD  CV: RRR, no m/r/g  Chest: CTAB, no wheezes or crackles  Abd: soft, NT, ND, +BS  Ext: no LE edema    LABS:    CMP  Last Comprehensive Metabolic Panel:  Sodium   Date Value Ref Range Status   03/06/2024 141 135 - 145 mmol/L Final     Comment:     Reference intervals for this test were updated on 09/26/2023 to more accurately reflect our healthy population. There may be differences in the flagging of prior results with similar values performed with this method. Interpretation of those prior results can be made in the context of the updated reference intervals.    12/28/2020 138 133 - 144 mmol/L Final     Potassium   Date Value Ref Range Status   03/06/2024 4.3 3.4 - 5.3 mmol/L Final   11/29/2022 4.5 3.4 - 5.3 mmol/L Final   12/28/2020 3.9 3.4 - 5.3 mmol/L Final     Chloride   Date Value Ref Range Status   03/06/2024 106 98 - 107 mmol/L Final   11/29/2022 105 94 - 109 mmol/L Final   12/28/2020 103 94 - 109 mmol/L Final     Carbon Dioxide   Date Value Ref Range Status   12/28/2020 29 20 - 32 mmol/L Final     Carbon Dioxide (CO2)   Date Value Ref Range Status   03/06/2024 27 22 - 29 mmol/L Final   11/29/2022 30 20 - 32 mmol/L Final     Anion Gap   Date Value Ref Range Status   03/06/2024 8 7 - 15 mmol/L Final   11/29/2022 2 (L) 3 - 14 mmol/L Final   12/28/2020 6 3 - 14 mmol/L Final     Glucose   Date Value Ref Range Status   03/06/2024 90 70 - 99 mg/dL Final   11/29/2022 101 (H) 70 - 99 mg/dL Final   12/28/2020 100 (H) 70 - 99 mg/dL Final     Comment:     Non Fasting     Urea Nitrogen   Date Value Ref Range Status   03/06/2024 12.8 8.0 - 23.0 mg/dL Final   11/29/2022 13 7 - 30 mg/dL Final   12/28/2020 10 7 - 30 mg/dL Final     Creatinine   Date Value Ref Range Status   03/06/2024 0.88 0.67 - 1.17 mg/dL Final   12/28/2020 0.99 0.66 - 1.25 mg/dL Final     GFR Estimate   Date Value Ref Range Status   03/06/2024 >90 >60 mL/min/1.73m2 Final   12/28/2020 83 >60 mL/min/[1.73_m2] Final      "Comment:     Non  GFR Calc  Starting 12/18/2018, serum creatinine based estimated GFR (eGFR) will be   calculated using the Chronic Kidney Disease Epidemiology Collaboration   (CKD-EPI) equation.       Calcium   Date Value Ref Range Status   03/06/2024 9.4 8.8 - 10.2 mg/dL Final   12/28/2020 9.6 8.5 - 10.1 mg/dL Final     Bilirubin Total   Date Value Ref Range Status   03/06/2024 0.8 <=1.2 mg/dL Final   08/14/2020 1.2 0.2 - 1.3 mg/dL Final     Alkaline Phosphatase   Date Value Ref Range Status   03/06/2024 86 40 - 150 U/L Final     Comment:     Reference intervals for this test were updated on 11/14/2023 to more accurately reflect our healthy population. There may be differences in the flagging of prior results with similar values performed with this method. Interpretation of those prior results can be made in the context of the updated reference intervals.   08/14/2020 79 40 - 150 U/L Final     ALT   Date Value Ref Range Status   03/06/2024 26 0 - 70 U/L Final     Comment:     Reference intervals for this test were updated on 6/12/2023 to more accurately reflect our healthy population. There may be differences in the flagging of prior results with similar values performed with this method. Interpretation of those prior results can be made in the context of the updated reference intervals.     08/14/2020 32 0 - 70 U/L Final     AST   Date Value Ref Range Status   03/06/2024 31 0 - 45 U/L Final     Comment:     Reference intervals for this test were updated on 6/12/2023 to more accurately reflect our healthy population. There may be differences in the flagging of prior results with similar values performed with this method. Interpretation of those prior results can be made in the context of the updated reference intervals.   08/14/2020 18 0 - 45 U/L Final       NT-proBNP - none available    TROP  No results found for: \"TROPI\", \"TROPONIN\", \"TROPR\", \"TROPN\"    CBC  CBC RESULTS:   Recent Labs   Lab Test " 22  0950   WBC 5.4   RBC 4.81   HGB 13.9   HCT 42.5   MCV 88   MCH 28.9   MCHC 32.7   RDW 13.1          LIPIDS  Recent Labs   Lab Test 22  1032 10/01/21  0712   CHOL 206* 251*   HDL 72 74   * 155*   TRIG 63 108       TSH  TSH   Date Value Ref Range Status   2024 0.90 0.30 - 4.20 uIU/mL Final   2022 0.72 0.40 - 4.00 mU/L Final   10/13/2017 0.40 0.40 - 4.00 mU/L Final       HBA1C  Lab Results   Component Value Date    A1C 5.7 10/01/2021    A1C 5.7 2020    A1C 5.7 11/10/2017         CARDIAC DATA:    EK2024: Heart rate 55 bpm, sinus bradycardia, otherwise normal ECG    Echo: None available    Exercise Echo Stress Test:    Normal    23  Abnormal exercise stress echocardiogram.  The patient exercised for 4 minutes 58 seconds achieving 87% of the maximal predicted heart rate, the target heart rate was achieved. Normal heart rate response to exercise. BP response was hypertensive with resting blood pressure 134/84 mmHg and peak exercise blood pressure 230/80 mmHg.  Normal biventricular size and thickness. Global systolic function at baseline is mildly reduced at LVEF 45-50%.  With exercise, LVEF did not augment and LV cavity size remained the same (did not decrease).  No regional wall motion abnormalities are present at rest or with exercise.  No angina was elicited.  ECG at baseline: sinus rhythm. With exercise slow upsloping ST segment depression noted in inferolateral leads.  Functional capacity is reduced for age.  No significant valvular abnormalities are noted on screening Doppler exam.  The aortic root and visualized ascending aorta are normal.      Pharmacologic Nuclear Stress Test:  24    The nuclear stress test is negative for inducible myocardial ischemia or infarction.    Left ventricular function is normal.    Cardiac MRI:  6/3/24  Clinical history:  nonspecific chest pain  Comparison CMR: none  1. The LV is normal in cavity size and wall  thickness. The global systolic function is normal. The LVEF is  66%. There are no regional wall motion abnormalities.  2. The RV is normal in cavity size. The global systolic function is normal. The RVEF is 48%.   3. Both atria are normal in size.  4. There is no significant valvular disease.   5. Late gadolinium enhancement imaging shows no MI, fibrosis or infiltrative disease.   6. Regadenoson stress perfusion imaging shows no ischemia.  7. There is no pericardial effusion or thickening.  8. There is no intracardiac thrombus.  CONCLUSIONS:  Normal biventricular function with LVEF 66% and RVEF 48%. No MI, fibrosis or infiltrative  disease on late gadolinium enhanced images. No perfusion deficits to suggest ischemia on Regadenoson stress imaging.    Cardiac Catheterization:  None available    Zio patch:   Feb 2024        ASSESSMENT/PLAN:  In summary, Jeanmarie Mclean is here today with the following -      1.  Multiple cardiac symptoms - Exertional chest pain, Shortness of breath, Palpitations, lightheadedness with negative evaluation by cardiac MRI with stress and zio patch as above  2. Incorrect diagnosis of possible cardiomyopathy by echocardiogram with mildly reduced EF 45- 50% - I reviewed images and was not convinced of the concerns. He also had a nuclear stress test following that was normal. I evaluated this further with Cardiac MRI that showed Normal biventricular function with LVEF 66% and RVEF 48% and no evidence of ischemia  3. Thyroid nodules and lung nodules, being monitored for possible cancer, no formal diagnosis  4. Thigh myxoma  5. Significant psychosocial concerns - schizoaffective disorder, PTSD, anxiety  6. Alcohol dependence   7. Hypertension      Plans-  No convincing evidence for significant CAD or cardiomyopathy   Does need to focus on optimal control for hypertension  He will follow up with PCP  Can see general cardiology as needed    -Aggressive risk factor modification including optimal  control of blood pressure,  -Advised on following a heart healthy diet and pursuing at least 150 minutes/week of moderate intensity aerobic exercise  -Counseled on cessation of alcohol, and explained cardiovascular risks of continued alcohol particularly at the amounts he is drinking. Referred to addiction medicine, and he is seeing them      I spent 31 minutes in care of the patient today including obtaining recent medical history, personally reviewing recent cardiac testing and/or lab results, today's examination, discussion of testing results and care recommendations with patient.       Thank you for the opportunity to participate in this patient's care. Please feel free to reach out with any questions or concerns.      Bjorn Somers MD, FACC  Associate Professor of Medicine  Advanced Heart Failure, LVAD & Cardiac Transplant  Director, Cardio-Obstetrics  Cardio-Oncology  [facility]

## 2024-06-07 NOTE — NURSING NOTE
"Chief Complaint   Patient presents with    Follow Up     Return Heart Failure       Initial BP (!) 141/85 (BP Location: Right arm, Patient Position: Sitting, Cuff Size: Adult Large)   Pulse 50   Ht 1.651 m (5' 5\")   Wt 75.3 kg (166 lb 1.6 oz)   SpO2 100%   BMI 27.64 kg/m   Estimated body mass index is 27.64 kg/m  as calculated from the following:    Height as of this encounter: 1.651 m (5' 5\").    Weight as of this encounter: 75.3 kg (166 lb 1.6 oz)..  BP completed using cuff size: large    Karla Guardado, EMT  "

## 2024-06-07 NOTE — NURSING NOTE
To Do    Medication changes:  1- none         Follow up:  1- follow up with PCP   2- continue to control your blood pressures  3- abstain from alcohol     Riya Montemayor RN

## 2024-06-07 NOTE — PATIENT INSTRUCTIONS
Cardiology Providers you saw during your visit:  Dr. Somesr     Medication changes:  1- none         Follow up:  1- follow up with PCP   2- continue to control your blood pressures  3- abstain from alcohol        Please call if you have:  1. Weight gain of more than 2 pounds in a day or 5 pounds in a week  2. Increased shortness of breath, swelling or bloating  3. Dizziness, lightheadedness   4. Any questions or concerns.       Follow the American Heart Association Diet and Lifestyle recommendations:  Limit saturated fat, trans fat, sodium, red meat, sweets and sugar-sweetened beverages. If you choose to eat red meat, compare labels and select the leanest cuts available.  Aim for at least 150 minutes of moderate physical activity or 75 minutes of vigorous physical activity - or an equal combination of both - each week.     During business hours: [phone], press option # 1 to schedule an appointment or send a message to your care team     After hours, weekends or holidays: On Call Cardiologist- [phone]   option #4 and ask to speak to the on-call Cardiologist. Inform them you are a CORE/heart failure patient at the [facility].    DELORES Ritter  Cardiology Nurse Care Coordinator (Heart Failure / C.O.R.E.)

## 2024-06-10 ENCOUNTER — MYC MEDICAL ADVICE (OUTPATIENT)
Dept: FAMILY MEDICINE | Facility: CLINIC | Age: 64
End: 2024-06-10
Payer: COMMERCIAL

## 2024-06-10 DIAGNOSIS — Z71.89 HEARING AID CONSULTATION: Primary | ICD-10-CM

## 2024-06-11 NOTE — TELEPHONE ENCOUNTER
Received referral order for updated Hearing Aid. Faxed to Audiology, [phone], right fax confirmed at 8:29 am today, 6/11/2024.  Placed in writer's copy basket.  Sheila Kam MA  [facility]   Primary Care

## 2024-06-14 ENCOUNTER — TRANSFERRED RECORDS (OUTPATIENT)
Dept: HEALTH INFORMATION MANAGEMENT | Facility: CLINIC | Age: 64
End: 2024-06-14
Payer: COMMERCIAL

## 2024-06-21 ENCOUNTER — MYC REFILL (OUTPATIENT)
Dept: FAMILY MEDICINE | Facility: CLINIC | Age: 64
End: 2024-06-21
Payer: COMMERCIAL

## 2024-06-21 DIAGNOSIS — R07.9 NONSPECIFIC CHEST PAIN: ICD-10-CM

## 2024-06-21 DIAGNOSIS — F41.9 ANXIETY: ICD-10-CM

## 2024-06-24 RX ORDER — LORAZEPAM 0.5 MG/1
0.5 TABLET ORAL PRN
Qty: 2 TABLET | Refills: 0 | Status: SHIPPED | OUTPATIENT
Start: 2024-06-24

## 2024-07-06 ASSESSMENT — ANXIETY QUESTIONNAIRES
4. TROUBLE RELAXING: SEVERAL DAYS
2. NOT BEING ABLE TO STOP OR CONTROL WORRYING: NEARLY EVERY DAY
GAD7 TOTAL SCORE: 16
7. FEELING AFRAID AS IF SOMETHING AWFUL MIGHT HAPPEN: NEARLY EVERY DAY
8. IF YOU CHECKED OFF ANY PROBLEMS, HOW DIFFICULT HAVE THESE MADE IT FOR YOU TO DO YOUR WORK, TAKE CARE OF THINGS AT HOME, OR GET ALONG WITH OTHER PEOPLE?: NOT DIFFICULT AT ALL
IF YOU CHECKED OFF ANY PROBLEMS ON THIS QUESTIONNAIRE, HOW DIFFICULT HAVE THESE PROBLEMS MADE IT FOR YOU TO DO YOUR WORK, TAKE CARE OF THINGS AT HOME, OR GET ALONG WITH OTHER PEOPLE: NOT DIFFICULT AT ALL
1. FEELING NERVOUS, ANXIOUS, OR ON EDGE: NEARLY EVERY DAY
GAD7 TOTAL SCORE: 16
5. BEING SO RESTLESS THAT IT IS HARD TO SIT STILL: NEARLY EVERY DAY
3. WORRYING TOO MUCH ABOUT DIFFERENT THINGS: NEARLY EVERY DAY
7. FEELING AFRAID AS IF SOMETHING AWFUL MIGHT HAPPEN: NEARLY EVERY DAY
6. BECOMING EASILY ANNOYED OR IRRITABLE: NOT AT ALL
GAD7 TOTAL SCORE: 16

## 2024-07-11 ENCOUNTER — VIRTUAL VISIT (OUTPATIENT)
Dept: PSYCHOLOGY | Facility: CLINIC | Age: 64
End: 2024-07-11
Payer: COMMERCIAL

## 2024-07-11 DIAGNOSIS — F10.29 ALCOHOL DEPENDENCE WITH UNSPECIFIED ALCOHOL-INDUCED DISORDER (H): ICD-10-CM

## 2024-07-11 PROCEDURE — 99207 PR NO BILLABLE SERVICE THIS VISIT: CPT | Mod: 95 | Performed by: COUNSELOR

## 2024-07-11 ASSESSMENT — PATIENT HEALTH QUESTIONNAIRE - PHQ9
10. IF YOU CHECKED OFF ANY PROBLEMS, HOW DIFFICULT HAVE THESE PROBLEMS MADE IT FOR YOU TO DO YOUR WORK, TAKE CARE OF THINGS AT HOME, OR GET ALONG WITH OTHER PEOPLE: SOMEWHAT DIFFICULT
SUM OF ALL RESPONSES TO PHQ QUESTIONS 1-9: 15
SUM OF ALL RESPONSES TO PHQ QUESTIONS 1-9: 15

## 2024-07-11 NOTE — PROGRESS NOTES
Client will need provider will dual licensure or experience with schizoaffective. Client reported trouble within [facility] system stating providers lie on documentation or bill appointments that have no happened. Provider will try to find best fit within the [facility] system or refer outside. Client denied present suicidal ideation. Client stated he has had thoughts like that before but none currently.    Will put in referral for [facility] via CC

## 2024-07-12 ENCOUNTER — PATIENT OUTREACH (OUTPATIENT)
Dept: CARE COORDINATION | Facility: CLINIC | Age: 64
End: 2024-07-12
Payer: COMMERCIAL

## 2024-07-12 NOTE — PROGRESS NOTES
"Community Health Worker Initial Outreach     Patient states that he does not want an [facility] worker and was upset that this order was placed without consulting with him. A message was sent to him via [employer] without the option to reply to it. Patient states \" I feel that it was very arrogant of the provider to take it upon herself to place this order.\"       Patient accepts CC: No, declined. Patient will be sent Care Coordination introduction letter for future reference.     BROOKE Hdz [place], [place], [place], [place] and [facility]  [phone]      "

## 2024-07-13 ENCOUNTER — NURSE TRIAGE (OUTPATIENT)
Dept: NURSING | Facility: CLINIC | Age: 64
End: 2024-07-13
Payer: COMMERCIAL

## 2024-07-13 NOTE — TELEPHONE ENCOUNTER
Nurse Triage SBAR    Consent: Not needed    Situation: Patient calling with back pain.    Background: Reports right sided back pain that radiates up nearly to his shoulder and down to his hip. States that he passed a few kidney stones yesterday that caused some stinging when he urinated yesterday, however, he is not experiencing any urinary symptoms today.  Reports urinary frequency where he was up 4 times in the night and notes that this is his normal and he passed large amounts of urine during each episode but denies pain with urination or blood in urine. Rates back pain a 5/10.  Also notes that he is having right sided abdominal pain that is located in the upper part of his abdomen and is right under his ribs as well. He states that over 10 years ago he broke a few ribs that healed incorrectly and this occasionally causes pain in this area. When questioned if this was a similar pain, he states that it is a similar pain but is also something additional.     Assessment: back pain from shoulder blade down to hip on right side, right sided upper abdominal pain, rates pain 5/10    Protocol Recommended Disposition: Go to ED Now (Or PCP Triage)    Recommendation: Advised patient to go to ED now. Reviewed concerning symptoms and when to call back.     2LT or FYI: Paged to Provider on-call     Provider consult indicated.     Reason for page: back and abdominal pain    Page sent to Dr. Martin by RN at 4:08.       Provider, Dr. Martin, returning page to [facility] at 4:09p.    Provider recommended plan of care:   Go to UC/ED.    Follow-Up: Reached patient/caregiver. Informed of providers recommendations. Patient verbalized understanding and agrees with the plan.     Note: Patient plans to seek care when he is able to arrange a ride to a care facility. Is unsure if he will be able to go today. Writer advised patient to be seen as soon as possible.    ADS: No     Cally Bedoya RN [place] [facility] 7/13/2024  4:07 PM      Reason for Disposition   [1] Abdominal pain AND [2] age > 60 years    Additional Information   Negative: Passed out (i.e., lost consciousness, collapsed and was not responding)   Negative: Shock suspected (e.g., cold/pale/clammy skin, too weak to stand, low BP, rapid pulse)   Negative: Sounds like a life-threatening emergency to the triager   Negative: [1] SEVERE back pain (e.g., excruciating) AND [2] sudden onset AND [3] age > 60 years   Negative: [1] Unable to urinate (or only a few drops) > 4 hours AND [2] bladder feels very full (e.g., palpable bladder or strong urge to urinate)   Negative: [1] Loss of bladder or bowel control (urine or bowel incontinence; wetting self, leaking stool) AND [2] new-onset   Negative: Numbness in groin or rectal area (i.e., loss of sensation)   Negative: [1] SEVERE abdominal pain AND [2] present > 1 hour    Protocols used: Back Pain-A-AH

## 2024-08-05 NOTE — PATIENT INSTRUCTIONS
[facility]   Procedure Discharge Instructions    Today you saw:  Dr. Barry Castellanos       You had an:  sacroiliac joint injection         If you were holding your blood thinning medication, please restart taking it: N/A  Be cautious when walking. Numbness and/or weakness in the lower extremities may occur for up to 6-8 hours after the procedure due to effect of the local anesthetic  Do not drive for 6 hours. The effect of the local anesthetic could slow your reflexes.   You may resume your regular activities after 24 hours  Avoid strenuous activity for the first 24 hours  You may shower, however avoid swimming, tub baths or hot tubs for 24 hours following your procedure  You may have a mild to moderate increase in pain for several days following the injection.  It may take up to 14 days for the steroid medication to start working although you may feel the effect as early as a few days after the procedure.     You may use ice packs for 10-15 minutes, 3 to 4 times a day at the injection site for comfort  Do not use heat to painful areas for 6 to 8 hours. This will give the local anesthetic time to wear off and prevent you from accidentally burning your skin.   Unless you have been directed to avoid the use of anti-inflammatory medications (NSAIDS), you may use medications such as ibuprofen, Aleve or Tylenol for pain control if needed.   Possible side effects of steroids that you may experience include flushing, elevated blood pressure, increased appetite, mild headaches and restlessness.  All of these symptoms will get better with time.  If you experience any of the following, call the Pain Clinic during work hours (Mon-Friday 8-4:30 pm) at [phone] or the Provider Line after hours at [phone]:  -Fever over 100 degree F  -Swelling, bleeding, redness, drainage, warmth at the injection site  -Progressive weakness or numbness in your legs   -Loss of bowel or bladder  function  -Unusual new onset of pain that is not improving       Universal Safety Interventions

## 2024-08-21 ENCOUNTER — MYC REFILL (OUTPATIENT)
Dept: FAMILY MEDICINE | Facility: CLINIC | Age: 64
End: 2024-08-21
Payer: COMMERCIAL

## 2024-08-21 DIAGNOSIS — R10.13 EPIGASTRIC PAIN: ICD-10-CM

## 2024-08-21 DIAGNOSIS — K21.9 GASTROESOPHAGEAL REFLUX DISEASE WITHOUT ESOPHAGITIS: Chronic | ICD-10-CM

## 2024-08-23 ENCOUNTER — ANCILLARY PROCEDURE (OUTPATIENT)
Dept: ULTRASOUND IMAGING | Facility: CLINIC | Age: 64
End: 2024-08-23
Attending: INTERNAL MEDICINE
Payer: COMMERCIAL

## 2024-08-23 DIAGNOSIS — E04.2 MULTIPLE THYROID NODULES: ICD-10-CM

## 2024-08-23 PROCEDURE — 76536 US EXAM OF HEAD AND NECK: CPT

## 2024-08-26 ENCOUNTER — TELEPHONE (OUTPATIENT)
Dept: ENDOCRINOLOGY | Facility: CLINIC | Age: 64
End: 2024-08-26
Payer: COMMERCIAL

## 2024-08-26 NOTE — TELEPHONE ENCOUNTER
Call patient and left a detailed message that the visit for 8/27/24 has been changed to a video visit as he requested.     Asked the patient to call back if he had any other questions.    Imelda Ramsey [facility]  Adult Endocrinology  [facility], [place]

## 2024-08-26 NOTE — TELEPHONE ENCOUNTER
Health Call Center    Phone Message    May a detailed message be left on voicemail: yes     Reason for Call: Other: patient requesting if appt on 8/27/24 with Dr. Oseguera can be changed to a video visit. Please call him at [phone] patient will be home 1pm to take call stated ok to LVM if able to change appt type.  thank you    Action Taken: Message routed to:  [facility] ([facility]):      Travel Screening: Not Applicable     Date of Service:

## 2024-08-26 NOTE — RESULT ENCOUNTER NOTE
There are no significant changes of the thyroid nodules.  Please keep your scheduled f/up appointment.

## 2024-08-27 ENCOUNTER — VIRTUAL VISIT (OUTPATIENT)
Dept: ENDOCRINOLOGY | Facility: CLINIC | Age: 64
End: 2024-08-27
Payer: COMMERCIAL

## 2024-08-27 DIAGNOSIS — E04.2 MULTIPLE THYROID NODULES: Primary | ICD-10-CM

## 2024-08-27 DIAGNOSIS — R07.0 THROAT PAIN: ICD-10-CM

## 2024-08-27 PROCEDURE — G2211 COMPLEX E/M VISIT ADD ON: HCPCS | Performed by: INTERNAL MEDICINE

## 2024-08-27 PROCEDURE — 99214 OFFICE O/P EST MOD 30 MIN: CPT | Mod: 95 | Performed by: INTERNAL MEDICINE

## 2024-08-27 ASSESSMENT — PAIN SCALES - GENERAL: PAINLEVEL: NO PAIN (0)

## 2024-08-27 NOTE — NURSING NOTE
Current patient location: [facility] [address]  [facility] [address]    Is the patient currently in the state of [place]? YES    Visit mode:VIDEO    If the visit is dropped, the patient can be reconnected by: VIDEO VISIT: Text to cell phone:   Telephone Information:   Mobile [phone]       Will anyone else be joining the visit? NO  (If patient encounters technical issues they should call [phone])    How would you like to obtain your AVS? MyChart    Are changes needed to the allergy or medication list? No    Are refills needed on medications prescribed by this physician? NO    Rooming Documentation:  Patient declined to complete questionnaire(s)      Reason for visit: RECHECK Shelby Kocher VVF

## 2024-08-27 NOTE — PROGRESS NOTES
Video-Visit Details    Type of service:  Video Visit  Joined the call at 8/27/2024, 9:03:46 am.  Left the call at 8/27/2024, 9:19:21 am.    Originating Location (pt. Location): Home  Distant Location (provider location): Off-site    Mode of Communication:  Video Conference via [employer]    =======================================================  Assessment     Jeanmarie Mclean is a 64 year old male with a past medical history significant for schizoaffective disorder, nephrolithiasis, prostate cancer, lung nodules, seen for evaluation of incidentally discovered thyroid nodules.  The biopsy of the left #3 thyroid nodule from December 2023 revealed AUS, Afirma negative.  The follow-up thyroid ultrasound from August 2024 did not reveal significant changes of the thyroid nodules.  The patient has no risk factors for thyroid cancer.    Recommendations:  Schedule a follow-up ultrasound in 1 year.  Recheck the reflex TSH in 1 year    Mild and intermittent sore throat  I advised the patient to consult ENT    Increased appetite and weight gain  I do not suspect a hormonal etiology.  The patient has been compliant with a healthy diet by report.  Discussed about the option of consulting a dietitian.  Unclear why the patient is requiring treatment with Prilosec and I advised to follow-up on this with his primary care provider or consult gastroenterology.    Orders Placed This Encounter   Procedures    US Thyroid    TSH with free T4 reflex    Adult ENT  Referral     =======================================================  The patient is seen in f/up.     History of Present Illness:  Jeanmarie Mclean is a 64 year old male with a past medical history significant for alcoholism, Asperger's syndrome, GERD, hypertension, nephrolithiasis, prostate cancer, hypercholesterolemia, seen for evaluation of thyroid nodules.    The [facility] which picked [facility] on thyroid abnormalities.   The thyroid ultrasound from 12/6/2023  revealed a total of 5 thyroid nodules, bilaterally.  The most suspicious nodule was a nodule located in the left thyroid lobe, with slightly irregular borders, heterogeneously hypoechoic, no definite microcalcifications.  The isthmus was thicker and it harbored a 1 cm mostly cystic nodule.  The biopsy of the left #3 thyroid nodule, measuring 1.9 cm in maximum diameter, from 12/28/2023 revealed AUS, Afirma negative.  A follow-up thyroid ultrasound was done on 8/23/2024 and I reviewed the ultrasound images.  Overall, the thyroid nodules have remained stable.    Most recent TSH was normal at 0.9, on 2/21/24.     The patient reports an intermittent throat pain (points towards the midline of the neck) 4-5 out of 10 intensity, present 3-4 times a month and lasting for a couple of hours.  He has been medicated with Prilosec for many years.  The upper endoscopy from March 2022 was unremarkable and the patient denies experiencing heartburns while taking Prilosec.  On questioning, he does endorse occasional episodes of voice hoarseness, transient.  Denies dysphagia or cough, palpitations, tremor.    He continues to complain of increased appetite since age 57.  His diet is very healthy and he exercises regularly.  His weight is up 15 pounds.    There is no prior history of radiation exposure or known family history of thyroid disease.    Past Medical History   Past Medical History:   Diagnosis Date    Alcoholism (H)     Arthritis     Asperger's syndrome     Dr. Owens, [facility]. Last visit 2006/2007    GERD (gastroesophageal reflux disease) 1999    EGD 2003 [place]    History of hypercholesterolemia     Hypertension     Kidney stones     Malignant hyperthermia due to anesthesia     Melasma     forehead, has received desonide from dermatologist    MMT (medial meniscus tear) 10/01    LT    Myalgia and myositis 4/5/2016    mid thorax, left subscapularis, latisimus dorsi Bilateral along iliac crest     Posttraumatic stress disorder      per pt    Prostate cancer (H)     Recurrent genital herpes 1982    Rib fracture 1985    L 6th    Skull fracture (H) 1985    frequent vertigo    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  R arm fracture - 1985   Lung nodules - monitored through [facility]    Past Surgical History   Past Surgical History:   Procedure Laterality Date    COLONOSCOPY      COLONOSCOPY N/A 2/22/2021    Procedure: Colonoscopy, Flexible, With Lesion Removal Using Snare;  Surgeon: Garrick Villavicencio MD;  Location: [facility] OR    COLONOSCOPY WITH CO2 INSUFFLATION N/A 2/22/2021    Procedure: COLONOSCOPY, WITH CO2 INSUFFLATION;  Surgeon: Garrick Villavicencio MD;  Location: [facility] OR    CYSTOSCOPY  10/98    for renal stones    EXCISE MASS LOWER EXTREMITY Right 3/25/2024    Procedure: Excision right thigh mass;  Surgeon: Pedro Tyler MD;  Location: [facility] OR    HC KNEE SCOPE,MED/LAT MENISECTOMY  6/24/11    Left, medial ([place])    HERNIA REPAIR, INGUINAL RT/LT  5/92    Right, @ [facility] (epid[facility])    [facility] HEP B VAC ADULT 3 DOSE IM  1995    received all 3 vaccines    [facility] REMV PROSTATE,RETROPUB,RADICAL  10/13/04    Dr. Muñoz ([place])   Prostate surgery   Schizoaffective disorder     Current Medications    Current Outpatient Medications:     acetaminophen (TYLENOL) 500 MG tablet, Take 500-1,000 mg by mouth every 6 hours as needed for mild pain 1000 mg per day, Disp: , Rfl:     amLODIPine (NORVASC) 2.5 MG tablet, Take 1 tablet (2.5 mg) by mouth daily, Disp: 90 tablet, Rfl: 3    Blood Pressure KIT, Monitor blood pressure as needed., Disp: 1 kit, Rfl: 1    Cholecalciferol (VITAMIN D) 1000 UNIT capsule, Take 1 capsule by mouth 2 times daily, Disp: , Rfl:     LORazepam (ATIVAN) 0.5 MG tablet, Take 1 tablet (0.5 mg) by mouth as needed for anxiety (One dose 30 minutes before MRI and one dose during MRI if needed), Disp: 2 tablet, Rfl: 0    Nerve Stimulator (TENS THERAPY PAIN RELIEF) GEORGIE, , Disp: , Rfl:     omeprazole  (PRILOSEC) 20 MG DR capsule, Take 1 capsule (20 mg) by mouth 2 times daily., Disp: 180 capsule, Rfl: 1    Family History   Problem Relation Age of Onset    Psychotic Disorder Son         autism    Prostate Cancer Father         40s    Cancer Father     Cancer Maternal Grandmother         lung    Glaucoma Maternal Grandmother     Eye Disorder Maternal Grandmother         glaucoma    Diabetes Mother          45    Blood Disease Sister         sickle trait    Hypertension Sister     Blood Disease Paternal Uncle         sickle    Blood Disease Paternal Aunt         sickle    Alzheimer Disease Paternal Grandfather         70s    Macular Degeneration Paternal Grandfather     Cerebrovascular Disease No family hx of     Thyroid Disease No family hx of     Myocardial Infarction No family hx of     C.A.D. No family hx of      Social History  . He has 2 children. He quit smoking in . Smoked for 10 years, ~ 10 ciggs a day. H/o alcoholism. He drinks alcohol to relieved the paranoia and depression. Denies using illicit drugs. He has a prescription for medical marihuana. Occupation: unemployed.               Vital Signs     Previous Weights:    Wt Readings from Last 10 Encounters:   24 75.3 kg (166 lb 1.6 oz)   24 73.8 kg (162 lb 11.2 oz)   24 75.8 kg (167 lb)   24 76.2 kg (168 lb)   24 73.9 kg (163 lb)   24 71.7 kg (158 lb)   23 67.7 kg (149 lb 4 oz)   23 68.9 kg (152 lb)   22 70.8 kg (156 lb)   22 66.7 kg (147 lb)        There were no vitals taken for this visit.    Physical Exam  General Appearance: alert, no distress noted   Eyes: grossly normal to inspection, conjunctivae and sclerae normal, no lid lag or stare   Respiratory: no audible wheeze, cough, or visible cyanosis.  No visible retractions or increased work of breathing.  Able to speak fully in complete sentences.  Neurological: Cranial nerves grossly intact, mentation intact and speech normal;  no tremor of the hands   Skin: no lesions on exposed skin   Psychological: mentation appears normal, affect normal, judgement and insight intact, normal speech and appearance well-groomed      Lab Results  I reviewed prior lab results documented in Epic.  TSH   Date Value Ref Range Status   02/21/2024 0.90 0.30 - 4.20 uIU/mL Final   03/03/2022 0.72 0.40 - 4.00 mU/L Final   10/01/2021 1.57 0.40 - 4.00 mU/L Final   10/13/2017 0.40 0.40 - 4.00 mU/L Final   05/07/2015 0.43 mcU/mL Final   10/24/2013 0.36 (L) 0.4 - 5.0 mU/L Final     31 minutes spent on the date of the encounter doing chart review, history and exam, documentation and further activities as noted above.  The longitudinal plan of care for the diagnosis(es)/condition(s) as documented were addressed during this visit. Due to the added complexity in care, I will continue to support Jeanmarie in the subsequent management and with ongoing continuity of care.

## 2024-08-27 NOTE — LETTER
8/27/2024      Jeanmarie Mclean  [id number]  [facility] [address]      Dear Colleague,    Thank you for referring your patient, Jeanmarie Mclean, to the [facility]. Please see a copy of my visit note below.    Video-Visit Details    Type of service:  Video Visit  Joined the call at 8/27/2024, 9:03:46 am.  Left the call at 8/27/2024, 9:19:21 am.    Originating Location (pt. Location): Home  Distant Location (provider location): Off-site    Mode of Communication:  Video Conference via [employer]    =======================================================  Assessment     Jeanmarie Mclean is a 64 year old male with a past medical history significant for schizoaffective disorder, nephrolithiasis, prostate cancer, lung nodules, seen for evaluation of incidentally discovered thyroid nodules.  The biopsy of the left #3 thyroid nodule from December 2023 revealed AUS, Afirma negative.  The follow-up thyroid ultrasound from August 2024 did not reveal significant changes of the thyroid nodules.  The patient has no risk factors for thyroid cancer.    Recommendations:  Schedule a follow-up ultrasound in 1 year.  Recheck the reflex TSH in 1 year    Mild and intermittent sore throat  I advised the patient to consult ENT    Increased appetite and weight gain  I do not suspect a hormonal etiology.  The patient has been compliant with a healthy diet by report.  Discussed about the option of consulting a dietitian.  Unclear why the patient is requiring treatment with Prilosec and I advised to follow-up on this with his primary care provider or consult gastroenterology.    Orders Placed This Encounter   Procedures    US Thyroid    TSH with free T4 reflex    Adult ENT  Referral     =======================================================  The patient is seen in f/up.     History of Present Illness:  Jeanmarie Mclean is a 64 year old male with a past medical history significant for alcoholism, Asperger's  syndrome, GERD, hypertension, nephrolithiasis, prostate cancer, hypercholesterolemia, seen for evaluation of thyroid nodules.    [facility] which [facility] abnormalities.   The thyroid ultrasound from 12/6/2023 revealed a total of 5 thyroid nodules, bilaterally.  The most suspicious nodule was a nodule located in the left thyroid lobe, with slightly irregular borders, heterogeneously hypoechoic, no definite microcalcifications.  The isthmus was thicker and it harbored a 1 cm mostly cystic nodule.  The biopsy of the left #3 thyroid nodule, measuring 1.9 cm in maximum diameter, from 12/28/2023 revealed AUS, Afirma negative.  A follow-up thyroid ultrasound was done on 8/23/2024 and I reviewed the ultrasound images.  Overall, the thyroid nodules have remained stable.    Most recent TSH was normal at 0.9, on 2/21/24.     The patient reports an intermittent throat pain (points towards the midline of the neck) 4-5 out of 10 intensity, present 3-4 times a month and lasting for a couple of hours.  He has been medicated with Prilosec for many years.  The upper endoscopy from March 2022 was unremarkable and the patient denies experiencing heartburns while taking Prilosec.  On questioning, he does endorse occasional episodes of voice hoarseness, transient.  Denies dysphagia or cough, palpitations, tremor.    He continues to complain of increased appetite since age 57.  His diet is very healthy and he exercises regularly.  His weight is up 15 pounds.    There is no prior history of radiation exposure or known family history of thyroid disease.    Past Medical History   Past Medical History:   Diagnosis Date    Alcoholism (H)     Arthritis     Asperger's syndrome     Dr. Owens, [facility]. Last visit 2006/2007    GERD (gastroesophageal reflux disease) 1999    EGD 2003 OK    History of hypercholesterolemia     Hypertension     Kidney stones     Malignant hyperthermia due to anesthesia     Melasma      forehead, has received desonide from dermatologist    MMT (medial meniscus tear) 10/01    LT    Myalgia and myositis 4/5/2016    mid thorax, left subscapularis, latisimus dorsi Bilateral along iliac crest     Posttraumatic stress disorder     per pt    Prostate cancer (H)     Recurrent genital herpes 1982    Rib fracture 1985    L 6th    Skull fracture (H) 1985    frequent vertigo    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  R arm fracture - 1985   Lung nodules - monitored through [facility]    Past Surgical History   Past Surgical History:   Procedure Laterality Date    COLONOSCOPY      COLONOSCOPY N/A 2/22/2021    Procedure: Colonoscopy, Flexible, With Lesion Removal Using Snare;  Surgeon: Garrick Villavicencio MD;  Location: [facility] OR    COLONOSCOPY WITH CO2 INSUFFLATION N/A 2/22/2021    Procedure: COLONOSCOPY, WITH CO2 INSUFFLATION;  Surgeon: Garrick Villavicencio MD;  Location: [facility] OR    CYSTOSCOPY  10/98    for renal stones    EXCISE MASS LOWER EXTREMITY Right 3/25/2024    Procedure: Excision right thigh mass;  Surgeon: Pedro Tyler MD;  Location: [facility] OR    HC KNEE SCOPE,MED/LAT MENISECTOMY  6/24/11    Left, medial ([place])    HERNIA REPAIR, INGUINAL RT/LT  5/92    Right, @ [facility] (epidural)    Z HEP B VAC ADULT 3 DOSE IM  1995    received all 3 vaccines    [facility] REMV PROSTATE,RETROPUB,RADICAL  10/13/04    Dr. Muñoz ([place])   Prostate surgery   Schizoaffective disorder     Current Medications    Current Outpatient Medications:     acetaminophen (TYLENOL) 500 MG tablet, Take 500-1,000 mg by mouth every 6 hours as needed for mild pain 1000 mg per day, Disp: , Rfl:     amLODIPine (NORVASC) 2.5 MG tablet, Take 1 tablet (2.5 mg) by mouth daily, Disp: 90 tablet, Rfl: 3    Blood Pressure KIT, Monitor blood pressure as needed., Disp: 1 kit, Rfl: 1    Cholecalciferol (VITAMIN D) 1000 UNIT capsule, Take 1 capsule by mouth 2 times daily, Disp: , Rfl:     LORazepam (ATIVAN)  0.5 MG tablet, Take 1 tablet (0.5 mg) by mouth as needed for anxiety (One dose 30 minutes before MRI and one dose during MRI if needed), Disp: 2 tablet, Rfl: 0    Nerve Stimulator (TENS THERAPY PAIN RELIEF) GEORGIE, , Disp: , Rfl:     omeprazole (PRILOSEC) 20 MG DR capsule, Take 1 capsule (20 mg) by mouth 2 times daily., Disp: 180 capsule, Rfl: 1    Family History   Problem Relation Age of Onset    Psychotic Disorder Son         autism    Prostate Cancer Father         40s    Cancer Father     Cancer Maternal Grandmother         lung    Glaucoma Maternal Grandmother     Eye Disorder Maternal Grandmother         glaucoma    Diabetes Mother          45    Blood Disease Sister         sickle trait    Hypertension Sister     Blood Disease Paternal Uncle         sickle    Blood Disease Paternal Aunt         sickle    Alzheimer Disease Paternal Grandfather         70s    Macular Degeneration Paternal Grandfather     Cerebrovascular Disease No family hx of     Thyroid Disease No family hx of     Myocardial Infarction No family hx of     C.A.D. No family hx of      Social History  . He has 2 children. He quit smoking in . Smoked for 10 years, ~ 10 ciggs a day. H/o alcoholism. He drinks alcohol to relieved the paranoia and depression. Denies using illicit drugs. He has a prescription for medical [employer]. Occupation: unemployed.               Vital Signs     Previous Weights:    Wt Readings from Last 10 Encounters:   24 75.3 kg (166 lb 1.6 oz)   24 73.8 kg (162 lb 11.2 oz)   24 75.8 kg (167 lb)   24 76.2 kg (168 lb)   24 73.9 kg (163 lb)   24 71.7 kg (158 lb)   23 67.7 kg (149 lb 4 oz)   23 68.9 kg (152 lb)   22 70.8 kg (156 lb)   22 66.7 kg (147 lb)        There were no vitals taken for this visit.    Physical Exam  General Appearance: alert, no distress noted   Eyes: grossly normal to inspection, conjunctivae and sclerae normal, no lid lag or stare    Respiratory: no audible wheeze, cough, or visible cyanosis.  No visible retractions or increased work of breathing.  Able to speak fully in complete sentences.  Neurological: Cranial nerves grossly intact, mentation intact and speech normal; no tremor of the hands   Skin: no lesions on exposed skin   Psychological: mentation appears normal, affect normal, judgement and insight intact, normal speech and appearance well-groomed    Lab Results  I reviewed prior lab results documented in Epic.  TSH   Date Value Ref Range Status   02/21/2024 0.90 0.30 - 4.20 uIU/mL Final   03/03/2022 0.72 0.40 - 4.00 mU/L Final   10/01/2021 1.57 0.40 - 4.00 mU/L Final   10/13/2017 0.40 0.40 - 4.00 mU/L Final   05/07/2015 0.43 mcU/mL Final   10/24/2013 0.36 (L) 0.4 - 5.0 mU/L Final     31 minutes spent on the date of the encounter doing chart review, history and exam, documentation and further activities as noted above.  The longitudinal plan of care for the diagnosis(es)/condition(s) as documented were addressed during this visit. Due to the added complexity in care, I will continue to support Jeanmarie in the subsequent management and with ongoing continuity of care.    Again, thank you for allowing me to participate in the care of your patient.        Sincerely,        Stacey Oseguera MD

## 2024-09-04 ENCOUNTER — MYC MEDICAL ADVICE (OUTPATIENT)
Dept: FAMILY MEDICINE | Facility: CLINIC | Age: 64
End: 2024-09-04
Payer: COMMERCIAL

## 2024-10-07 ENCOUNTER — MYC MEDICAL ADVICE (OUTPATIENT)
Dept: FAMILY MEDICINE | Facility: CLINIC | Age: 64
End: 2024-10-07
Payer: COMMERCIAL

## 2024-11-07 ENCOUNTER — MYC MEDICAL ADVICE (OUTPATIENT)
Dept: FAMILY MEDICINE | Facility: CLINIC | Age: 64
End: 2024-11-07
Payer: COMMERCIAL

## 2024-11-08 ENCOUNTER — NURSE TRIAGE (OUTPATIENT)
Dept: FAMILY MEDICINE | Facility: CLINIC | Age: 64
End: 2024-11-08
Payer: COMMERCIAL

## 2024-11-08 NOTE — TELEPHONE ENCOUNTER
This writer attempted to contact patient on 11/08/24      Reason for call mass on R fist and left message.      If patient calls back:   Registered Nurse called. Follow Triage Call workflow.         Gema Keyes RN

## 2024-11-08 NOTE — TELEPHONE ENCOUNTER
"Pt called nurse line as wanted pea-sized mass on R hand to be triaged. Pt states he noticed pain (not mass/bump) on 11/7 and reports no injury at the site. Per pt, the mass is under the skin, it is located below his middle finger knuckle, and there are no color changes.    Pt worried because he reports hx of a mass removal at R upper thigh. Pt states that the mass site is only painful when he uses his hand or makes a fist. Since pain is intermittent, pt states pain is 3/10.    Per protocol, pt to be seen in office within 3 days. PCP appointment set up for 11/12/2024.    Noelle Ramirez RN on 11/8/2024 at 3:09 PM        Reason for Disposition   Small growth, spot, bump, or pigmented area of skin (e.g., moles, skin tags, wart, melanoma, skin cancer)   Patient wants to be seen    Additional Information   Negative: Sounds like a life-threatening emergency to the triager   Negative: Patient sounds very sick or weak to the triager   Negative: Fever and bump is tender to touch   Negative: Looks infected (e.g., spreading redness, red streak, pus)   Negative: Looks like a boil, infected sore, or deep ulcer   Negative: Caller can't describe it clearly    Answer Assessment - Initial Assessment Questions  1. APPEARANCE of SWELLING: \"What does it look like?\"      Pt states there is a bump/ mass underneath skin  2. SIZE: \"How large is the swelling?\" (e.g., inches, cm; or compare to size of pinhead, tip of pen, eraser, coin, pea, grape, ping pong ball)       Pea sized  3. LOCATION: \"Where is the swelling located?\"      Below middle finger knuckle   4. ONSET: \"When did the swelling start?\"      11/7-- pt reports no injury at the site  5. COLOR: \"What color is it?\" \"Is there more than one color?\"      No change in color, lump is under skin.   6. PAIN: \"Is there any pain?\" If Yes, ask: \"How bad is the pain?\" (e.g., scale 1-10; or mild, moderate, severe)      - NONE (0): no pain    - MILD (1-3): doesn't interfere with normal activities " "    - MODERATE (4-7): interferes with normal activities or awakens from sleep     - SEVERE (8-10): excruciating pain, unable to do any normal activities      3/10  7. ITCH: \"Does it itch?\" If Yes, ask: \"How bad is the itch?\"       No  8. CAUSE: \"What do you think caused the swelling?\"  Possible mass/cancer?  9 OTHER SYMPTOMS: \"Do you have any other symptoms?\" (e.g., fever)      No    Answer Assessment - Initial Assessment Questions  4. SHAPE: \"What shape is it?\" (e.g., round, irregular)      Round  5. RAISED: \"Does it stick up above the skin or is it flat?\" (e.g., raised or elevated)      Yes  6. TENDER: \"Does it hurt when you touch it?\"  (Scale 1-10; or mild, moderate, severe)      Yes, only when pt moves hand and pressure is placed.    Protocols used: Skin Lump or Localized Swelling-A-OH, Skin Lesion - Moles or Growths-A-OH    "

## 2024-11-12 ENCOUNTER — ANCILLARY PROCEDURE (OUTPATIENT)
Dept: GENERAL RADIOLOGY | Facility: CLINIC | Age: 64
End: 2024-11-12
Attending: FAMILY MEDICINE
Payer: COMMERCIAL

## 2024-11-12 ENCOUNTER — OFFICE VISIT (OUTPATIENT)
Dept: FAMILY MEDICINE | Facility: CLINIC | Age: 64
End: 2024-11-12
Payer: COMMERCIAL

## 2024-11-12 VITALS
HEIGHT: 65 IN | DIASTOLIC BLOOD PRESSURE: 98 MMHG | OXYGEN SATURATION: 100 % | WEIGHT: 170.4 LBS | HEART RATE: 55 BPM | RESPIRATION RATE: 15 BRPM | BODY MASS INDEX: 28.39 KG/M2 | SYSTOLIC BLOOD PRESSURE: 148 MMHG | TEMPERATURE: 97.7 F

## 2024-11-12 DIAGNOSIS — R22.31 MASS OF RIGHT HAND: ICD-10-CM

## 2024-11-12 DIAGNOSIS — R22.31 MASS OF RIGHT HAND: Primary | ICD-10-CM

## 2024-11-12 DIAGNOSIS — I10 ESSENTIAL HYPERTENSION WITH GOAL BLOOD PRESSURE LESS THAN 140/90: ICD-10-CM

## 2024-11-12 PROCEDURE — 73130 X-RAY EXAM OF HAND: CPT | Mod: TC | Performed by: RADIOLOGY

## 2024-11-12 PROCEDURE — 99214 OFFICE O/P EST MOD 30 MIN: CPT | Performed by: FAMILY MEDICINE

## 2024-11-12 ASSESSMENT — PATIENT HEALTH QUESTIONNAIRE - PHQ9
10. IF YOU CHECKED OFF ANY PROBLEMS, HOW DIFFICULT HAVE THESE PROBLEMS MADE IT FOR YOU TO DO YOUR WORK, TAKE CARE OF THINGS AT HOME, OR GET ALONG WITH OTHER PEOPLE: NOT DIFFICULT AT ALL
SUM OF ALL RESPONSES TO PHQ QUESTIONS 1-9: 11
SUM OF ALL RESPONSES TO PHQ QUESTIONS 1-9: 11

## 2024-11-12 ASSESSMENT — PAIN SCALES - GENERAL: PAINLEVEL_OUTOF10: MILD PAIN (2)

## 2024-11-12 NOTE — PROGRESS NOTES
"    Julio Cesar Murry is a 64 year old, presenting for the following health issues:  Mass        11/12/2024    12:30 PM   Additional Questions   Roomed by Spring Forman    History of Present Illness       Reason for visit:  Mass on right hand  Symptom onset:  1-3 days ago  Symptoms include:  A mass developed on my right hand  Symptom intensity:  Mild  Symptom progression:  Staying the same  Had these symptoms before:  Yes  Has tried/received treatment for these symptoms:  Yes  Previous treatment was successful:  Yes  Prior treatment description:  I had surgery to remove a myxoma earlier this year but in another location  What makes it worse:  Applying pressure  What makes it better:  Nothing He is missing 3 dose(s) of medications per week.  He is not taking prescribed medications regularly due to remembering to take.       Skin Lesion  Onset/Duration: 2-3 days  Description  Location: Pea sized mass on right hand   Color: skin colored  Border description: evenly pigmented, raised  Character: round, raised  Itching: no  Bleeding:  No  Intensity:  mild  Progression of Symptoms:  same  Accompanying signs and symptoms:   Bleeding: No  Scaling: No  Excessive sun exposure/tanning: No  Sunscreen used: No  History:           Any previous history of skin cancer: No  Any family history of melanoma: No  Previous episodes of similar lesion: YES-  On right thigh removed march 25th 2024  Precipitating or alleviating factors: Applying pressure to it is painful  Therapies tried and outcome: none        Review of Systems  Constitutional, HEENT, cardiovascular, pulmonary, GI, , musculoskeletal, neuro, skin, endocrine and psych systems are negative, except as otherwise noted.      Objective    BP (!) 148/98 (BP Location: Left arm, Patient Position: Sitting, Cuff Size: Adult Large)   Pulse 55   Temp 97.7  F (36.5  C) (Temporal)   Resp 15   Ht 1.651 m (5' 5\")   Wt 77.3 kg (170 lb 6.4 oz)   SpO2 100%   BMI 28.36 kg/m    Body " mass index is 28.36 kg/m .  Physical Exam   GENERAL: alert and no distress  NECK: no adenopathy, no asymmetry, masses, or scars  RESP: lungs clear to auscultation - no rales, rhonchi or wheezes  CV: regular rate and rhythm, normal S1 S2, no S3 or S4, no murmur, click or rub, no peripheral edema  ABDOMEN: soft, nontender, no hepatosplenomegaly, no masses and bowel sounds normal  MS: dorsal side of hand 4-5 mm mass between 2nd and 3rd digits, movable      A/P:  (R22.31) Mass of right hand  (primary encounter diagnosis)  Comment:   Plan: XR Hand Right G/E 3 Views, Orthopedic         Referral        Cyst? Movable. Referred to Orthopedics. Check xrays.    (I10) Essential hypertension with goal blood pressure less than 140/90  Comment:   Plan: not controlled. Discussed monitor. If consistently above goal, will increase amlodipine dose.      Signed Electronically by: Milton Iglesias MD, MD    Answers submitted by the patient for this visit:  Patient Health Questionnaire (Submitted on 11/12/2024)  If you checked off any problems, how difficult have these problems made it for you to do your work, take care of things at home, or get along with other people?: Not difficult at all  PHQ9 TOTAL SCORE: 11

## 2024-11-14 ENCOUNTER — MYC MEDICAL ADVICE (OUTPATIENT)
Dept: FAMILY MEDICINE | Facility: CLINIC | Age: 64
End: 2024-11-14

## 2024-11-14 DIAGNOSIS — M53.3 SI (SACROILIAC) JOINT DYSFUNCTION: Primary | ICD-10-CM

## 2024-11-18 ENCOUNTER — TELEPHONE (OUTPATIENT)
Dept: PALLIATIVE MEDICINE | Facility: CLINIC | Age: 64
End: 2024-11-18
Payer: COMMERCIAL

## 2024-11-18 DIAGNOSIS — M53.3 SI (SACROILIAC) JOINT DYSFUNCTION: Primary | ICD-10-CM

## 2024-11-18 NOTE — TELEPHONE ENCOUNTER
"Injection Order (copy and paste all info under \"order questions\" section:   My clinical question is:   Reason for Referral:  Procedure Order    Procedure:  Injection to be Determined by Pain Management Specialist    Patient Scheduling Instructions:  [facility] will call you to coordinate care as prescribed your provider. If you don t hear from a representative within 2 business days, please call [phone].    Additional Information:     Associated Diagnosis: SI (sacroiliac) joint dysfunction [M53.3]    Referring Provider: Milton Iglesias MD in [facility] FP/IM/PEDS    Injection History (previous injections with pain, type and date): Yes, SI with Dr. Castellanos 04/2024    After Review please route to Pain Nurse Pool for nursing to triage.    Anne Dey      [facility]  Pain Management    "

## 2024-11-18 NOTE — TELEPHONE ENCOUNTER
Routed to the injection schedulers to call pt to schedule repeat b/l sacroiliac joint injection.  ___________________________    Per the 11/14/24 mychart encounter this TBD injection is for a bilateral sacroiliac joint injection.  Pt has had several w/ us. Last one was 4/26/24.      Lizy RN-BSN  [facility]   [phone]

## 2024-11-19 NOTE — TELEPHONE ENCOUNTER
[employer] policy- SI Joint Injection      Sacroiliac Joint Injection  General Information  The terms in the section provide operational definitions when they are referenced as requirements in the guideline.  Documentation supporting medical necessity should be submitted at the time of the request and must include the following components:  Conservative management1 should include a combination of strategies to reduce inflammation, alleviate pain, and correct underlying dysfunction, including physical therapy AND at least one complementary conservative treatment strategy.  Physical therapy requirement includes ANY of the following:  Physical therapy rendered by a qualified provider of physical therapy services  Supervised home treatment program that includes ALL of the following:  Participation in a patient specific or tailored program  Initial active instruction by MD/DO/PT with redemonstration of patient ability to perform exercises  Compliance (documented or by clinician attestation on follow-up evaluation)  Exception to the physical therapy requirement in unusual circumstances (for instance, intractable pain so severe that physical therapy is not possible) when clearly documented in the medical record  Complementary conservative managementrequirement includes ANY of the following:  Complementary alternative medicine including, but not limited to, chiropractic care, acupuncture, cognitive behavioral or massage therapy  Anti-inflammatory medications and analgesics  Complementary conservative treatment requirement includes ANY of the following:  Anti-inflammatory medications and analgesics2  Adjunctive medications such as nerve membrane stabilizers or muscle relaxants2  Alternative therapies such as acupuncture, chiropractic manipulation, massage therapy, activity modification, and/or a trial period of rest (e.g., from the aggravating/contributing factors) where applicable  1 Additional condition or  procedure-specific requirements may apply and can be found in the respective sections of the guideline.  2 In the absence of contraindications  Clinical reevaluation. In most cases, reevaluation should include a physical examination. Direct contact by other methods, such as telephone communication or electronic messaging, may substitute for in-person evaluation when circumstances preclude an office visit.  Failure of conservative management requires ALL of the following:  Patient has completed a full course of conservative management (as defined above) for the current episode of care  Worsening of or no significant improvement in signs and/or symptoms upon clinical reevaluation  More invasive forms of therapy are being considered  Documentation of compliance with a plan of therapy that includes elements from these areas is required where conservative management is appropriate.    Patient Requirements  Patients must meet ALL of the following criteria to be able to proceed with diagnostic intra-articular sacroiliac joint injections or therapeutic intra-articular sacroiliac joint injections.  There is persistent typically unilateral non-radicular pain that is predominantly below the lumbar spine (L5) and is primarily localized over the region of the sacroiliac joint and has been present for at least 3 months  Examination shows localized tenderness with palpation over the sacral sulcus just inferior to the posterior superior iliac spine (PSIS) in the absence of tenderness of equal severity elsewhere (e.g., lumbar spine, greater trochanter, hip, coccyx)  At least ONE of the following provocative tests is positive: pelvic distraction test, lateral iliac compression test, sacral compression/thrust test, thigh thrust test, OLIVE (Daniel's test), and Gaenslen's test  There is no evidence of acute or subacute radicular pain/radiculopathy or neurogenic claudication. If there is evidence of radicular pain/radiculopathy or  neurogenic claudication, the condition must be addressed, stable and have been maximally optimized through comprehensive treatment prior to sacroiliac joint interventions  Lack of adequate improvement following 6 weeks of conservative management    Repeat therapeutic intraarticular sacroiliac joint injections  An injection is considered a repeat injection if the last injection was performed within the previous 12 months. If 12 months or more have elapsed, it is considered a new (initial) injection.  Repeat injection is considered medically necessary if symptoms recur and the patient has demonstrated at least 50% pain relief, and improvement in patient-specific ADLs, for at least 6 weeks after a previous injection. A repeat physical examination must confirm the source of pain as sacroiliac joint as defined in the initial injection requirements.  Injections may not be repeated at intervals of less than 3 months, with a maximum of three (3) injections in a 12-month period.  Treatment with therapeutic injections should be accompanied by participation in an ongoing active rehabilitation program, home exercise program, or functional restoration program.          ROUTING TO REVIEW AND ADVISE, IT LOOKS LIKE PATIENT HAS HAD MULTIPLE SI JOINT INJECTIONS BUT THIS IS THE FIRST TIME A PRIOR AUTH IS REQUIRED      Kym Stern   [facility]

## 2024-11-19 NOTE — TELEPHONE ENCOUNTER
"Screening Questions for Radiology Injections:    Injection to be done at which interventional clinic site? [facility] [place] [facility] [place]    If choosing [facility] for location, please inform patient:  \"[facility] is a Hospital based clinic. Before your visit, you should check with your insurance about how it covers the charges for facility services in a hospital-based clinic.     Procedure ordered by Milton Iglesias MD     Procedure ordered? repeat b/l sacroiliac joint injection   Transforaminal Cervical JOSE R - Send to [facility] - No [facility] Site providers perform this procedure    What insurance would patient like us to bill for this procedure? MEDICA/UHC  IF SCHEDULING IN [place] PAIN OR SPINE PLEASE SCHEDULE AT LEAST 7-10 BUSINESS DAYS OUT SO A PA CAN BE OBTAINED  Worker's comp or MVA (motor vehicle accident) -Any injection DO NOT SCHEDULE and route to Maria Elena Loving.    [employer] insurance - For ALL INJECTIONS DO NOT SCHEDULE and route to Kym Fisher.     ALL BCBS, [employer] and [employer] CIGNA - DO NOT SCHEDULE and route to Kym Fisher  MEDICA- ALL INJECTIONS- route to Kym Phillip    Is patient scheduled at [facility]? NO   If YES, route every encounter to [facility] SPINE CENTER CARE NAVIGATION POOL [[phone]]    Is an  needed? No     Patient has a  home? (Review Grid) YES: Informed     Any chance of pregnancy? Not Applicable   If YES, do NOT schedule and route to RN pool  - Dr. Khan route to PM&R Nurse  [[id number]]      Is patient actively being treated for cancer or immunocompromised? No  If YES, do NOT schedule and route to RN pool/ Dr. Khan's Team    Does the patient have a bleeding or clotting disorder? No   If YES, okay to schedule AND route to RN nurse / Dr. Khan's Team   (For any patients with platelet count <100, RN must forward to provider)    Is patient taking any Blood Thinners OR Antiplatelet medication?  No   If hold needed, do NOT schedule, " route to RN avelino/ Dr. Khan's Team  Examples:   Blood Thinners: (Coumadin, Warfarin, Jantoven, Pradaxa, Xarelto, Eliquis, Edoxaban, Enoxaparin, Lovenox, Heparin, Arixtra, Fondaparinux or Fragmin)  Antiplatelet Medications: (Plavix, Brilinta or Effient)     Is patient taking any aspirin products (includes Excedrin and Fiorinal)? No   If yes route to RN pool/ Dr. Khan's Team - Do not schedule    Is patient taking any GLP-1 Antagonist (hold needed for sedation patients only) No   (semaglutide (Ozempic, Wegovy), dulaglutide (Trulicity), exenatide ER (Bydureon), tirzepatide (Mounjaro), Liraglutide (Saxenda, Victoza), semaglutide (Rybelsus), Terzepatide (Zepbound)  If YES, okay to schedule AND route to RN nurse / Dr. Khan's Team      Any allergies to contrast dye, iodine, shellfish, or numbing and steroid medications? No  If YES, schedule and add allergy information to appointment notes AND route to the RN avelino/ Dr. Khan's Team  If JOSE R and Contrast Dye / Iodine Allergy? DO NOT SCHEDULE, route to RN pool/ Dr. Khan's Team  Allergies: Risperidone, Trimethoprim, Effexor [venlafaxine hydrochloride], Ambien [zolpidem tartrate], Buspirone, Celexa [citalopram], Duloxetine, Septra [bactrim], Seroquel [quetiapine], Sulfa antibiotics, Sulfamethoxazole-trimethoprim, Tramadol, Ultram [tramadol hcl], Venlafaxine, Zolpidem, and Zolpidem tartrate     Does patient have an active infection or treated for one within the past week? No  Is patient currently taking any antibiotics or steroid medications?  No   For patients on chronic, preventative, or prophylactic antibiotics, procedures may be scheduled.   For patients on antibiotics for active or recent infection, schedule 4 days after completed.  For patients on steroid medications, schedule 4 days after completed.     Has the patient had a flu shot or any other vaccinations within the past 7 days? No  If yes, explain that for the vaccine to work best they need to:     wait 1 week  before and 1 week after getting any Vaccine  wait 1 week before and 2 weeks after getting any Covid Vaccine   If patient has concerns about the timing, send to RN pool/ Dr. Khan's Team    Does patient have an MRI/CT?  Not Applicable Include Date and Check Procedure Scheduling Grid to see if required.  Was the MRI/CT done within the last 3 years?  NA   If no route to RN Pool/ Dr. Carranzas Team  If yes, where was the MRI/CT done?    Refer to PACS Transmissions list for approved external locations and route to RN Pool High Priority/ Dr. Carranzas Team  If MRI was not done at approved external location do NOT schedule and route to RN pool/ Dr. Carranzas Team    If patient has an imaging disc, the injection MAY be scheduled but patient must bring disc to appt or appt will be cancelled.    Is patient able to transfer to a procedure table with minimal or no assistance? Yes   If no, do NOT schedule and route to RN Pool/ Dr. Khan's Team    Procedure Specific Instructions:  If celiac plexus block, informed patient NPO for 6 hours and that it is okay to take medications with sips of water, especially blood pressure medications Not Applicable       If this is for a cervical procedure, informed patient that aspirin needs to be held for 6 days.   Not Applicable    Sedation, If Sedation is ordered for any procedure, patient must be NPO for 6 hours prior to procedure Not Applicable    If IV needed:  Do not schedule procedures requiring IV placement in the first appointment of the day or first appointment after lunch. Do NOT schedule at 0745, 0815 or 1245.     Instructed patient to arrive 30 minutes early for IV start if required. (Check Procedure Scheduling Grid)  Not Applicable    Reminders:  If you are started on any steroids or antibiotics between now and your appointment, you must contact us because the procedure may need to be cancelled.  Yes    As a reminder, receiving steroids can decrease your body's ability to fight  infection.   Would you still like to move forward with scheduling the injection?  Yes    IV Sedation is not provided for procedures. If oral anti-anxiety medication is needed, the patient should request this from their referring provider.    Instruct patient to arrive as directed prior to the scheduled appointment time:  If IV needed 30 minutes before appointment time     For patients 85 or older we recommend having an adult stay w/ them for the remainder of the day.     If the patient is Diabetic, remind them to bring their glucometer.      Does the patient have any questions?  NO  Anne Dey  [facility]

## 2024-11-20 ENCOUNTER — TELEPHONE (OUTPATIENT)
Dept: ORTHOPEDICS | Facility: CLINIC | Age: 64
End: 2024-11-20
Payer: COMMERCIAL

## 2024-11-20 DIAGNOSIS — M79.89 SOFT TISSUE MASS: Primary | ICD-10-CM

## 2024-11-20 NOTE — TELEPHONE ENCOUNTER
Dr. Iglesias,     I am wondering if you can provider further information on this referral that is being requested by insurance.   The following information is being requested for the ordered repeat SI joint injection  Physical therapy requirement includes ANY of the following:  Physical therapy rendered by a qualified provider of physical therapy services  Supervised home treatment program that includes ALL of the following:  Participation in a patient specific or tailored program  Initial active instruction by MD/DO/PT with redemonstration of patient ability to perform exercises  Compliance (documented or by clinician attestation on follow-up evaluation)  Exception to the physical therapy requirement in unusual circumstances (for instance, intractable pain so severe that physical therapy is not possible) when clearly documented in the medical record  At least ONE of the following provocative tests is positive: pelvic distraction test, lateral iliac compression test, sacral compression/thrust test, thigh thrust test, OLIVE (Daniel's test), and Gaenslen's test  Repeat therapeutic intraarticular sacroiliac joint injections  An injection is considered a repeat injection if the last injection was performed within the previous 12 months. If 12 months or more have elapsed, it is considered a new (initial) injection.  Repeat injection is considered medically necessary if symptoms recur and the patient has demonstrated at least 50% pain relief, and improvement in patient-specific ADLs, for at least 6 weeks after a previous injection. A repeat physical examination must confirm the source of pain as sacroiliac joint as defined in the initial injection requirements.  Injections may not be repeated at intervals of less than 3 months, with a maximum of three (3) injections in a 12-month period.  Treatment with therapeutic injections should be accompanied by participation in an ongoing active rehabilitation program, home exercise  program, or functional restoration program.    Thank you,     Raya Macdonald, RN

## 2024-11-20 NOTE — TELEPHONE ENCOUNTER
Writer spoke with patient telling him that I would connect with him tomorrow once MRI order is placed and help him to get that set up. Writer asked patient about leg pain. He states it is subsiding at this time, getting better but completely not gone. He does not think it is from the surgery, but rather overexerting himself with exercise. He goes to the gym with his son 4 times a week walking for 90 minutes on the treadmill each time. Writer advised cutting down the walking to see if the pain goes away.     Sarina Head LPN

## 2024-11-20 NOTE — TELEPHONE ENCOUNTER
Pt is a CURRENT PATIENT of [facility]    Please see pt's NEW REFERRAL ON FILE for RIGHT HAND MASS    Please see NEW imaging ON FILE    Please review/triage and CALL pt to schedule.    [facility] Ortho Priority Line contacted. This TE was requested. Thank you.

## 2024-11-20 NOTE — TELEPHONE ENCOUNTER
Triaged with Dr. Tyler who states that patient should have a MRI of right hand with and without contrast.     Sarina Head LPN

## 2024-11-21 ENCOUNTER — TELEPHONE (OUTPATIENT)
Dept: PALLIATIVE MEDICINE | Facility: CLINIC | Age: 64
End: 2024-11-21
Payer: COMMERCIAL

## 2024-11-21 DIAGNOSIS — R22.41 MASS OF RIGHT THIGH: Primary | ICD-10-CM

## 2024-11-21 RX ORDER — LORAZEPAM 0.5 MG/1
0.5 TABLET ORAL ONCE
Qty: 1 TABLET | Refills: 0 | Status: SHIPPED | OUTPATIENT
Start: 2024-11-21 | End: 2024-11-21

## 2024-11-21 NOTE — TELEPHONE ENCOUNTER
Writer helped patient get scheduled form MRI at [place] 1/10/25 9:00. Per patient request writer called the [place] Office to inquire and set up a place for patient's son to wait near the area where he will be having his MRI done. Son is 30 years old and has autism and patient is the sole care giver.     Patient requesting medication for claustrophobia. He would like Ativan. Routing to Dr. Tyler's team to arrange a medication for him.    Sarina Head LPN

## 2024-11-21 NOTE — TELEPHONE ENCOUNTER
[facility] Call Center    Phone Message    May a detailed message be left on voicemail: yes     Reason for Call: Other: Patient called stating he would like a prior authorization for procedure on 12/6/2024. He states without this, he will want to cancel due to not wanting to be billed for procedure. He is requesting a call back to discuss and confirm this.      Action Taken: Message routed to:  Other: [facility]    Travel Screening: Not Applicable     Date of Service:

## 2024-11-21 NOTE — TELEPHONE ENCOUNTER
From 11/21/2024 encounter:      [facility]     Phone Message     May a detailed message be left on voicemail: yes      Reason for Call: Other: Patient called stating he would like a prior authorization for procedure on 12/6/2024. He states without this, he will want to cancel due to not wanting to be billed for procedure. He is requesting a call back to discuss and confirm this.       Action Taken: Message routed to:  Other: [facility]     Travel Screening: Not Applicable          Date of Service:         This request is in progress.  More information was requested from PCP who states that referral will be placed to sports and ortho to complete evaluation and testing requested by insurance.     Patient reports that following 4/26/2024 injection he appreciated 90% relief for 4-5 months.     Patient states that he does not remember when he last completed physical therapy, but he did previously do physical therapy at multiple locations.     Patient notes that he does see an orthopedic surgeon that he will be rescheduling with and inquires if exam could occur with that provider.   Writer recommends that patient inquire with that office to see if they can complete exam for SI joint along with his appointment.     Patient requests cancellation of 12/6/2024 appointment at this time until he hears from Dr. Iglesias regarding referrals.     Dr. Iglesias - please place referrals for sports and orthopedic and physical therapy.  Patient is to contact us once these are completed to reschedule ordered injection.   Orders prepped.     Raya Macdonald RN

## 2024-11-25 NOTE — TELEPHONE ENCOUNTER
REASON FOR VISIT: SI (sacroiliac) joint dysfunction    DATE OF APPT: 12/26/2024   NOTES (FOR ALL VISITS) STATUS DETAILS   OFFICE NOTE from referring provider Internal [facility] Milton Marie MD 11/21/2024   MEDICATION LIST Internal    IMAGING  (FOR ALL VISITS)     XR N/A    MRI (HEAD, NECK, SPINE) N/A    CT (HEAD, NECK, SPINE) N/A

## 2024-12-01 NOTE — PROGRESS NOTES
[facility] Dermatology Note    Encounter Date: Dec 2, 2024    Dermatology Problem List:  1. Melasma  - current tx: adapalene gel  2. Zoster  - current tx: valtrex 1000 mg- take 3 times a day  3.  Pruritus on the buttock  - current tx: triamcinolone ointment 0.1%  4. Tinea Versicolor  - current tx: ketoconazole shampoo 2%  5. Seborrheic keratoses on the neck  6. Idiopathic guttate hypomelanosis    ______________________________________    Impression/Plan:  1. Melasma  - discussed topical treatment in detail (adapalene vs OTC products)  - start adapalene gel-apply to face at bedtime (will get over the counter)    2. Zoster, no active blisters seen today on right forearm  - Discussed risk/benefits of valtrex vs acyclovir in detail   - start valtrex 1000 mg-take 3 times a day for 7 days  - VZV/HSV PCR done today    3. Pruritus on the buttock  - start triamcinolone ointment 0.1%-apply to rash in buttock area twice a day    4. Tinea versicolor  - discussed pathophysiology in detail with patient  - discussed treatment in detail   - start ketoconazole shampoo 2%- use as body wash daily in the shower    5. Seborrheic keratoses on the neck  - discussed treatment in detail (cosmetic treatment)  - reassurance given     6. Idiopathic guttate hypomelanosis  - reassurance provided    Follow-up in 1 year.       Staff Involved:  Staff and Scribe    Scribe Disclosure:   I, Madison Lambert, am serving as a scribe; to document services personally performed by Gurpreet Parker MD -based on data collection and the provider's statements to me.     Provider Disclosure:   The documentation recorded by the scribe accurately reflects the services I personally performed and the decisions made by me.    Gurpreet Parker MD   of Dermatology  Department of Dermatology  [facility] [employer]        CC:   Chief Complaint   Patient presents with    Skin Check     L98.9 (ICD-10-CM) - Skin  lesion/ Referral by: KRISTOFER CASTILLO M/ Recs: [facility]/ [facility]/ Per Pt, Jeanmarie  Pt is here for a FBSC, on the back there is discoloration, there was a fungus on the thyroid, the back was inconclusive, having an episode of shingles on the forearm. In [place], he contacted herpes in the basilio region.        History of Present Illness:  Mr. Jeanmarie Mclean is a 64 year old male who presents as a new patient.      Here for skin lesion. Today, he presents with discoloration on the back.   Patient, also, has shingles flaring on the forearm, which started yesterday. The rash is itchy and burning.   He states that he also, contacted herpes in the genital area, while in [place]. The area has been burning, painful and itching. He is going to the [facility] in 2 weeks and he would like to know if valtrex is safe for the kidneys.   Patient has history of melasma on the face, which areas will get darker in the summer. He has seen a dermatologist at [facility] dermat[facility], which he had some warts  treated with cryotherapy, that he feels may have left some discoloration.   Labs:  N/a    Physical exam:  Vitals: There were no vitals taken for this visit.  GEN: This is a well developed, well-nourished male in no acute distress, in a pleasant mood.    SKIN: Krishnamurthy phototype V  - Total skin excluding the undergarment areas was performed. The exam included the head/face, neck, both arms, chest, back, abdomen, both legs, digits and/or nails.   Lineal superficial erosions on the right forearm  No active rash or blisters on the  buttock area  Hyperpigmented patches on the forehead and cheeks   - Hypopigmented macules and confluent patches  on the back  - numerous hypopigmented macules on the extremities   - stuck on brown papules on the neck  - No other lesions of concern on areas examined.     Past Medical History:   Past Medical History:   Diagnosis Date    Alcoholism (H)     Arthritis     Asperger's syndrome     Dr. Owens, [facility]. Last  visit 2006/2007    GERD (gastroesophageal reflux disease) 1999    EGD 2003 [place]    History of hypercholesterolemia     Hypertension     Kidney stones     Malignant hyperthermia due to anesthesia     Melasma     forehead, has received desonide from dermatologist    MMT (medial meniscus tear) 10/01    LT    Myalgia and myositis 4/5/2016    mid thorax, left subscapularis, latisimus dorsi Bilateral along iliac crest     Posttraumatic stress disorder     per pt    Prostate cancer (H)     Recurrent genital herpes 1982    Rib fracture 1985    L 6th    Skull fracture (H) 1985    frequent vertigo    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    ultrasound     Past Surgical History:   Procedure Laterality Date    COLONOSCOPY      COLONOSCOPY N/A 2/22/2021    Procedure: Colonoscopy, Flexible, With Lesion Removal Using Snare;  Surgeon: Garrick Villavicencio MD;  Location: [facility] OR    COLONOSCOPY WITH CO2 INSUFFLATION N/A 2/22/2021    Procedure: COLONOSCOPY, WITH CO2 INSUFFLATION;  Surgeon: Garrick Villavicencio MD;  Location: [facility] OR    CYSTOSCOPY  10/98    for renal stones    EXCISE MASS LOWER EXTREMITY Right 3/25/2024    Procedure: Excision right thigh mass;  Surgeon: Pedro Tyler MD;  Location: [facility] OR    HC KNEE SCOPE,MED/LAT MENISECTOMY  6/24/11    Left, medial ([place])    HERNIA REPAIR, INGUINAL RT/LT  5/92    [facility]    ZZ HEP B VAC ADULT 3 DOSE IM  1995    received all 3 vaccines    [facility] REMV PROSTATE,RETROPUB,RADICAL  10/13/04    Dr. Muñoz ([place])       Social History:   reports that he quit smoking about 26 years ago. His smoking use included cigarettes. He started smoking about 36 years ago. He has a 5 pack-year smoking history. He has been exposed to tobacco smoke. He has never used smokeless tobacco. He reports current alcohol use. He reports current drug use. Drug: Marijuana.    Family History:  Family History   Problem Relation Age of Onset    Psychotic Disorder Son         autism    Prostate Cancer Father          40s    Cancer Father     Cancer Maternal Grandmother         lung    Glaucoma Maternal Grandmother     Eye Disorder Maternal Grandmother         glaucoma    Diabetes Mother          45    Blood Disease Sister         sickle trait    Hypertension Sister     Blood Disease Paternal Uncle         sickle    Blood Disease Paternal Aunt         sickle    Alzheimer Disease Paternal Grandfather         70s    Macular Degeneration Paternal Grandfather     Cerebrovascular Disease No family hx of     Thyroid Disease No family hx of     Myocardial Infarction No family hx of     C.A.D. No family hx of        Medications:  Current Outpatient Medications   Medication Sig Dispense Refill    acetaminophen (TYLENOL) 500 MG tablet Take 500-1,000 mg by mouth every 6 hours as needed for mild pain 1000 mg per day      amLODIPine (NORVASC) 2.5 MG tablet Take 1 tablet (2.5 mg) by mouth daily 90 tablet 3    Blood Pressure KIT Monitor blood pressure as needed. 1 kit 1    Cholecalciferol (VITAMIN D) 1000 UNIT capsule Take 1 capsule by mouth 2 times daily      Nerve Stimulator (TENS THERAPY PAIN RELIEF) GEORGIE       omeprazole (PRILOSEC) 20 MG DR capsule Take 1 capsule (20 mg) by mouth 2 times daily. 180 capsule 1     Allergies   Allergen Reactions    Risperidone Other (See Comments)     Serve numbness     Trimethoprim Hives    Effexor [Venlafaxine Hydrochloride] Other (See Comments)     Headache, Painful scrotum and drainage from the penis    Ambien [Zolpidem Tartrate] Nausea    Buspirone Nausea     Dizziness, headache    Celexa [Citalopram] Other (See Comments)     Headache    Duloxetine Other (See Comments)     Headache  headaches    Septra [Bactrim] Itching    Seroquel [Quetiapine] Other (See Comments)     Headache, N, V    Sulfa Antibiotics Itching    Sulfamethoxazole-Trimethoprim      hives    Tramadol Nausea     Nausea and headache    Ultram [Tramadol Hcl] Nausea and Vomiting    Venlafaxine     Zolpidem      headaches     Zolpidem Tartrate Other (See Comments)     headaches

## 2024-12-02 ENCOUNTER — OFFICE VISIT (OUTPATIENT)
Dept: DERMATOLOGY | Facility: CLINIC | Age: 64
End: 2024-12-02
Attending: FAMILY MEDICINE
Payer: COMMERCIAL

## 2024-12-02 DIAGNOSIS — B36.0 TINEA VERSICOLOR DUE TO MALASSEZIA FURFUR: ICD-10-CM

## 2024-12-02 DIAGNOSIS — L81.8 IDIOPATHIC GUTTATE HYPOMELANOSIS: ICD-10-CM

## 2024-12-02 DIAGNOSIS — L30.9 DERMATITIS: ICD-10-CM

## 2024-12-02 DIAGNOSIS — L82.1 SEBORRHEIC KERATOSIS: ICD-10-CM

## 2024-12-02 DIAGNOSIS — B02.9 ZOSTER WITHOUT COMPLICATIONS: ICD-10-CM

## 2024-12-02 DIAGNOSIS — L81.1 MELASMA: Primary | ICD-10-CM

## 2024-12-02 LAB
SPECIMEN TYPE: NORMAL
VZV DNA SPEC QL NAA+PROBE: NOT DETECTED

## 2024-12-02 PROCEDURE — 99203 OFFICE O/P NEW LOW 30 MIN: CPT | Performed by: DERMATOLOGY

## 2024-12-02 PROCEDURE — 87798 DETECT AGENT NOS DNA AMP: CPT | Performed by: DERMATOLOGY

## 2024-12-02 RX ORDER — TRIAMCINOLONE ACETONIDE 1 MG/G
OINTMENT TOPICAL 2 TIMES DAILY
Qty: 80 G | Refills: 3 | Status: SHIPPED | OUTPATIENT
Start: 2024-12-02

## 2024-12-02 RX ORDER — KETOCONAZOLE 20 MG/ML
SHAMPOO, SUSPENSION TOPICAL DAILY
Qty: 120 ML | Refills: 11 | Status: SHIPPED | OUTPATIENT
Start: 2024-12-02

## 2024-12-02 RX ORDER — VALACYCLOVIR HYDROCHLORIDE 1 G/1
1000 TABLET, FILM COATED ORAL 3 TIMES DAILY
Qty: 21 TABLET | Refills: 0 | Status: SHIPPED | OUTPATIENT
Start: 2024-12-02 | End: 2024-12-09

## 2024-12-02 NOTE — LETTER
12/2/2024      Jeanmarie Mclean  [address]  [facility] [address]      Dear Colleague,    Thank you for referring your patient, Jeanmarie Mclean, to the [facility]. Please see a copy of my visit note below.    [facility] Dermatology Note    Encounter Date: Dec 2, 2024    Dermatology Problem List:  1. Melasma  - current tx: adapalene gel  2. Zoster  - current tx: valtrex 1000 mg- take 3 times a day  3.  Pruritus on the buttock  - current tx: triamcinolone ointment 0.1%  4. Tinea Versicolor  - current tx: ketoconazole shampoo 2%  5. Seborrheic keratoses on the neck  6. Idiopathic guttate hypomelanosis    ______________________________________    Impression/Plan:  1. Melasma  - discussed topical treatment in detail (adapalene vs OTC products)  - start adapalene gel-apply to face at bedtime (will get over the counter)    2. Zoster, no active blisters seen today on right forearm  - Discussed risk/benefits of valtrex vs acyclovir in detail   - start valtrex 1000 mg-take 3 times a day for 7 days  - VZV/HSV PCR done today    3. Pruritus on the buttock  - start triamcinolone ointment 0.1%-apply to rash in buttock area twice a day    4. Tinea versicolor  - discussed pathophysiology in detail with patient  - discussed treatment in detail   - start ketoconazole shampoo 2%- use as body wash daily in the shower    5. Seborrheic keratoses on the neck  - discussed treatment in detail (cosmetic treatment)  - reassurance given     6. Idiopathic guttate hypomelanosis  - reassurance provided    Follow-up in 1 year.       Staff Involved:  Staff and Scribe    Scribe Disclosure:   I, Madison Lambert, am serving as a scribe; to document services personally performed by Gurpreet Parker MD -based on data collection and the provider's statements to me.     Provider Disclosure:   The documentation recorded by the scribe accurately reflects the services I personally performed and the decisions  made by me.    Gurpreet Parker MD   of Dermatology  Department of Dermatology  [facility] [employer] [facility] [employer] [facility]        CC:   Chief Complaint   Patient presents with     Skin Check     L98.9 (ICD-10-CM) - Skin lesion/ Referral by: KRISTOFER CASTILLO M/ Recs:  EPIC/ [facility]/ Per Pt, Jeanmarie Saavedra is here for a FBSC, on the back there is discoloration, there was a fungus on the thyroid, the back was inconclusive, having an episode of shingles on the forearm. In [place], he contacted herpes in the basilio region.        History of Present Illness:  Mr. Jeanmarie Mclean is a 64 year old male who presents as a new patient.      Here for skin lesion. Today, he presents with discoloration on the back.   Patient, also, has shingles flaring on the forearm, which started yesterday. The rash is itchy and burning.   He states that he also, contacted herpes in the genital area, while in [place]. The area has been burning, painful and itching. He is going to the [facility] in 2 weeks and he would like to know if valtrex is safe for the kidneys.   Patient has history of melasma on the face, which areas will get darker in the summer. He has seen a dermatologist at [facility], which he had some warts  treated with cryotherapy, that he feels may have left some discoloration.   Labs:  N/a    Physical exam:  Vitals: There were no vitals taken for this visit.  GEN: This is a well developed, well-nourished male in no acute distress, in a pleasant mood.    SKIN: Krishnamurthy phototype V  - Total skin excluding the undergarment areas was performed. The exam included the head/face, neck, both arms, chest, back, abdomen, both legs, digits and/or nails.   Lineal superficial erosions on the right forearm  No active rash or blisters on the  buttock area  Hyperpigmented patches on the forehead and cheeks   - Hypopigmented macules and confluent patches  on the back  - numerous hypopigmented macules on the extremities   -  stuck on brown papules on the neck  - No other lesions of concern on areas examined.     Past Medical History:   Past Medical History:   Diagnosis Date     Alcoholism (H)      Arthritis      Asperger's syndrome     Dr. Owens, [facility]. Last visit 2006/2007     GERD (gastroesophageal reflux disease) 1999    EGD 2003 [place]     History of hypercholesterolemia      Hypertension      Kidney stones      Malignant hyperthermia due to anesthesia      Melasma     forehead, has received desonide from dermatologist     MMT (medial meniscus tear) 10/01    LT     Myalgia and myositis 4/5/2016    mid thorax, left subscapularis, latisimus dorsi Bilateral along iliac crest      Posttraumatic stress disorder     per pt     Prostate cancer (H)      Recurrent genital herpes 1982     Rib fracture 1985    L 6th     Skull fracture (H) 1985    frequent vertigo     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    ultrasound     Past Surgical History:   Procedure Laterality Date     COLONOSCOPY       COLONOSCOPY N/A 2/22/2021    Procedure: Colonoscopy, Flexible, With Lesion Removal Using Snare;  Surgeon: Garrick Villavicencio MD;  Location: [facility] OR     COLONOSCOPY WITH CO2 INSUFFLATION N/A 2/22/2021    Procedure: COLONOSCOPY, WITH CO2 INSUFFLATION;  Surgeon: Garrick Villavicencio MD;  Location: [facility] OR     CYSTOSCOPY  10/98    for renal stones     EXCISE MASS LOWER EXTREMITY Right 3/25/2024    Procedure: Excision right thigh mass;  Surgeon: Pedro Tyler MD;  Location: [facility] OR     HC KNEE SCOPE,MED/LAT MENISECTOMY  6/24/11    Left, medial ([place])     HERNIA REPAIR, INGUINAL RT/LT  5/92    [facility])     Z HEP B VAC ADULT 3 DOSE IM  1995    received all 3 vaccines     [facility] REMV PROSTATE,RETROPUB,RADICAL  10/13/04    Dr. Muñoz ([place])       Social History:   reports that he quit smoking about 26 years ago. His smoking use included cigarettes. He started smoking about 36 years ago. He has a 5 pack-year smoking history. He has been exposed  to tobacco smoke. He has never used smokeless tobacco. He reports current alcohol use. He reports current drug use. Drug: Marijuana.    Family History:  Family History   Problem Relation Age of Onset     Psychotic Disorder Son         autism     Prostate Cancer Father         40s     Cancer Father      Cancer Maternal Grandmother         lung     Glaucoma Maternal Grandmother      Eye Disorder Maternal Grandmother         glaucoma     Diabetes Mother          45     Blood Disease Sister         sickle trait     Hypertension Sister      Blood Disease Paternal Uncle         sickle     Blood Disease Paternal Aunt         sickle     Alzheimer Disease Paternal Grandfather         70s     Macular Degeneration Paternal Grandfather      Cerebrovascular Disease No family hx of      Thyroid Disease No family hx of      Myocardial Infarction No family hx of      C.A.D. No family hx of        Medications:  Current Outpatient Medications   Medication Sig Dispense Refill     acetaminophen (TYLENOL) 500 MG tablet Take 500-1,000 mg by mouth every 6 hours as needed for mild pain 1000 mg per day       amLODIPine (NORVASC) 2.5 MG tablet Take 1 tablet (2.5 mg) by mouth daily 90 tablet 3     Blood Pressure KIT Monitor blood pressure as needed. 1 kit 1     Cholecalciferol (VITAMIN D) 1000 UNIT capsule Take 1 capsule by mouth 2 times daily       Nerve Stimulator (TENS THERAPY PAIN RELIEF) GEORGIE        omeprazole (PRILOSEC) 20 MG DR capsule Take 1 capsule (20 mg) by mouth 2 times daily. 180 capsule 1     Allergies   Allergen Reactions     Risperidone Other (See Comments)     Serve numbness      Trimethoprim Hives     Effexor [Venlafaxine Hydrochloride] Other (See Comments)     Headache, Painful scrotum and drainage from the penis     Ambien [Zolpidem Tartrate] Nausea     Buspirone Nausea     Dizziness, headache     Celexa [Citalopram] Other (See Comments)     Headache     Duloxetine Other (See Comments)     Headache  headaches      Septra [Bactrim] Itching     Seroquel [Quetiapine] Other (See Comments)     Headache, N, V     Sulfa Antibiotics Itching     Sulfamethoxazole-Trimethoprim      hives     Tramadol Nausea     Nausea and headache     Ultram [Tramadol Hcl] Nausea and Vomiting     Venlafaxine      Zolpidem      headaches     Zolpidem Tartrate Other (See Comments)     headaches         Again, thank you for allowing me to participate in the care of your patient.        Sincerely,      Gurpreet Parker MD

## 2024-12-02 NOTE — NURSING NOTE
Jeanmarie Mclean's goals for this visit include:   Chief Complaint   Patient presents with    Skin Check     L98.9 (ICD-10-CM) - Skin lesion/ Referral by: MILTON IGLESIAS/ Recs: [facility] EPIC/ [facility]/ Per Pt, Jeanmarie  Pt is here for a FBSC, on the back there is discoloration, there was a fungus on the thyroid, the back was inconclusive, having an episode of shingles on the forearm. In [place], he contacted herpes in the [facility] region.        He requests these members of his care team be copied on today's visit information:     PCP: Milton Igleisas    Referring Provider:  Milton Iglesias MD  [facility] [address] [address]    There were no vitals taken for this visit.    Do you need any medication refills at today's visit?     Taylor Weber LPN on 12/2/2024 at 7:08 AM

## 2024-12-02 NOTE — PATIENT INSTRUCTIONS
Valacyclovir and acyclovir are both generally safe with normal kidneys. If there is kidney damage, the doses of these medications need to be adjusted. For critically ill patients requiring high-dose IV acyclovir by vein, we have to pay close attention to kidney health because there is a risk of kidney damage on that regimen.    Adapalene (Differin) - use pea-sized amount to face 2-3 times per week, gradually increase to nightly use. Can cause dryness.    Ketoconazole shampoo - use as body wash for tinea versicolor    Triamcinolone ointment - use on rash on the buttocks

## 2024-12-16 ENCOUNTER — MYC MEDICAL ADVICE (OUTPATIENT)
Dept: ORTHOPEDICS | Facility: CLINIC | Age: 64
End: 2024-12-16

## 2024-12-16 ENCOUNTER — THERAPY VISIT (OUTPATIENT)
Dept: PHYSICAL THERAPY | Facility: CLINIC | Age: 64
End: 2024-12-16
Attending: FAMILY MEDICINE
Payer: COMMERCIAL

## 2024-12-16 ENCOUNTER — MYC MEDICAL ADVICE (OUTPATIENT)
Dept: FAMILY MEDICINE | Facility: CLINIC | Age: 64
End: 2024-12-16

## 2024-12-16 DIAGNOSIS — M53.3 SI (SACROILIAC) JOINT DYSFUNCTION: ICD-10-CM

## 2024-12-16 DIAGNOSIS — M53.3 SACROILIAC JOINT PAIN: Primary | ICD-10-CM

## 2024-12-16 PROCEDURE — 97161 PT EVAL LOW COMPLEX 20 MIN: CPT | Mod: GP | Performed by: PHYSICAL THERAPIST

## 2024-12-16 PROCEDURE — 97110 THERAPEUTIC EXERCISES: CPT | Mod: GP | Performed by: PHYSICAL THERAPIST

## 2024-12-16 NOTE — PROGRESS NOTES
PHYSICAL THERAPY EVALUATION  Type of Visit: Evaluation             Subjective         Presenting condition or subjective complaint: (Proxy-Rptd) Degenerative Disc Diease/Sciatica  Date of onset: 11/21/24    Relevant medical history:   cervical radiculopathy  Dates & types of surgery: (Proxy-Rptd) Hernia, Prostate removal, Microscopic on right knee, Surgical removal of a Myxoma on upper right thigh    Prior diagnostic imaging/testing results: (Proxy-Rptd) MRI     Prior therapy history for the same diagnosis, illness or injury: (Proxy-Rptd) Yes        Living Environment  Social support: (Proxy-Rptd) With family members   Type of home: (Proxy-Rptd) Apartment/condo; Multi-level   Stairs to enter the home: (Proxy-Rptd) Yes   Is there a railing: (Proxy-Rptd) Yes     Ramp: (Proxy-Rptd) No   Stairs inside the home: (Proxy-Rptd) No       Help at home: (Proxy-Rptd) Medication and/or finances; Meals on wheels/meal service; Emergency call system; Other  Equipment owned: (Proxy-Rptd) [employer]     Employment: (Proxy-Rptd) Not Applicable    Hobbies/Interests: (Proxy-Rptd) Writing poetry, writing songs, writing opinion commentary, teaching myself to play bass [employer]. Writing screenplays, trying to write a novel. Besides caring for my special needs(autistic)son, writing is my passion.    Patient goals for therapy: (Proxy-Rptd) Have the pain relieved    Pain assessment: See objective evaluation for additional pain details       PHYSICAL THERAPY LUMBAR EVALUATION    SUBJECTIVE:  Jeanmarie is a 64 year old male who reports decade plus period of low back pain. He reports the pain has only worsened. Nothing much helps. No history of traumatic events. He reports many periods of doing therapy at various clinic but none of it has ever helped and he really just wants an injection. He says he has gotten about 15 injections every every 3-9 months.    Patient reports symptoms of:pain, range of motion loss, stiffness or tightness, numbness,  "tingling, and weakness    Patient report of pain:  Pain Rating Now: unable to rate  Pain Rating at Best: unable to rate  Pain Rating at Worst: 10/10  Pain Location: bilateral low back, both buttocks, right thigh  Pain Quality/Description: aching, dull, burning, \"hurts\"  Pain Better with: injections only  Pain Worse with: too much physical activity, walking for more than an hour, lifting anything  Progression of Symptoms: worsened      Patient reports Red Flags symptoms of:  None    OBJECTIVE:    POSTURE        PALPATION  Pain with palpation at: paraspinals, QL/ES area, and bilateral    LUMBAR ROM:  Standing Lumbar Flexion (Hands slide down thighs): Fingertips to Ankles  Standing Lumbar Extension Assessment: Moderate Restriction of 50% of ROM  Right Lumbar Side Bend: Mild Restriction of 25% of ROM; Left Lumbar Side Bend: Mild Restriction of 25% of ROM,   Right Lumbar Rotation: Mild Restriction of 25% of ROM; Left Lumbar Rotation: Mild Restriction of 25% of ROM     All movements hurt. Extension the most. Broad low back pain.      MYOTOMES:  Hip Flexion (L2): L 5/5, R 5/5  Knee Extension (L3): L 5/5, R 5/5  Ankle Dorsiflexion (L4): L 5/5, R 5/5  Great Toe (L5): L 5/5, R 5/5  Ankle Plantarflexion (S1): L 5/5, R 5/5  Knee Flexion (S2): L 5/5, R 5/5      NEURAL TENSION:   Slump Test: Right Leg: Negative Slump Test, Left Leg: Negative Slump Test  Straight Leg Raise:       JOINT MOBILITY:   DERMATOMES:   OBSERVATION: lots of muscle tension broadly from upper thoracic through hips. Hypomobility to L3-5 area.       IMPRESSION/ASSESSMENT:  Patient is a 64 year old male with low back and SI complaints.  The following significant findings have been identified: Pain, Decreased ROM/flexibility, Decreased joint mobility, and Decreased strength. These impairments interfere with their ability to perform self care tasks, work tasks, recreational activities, household chores, driving , household mobility, and community mobility as " compared to previous level of function.     MEDICAL DIAGNOSIS: SI (sacroiliac) joint dysfunction       PT TREATMENT DIAGNOSIS: SI (sacroiliac) joint dysfunction       Clinical Decision Making (Complexity):  Clinical Presentation: Stable/Uncomplicated  Clinical Presentation Rationale: based on medical and personal factors listed in PT evaluation  Clinical Decision Making (Complexity): Low complexity      PHYSICAL THERAPY PLAN OF CARE:  Treatment Interventions:  Modalities: E-stim, Ultrasound  Interventions: Manual Therapy, Neuromuscular Re-education, Therapeutic Activity, Therapeutic Exercise    Long Term Goals     PT Goal 1  Goal Identifier: LTG 1  Goal Description: Patient will be able to sleep through the night without pain over 1 week period  Rationale:  (to promote restorative sleep pattern)  Goal Progress: 3-4x/night up d/t pain  Target Date: 01/27/25    Frequency of Treatment: 1x/week  Duration of Treatment: 6 visits         Risks and benefits of evaluation/treatment have been explained.   Patient/Family/caregiver agrees with Plan of Care.      Evaluation Time:     PT Eval, Low Complexity Minutes (66966): 23         Signing Clinician: Julian Pedraza, PT            [facility]                                                                                   OUTPATIENT PHYSICAL THERAPY      PLAN OF TREATMENT FOR OUTPATIENT REHABILITATION   Patient's Last Name, First Name, KVNGJOSE  VinayJeanmarie carpenter YOB: 1960   Provider's Name   [facility]   Medical Record No.  [id number]     Onset Date: 11/21/24  Start of Care Date: 12/16/24     Medical Diagnosis:  SI (sacroiliac) joint dysfunction      PT Treatment Diagnosis:  SI (sacroiliac) joint dysfunction Plan of Treatment  Frequency/Duration: 1x/week/ 6 visits    Certification date from 12/16/24 to 01/27/25         See note for plan of treatment details and functional goals     Julian Pedraza,  PT                         I CERTIFY THE NEED FOR THESE SERVICES FURNISHED UNDER        THIS PLAN OF TREATMENT AND WHILE UNDER MY CARE     (Physician attestation of this document indicates review and certification of the therapy plan).              Referring Provider:  Milton Iglesias    Initial Assessment  See Epic Evaluation- Start of Care Date: 12/16/24

## 2024-12-16 NOTE — TELEPHONE ENCOUNTER
I reviewed pt chart.  Pt requests appointment with Dr. Iglesias on 12/19/24. Appointment made.   Advise if any changes, increase in size, redness or new symptoms we should see him in clinic before that. Pt verbalizes understanding.    To provider to review.  If ok with plan this is fyi, if you want something different let us know.  Alisson DENNISN, RN

## 2024-12-17 NOTE — PROGRESS NOTES
SUBJECTIVE:  HPI:  Jeanmarie Mclean  Is a 64 year old male who presents for new patient evaluation of low back pain and reported SI joint pain, interested in an injection.  Apparently referred by Dr. Milton Iglesias but I do not see any office notes regarding this.    4/26/2024 bilateral SI joint injections Dr. Castellanos: 8/10-8/10  7/20/2023, 2/14/2023, 9/6/2022, 5/5/2022, 1/12/2022, 9/28/2021, 3/3/2021, and likely multiple other dates prior bilateral SI joint injections    Judy with his son.  He indicates that he has had bilateral SI joint injections dating back to at least 2017 and every time it has been therapeutic for about 3 months.  He is really interested in repeating the same therapeutic maneuver but the insurer requires him to have positive SI provocation test.  His pain diagram is pain infused.  He has had chronic back pain for years and he thinks he may be overused his back as a younger man and did a lot of running.  He points to the bilateral SI joints as the site of his pain but he also says that shoots down right greater than left leg.  He describes posterior thigh and calf pain in the top of the foot on the right.  On the left goes down the posterior thigh and calf but not on the foot.  2004 prostate cancer and prostatectomy and since then he has some ED but he is able to have erections and orgasms.  Bowel and bladder function normal and no saddle anesthesia.  He does not really describe true leg weakness but intermittently his right leg will collapse on him and he is not exactly sure if it is pain related or for unknown reasons.  He does get pain relief with a TENS machine and Salonpas.  He in the past he was dependent on oxycodone but has been opioid free for 1 year.  He has had extensive PT over the years and is not interested in doing more because he has all kinds of home exercises that he does.  He recently saw Satya who taught him how to use a foam roller.    Pain score and diagram reviewed.  See  questionnaire.        ROS: .  Otherwise negative for bowel/bladder dysfunction, balance changes, headache, leg pain/numbness/weakness, fevers, chills, night sweats, unexplained weight loss;  otherwise unremarkable.   See the patient's intake questionnaire from today for details.      MEDICATIONS:  Reviewed.    ALLERGIES:  Reviewed.     PAST MEDICAL/SURGICAL HISTORY:   Pertinent for reported sacroiliac joint pain and low back pain, depression, chronic pain, TBI, anxiety, PTSD, fibromyalgia, delusional disorder, hypertension, overweight, GERD, prostate cancer-10/13/2004 prostatectomy, Asperger's syndrome, alcoholism, nephrolithiasis, bilateral inguinal hernia repair 1992.  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  He has 1 in his right ankle and 1 in his right hand that are slated to be removed at a future time.    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  Hobbies and activities: Walking.  He is an [employer] .  He was a  but does not have [employer] benefits.  He is  and with 2 children.    OBJECTIVE:    IMAGING: Images and reports reviewed    MR pelvis without and with contrast 12/6/2023    Centered within the right adductor longus muscle belly there is a  circumscribed homogeneous T1 hypointense, T2 hyperintense mass  measuring 2.3 x 2.7 x 3.1 cm which demonstrates patchy internal and  peripheral enhancement on postcontrast sequences. There is mild  adjacent enhancing intramuscular edema.     Muscles and tendons  Muscles and tendons: Bilateral insertional tendinosis of the gluteus  medius and minimus and medius tendons on the greater trochanters,  right worse than left. There is a low-grade partial-thickness tear of  the distal right gluteus minimus tendon anteriorly just proximal to  the trochanteric attachment (axial images 37-39). Mild bilateral  proximal hamstring tendinosis. The bilateral rectus femoris,  iliopsoas, and abductor tendons are  intact. No focal muscle atrophy or  strain.    Impression:  1. 3.1 cm heterogeneously enhancing T2 hyperintense mass centered in  the right adductor longus muscle. The imaging appearance favors a  myxoid lesion, primarily an intramuscular myxoma. Recommend orthopedic  oncology referral.  2. Bilateral insertional gluteal tendinosis with low-grade  partial-thickness tear of the distal right gluteus minimus tendon at  the trochanteric attachment.    MRI of the Lumbar Spine without contrast 9/17/2012     L4-5: Mild broad-based annular disc bulge.  Bilateral facet   arthropathy.  Ligamentum flavum hypertrophy is present.  These   combine to produce mild spinal canal stenosis.  There is mild   bilateral neural foramen stenosis.      L5-S1: Broad-based annular disc bulge, eccentric left causes mild   bilateral neural foramen stenosis.  There is moderate facet   arthropathy.      Impression:    Mild degenerative changes as described above.        EXAMINATION:    --CONSTITUTIONAL:   No acute distress.  The patient is well nourished and well groomed.  Transitions well and moves fluidly.  Elevated BMI.  --SKIN:  Skin over the face, bilateral lower extremities, and posterior torso is clean, dry, intact without rashes.    --GAIT:  is non-antalgic. Flat foot, heel and toe walking:  normal   .  Squat and rise   normal    .  --STANDING EXAMINATION:    Symmetry of spine/pelvis   unremarkable   .      Range of motion full with mild bilateral SI pain in flexion.  Slightly limited but no pain with extension.   Standing flexion   negative   .    Jacqueline's sign   negative    .     Stork test   negative    .   --NEUROLOGICAL:    SENSATION to light touch is intact in bilateral thighs, lower legs and feet.   REFLEXES:  patellar absent, and achilles absent.  Babinski is negative. No clonus.  MANUAL MOTOR TESTING:  Hip flexion 5/5   Hip abduction 5/5   Hip adduction 5/5   Knee extension 5/5   Knee flexion 5/5   Ankle dorsiflexion 5/5   EHL  5/5   Ankle inversion 5/5   Ankle eversion 5/5  DURAL STRETCH TESTS:  SLR negative.    --PELVIC/HIP JOINTS:                Long Sitting   negative   and equal leg length.    Hip scour   negative   .    Hip Impingement   negative   .  Piriformis   negative   .   Spring testing positive bilateral SI and lower 4 lumbar segments and somewhat pain infused.  SIJ provocation: Positive pelvic distraction, left OLIVE, right thigh thrust, and bilateral Gaenslen's.  Negative right Olive, left thigh thrust, and pelvic compression.    PELVIC ALIGNMENT neutral.   --LUMBAR/GLUTEAL MUSCLES: Generally tender in all the muscles without radiating symptoms.      ASSESSMENT: Jeanmarie Mclean is a 64 year old male who presents  today for new patient evaluation of:    Multiple therapeutic bilateral SI joint injections dating back to 2017 with current positive bilateral 3/5 SI joint provocation test.  Somewhat inexplicable pain infused exam and symptoms and a dramatic pain diagram that does not really comport with essentially benign lumbar MRI imaging.  Neurologically intact      PLAN:  I will go ahead and order an SI joint injection and consultation with Dr. Malik Lucero to consider whether he might be a candidate for RFA.  He has had so many SI joint injections with only 3 months pain relief, that he might have longer-term pain relief with an RFA if it is even offered.      65 minutes of time spent doing chart review, history and exam, documentation, counseling, education, coordination of care, and other activities as described above.    Advised patient to call or return early if symptoms worsen, or having problems controlling bladder and bowel function or worsening leg weakness.     Please note: Voice recognition software was used in this dictation.  It may therefore contain typographical errors.    Jayant Abarca MD

## 2024-12-18 NOTE — TELEPHONE ENCOUNTER
"Late entry.for 12/16/2024    Writer called Pt and told him \"For something that small, it's going to be hard to see on MRI.  Myxoma is not usually found in multiple locations. I would wait to see what the hand MRI shows first.\"    Per Pt's request, writer will call him back late Wednesday or early Thursday to discuss results of the MRI.    "

## 2024-12-26 ENCOUNTER — OFFICE VISIT (OUTPATIENT)
Dept: NEUROSURGERY | Facility: CLINIC | Age: 64
End: 2024-12-26
Attending: FAMILY MEDICINE
Payer: COMMERCIAL

## 2024-12-26 ENCOUNTER — PRE VISIT (OUTPATIENT)
Dept: NEUROSURGERY | Facility: CLINIC | Age: 64
End: 2024-12-26

## 2024-12-26 VITALS
HEIGHT: 65 IN | HEART RATE: 53 BPM | SYSTOLIC BLOOD PRESSURE: 119 MMHG | DIASTOLIC BLOOD PRESSURE: 80 MMHG | BODY MASS INDEX: 27.49 KG/M2 | WEIGHT: 165 LBS

## 2024-12-26 DIAGNOSIS — M53.3 SACROILIAC JOINT PAIN: Primary | ICD-10-CM

## 2024-12-26 ASSESSMENT — PAIN SCALES - GENERAL: PAINLEVEL_OUTOF10: SEVERE PAIN (7)

## 2024-12-26 NOTE — NURSING NOTE
"Reason For Visit:   Chief Complaint   Patient presents with    Consult     SI joint         Occupation: former central service technician  Currently working? No.  Work status?  Retired.    Sports: n  Activities: walking             /80   Pulse 53   Ht 1.651 m (5' 5\")   Wt 74.8 kg (165 lb)   BMI 27.46 kg/m        Allergies   Allergen Reactions    Risperidone Other (See Comments)     Serve numbness     Trimethoprim Hives    Effexor [Venlafaxine Hydrochloride] Other (See Comments)     Headache, Painful scrotum and drainage from the penis    Ambien [Zolpidem Tartrate] Nausea    Buspirone Nausea     Dizziness, headache    Celexa [Citalopram] Other (See Comments)     Headache    Duloxetine Other (See Comments)     Headache  headaches    Septra [Bactrim] Itching    Seroquel [Quetiapine] Other (See Comments)     Headache, N, V    Sulfa Antibiotics Itching    Sulfamethoxazole-Trimethoprim      hives    Tramadol Nausea     Nausea and headache    Ultram [Tramadol Hcl] Nausea and Vomiting    Venlafaxine     Zolpidem      headaches    Zolpidem Tartrate Other (See Comments)     headaches       Current Outpatient Medications   Medication Sig Dispense Refill    acetaminophen (TYLENOL) 500 MG tablet Take 500-1,000 mg by mouth every 6 hours as needed for mild pain 1000 mg per day      amLODIPine (NORVASC) 2.5 MG tablet Take 1 tablet (2.5 mg) by mouth daily 90 tablet 3    Blood Pressure KIT Monitor blood pressure as needed. 1 kit 1    Cholecalciferol (VITAMIN D) 1000 UNIT capsule Take 1 capsule by mouth 2 times daily      ketoconazole (NIZORAL) 2 % external shampoo Apply topically daily. As body wash 120 mL 11    Nerve Stimulator (TENS THERAPY PAIN RELIEF) GEORGIE       omeprazole (PRILOSEC) 20 MG DR capsule Take 1 capsule (20 mg) by mouth 2 times daily. 180 capsule 1    triamcinolone (KENALOG) 0.1 % external ointment Apply topically 2 times daily. 80 g 3    valACYclovir (VALTREX) 1000 mg tablet Take 1 tablet (1,000 mg) by " mouth 3 times daily for 7 days. 21 tablet 0     No current facility-administered medications for this visit.         Darla Severin-Brown, LPN

## 2024-12-26 NOTE — PATIENT INSTRUCTIONS
Jeanmarie I will have you consult with Dr. Malik Lucero about the RFA procedure we talked about to burn the nerves in your SI joint which might be able to give you a longer term relief than repeated injections and I will also ask him to do the bilateral SI joint injections which you qualify for based on your examination today.  I do not think you have true sciatica and I am not sure what is causing the leg symptoms but your nerves are not being pinched in your spine and your nerves are working normally when I test them so that is good news.  See the assessment and plan below for further details of our conversation today and I wish you all the best    ASSESSMENT: Jeanmarie Mclean is a 64 year old male who presents  today for new patient evaluation of:    Multiple therapeutic bilateral SI joint injections dating back to 2017 with current positive bilateral 3/5 SI joint provocation test.  Somewhat inexplicable pain infused exam and symptoms and a dramatic pain diagram that does not really comport with essentially benign lumbar MRI imaging.  Neurologically intact      PLAN:  I will go ahead and order an SI joint injection and consultation with Dr. Malik Lucero to consider whether he might be a candidate for RFA.  He has had so many SI joint injections with only 3 months pain relief, that he might have longer-term pain relief with an RFA if it is even offered.

## 2024-12-26 NOTE — LETTER
12/26/2024      Jeanmarie Mclean  [address]  [facility] [place] [address]      Dear Colleague,    Thank you for referring your patient, Jeanmarie Mclean, to the [facility] [place]. Please see a copy of my visit note below.        SUBJECTIVE:  HPI:  Jeanmarie Mclean  Is a 64 year old male who presents for new patient evaluation of low back pain and reported SI joint pain, interested in an injection.  Apparently referred by Dr. Milton Iglesias but I do not see any office notes regarding this.    4/26/2024 bilateral SI joint injections Dr. Castellanos: 8/10-8/10  7/20/2023, 2/14/2023, 9/6/2022, 5/5/2022, 1/12/2022, 9/28/2021, 3/3/2021, and likely multiple other dates prior bilateral SI joint injections    Judy with his son.  He indicates that he has had bilateral SI joint injections dating back to at least 2017 and every time it has been therapeutic for about 3 months.  He is really interested in repeating the same therapeutic maneuver but the insurer requires him to have positive SI provocation test.  His pain diagram is pain infused.  He has had chronic back pain for years and he thinks he may be overused his back as a younger man and did a lot of running.  He points to the bilateral SI joints as the site of his pain but he also says that shoots down right greater than left leg.  He describes posterior thigh and calf pain in the top of the foot on the right.  On the left goes down the posterior thigh and calf but not on the foot.  2004 prostate cancer and prostatectomy and since then he has some ED but he is able to have erections and orgasms.  Bowel and bladder function normal and no saddle anesthesia.  He does not really describe true leg weakness but intermittently his right leg will collapse on him and he is not exactly sure if it is pain related or for unknown reasons.  He does get pain relief with a TENS machine and Salonpas.  He in the past he was dependent on oxycodone but has been opioid free  for 1 year.  He has had extensive PT over the years and is not interested in doing more because he has all kinds of home exercises that he does.  He recently saw Satya who taught him how to use a foam roller.    Pain score and diagram reviewed.  See questionnaire.        ROS: .  Otherwise negative for bowel/bladder dysfunction, balance changes, headache, leg pain/numbness/weakness, fevers, chills, night sweats, unexplained weight loss;  otherwise unremarkable.   See the patient's intake questionnaire from today for details.      MEDICATIONS:  Reviewed.    ALLERGIES:  Reviewed.     PAST MEDICAL/SURGICAL HISTORY:   Pertinent for reported sacroiliac joint pain and low back pain, depression, chronic pain, TBI, anxiety, PTSD, fibromyalgia, delusional disorder, hypertension, overweight, GERD, prostate cancer-10/13/2004 prostatectomy, Asperger's syndrome, alcoholism, nephrolithiasis, bilateral inguinal hernia repair 1992.  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  He has 1 in his right ankle and 1 in his right hand that are slated to be removed at a future time.    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  Hobbies and activities: Walking.  He is an [employer] .  He was a  but does not have [employer] benefits.  He is  and with 2 children.    OBJECTIVE:    IMAGING: Images and reports reviewed    MR pelvis without and with contrast 12/6/2023    Centered within the right adductor longus muscle belly there is a  circumscribed homogeneous T1 hypointense, T2 hyperintense mass  measuring 2.3 x 2.7 x 3.1 cm which demonstrates patchy internal and  peripheral enhancement on postcontrast sequences. There is mild  adjacent enhancing intramuscular edema.     Muscles and tendons  Muscles and tendons: Bilateral insertional tendinosis of the gluteus  medius and minimus and medius tendons on the greater trochanters,  right worse than left. There is a low-grade  partial-thickness tear of  the distal right gluteus minimus tendon anteriorly just proximal to  the trochanteric attachment (axial images 37-39). Mild bilateral  proximal hamstring tendinosis. The bilateral rectus femoris,  iliopsoas, and abductor tendons are intact. No focal muscle atrophy or  strain.    Impression:  1. 3.1 cm heterogeneously enhancing T2 hyperintense mass centered in  the right adductor longus muscle. The imaging appearance favors a  myxoid lesion, primarily an intramuscular myxoma. Recommend orthopedic  oncology referral.  2. Bilateral insertional gluteal tendinosis with low-grade  partial-thickness tear of the distal right gluteus minimus tendon at  the trochanteric attachment.    MRI of the Lumbar Spine without contrast 9/17/2012     L4-5: Mild broad-based annular disc bulge.  Bilateral facet   arthropathy.  Ligamentum flavum hypertrophy is present.  These   combine to produce mild spinal canal stenosis.  There is mild   bilateral neural foramen stenosis.      L5-S1: Broad-based annular disc bulge, eccentric left causes mild   bilateral neural foramen stenosis.  There is moderate facet   arthropathy.      Impression:    Mild degenerative changes as described above.        EXAMINATION:    --CONSTITUTIONAL:   No acute distress.  The patient is well nourished and well groomed.  Transitions well and moves fluidly.  Elevated BMI.  --SKIN:  Skin over the face, bilateral lower extremities, and posterior torso is clean, dry, intact without rashes.    --GAIT:  is non-antalgic. Flat foot, heel and toe walking:  normal   .  Squat and rise   normal    .  --STANDING EXAMINATION:    Symmetry of spine/pelvis   unremarkable   .      Range of motion full with mild bilateral SI pain in flexion.  Slightly limited but no pain with extension.   Standing flexion   negative   .    Jacqueline's sign   negative    .     Stork test   negative    .   --NEUROLOGICAL:    SENSATION to light touch is intact in bilateral thighs,  lower legs and feet.   REFLEXES:  patellar absent, and achilles absent.  Babinski is negative. No clonus.  MANUAL MOTOR TESTING:  Hip flexion 5/5   Hip abduction 5/5   Hip adduction 5/5   Knee extension 5/5   Knee flexion 5/5   Ankle dorsiflexion 5/5   EHL 5/5   Ankle inversion 5/5   Ankle eversion 5/5  DURAL STRETCH TESTS:  SLR negative.    --PELVIC/HIP JOINTS:                Long Sitting   negative   and equal leg length.    Hip scour   negative   .    Hip Impingement   negative   .  Piriformis   negative   .   Spring testing positive bilateral SI and lower 4 lumbar segments and somewhat pain infused.  SIJ provocation: Positive pelvic distraction, left ASH, right thigh thrust, and bilateral Gaenslen's.  Negative right Ash, left thigh thrust, and pelvic compression.    PELVIC ALIGNMENT neutral.   --LUMBAR/GLUTEAL MUSCLES: Generally tender in all the muscles without radiating symptoms.      ASSESSMENT: Jeanmarie Mclean is a 64 year old male who presents  today for new patient evaluation of:    Multiple therapeutic bilateral SI joint injections dating back to 2017 with current positive bilateral 3/5 SI joint provocation test.  Somewhat inexplicable pain infused exam and symptoms and a dramatic pain diagram that does not really comport with essentially benign lumbar MRI imaging.  Neurologically intact      PLAN:  I will go ahead and order an SI joint injection and consultation with Dr. Malik Lucero to consider whether he might be a candidate for RFA.  He has had so many SI joint injections with only 3 months pain relief, that he might have longer-term pain relief with an RFA if it is even offered.      65 minutes of time spent doing chart review, history and exam, documentation, counseling, education, coordination of care, and other activities as described above.    Advised patient to call or return early if symptoms worsen, or having problems controlling bladder and bowel function or worsening leg weakness.      Please note: Voice recognition software was used in this dictation.  It may therefore contain typographical errors.    Jayant Abarca MD             Again, thank you for allowing me to participate in the care of your patient.        Sincerely,        Jayant Abarca MD    Electronically signed

## 2025-01-08 ASSESSMENT — PATIENT HEALTH QUESTIONNAIRE - PHQ9: SUM OF ALL RESPONSES TO PHQ QUESTIONS 1-9: 8

## 2025-01-09 ENCOUNTER — OFFICE VISIT (OUTPATIENT)
Dept: FAMILY MEDICINE | Facility: CLINIC | Age: 65
End: 2025-01-09
Payer: COMMERCIAL

## 2025-01-09 VITALS
HEART RATE: 54 BPM | SYSTOLIC BLOOD PRESSURE: 111 MMHG | BODY MASS INDEX: 28.16 KG/M2 | TEMPERATURE: 97 F | WEIGHT: 169 LBS | OXYGEN SATURATION: 100 % | DIASTOLIC BLOOD PRESSURE: 73 MMHG | HEIGHT: 65 IN | RESPIRATION RATE: 18 BRPM

## 2025-01-09 DIAGNOSIS — I10 ESSENTIAL HYPERTENSION WITH GOAL BLOOD PRESSURE LESS THAN 140/90: ICD-10-CM

## 2025-01-09 DIAGNOSIS — Z13.6 CARDIOVASCULAR SCREENING; LDL GOAL LESS THAN 130: ICD-10-CM

## 2025-01-09 DIAGNOSIS — Z13.1 SCREENING FOR DIABETES MELLITUS: ICD-10-CM

## 2025-01-09 DIAGNOSIS — Z12.5 SCREENING FOR PROSTATE CANCER: ICD-10-CM

## 2025-01-09 DIAGNOSIS — Z00.00 ROUTINE GENERAL MEDICAL EXAMINATION AT A HEALTH CARE FACILITY: Primary | ICD-10-CM

## 2025-01-09 LAB
ALBUMIN SERPL BCG-MCNC: 4.5 G/DL (ref 3.5–5.2)
ALP SERPL-CCNC: 80 U/L (ref 40–150)
ALT SERPL W P-5'-P-CCNC: 26 U/L (ref 0–70)
ANION GAP SERPL CALCULATED.3IONS-SCNC: 11 MMOL/L (ref 7–15)
AST SERPL W P-5'-P-CCNC: 43 U/L (ref 0–45)
BILIRUB SERPL-MCNC: 0.9 MG/DL
BUN SERPL-MCNC: 21.4 MG/DL (ref 8–23)
CALCIUM SERPL-MCNC: 9.6 MG/DL (ref 8.8–10.4)
CHLORIDE SERPL-SCNC: 106 MMOL/L (ref 98–107)
CHOLEST SERPL-MCNC: 199 MG/DL
CREAT SERPL-MCNC: 0.95 MG/DL (ref 0.67–1.17)
EGFRCR SERPLBLD CKD-EPI 2021: 90 ML/MIN/1.73M2
FASTING STATUS PATIENT QL REPORTED: YES
FASTING STATUS PATIENT QL REPORTED: YES
GLUCOSE SERPL-MCNC: 94 MG/DL (ref 70–99)
HCO3 SERPL-SCNC: 25 MMOL/L (ref 22–29)
HDLC SERPL-MCNC: 57 MG/DL
LDLC SERPL CALC-MCNC: 129 MG/DL
NONHDLC SERPL-MCNC: 142 MG/DL
POTASSIUM SERPL-SCNC: 4.1 MMOL/L (ref 3.4–5.3)
PROT SERPL-MCNC: 7.8 G/DL (ref 6.4–8.3)
PSA SERPL DL<=0.01 NG/ML-MCNC: 0.04 NG/ML (ref 0–4.5)
SODIUM SERPL-SCNC: 142 MMOL/L (ref 135–145)
TRIGL SERPL-MCNC: 63 MG/DL

## 2025-01-09 SDOH — HEALTH STABILITY: PHYSICAL HEALTH: ON AVERAGE, HOW MANY DAYS PER WEEK DO YOU ENGAGE IN MODERATE TO STRENUOUS EXERCISE (LIKE A BRISK WALK)?: 4 DAYS

## 2025-01-09 SDOH — HEALTH STABILITY: PHYSICAL HEALTH: ON AVERAGE, HOW MANY MINUTES DO YOU ENGAGE IN EXERCISE AT THIS LEVEL?: 130 MIN

## 2025-01-09 ASSESSMENT — PAIN SCALES - GENERAL: PAINLEVEL_OUTOF10: MODERATE PAIN (5)

## 2025-01-09 ASSESSMENT — PATIENT HEALTH QUESTIONNAIRE - PHQ9
SUM OF ALL RESPONSES TO PHQ QUESTIONS 1-9: 8
SUM OF ALL RESPONSES TO PHQ QUESTIONS 1-9: 8
10. IF YOU CHECKED OFF ANY PROBLEMS, HOW DIFFICULT HAVE THESE PROBLEMS MADE IT FOR YOU TO DO YOUR WORK, TAKE CARE OF THINGS AT HOME, OR GET ALONG WITH OTHER PEOPLE: NOT DIFFICULT AT ALL

## 2025-01-09 ASSESSMENT — SOCIAL DETERMINANTS OF HEALTH (SDOH): HOW OFTEN DO YOU GET TOGETHER WITH FRIENDS OR RELATIVES?: NEVER

## 2025-01-09 NOTE — PROGRESS NOTES
"Preventive Care Visit  [facility]  Milton Iglesias MD, MD, Family Medicine  Jan 9, 2025      Assessment & Plan     (Z00.00) Routine general medical examination at a health care facility  (primary encounter diagnosis)  Comment:   Plan: as below.    (I10) Essential hypertension with goal blood pressure less than 140/90  Comment:   Plan: Comprehensive metabolic panel (BMP + Alb, Alk         Phos, ALT, AST, Total. Bili, TP)        Controlled. Recheck renal function and lytes.    (Z13.1) Screening for diabetes mellitus  Comment:   Plan: Comprehensive metabolic panel (BMP + Alb, Alk         Phos, ALT, AST, Total. Bili, TP)            (Z13.6) CARDIOVASCULAR SCREENING; LDL GOAL LESS THAN 130  Comment:   Plan: Comprehensive metabolic panel (BMP + Alb, Alk         Phos, ALT, AST, Total. Bili, TP), Lipid panel         reflex to direct LDL Fasting            (Z12.5) Screening for prostate cancer  Comment:   Plan: PSA, screen              BMI  Estimated body mass index is 28.12 kg/m  as calculated from the following:    Height as of this encounter: 1.651 m (5' 5\").    Weight as of this encounter: 76.7 kg (169 lb).       Counseling  Appropriate preventive services were addressed with this patient via screening, questionnaire, or discussion as appropriate for fall prevention, nutrition, physical activity, Tobacco-use cessation, social engagement, weight loss and cognition.  Checklist reviewing preventive services available has been given to the patient.  Reviewed patient's diet, addressing concerns and/or questions.   Patient is at risk for social isolation and has been provided with information about the benefit of social connection.   The patient was instructed to see the dentist every 6 months.   He is at risk for psychosocial distress and has been provided with information to reduce risk.   The patient's PHQ-9 score is consistent with mild depression. He was provided with information regarding depression. "       Julio Cesar Murry is a 64 year old, presenting for the following:  Physical        1/9/2025     9:09 AM   Additional Questions   Roomed by Mima          History of Present Illness       Reason for visit:  Physical He is missing 3 dose(s) of medications per week.  He is not taking prescribed medications regularly due to remembering to take.      Health Care Directive  Patient has a Health Care Directive on file  Discussed advance care planning with patient.      1/9/2025   General Health   How would you rate your overall physical health? (!) FAIR   Feel stress (tense, anxious, or unable to sleep) Very much   (!) STRESS CONCERN      1/9/2025   Nutrition   Three or more servings of calcium each day? Yes   Diet: Regular (no restrictions)   How many servings of fruit and vegetables per day? (!) 0-1   How many sweetened beverages each day? 0-1         1/9/2025   Exercise   Days per week of moderate/strenous exercise 4 days   Average minutes spent exercising at this level 130 min         1/9/2025   Social Factors   Frequency of gathering with friends or relatives Never   Worry food won't last until get money to buy more Patient declined   Food not last or not have enough money for food? Patient declined   Do you have housing? (Housing is defined as stable permanent housing and does not include staying ouside in a car, in a tent, in an abandoned building, in an overnight shelter, or couch-surfing.) Patient declined   Are you worried about losing your housing? Patient declined   Lack of transportation? Patient declined   Unable to get utilities (heat,electricity)? Patient declined   (!) SOCIAL CONNECTIONS CONCERN      1/9/2025   Fall Risk   Fallen 2 or more times in the past year? Yes    Trouble with walking or balance? Yes    Gait Speed Test (Document in seconds) 3.5   Gait Speed Test Interpretation Less than or equal to 5.00 seconds - PASS       Proxy-reported          1/9/2025   Dental   Dentist two times every  year? (!) NO         2025   TB Screening   Were you born outside of the [place]? No       Today's PHQ-9 Score:       2025     9:07 AM   PHQ-9 SCORE   PHQ-9 Total Score MyChart 8 (Mild depression)   PHQ-9 Total Score 8        Proxy-reported         2025   Substance Use   Alcohol more than 3/day or more than 7/wk No   Do you use any other substances recreationally? No     Social History     Tobacco Use    Smoking status: Former     Current packs/day: 0.00     Average packs/day: 0.5 packs/day for 10.0 years (5.0 ttl pk-yrs)     Types: Cigarettes     Start date: 1987     Quit date: 1997     Years since quittin.0     Passive exposure: Past (His mother smoked while growing up.)    Smokeless tobacco: Never   Vaping Use    Vaping status: Never Used   Substance Use Topics    Alcohol use: Yes     Comment: hx alcoholism quit March 10, 2022. [facility] in     Drug use: Yes     Types: Marijuana     Comment: crack (last used 10/08?)           2025   STI Screening   New sexual partner(s) since last STI/HIV test? No   Last PSA:   PSA   Date Value Ref Range Status   2020 0.02 0 - 4 ug/L Final     Comment:     Assay Method:  Chemiluminescence using Siemens Vista analyzer     Prostate Specific Antigen Screen   Date Value Ref Range Status   10/01/2021 0.03 0.00 - 4.00 ug/L Final     PSA Tumor Marker   Date Value Ref Range Status   2022 0.03 0.00 - 4.00 ug/L Final     ASCVD Risk   The 10-year ASCVD risk score (Yao LYNCH, et al., 2019) is: 10.8%    Values used to calculate the score:      Age: 64 years      Sex: Male      Is Non- : Yes      Diabetic: No      Tobacco smoker: No      Systolic Blood Pressure: 111 mmHg      Is BP treated: Yes      HDL Cholesterol: 77 mg/dL      Total Cholesterol: 240 mg/dL      Reviewed and updated as needed this visit by Provider                    Past Medical History:   Diagnosis Date    Alcoholism (H)      Arthritis     Asperger's syndrome     Dr. Owens, [facility]. Last visit 2006/2007    GERD (gastroesophageal reflux disease) 1999    EGD 2003 [place]    History of hypercholesterolemia     Hypertension     Kidney stones     Malignant hyperthermia due to anesthesia     Melasma     forehead, has received desonide from dermatologist    MMT (medial meniscus tear) 10/01    LT    Myalgia and myositis 4/5/2016    mid thorax, left subscapularis, latisimus dorsi Bilateral along iliac crest     Posttraumatic stress disorder     per pt    Prostate cancer (H)     Recurrent genital herpes 1982    Rib fracture 1985    L 6th    Skull fracture (H) 1985    frequent vertigo    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    ultrasound     Past Surgical History:   Procedure Laterality Date    COLONOSCOPY      COLONOSCOPY N/A 2/22/2021    Procedure: Colonoscopy, Flexible, With Lesion Removal Using Snare;  Surgeon: Garrick Villavicencio MD;  Location: [facility] OR    COLONOSCOPY WITH CO2 INSUFFLATION N/A 2/22/2021    Procedure: COLONOSCOPY, WITH CO2 INSUFFLATION;  Surgeon: Garrick Villavicencio MD;  Location: [facility] OR    CYSTOSCOPY  10/98    for renal stones    EXCISE MASS LOWER EXTREMITY Right 3/25/2024    Procedure: Excision right thigh mass;  Surgeon: Pedro Tyler MD;  Location: [facility] OR    HC KNEE SCOPE,MED/LAT MENISECTOMY  6/24/11    Left, medial ([place])    HERNIA REPAIR, INGUINAL RT/LT  5/92    [facility])    ZZ HEP B VAC ADULT 3 DOSE IM  1995    received all 3 vaccines    RUST REMV PROSTATE,RETROPUB,RADICAL  10/13/04    Dr. Muñoz ([place])     Lab work is in process  Labs reviewed in EPIC  BP Readings from Last 3 Encounters:   01/09/25 111/73   12/26/24 119/80   11/12/24 (!) 148/98    Wt Readings from Last 3 Encounters:   01/09/25 76.7 kg (169 lb)   12/26/24 74.8 kg (165 lb)   11/12/24 77.3 kg (170 lb 6.4 oz)                  Patient Active Problem List   Diagnosis    Malignant neoplasm of prostate (H)    CARDIOVASCULAR SCREENING; LDL GOAL  LESS THAN 130    GERD (gastroesophageal reflux disease)    Overweight (BMI 25.0-29.9)    Fibromyalgia syndrome    PTSD (post-traumatic stress disorder)    Low back pain    Inadequate material resources    Generalized anxiety disorder    History of Asperger's syndrome    Pervasive developmental disorder, active    Depressive disorder    Delusional disorder (H)    TBI (traumatic brain injury) (H)    Insomnia    Chronic pain    Myalgia    male stress incontinence    Major depressive disorder, recurrent episode, moderate (H)    Low back pain without sciatica, unspecified back pain laterality, unspecified chronicity    Hypertensive response to exercise    Sacroiliac joint pain     Past Surgical History:   Procedure Laterality Date    COLONOSCOPY      COLONOSCOPY N/A 2021    Procedure: Colonoscopy, Flexible, With Lesion Removal Using Snare;  Surgeon: Garrick Villavicencio MD;  Location: [facility] OR    COLONOSCOPY WITH CO2 INSUFFLATION N/A 2021    Procedure: COLONOSCOPY, WITH CO2 INSUFFLATION;  Surgeon: Garrick Villavicencio MD;  Location: [facility]    CYSTOSCOPY  10/98    for renal stones    EXCISE MASS LOWER EXTREMITY Right 3/25/2024    Procedure: Excision right thigh mass;  Surgeon: Pedro Tyler MD;  Location: [facility]    HC KNEE SCOPE,MED/LAT MENISECTOMY  11    Left, medial ([place])    HERNIA REPAIR, INGUINAL RT/LT      [facility], [facility] (epid[facility])    ZZC HEP B VAC ADULT 3 DOSE IM      received all 3 vaccines    [facility] REMV PROSTATE,RETROPUB,RADICAL  10/13/04    Dr. Muñoz ([place])       Social History     Tobacco Use    Smoking status: Former     Current packs/day: 0.00     Average packs/day: 0.5 packs/day for 10.0 years (5.0 ttl pk-yrs)     Types: Cigarettes     Start date: 1987     Quit date: 1997     Years since quittin.0     Passive exposure: Past (His mother smoked while growing up.)    Smokeless tobacco: Never   Substance Use Topics    Alcohol use: Yes     Comment: hx alcoholism quit  March 10, 2022. [facility] in      Family History   Problem Relation Age of Onset    Psychotic Disorder Son         autism    Prostate Cancer Father         40s    Cancer Father     Cancer Maternal Grandmother         lung    Glaucoma Maternal Grandmother     Eye Disorder Maternal Grandmother         glaucoma    Diabetes Mother          45    Blood Disease Sister         sickle trait    Hypertension Sister     Blood Disease Paternal Uncle         sickle    Blood Disease Paternal Aunt         sickle    Alzheimer Disease Paternal Grandfather         70s    Macular Degeneration Paternal Grandfather     Cerebrovascular Disease No family hx of     Thyroid Disease No family hx of     Myocardial Infarction No family hx of     C.A.D. No family hx of          Current Outpatient Medications   Medication Sig Dispense Refill    acetaminophen (TYLENOL) 500 MG tablet Take 500-1,000 mg by mouth every 6 hours as needed for mild pain 1000 mg per day      amLODIPine (NORVASC) 2.5 MG tablet Take 1 tablet (2.5 mg) by mouth daily 90 tablet 3    Blood Pressure KIT Monitor blood pressure as needed. 1 kit 1    Cholecalciferol (VITAMIN D) 1000 UNIT capsule Take 1 capsule by mouth 2 times daily      ketoconazole (NIZORAL) 2 % external shampoo Apply topically daily. As body wash 120 mL 11    Nerve Stimulator (TENS THERAPY PAIN RELIEF) GEORGIE       omeprazole (PRILOSEC) 20 MG DR capsule Take 1 capsule (20 mg) by mouth 2 times daily. 180 capsule 1    triamcinolone (KENALOG) 0.1 % external ointment Apply topically 2 times daily. 80 g 3    valACYclovir (VALTREX) 1000 mg tablet Take 1 tablet (1,000 mg) by mouth 3 times daily for 7 days. 21 tablet 0     Allergies   Allergen Reactions    Risperidone Other (See Comments)     Serve numbness     Trimethoprim Hives    Effexor [Venlafaxine Hydrochloride] Other (See Comments)     Headache, Painful scrotum and drainage from the penis    Ambien [Zolpidem Tartrate] Nausea    Buspirone  "Nausea     Dizziness, headache    Celexa [Citalopram] Other (See Comments)     Headache    Duloxetine Other (See Comments)     Headache  headaches    Septra [Bactrim] Itching    Seroquel [Quetiapine] Other (See Comments)     Headache, N, V    Sulfa Antibiotics Itching    Sulfamethoxazole-Trimethoprim      hives    Tramadol Nausea     Nausea and headache    Ultram [Tramadol Hcl] Nausea and Vomiting    Venlafaxine     Zolpidem      headaches    Zolpidem Tartrate Other (See Comments)     headaches         Review of Systems  Constitutional, HEENT, cardiovascular, pulmonary, GI, , musculoskeletal, neuro, skin, endocrine and psych systems are negative, except as otherwise noted.     Objective    Exam  /73 (BP Location: Left arm, Patient Position: Chair, Cuff Size: Adult Large)   Pulse 54   Temp 97  F (36.1  C) (Temporal)   Resp 18   Ht 1.651 m (5' 5\")   Wt 76.7 kg (169 lb)   SpO2 100%   BMI 28.12 kg/m     Estimated body mass index is 28.12 kg/m  as calculated from the following:    Height as of this encounter: 1.651 m (5' 5\").    Weight as of this encounter: 76.7 kg (169 lb).    Physical Exam  GENERAL: alert and no distress  NECK: no adenopathy, no asymmetry, masses, or scars  RESP: lungs clear to auscultation - no rales, rhonchi or wheezes  CV: regular rate and rhythm, normal S1 S2, no S3 or S4, no murmur, click or rub, no peripheral edema  ABDOMEN: soft, nontender, no hepatosplenomegaly, no masses and bowel sounds normal      Signed Electronically by: Milton Iglesias MD, MD    "

## 2025-01-09 NOTE — PATIENT INSTRUCTIONS
At [facility], we strive to deliver an exceptional experience to you, every time we see you. If you receive a survey, please let us know what we are doing well and/or what we could improve upon, as we do value your feedback.  If you have MyChart, you can expect to receive results automatically within 24 hours of their completion.  Your provider will send a note interpreting your results as well.   If you do not have MyChart, you should receive your results in about a week by mail.    Your care team:                            Family Medicine Internal Medicine   MD Aaron Schulte, MD Melinda Hays, MD Jefe Mcknight, MD Amberly Casillas, PALeoC    Milton Iglesias, MD Pediatrics   Pilar Han, MD Georgia Oakes, MD Maricruz Coe, APRN CNP Sarita Iniguez APRN CNP   Mandi Reyna, MD Lisa Pérez, MD Fatimah Lee, CNP     Serafin Shankar, CNP Same-Day Provider (No follow-up visits)   RAJINDER Javier, DNP Jennifer Grimaldo, PA-C   RAJINDER Lockhart, FNP, BC FRANCESCA ConklinC     Clinic hours: Monday - Thursday 7 am-6 pm; Fridays 7 am-5 pm.   Urgent care: Monday - Friday 10 am- 8 pm; Saturday and Sunday 9 am-5 pm.    Clinic: [phone]       [facility]: Monday - Thursday 8 am - 7 pm; Friday 8 am - 6 pm  [facility]: [phone]     Patient Education   Preventive Care Advice   This is general advice given by our system to help you stay healthy. However, your care team may have specific advice just for you. Please talk to your care team about your preventive care needs.  Nutrition  Eat 5 or more servings of fruits and vegetables each day.  Try wheat bread, brown rice and whole grain pasta (instead of white bread, rice, and pasta).  Get enough calcium and vitamin D. Check the label on foods and aim for 100% of the RDA (recommended daily allowance).  Lifestyle  Exercise at least 150  minutes each week  (30 minutes a day, 5 days a week).  Do muscle strengthening activities 2 days a week. These help control your weight and prevent disease.  No smoking.  Wear sunscreen to prevent skin cancer.  Have a dental exam and cleaning every 6 months.  Yearly exams  See your health care team every year to talk about:  Any changes in your health.  Any medicines your care team has prescribed.  Preventive care, family planning, and ways to prevent chronic diseases.  Shots (vaccines)   HPV shots (up to age 26), if you've never had them before.  Hepatitis B shots (up to age 59), if you've never had them before.  COVID-19 shot: Get this shot when it's due.  Flu shot: Get a flu shot every year.  Tetanus shot: Get a tetanus shot every 10 years.  Pneumococcal, hepatitis A, and RSV shots: Ask your care team if you need these based on your risk.  Shingles shot (for age 50 and up)  General health tests  Diabetes screening:  Starting at age 35, Get screened for diabetes at least every 3 years.  If you are younger than age 35, ask your care team if you should be screened for diabetes.  Cholesterol test: At age 39, start having a cholesterol test every 5 years, or more often if advised.  Bone density scan (DEXA): At age 50, ask your care team if you should have this scan for osteoporosis (brittle bones).  Hepatitis C: Get tested at least once in your life.  STIs (sexually transmitted infections)  Before age 24: Ask your care team if you should be screened for STIs.  After age 24: Get screened for STIs if you're at risk. You are at risk for STIs (including HIV) if:  You are sexually active with more than one person.  You don't use condoms every time.  You or a partner was diagnosed with a sexually transmitted infection.  If you are at risk for HIV, ask about PrEP medicine to prevent HIV.  Get tested for HIV at least once in your life, whether you are at risk for HIV or not.  Cancer screening tests  Cervical cancer screening:  If you have a cervix, begin getting regular cervical cancer screening tests starting at age 21.  Breast cancer scan (mammogram): If you've ever had breasts, begin having regular mammograms starting at age 40. This is a scan to check for breast cancer.  Colon cancer screening: It is important to start screening for colon cancer at age 45.  Have a colonoscopy test every 10 years (or more often if you're at risk) Or, ask your provider about stool tests like a FIT test every year or Cologuard test every 3 years.  To learn more about your testing options, visit:   .  For help making a decision, visit:   https://bit.ly/nj61946.  Prostate cancer screening test: If you have a prostate, ask your care team if a prostate cancer screening test (PSA) at age 55 is right for you.  Lung cancer screening: If you are a current or former smoker ages 50 to 80, ask your care team if ongoing lung cancer screenings are right for you.  For informational purposes only. Not to replace the advice of your health care provider. Copyright   2023 [employer]. All rights reserved. Clinically reviewed by the [facility] Transitions Program. [employer] [id number] - REV 01/24.  Learning About Depression Screening  What is depression screening?  Depression screening is a way to see if you have depression symptoms. It may be done by a doctor or counselor. It's often part of a routine checkup. That's because your mental health is just as important as your physical health.  Depression is a mental health condition that affects how you feel, think, and act. You may:  Have less energy.  Lose interest in your daily activities.  Feel sad and grouchy for a long time.  Depression is very common. It affects people of all ages.  Many things can lead to depression. Some people become depressed after they have a stroke or find out they have a major illness like cancer or heart disease. The death of a loved one or a breakup may lead to depression. It  "can run in families. Most experts believe that a combination of inherited genes and stressful life events can cause it.  What happens during screening?  You may be asked to fill out a form about your depression symptoms. You and the doctor will discuss your answers. The doctor may ask you more questions to learn more about how you think, act, and feel.  What happens after screening?  If you have symptoms of depression, your doctor will talk to you about your options.  Doctors usually treat depression with medicines or counseling. Often, combining the two works best. Many people don't get help because they think that they'll get over the depression on their own. But people with depression may not get better unless they get treatment.  The cause of depression is not well understood. There may be many factors involved. But if you have depression, it's not your fault.  A serious symptom of depression is thinking about death or suicide. If you or someone you care about talks about this or about feeling hopeless, get help right away.  It's important to know that depression can be treated. Medicine, counseling, and self-care may help.  Where can you learn more?  Go to https://www.Insights.net/patiented  Enter T185 in the search box to learn more about \"Learning About Depression Screening.\"  Current as of: July 31, 2024  Content Version: 14.3    2024 [employer].   Care instructions adapted under license by your healthcare professional. If you have questions about a medical condition or this instruction, always ask your healthcare professional. [employer] disclaims any warranty or liability for your use of this information.       Patient Education   Preventive Care Advice   This is general advice given by our system to help you stay healthy. However, your care team may have specific advice just for you. Please talk to your care team about your preventive care needs.  Nutrition  Eat 5 or more " servings of fruits and vegetables each day.  Try wheat bread, brown rice and whole grain pasta (instead of white bread, rice, and pasta).  Get enough calcium and vitamin D. Check the label on foods and aim for 100% of the RDA (recommended daily allowance).  Lifestyle  Exercise at least 150 minutes each week  (30 minutes a day, 5 days a week).  Do muscle strengthening activities 2 days a week. These help control your weight and prevent disease.  No smoking.  Wear sunscreen to prevent skin cancer.  Have a dental exam and cleaning every 6 months.  Yearly exams  See your health care team every year to talk about:  Any changes in your health.  Any medicines your care team has prescribed.  Preventive care, family planning, and ways to prevent chronic diseases.  Shots (vaccines)   HPV shots (up to age 26), if you've never had them before.  Hepatitis B shots (up to age 59), if you've never had them before.  COVID-19 shot: Get this shot when it's due.  Flu shot: Get a flu shot every year.  Tetanus shot: Get a tetanus shot every 10 years.  Pneumococcal, hepatitis A, and RSV shots: Ask your care team if you need these based on your risk.  Shingles shot (for age 50 and up)  General health tests  Diabetes screening:  Starting at age 35, Get screened for diabetes at least every 3 years.  If you are younger than age 35, ask your care team if you should be screened for diabetes.  Cholesterol test: At age 39, start having a cholesterol test every 5 years, or more often if advised.  Bone density scan (DEXA): At age 50, ask your care team if you should have this scan for osteoporosis (brittle bones).  Hepatitis C: Get tested at least once in your life.  STIs (sexually transmitted infections)  Before age 24: Ask your care team if you should be screened for STIs.  After age 24: Get screened for STIs if you're at risk. You are at risk for STIs (including HIV) if:  You are sexually active with more than one person.  You don't use condoms  every time.  You or a partner was diagnosed with a sexually transmitted infection.  If you are at risk for HIV, ask about PrEP medicine to prevent HIV.  Get tested for HIV at least once in your life, whether you are at risk for HIV or not.  Cancer screening tests  Cervical cancer screening: If you have a cervix, begin getting regular cervical cancer screening tests starting at age 21.  Breast cancer scan (mammogram): If you've ever had breasts, begin having regular mammograms starting at age 40. This is a scan to check for breast cancer.  Colon cancer screening: It is important to start screening for colon cancer at age 45.  Have a colonoscopy test every 10 years (or more often if you're at risk) Or, ask your provider about stool tests like a FIT test every year or Cologuard test every 3 years.  To learn more about your testing options, visit:   .  For help making a decision, visit:   https://bit.ly/bj54045.  Prostate cancer screening test: If you have a prostate, ask your care team if a prostate cancer screening test (PSA) at age 55 is right for you.  Lung cancer screening: If you are a current or former smoker ages 50 to 80, ask your care team if ongoing lung cancer screenings are right for you.  For informational purposes only. Not to replace the advice of your health care provider. Copyright   2023 [facility]. All rights reserved. Clinically reviewed by the [facility] Transitions Program. [employer] [id number] - REV 01/24.  Learning About Depression Screening  What is depression screening?  Depression screening is a way to see if you have depression symptoms. It may be done by a doctor or counselor. It's often part of a routine checkup. That's because your mental health is just as important as your physical health.  Depression is a mental health condition that affects how you feel, think, and act. You may:  Have less energy.  Lose interest in your daily activities.  Feel sad and grouchy for a  "long time.  Depression is very common. It affects people of all ages.  Many things can lead to depression. Some people become depressed after they have a stroke or find out they have a major illness like cancer or heart disease. The death of a loved one or a breakup may lead to depression. It can run in families. Most experts believe that a combination of inherited genes and stressful life events can cause it.  What happens during screening?  You may be asked to fill out a form about your depression symptoms. You and the doctor will discuss your answers. The doctor may ask you more questions to learn more about how you think, act, and feel.  What happens after screening?  If you have symptoms of depression, your doctor will talk to you about your options.  Doctors usually treat depression with medicines or counseling. Often, combining the two works best. Many people don't get help because they think that they'll get over the depression on their own. But people with depression may not get better unless they get treatment.  The cause of depression is not well understood. There may be many factors involved. But if you have depression, it's not your fault.  A serious symptom of depression is thinking about death or suicide. If you or someone you care about talks about this or about feeling hopeless, get help right away.  It's important to know that depression can be treated. Medicine, counseling, and self-care may help.  Where can you learn more?  Go to https://www.[employer]mberWave.net/patiented  Enter T185 in the search box to learn more about \"Learning About Depression Screening.\"  Current as of: July 31, 2024  Content Version: 14.3    2024 [employer].   Care instructions adapted under license by your healthcare professional. If you have questions about a medical condition or this instruction, always ask your healthcare professional. [employer] disclaims any warranty or liability for your use of this " information.

## 2025-01-14 ENCOUNTER — OFFICE VISIT (OUTPATIENT)
Dept: OPTOMETRY | Facility: CLINIC | Age: 65
End: 2025-01-14
Payer: COMMERCIAL

## 2025-01-14 DIAGNOSIS — H04.123 DRY EYE SYNDROME OF BOTH EYES: ICD-10-CM

## 2025-01-14 DIAGNOSIS — Z01.00 EXAMINATION OF EYES AND VISION: Primary | ICD-10-CM

## 2025-01-14 DIAGNOSIS — H52.13 MYOPIA OF BOTH EYES: ICD-10-CM

## 2025-01-14 DIAGNOSIS — H52.4 PRESBYOPIA: ICD-10-CM

## 2025-01-14 DIAGNOSIS — H10.13 ALLERGIC CONJUNCTIVITIS OF BOTH EYES: ICD-10-CM

## 2025-01-14 DIAGNOSIS — H52.223 REGULAR ASTIGMATISM OF BOTH EYES: ICD-10-CM

## 2025-01-14 DIAGNOSIS — H25.813 COMBINED FORMS OF AGE-RELATED CATARACT OF BOTH EYES: ICD-10-CM

## 2025-01-14 PROCEDURE — 92015 DETERMINE REFRACTIVE STATE: CPT | Performed by: OPTOMETRIST

## 2025-01-14 PROCEDURE — 92014 COMPRE OPH EXAM EST PT 1/>: CPT | Performed by: OPTOMETRIST

## 2025-01-14 RX ORDER — OLOPATADINE HYDROCHLORIDE 2 MG/ML
1 SOLUTION/ DROPS OPHTHALMIC DAILY
Qty: 2.5 ML | Refills: 11 | Status: CANCELLED | OUTPATIENT
Start: 2025-01-14

## 2025-01-14 RX ORDER — POLYETHYLENE GLYCOL 400 AND PROPYLENE GLYCOL 4; 3 MG/ML; MG/ML
1 SOLUTION/ DROPS OPHTHALMIC 4 TIMES DAILY
Qty: 6 ML | Refills: 12 | Status: CANCELLED | OUTPATIENT
Start: 2025-01-14

## 2025-01-14 ASSESSMENT — KERATOMETRY
OD_AXISANGLE2_DEGREES: 172
OS_AXISANGLE_DEGREES: 107
OS_AXISANGLE2_DEGREES: 017
OS_K1POWER_DIOPTERS: 44.25
OD_AXISANGLE_DEGREES: 082
OD_K1POWER_DIOPTERS: 44.00
OS_K2POWER_DIOPTERS: 44.75
OD_K2POWER_DIOPTERS: 44.25

## 2025-01-14 ASSESSMENT — CUP TO DISC RATIO
OD_RATIO: 0.4
OS_RATIO: 0.4

## 2025-01-14 ASSESSMENT — TONOMETRY
OS_IOP_MMHG: 18.4
OD_IOP_MMHG: 20.7
IOP_METHOD: ICARE

## 2025-01-14 ASSESSMENT — REFRACTION_MANIFEST
OD_SPHERE: -1.75
OS_CYLINDER: +0.50
OS_SPHERE: -1.75
OS_ADD: +2.25
OS_AXIS: 163
OD_ADD: +2.25
OD_AXIS: 025
OD_CYLINDER: +1.25

## 2025-01-14 ASSESSMENT — REFRACTION_WEARINGRX
OS_AXIS: 163
SPECS_TYPE: DIDNT BRING
OD_AXIS: 030
OD_CYLINDER: +0.50
OS_SPHERE: -1.50
OD_SPHERE: -1.75
OS_CYLINDER: +0.25

## 2025-01-14 ASSESSMENT — VISUAL ACUITY
OD_PH_SC: 20/30
OS_SC: 20/20
OS_PH_SC+: -2
OD_SC+: -2
OD_SC: 20/30-1
OS_SC: 20/50
OD_SC: 20/60
OS_PH_SC: 20/25
METHOD: SNELLEN - LINEAR
OD_PH_SC+: -1

## 2025-01-14 ASSESSMENT — CONF VISUAL FIELD
OS_INFERIOR_NASAL_RESTRICTION: 0
OD_SUPERIOR_TEMPORAL_RESTRICTION: 0
OS_NORMAL: 1
OS_SUPERIOR_TEMPORAL_RESTRICTION: 0
OD_SUPERIOR_NASAL_RESTRICTION: 0
OD_NORMAL: 1
OS_SUPERIOR_NASAL_RESTRICTION: 0
OS_INFERIOR_TEMPORAL_RESTRICTION: 0
OD_INFERIOR_NASAL_RESTRICTION: 0
OD_INFERIOR_TEMPORAL_RESTRICTION: 0

## 2025-01-14 ASSESSMENT — EXTERNAL EXAM - RIGHT EYE: OD_EXAM: NORMAL

## 2025-01-14 ASSESSMENT — SLIT LAMP EXAM - LIDS
COMMENTS: NORMAL
COMMENTS: NORMAL

## 2025-01-14 ASSESSMENT — EXTERNAL EXAM - LEFT EYE: OS_EXAM: NORMAL

## 2025-01-14 NOTE — PROGRESS NOTES
Chief Complaint   Patient presents with    Annual Eye Exam      Accompanied by son  Last Eye Exam: 12-  Dilated Previously: Yes    What are you currently using to see?  glasses       Distance Vision Acuity: Satisfied with vision    Near Vision Acuity: Satisfied with vision while reading  with glasses    Eye Comfort: itchy and dry   Do you use eye drops? : Yes: systane as needed, works well.  Pataday- does not need refills.  Occupation or Hobbies: retired     He feels like he may be prediabetic- some blurred vision and thirstiness.  Last physical was 1/9/2025 and was told he is not diabetic.        Phuong Oakes Optometric Assistant, A.B.O.C.        Medical, surgical and family histories reviewed and updated 1/14/2025.       OBJECTIVE: See Ophthalmology exam    ASSESSMENT:    ICD-10-CM    1. Examination of eyes and vision  Z01.00 EYE EXAM (SIMPLE-NONBILLABLE)      2. Myopia of both eyes  H52.13 REFRACTION      3. Regular astigmatism of both eyes  H52.223 REFRACTION      4. Presbyopia  H52.4 REFRACTION      5. Dry eye syndrome of both eyes  H04.123 EYE EXAM (SIMPLE-NONBILLABLE)      6. Allergic conjunctivitis of both eyes  H10.13 EYE EXAM (SIMPLE-NONBILLABLE)      7. Combined forms of age-related cataract of both eyes  H25.813 EYE EXAM (SIMPLE-NONBILLABLE)          PLAN:     Patient Instructions   Eyeglass prescription given.  There is a slight change in prescription.    Artificial tears- 1 drop both eyes once before bedtime and once in the morning then 2 x day as needed.       Pataday to be used once daily for itchy eyes.  Use as needed. Contact your pharmacy for refills.    You have the start of mild cataracts.  You may notice some blurred vision or glare with night driving.  It is important that you wear good sunglasses to protect your eyes from the ultraviolet light from the sun.  I recommend that you return in 1 year for an eye exam unless there are any sudden changes in your vision.       Return in 1 year  for a complete eye exam or sooner if needed.    Willis Jones, OD

## 2025-01-14 NOTE — PATIENT INSTRUCTIONS
Eyeglass prescription given.  There is a slight change in prescription.    Artificial tears- 1 drop both eyes once before bedtime and once in the morning then 2 x day as needed.       Pataday to be used once daily for itchy eyes.  Use as needed. Contact your pharmacy for refills.    You have the start of mild cataracts.  You may notice some blurred vision or glare with night driving.  It is important that you wear good sunglasses to protect your eyes from the ultraviolet light from the sun.  I recommend that you return in 1 year for an eye exam unless there are any sudden changes in your vision.       Return in 1 year for a complete eye exam or sooner if needed.    Willis Jones, OD    The affects of the dilating drops last for 4- 6 hours.  You will be more sensitive to light and vision will be blurry up close.  Do not drive if you do not feel comfortable.  Mydriatic sunglasses were given if needed.      Optometry Providers       Clinic Locations                                 Telephone Number   Dr. Milagro Drake    [facility] [phone]     [facility] Hours:                [facility] Hours:       [facility] Hours:   [address]   [address]     [address] [place] [address]   [place] [address]    [place] [address]  Phone: [phone]                    Phone: [phone]     Phone: [phone]                      Monday 8:00-6:00                          Monday 8:00-6:00                          Monday 8:00-6:00              Tuesday 8:00-6:00                          Tuesday 8:00-6:00                          Tuesday 8:00-6:00              Wednesday 8:00-6:00                  Wednesday 8:00-6:00                   Wednesday 8:00-6:00      Thursday 8:00-6:00                         Thursday 8:00-6:00                         Thursday 8:00-6:00            Friday 8:00-5:00                              Friday 8:00-5:00                              Friday 8:00-5:00    [facility] Hours:   [address] [place] [address]  [phone]    Monday 9:00-6:00  Tuesday 9:00-6:00  Wednesday 9:00-6:00  Thursday 9:00-6:00  Friday 9:00-5:00  As always, Thank you for trusting us with your health care needs!

## 2025-01-14 NOTE — LETTER
1/14/2025      Jeanmarie Mclean  [address]  [place] [address]      Dear Colleague,    Thank you for referring your patient, Jeanmarie Mclean, to the [facility]. Please see a copy of my visit note below.    Chief Complaint   Patient presents with     Annual Eye Exam      Accompanied by son  Last Eye Exam: 12-  Dilated Previously: Yes    What are you currently using to see?  glasses       Distance Vision Acuity: Satisfied with vision    Near Vision Acuity: Satisfied with vision while reading  with glasses    Eye Comfort: itchy and dry   Do you use eye drops? : Yes: systane as needed, works well.  Pataday- does not need refills.  Occupation or Hobbies: retired     He feels like he may be prediabetic- some blurred vision and thirstiness.  Last physical was 1/9/2025 and was told he is not diabetic.        Phuong Oakes Optometric Assistant, A.B.O.C.        Medical, surgical and family histories reviewed and updated 1/14/2025.       OBJECTIVE: See Ophthalmology exam    ASSESSMENT:    ICD-10-CM    1. Examination of eyes and vision  Z01.00 EYE EXAM (SIMPLE-NONBILLABLE)      2. Myopia of both eyes  H52.13 REFRACTION      3. Regular astigmatism of both eyes  H52.223 REFRACTION      4. Presbyopia  H52.4 REFRACTION      5. Dry eye syndrome of both eyes  H04.123 EYE EXAM (SIMPLE-NONBILLABLE)      6. Allergic conjunctivitis of both eyes  H10.13 EYE EXAM (SIMPLE-NONBILLABLE)      7. Combined forms of age-related cataract of both eyes  H25.813 EYE EXAM (SIMPLE-NONBILLABLE)          PLAN:     Patient Instructions   Eyeglass prescription given.  There is a slight change in prescription.    Artificial tears- 1 drop both eyes once before bedtime and once in the morning then 2 x day as needed.       Pataday to be used once daily for itchy eyes.  Use as needed. Contact your pharmacy for refills.    You have the start of mild cataracts.  You may notice some blurred vision or glare with night  driving.  It is important that you wear good sunglasses to protect your eyes from the ultraviolet light from the sun.  I recommend that you return in 1 year for an eye exam unless there are any sudden changes in your vision.       Return in 1 year for a complete eye exam or sooner if needed.    Willis Jones, OD         Again, thank you for allowing me to participate in the care of your patient.        Sincerely,        Willis Jones, OD    Electronically signed

## 2025-01-21 ENCOUNTER — ANCILLARY PROCEDURE (OUTPATIENT)
Dept: MRI IMAGING | Facility: CLINIC | Age: 65
End: 2025-01-21
Payer: COMMERCIAL

## 2025-01-21 DIAGNOSIS — M79.89 SOFT TISSUE MASS: ICD-10-CM

## 2025-01-21 PROCEDURE — A9585 GADOBUTROL INJECTION: HCPCS | Mod: JZ | Performed by: RADIOLOGY

## 2025-01-21 PROCEDURE — 73220 MRI UPPR EXTREMITY W/O&W/DYE: CPT | Mod: RT | Performed by: RADIOLOGY

## 2025-01-21 RX ORDER — GADOBUTROL 604.72 MG/ML
7.5 INJECTION INTRAVENOUS ONCE
Status: COMPLETED | OUTPATIENT
Start: 2025-01-21 | End: 2025-01-21

## 2025-01-21 RX ADMIN — GADOBUTROL 7.5 ML: 604.72 INJECTION INTRAVENOUS at 11:00

## 2025-01-22 ENCOUNTER — VIRTUAL VISIT (OUTPATIENT)
Dept: ORTHOPEDICS | Facility: CLINIC | Age: 65
End: 2025-01-22
Payer: COMMERCIAL

## 2025-01-22 ENCOUNTER — TELEPHONE (OUTPATIENT)
Dept: ORTHOPEDICS | Facility: CLINIC | Age: 65
End: 2025-01-22

## 2025-01-22 DIAGNOSIS — M67.40 GANGLION CYST: Primary | ICD-10-CM

## 2025-01-22 NOTE — LETTER
1/22/2025      Jeanmarie Mclean  [address]  [facility] [facility] [address]      Dear Colleague,    Thank you for referring your patient, Jeanmarie Mclean, to the [facility] [place]. Please see a copy of my visit note below.    Virtual Visit Details    Type of service:  Telephone Visit   Phone call duration: 11 minutes   Originating Location (pt. Location): Home    Distant Location (provider location):  On-site  Telephone visit completed due to the patient did not have access to video, while the distant provider did.    Chief Complaint: Right dorsal hand mass; right anterior ankle pain and swelling.      HPI: Jeanmarie is a 64-year-old man who has complaints of dorsal right hand swelling near the third knuckle.  He reports this has been going on for a couple months.  The mass has gotten bigger and smaller at times.  It is occasionally tender when pressed.  He got an MRI because he had a history of a right thigh myxoma and was concerned that this was a recurrent tumor in his hand.  He also reports anterior right ankle swelling for the last 3 weeks with some pain.  He likes to walk on the treadmill for quite a long ways add a steep incline.  He saw his PCP who recommended just watch and rest.  He has not had any imaging.  No other concerns.    Imaging: I did review his recent right hand MRI with and without contrast from 1/21/2025.  This shows:  1. Just distal to the external marker, 11 x 9 x 11 mm  ganglion/synovial cyst intimate with third MCP joint.  2. Query additional smaller ganglion/synovial cysts at volar aspect of  second MCP, and along ulnar styloi    Impression: 64-year-old man with right dorsal hand mass consistent with ganglion cyst, no sign of recurrent myxoma, new onset right ankle pain without imaging    Plan: I explained to Jeanmarie that this is not a concerning mass.  The hand mass represents a ganglion cyst, which is quite common in the wrist and hand and associated with arthritis.   He does have some sign of degeneration in this area.  I explained the treatment options which are usually do nothing, aspiration, or surgical excision.  With both aspiration and surgery, there is a greater then 50% chance of the mass recurring.  It is not very bothersome, so I recommend he just leave it alone for now.  If it becomes more bothersome or growing, we can have our hand colleagues take a look and discussed treatment options with him.  He agrees with this plan.  With regards to his ankle, I recommend he lower the treadmill to flat ground or minimal incline for the next couple weeks and see if his ankle pain and swelling improves.  If it does not, we can see him back for an in person visit with three-view x-ray of the ankle.  He agrees with the plan.  All questions answered.      Again, thank you for allowing me to participate in the care of your patient.        Sincerely,        Naa Cross PA-C    Electronically signed

## 2025-01-22 NOTE — PROGRESS NOTES
Virtual Visit Details    Type of service:  Telephone Visit   Phone call duration: 11 minutes   Originating Location (pt. Location): Home    Distant Location (provider location):  On-site  Telephone visit completed due to the patient did not have access to video, while the distant provider did.    Chief Complaint: Right dorsal hand mass; right anterior ankle pain and swelling.      HPI: Jeanmarie is a 64-year-old man who has complaints of dorsal right hand swelling near the third knuckle.  He reports this has been going on for a couple months.  The mass has gotten bigger and smaller at times.  It is occasionally tender when pressed.  He got an MRI because he had a history of a right thigh myxoma and was concerned that this was a recurrent tumor in his hand.  He also reports anterior right ankle swelling for the last 3 weeks with some pain.  He likes to walk on the treadmill for quite a long ways add a steep incline.  He saw his PCP who recommended just watch and rest.  He has not had any imaging.  No other concerns.    Imaging: I did review his recent right hand MRI with and without contrast from 1/21/2025.  This shows:  1. Just distal to the external marker, 11 x 9 x 11 mm  ganglion/synovial cyst intimate with third MCP joint.  2. Query additional smaller ganglion/synovial cysts at volar aspect of  second MCP, and along ulnar styloi    Impression: 64-year-old man with right dorsal hand mass consistent with ganglion cyst, no sign of recurrent myxoma, new onset right ankle pain without imaging    Plan: I explained to Jeanmarie that this is not a concerning mass.  The hand mass represents a ganglion cyst, which is quite common in the wrist and hand and associated with arthritis.  He does have some sign of degeneration in this area.  I explained the treatment options which are usually do nothing, aspiration, or surgical excision.  With both aspiration and surgery, there is a greater then 50% chance of the mass recurring.  It is  not very bothersome, so I recommend he just leave it alone for now.  If it becomes more bothersome or growing, we can have our hand colleagues take a look and discussed treatment options with him.  He agrees with this plan.  With regards to his ankle, I recommend he lower the treadmill to flat ground or minimal incline for the next couple weeks and see if his ankle pain and swelling improves.  If it does not, we can see him back for an in person visit with three-view x-ray of the ankle.  He agrees with the plan.  All questions answered.

## 2025-01-22 NOTE — TELEPHONE ENCOUNTER
Writer called Pt and spoke to them about the pea size lump on his right foot.  Pt had messaged team earlier about right foot lump, and provider wanted to wait until his MRI results (1/21/25) for a pea size lump on his right hand, before making treatment plans.  Phone visit set up for phone visit to discus MRI results.

## 2025-01-27 ENCOUNTER — APPOINTMENT (OUTPATIENT)
Dept: OPTOMETRY | Facility: CLINIC | Age: 65
End: 2025-01-27
Payer: COMMERCIAL

## 2025-01-27 PROCEDURE — 92340 FIT SPECTACLES MONOFOCAL: CPT | Performed by: OPTOMETRIST

## 2025-01-28 ENCOUNTER — MYC MEDICAL ADVICE (OUTPATIENT)
Dept: FAMILY MEDICINE | Facility: CLINIC | Age: 65
End: 2025-01-28
Payer: COMMERCIAL

## 2025-01-30 ENCOUNTER — VIRTUAL VISIT (OUTPATIENT)
Dept: FAMILY MEDICINE | Facility: CLINIC | Age: 65
End: 2025-01-30
Payer: COMMERCIAL

## 2025-01-30 DIAGNOSIS — F41.1 GENERALIZED ANXIETY DISORDER: Primary | ICD-10-CM

## 2025-01-30 ASSESSMENT — ENCOUNTER SYMPTOMS: NERVOUS/ANXIOUS: 1

## 2025-01-30 NOTE — PROGRESS NOTES
"Jeanmarie is a 64 year old who is being evaluated via a billable telephone visit.    What phone number would you like to be contacted at? [phone]  How would you like to obtain your AVS? [employer]  Originating Location (pt. Location): Home    Distant Location (provider location):  On-site  Telephone visit completed due to the patient did not consent to a video visit.    If patient has telephone visit, have they been educated on video visit as preferred visit method and offered to change to video visit? NO - PATIENT DECLINED     Instructions Relayed to Patient by Virtual Roomer:     Patient is active on [employer]:   Relayed following to patient: \"It looks like you are active on [employer], are you able to join the visit this way? If not, do you need us to send you a link now or would you like your provider to send a link via text or email when they are ready to initiate the visit?\"      Patient Confirmed they will join visit via: Provider to call patient for telephone visit   Reminded patient to ensure they were logged on to virtual visit by arrival time listed.   Asked if patient has flexibility to initiate visit sooner than arrival time: patient stated yes, documented in appointment notes availability to initiate visit earlier than arrival time     If pediatric virtual visit, ensured pediatric patient along with parent/guardian will be present for video visit.     Patient offered the website [email or web address] and/or phone number to [employer] Help line: [phone]   Assessment & Plan     Generalized anxiety disorder  Patient with history of anxiety, PTSD, insomnia and TBI.  He presented to visit and when when I asked how I could be of help, patient stated that he was calling to ask for \" a prescription of 30 tablets of lorazepam\".     Stated that he was requesting lorazepam mostly for sleep but as well as anxiety.  As I was speaking with him, I reviewed his medication list with the patient and it does not " "appear that this is one of his chronic medications.  I then reviewed PDMP and noticed that he has not had any recent prescriptions for lorazepam.  He received 1 tablet on 11/21/2024, 2 tablets on 6/24/2024 and 2 tablets on 2/23/2024.    Patient then proceeded to tell me that [facility] and [facility] and that is why I can't see some of his previous prescriptions.    I tried to redirect the conversation to talk about his current symptoms, patient listed several medications he has tried for sleep in the past that have not worked for him bloating Seroquel, Cymbalta and gabapentin. I offered to start patient on hydroxyzine to get started in place a psychiatry referral, he declined.  Reported that hydroxyzine stays too long in his system and declined.    I then told patient that I would not be prescribing him the amount of lorazepam he was asking for, and then he asked if I could prescribe \" at least 10\".  He reiterated that I would feel more comfortable starting with hydroxyzine and seeing how the medication would work for him before moving onto lorazepam which upset patient and he hung up.        Julio Cesar Murry is a 64 year old, presenting for the following health issues:  Anxiety and Insomnia      1/30/2025     3:23 PM   Additional Questions   Roomed by Guerrero   Accompanied by self         1/30/2025     3:23 PM   Patient Reported Additional Medications   Patient reports taking the following new medications No     Anxiety    History of Present Illness       Reason for visit:  Anxiety and insomnia He is missing 3 dose(s) of medications per week.  He is not taking prescribed medications regularly due to remembering to take.                   Objective         Vitals:  No vitals were obtained today due to virtual visit.    Physical Exam   General: Alert and no distress //Respiratory: No audible wheeze, cough, or shortness of breath // Psychiatric:  " Appropriate affect, tone, and pace of words          Phone call duration: 13 minutes  Signed Electronically by: RAJINDER Javier CNP

## 2025-02-12 ENCOUNTER — TRANSFERRED RECORDS (OUTPATIENT)
Dept: HEALTH INFORMATION MANAGEMENT | Facility: CLINIC | Age: 65
End: 2025-02-12

## 2025-05-07 ENCOUNTER — NURSE TRIAGE (OUTPATIENT)
Dept: FAMILY MEDICINE | Facility: CLINIC | Age: 65
End: 2025-05-07
Payer: COMMERCIAL

## 2025-05-07 NOTE — TELEPHONE ENCOUNTER
"S-(situation): Patient reporting worsening R pelvic/hip bone pain, present for many years but becoming more frequent.    B-(background): Hx fibromyalgia, DDD, TBI, low back pain, PTSD, schizoaffective disorder, insomnia. Patient seen in ED 3/24 for abd pain (RUQ and R flank), and ED 4/8 for insomnia and chronic abd/rib pain.    A-(assessment):   - Patient states this pain has been intermittent for \"many, many\" years, but recently has become more frequent. Used to be a few times per year, now a few times per day  - Patient states it seems to be located on the R lower abd near \"the bone that juts out there\", possibly iliac crest. Patient then mentioned some hip pain, but feels like it's more pelvic pain  - States it's a sharp, stabbing pain, lasts about 30-60 seconds then goes away. Can come and go for a while and then disappear  - Patient unable to find any trend in the pain, able to ambulate normally with the pain present  - No other symptoms per patient  - Patient feels like he needs a bone biopsy    R-(recommendations): Recommended OV to be assessed, patient prefers to only see PCP. Appt made for this Friday per patient request, states he needs at least 24 hours advance warning d/t scheduling transport. No other questions or concerns.      Anan Armenta, RN, BSN  [facility]  [place], [place] and [place]    Reason for Disposition   Patient wants to be seen   Patient wants to be seen    Additional Information   Negative: Looks like a broken bone or dislocated joint (e.g., crooked or deformed)   Negative: Sounds like a life-threatening emergency to the triager   Negative: Followed a hip injury   Negative: Leg pain is main symptom   Negative: Back pain radiating (shooting) into hip   Negative: SEVERE pain (e.g., excruciating, unable to do any normal activities) and fever   Negative: Can't stand (bear weight) or walk   Negative: Fever and red area (or area very tender to touch)   " Negative: Patient sounds very sick or weak to the triager   Negative: SEVERE pain (e.g., excruciating, unable to do any normal activities)   Negative: Red area or streak > 2 inches (or 5 cm)   Negative: Painful rash with multiple small blisters grouped together (i.e., dermatomal distribution or 'band' or 'stripe')   Negative: Looks like a boil, infected sore, deep ulcer, or other infected rash (spreading redness, pus)   Negative: Localized rash is very painful (no fever)   Negative: Numbness in a leg or foot (i.e., loss of sensation)   Negative: Passed out (e.g., fainted, lost consciousness, blacked out and was not responding)   Negative: Shock suspected (e.g., cold/pale/clammy skin, too weak to stand, low BP, rapid pulse)   Negative: Sounds like a life-threatening emergency to the triager   Negative: Followed an abdomen (stomach) injury   Negative: Chest pain   Negative: Pain is mainly in upper abdomen (if needed ask: 'is it mainly above the belly button?')   Negative: Abdomen bloating or swelling are main symptoms   Negative: SEVERE abdominal pain (e.g., excruciating)   Negative: Vomiting red blood or black (coffee ground) material   Negative: Blood in bowel movements  (Exception: Blood on surface of BM with constipation.)   Negative: Black or tarry bowel movements  (Exception: Chronic-unchanged black-grey BMs AND is taking iron pills or Pepto-Bismol.)   Negative: Unable to urinate (or only a few drops) and bladder feels very full   Negative: Pain in scrotum persists > 1 hour   Negative: MILD TO MODERATE constant pain lasting > 2 hours   Negative: MILD TO MODERATE constant pain lasting > 2 hours, and age > 60 years   Negative: Vomiting bile (green color)   Negative: Patient sounds very sick or weak to the triager   Negative: Vomiting and abdomen looks much more swollen than usual   Negative: White of the eyes have turned yellow (i.e., jaundice)   Negative: Blood in urine (red, pink, or tea-colored)   Negative:  Fever > 103 F (39.4 C)   Negative: Fever > 101 F (38.3 C) and over 60 years of age   Negative: Fever > 100 F (37.8 C) and has diabetes mellitus or a weak immune system (e.g., HIV positive, cancer chemotherapy, organ transplant, splenectomy, chronic steroids)   Negative: Fever > 100 F (37.8 C) and bedridden (e.g., CVA, chronic illness, recovering from surgery)   Negative: MODERATE pain (e.g., interferes with normal activities) that comes and goes (cramps) lasts > 24 hours  (Exception: Pain with Vomiting or Diarrhea - see that Protocol.)    Protocols used: Hip Pain-A-OH, Abdominal Pain - Male-A-OH

## 2025-05-09 ENCOUNTER — ANCILLARY PROCEDURE (OUTPATIENT)
Dept: GENERAL RADIOLOGY | Facility: CLINIC | Age: 65
End: 2025-05-09
Attending: FAMILY MEDICINE
Payer: COMMERCIAL

## 2025-05-09 DIAGNOSIS — M25.551 HIP PAIN, RIGHT: ICD-10-CM

## 2025-05-09 PROCEDURE — 73502 X-RAY EXAM HIP UNI 2-3 VIEWS: CPT | Mod: TC | Performed by: STUDENT IN AN ORGANIZED HEALTH CARE EDUCATION/TRAINING PROGRAM

## 2025-05-10 ENCOUNTER — RESULTS FOLLOW-UP (OUTPATIENT)
Dept: FAMILY MEDICINE | Facility: CLINIC | Age: 65
End: 2025-05-10

## 2025-06-04 ENCOUNTER — MYC MEDICAL ADVICE (OUTPATIENT)
Dept: FAMILY MEDICINE | Facility: CLINIC | Age: 65
End: 2025-06-04
Payer: COMMERCIAL

## 2025-06-04 NOTE — TELEPHONE ENCOUNTER
Routing to provider as JERI Armenta, RN, BSN  [facility]

## 2025-06-28 ENCOUNTER — MYC MEDICAL ADVICE (OUTPATIENT)
Dept: FAMILY MEDICINE | Facility: CLINIC | Age: 65
End: 2025-06-28
Payer: COMMERCIAL

## 2025-07-14 ENCOUNTER — NURSE TRIAGE (OUTPATIENT)
Dept: FAMILY MEDICINE | Facility: CLINIC | Age: 65
End: 2025-07-14
Payer: COMMERCIAL

## 2025-07-14 NOTE — TELEPHONE ENCOUNTER
This writer attempted to contact patient on 07/14/25      Reason for call triage tingling and left message.      If patient calls back:   Registered Nurse called.     If patient calls back triage foot tingling.    SONNY LopezN, RN  [facility]

## 2025-07-14 NOTE — TELEPHONE ENCOUNTER
Reason for Disposition    Patient wants to be seen    Additional Information    Negative: SEVERE weakness (e.g., unable to walk or barely able to walk, requires support) and new-onset or getting worse    Negative: Difficult to awaken or acting confused (e.g., disoriented, slurred speech)    Negative: Sudden onset of weakness of the face, arm or leg on one side of the body and present now (Exception: Bell's palsy suspected: weakness on one side of the face developing over hours to days, with no other symptoms.)    Negative: Sudden onset of numbness of the face, arm or leg on one side of the body and present now    Negative: Sudden onset of loss of speech or garbled speech and present now    Negative: Sounds like a life-threatening emergency to the triager    Negative: Confusion, disorientation, or hallucinations is main symptom    Negative: Dizziness is main symptom    Negative: Followed a head injury within last 3 days    Negative: Headache (with neurologic deficit)    Negative: Unable to urinate (or only a few drops) and bladder feels very full    Negative: Loss of bladder or bowel control (urine or bowel incontinence; wetting self, leaking stool) of new-onset    Negative: Back pain with numbness (loss of sensation) in groin or rectal area    Negative: Neurologic deficit that was brief (now gone), ANY of the following: * Weakness of the face, arm, or leg on one side of the body * Numbness of the face, arm, or leg on one side of the body * Loss of speech or garbled speech    Negative: Patient sounds very sick or weak to the triager    Negative: Neurologic deficit of gradual onset (e.g., days to weeks), ANY of the following: * Weakness of the face, arm, or leg on one side of the body* Numbness of the face, arm, or leg on one side of the body* Loss of speech or garbled speech    Negative: Buhl palsy suspected (i.e., weakness only one side of the face, developing over hours to days, no other symptoms)    Negative:  "Tingling (e.g., pins and needles) of the face, arm or leg on one side of the body, that is present now  (Exceptions: Chronic or recurrent symptom lasting > 4 weeks; or from known cause, such as: bumped elbow, carpal tunnel, pinched nerve.)    Negative: Back pain (with neurologic deficit)    Negative: Neck pain (with neurologic deficit)    Answer Assessment - Initial Assessment Questions  1. SYMPTOM: \"What is the main symptom you are concerned about?\" (e.g., weakness, numbness)      Right leg tingling on shin to the foot  2. ONSET: \"When did this start?\" (minutes, hours, days; while sleeping)      2 weeks  3. LAST NORMAL: \"When was the last time you (the patient) were normal (no symptoms)?\"      2 weeks ago  4. PATTERN \"Does this come and go, or has it been constant since it started?\"  \"Is it present now?\"      Comes and goes  5. CARDIAC SYMPTOMS: \"Have you had any of the following symptoms: chest pain, difficulty breathing, palpitations?\"      Had a lot of work done.  \"Have a bad pinch nurse on the upper back  6. NEUROLOGIC SYMPTOMS: \"Have you had any of the following symptoms: headache, dizziness, vision loss, double vision, changes in speech, unsteady on your feet?\"      Lightheaded \"a little\" blurry vision, \"diabetic?\"  7. OTHER SYMPTOMS: \"Do you have any other symptoms?\"      no  8. PREGNANCY: \"Is there any chance you are pregnant?\" \"When was your last menstrual period?\"      NA    Protocols used: Neurologic Deficit-A-OH    "

## 2025-07-15 ASSESSMENT — PATIENT HEALTH QUESTIONNAIRE - PHQ9
SUM OF ALL RESPONSES TO PHQ QUESTIONS 1-9: 10
SUM OF ALL RESPONSES TO PHQ QUESTIONS 1-9: 10
10. IF YOU CHECKED OFF ANY PROBLEMS, HOW DIFFICULT HAVE THESE PROBLEMS MADE IT FOR YOU TO DO YOUR WORK, TAKE CARE OF THINGS AT HOME, OR GET ALONG WITH OTHER PEOPLE: NOT DIFFICULT AT ALL

## 2025-07-16 ENCOUNTER — MYC MEDICAL ADVICE (OUTPATIENT)
Dept: FAMILY MEDICINE | Facility: CLINIC | Age: 65
End: 2025-07-16

## 2025-07-16 ENCOUNTER — OFFICE VISIT (OUTPATIENT)
Dept: FAMILY MEDICINE | Facility: CLINIC | Age: 65
End: 2025-07-16
Payer: COMMERCIAL

## 2025-07-16 VITALS
RESPIRATION RATE: 18 BRPM | HEART RATE: 56 BPM | TEMPERATURE: 97.4 F | OXYGEN SATURATION: 99 % | WEIGHT: 167 LBS | BODY MASS INDEX: 27.82 KG/M2 | HEIGHT: 65 IN | SYSTOLIC BLOOD PRESSURE: 136 MMHG | DIASTOLIC BLOOD PRESSURE: 82 MMHG

## 2025-07-16 DIAGNOSIS — R20.0 NUMBNESS AND TINGLING OF FOOT: Primary | ICD-10-CM

## 2025-07-16 DIAGNOSIS — R20.2 NUMBNESS AND TINGLING OF FOOT: Primary | ICD-10-CM

## 2025-07-16 PROCEDURE — 3075F SYST BP GE 130 - 139MM HG: CPT | Performed by: FAMILY MEDICINE

## 2025-07-16 PROCEDURE — 1126F AMNT PAIN NOTED NONE PRSNT: CPT | Performed by: FAMILY MEDICINE

## 2025-07-16 PROCEDURE — 99214 OFFICE O/P EST MOD 30 MIN: CPT | Performed by: FAMILY MEDICINE

## 2025-07-16 PROCEDURE — 3079F DIAST BP 80-89 MM HG: CPT | Performed by: FAMILY MEDICINE

## 2025-07-16 ASSESSMENT — ENCOUNTER SYMPTOMS: NUMBNESS: 1

## 2025-07-16 ASSESSMENT — PAIN SCALES - GENERAL: PAINLEVEL_OUTOF10: NO PAIN (0)

## 2025-07-16 NOTE — PATIENT INSTRUCTIONS
At [facility], we strive to deliver an exceptional experience to you, every time we see you. If you receive a survey, please let us know what we are doing well and/or what we could improve upon, as we do value your feedback.  If you have MyChart, you can expect to receive results automatically within 24 hours of their completion.  Your provider will send a note interpreting your results as well.   If you do not have MyChart, you should receive your results in about a week by mail.    Your care team:                            Family Medicine Internal Medicine   MD Aaron Schulte, MD Melinda Hays, MD Jefe Mcknight, MD Amberly Casillas, PA-C RAJINDER Javier, RICHA Iglesias, MD Pediatrics   MD Georgia Reese, MD Florinda Ramey, PAJOSÉ Iniguez APRN CNP   MD Lisa Lauren, MD Fatimah Lee, CNP      Same-Day Provider (No follow-up visits)    LIZ Murphy PA-C     Clinic hours: Monday - Thursday 7 am-6 pm; Fridays 7 am-5 pm.   Urgent care: Monday - Friday 10 am- 8 pm; Saturday and Sunday 9 am-5 pm.    Clinic: [phone]       [facility]: Monday - Thursday 8 am - 7 pm; Friday 8 am - 6 pm  [facility]: [phone]     [facility] Scheduling: Monday - Friday 7 am - 7 pm; Saturday 7 am - 3:30 pm  [phone] [place] : [phone]

## 2025-07-16 NOTE — PROGRESS NOTES
"  Assessment & Plan     (R20.0,  R20.2) Numbness and tingling of foot  (primary encounter diagnosis)  Comment:   Plan: EMG        Peripheral neuropathy? Will check emg.    BMI  Estimated body mass index is 27.79 kg/m  as calculated from the following:    Height as of this encounter: 1.651 m (5' 5\").    Weight as of this encounter: 75.8 kg (167 lb).       Depression Screening Follow Up        7/15/2025     3:05 PM   PHQ   PHQ-9 Total Score 10    Q9: Thoughts of better off dead/self-harm past 2 weeks Several days   F/U: Thoughts of suicide or self-harm No   F/U: Safety concerns Yes       Patient-reported         7/15/2025     3:05 PM   Last PHQ-9   1.  Little interest or pleasure in doing things 0   2.  Feeling down, depressed, or hopeless 2   3.  Trouble falling or staying asleep, or sleeping too much 3   4.  Feeling tired or having little energy 0   5.  Poor appetite or overeating 3   6.  Feeling bad about yourself 1   7.  Trouble concentrating 0   8.  Moving slowly or restless 0   Q9: Thoughts of better off dead/self-harm past 2 weeks 1   PHQ-9 Total Score 10    In the past two weeks have you had thoughts of suicide or self harm? No   Do you have concerns about your personal safety or the safety of others? Yes       Patient-reported              No data to display                      Follow Up Actions Taken  Crisis resource information provided in the After Visit Summary    Discussed the following ways the patient can remain in a safe environment:  be around others  Follow-up   No follow-ups on file.        Julio Cesar Murry is a 65 year old, presenting for the following health issues:  Numbness (Right Foot & Right Knee)      7/16/2025     1:19 PM   Additional Questions   Roomed by Mima     [place]  Associated symptoms include numbness.   History of Present Illness       Reason for visit:  Numbness and tingling of the right foot and lower right leg  Symptom onset:  1-2 weeks ago  Symptoms include:  Tingling and " "numbness sensation.  Symptom intensity:  Moderate  Symptom progression:  Worsening  Had these symptoms before:  No  What makes it worse:  No.  What makes it better:  Nothing thus far. He is missing 4 dose(s) of medications per week.  He is not taking prescribed medications regularly due to remembering to take and other.            Review of Systems  Constitutional, HEENT, cardiovascular, pulmonary, GI, , musculoskeletal, neuro, skin, endocrine and psych systems are negative, except as otherwise noted.      Objective    /82   Pulse 56   Temp 97.4  F (36.3  C) (Temporal)   Resp 18   Ht 1.651 m (5' 5\")   Wt 75.8 kg (167 lb)   SpO2 99%   BMI 27.79 kg/m    Body mass index is 27.79 kg/m .  Physical Exam   GENERAL: alert and no distress  NECK: no adenopathy, no asymmetry, masses, or scars  RESP: lungs clear to auscultation - no rales, rhonchi or wheezes  CV: regular rate and rhythm, normal S1 S2, no S3 or S4, no murmur, click or rub, no peripheral edema  ABDOMEN: soft, nontender, no hepatosplenomegaly, no masses and bowel sounds normal  MS: no gross musculoskeletal defects noted, no edema  EXAM:  NEURO: Gait normal. Sensation grossly WNL.          Signed Electronically by: Milton Iglesias MD, MD    "

## 2025-07-30 ENCOUNTER — OFFICE VISIT (OUTPATIENT)
Dept: DERMATOLOGY | Facility: CLINIC | Age: 65
End: 2025-07-30
Attending: DERMATOLOGY
Payer: COMMERCIAL

## 2025-07-30 DIAGNOSIS — D23.9 DERMATOFIBROMA: ICD-10-CM

## 2025-07-30 DIAGNOSIS — B36.0 TINEA VERSICOLOR DUE TO MALASSEZIA FURFUR: Primary | ICD-10-CM

## 2025-07-30 DIAGNOSIS — L82.1 SEBORRHEIC KERATOSIS: ICD-10-CM

## 2025-07-30 PROCEDURE — 1125F AMNT PAIN NOTED PAIN PRSNT: CPT | Performed by: DERMATOLOGY

## 2025-07-30 PROCEDURE — 99214 OFFICE O/P EST MOD 30 MIN: CPT | Performed by: DERMATOLOGY

## 2025-07-30 RX ORDER — TRAZODONE HYDROCHLORIDE 50 MG/1
TABLET ORAL
COMMUNITY
Start: 2025-04-04

## 2025-07-30 RX ORDER — KETOCONAZOLE 20 MG/ML
SHAMPOO, SUSPENSION TOPICAL WEEKLY
Qty: 120 ML | Refills: 11 | Status: SHIPPED | OUTPATIENT
Start: 2025-07-30

## 2025-07-30 RX ORDER — TRIAMCINOLONE ACETONIDE 1 MG/G
OINTMENT TOPICAL 2 TIMES DAILY
Qty: 80 G | Refills: 3 | Status: CANCELLED | OUTPATIENT
Start: 2025-07-30

## 2025-07-30 RX ORDER — MECLIZINE HYDROCHLORIDE 25 MG/1
25 TABLET ORAL
COMMUNITY
Start: 2024-05-25

## 2025-07-30 ASSESSMENT — PAIN SCALES - GENERAL: PAINLEVEL_OUTOF10: MODERATE PAIN (4)

## 2025-07-30 NOTE — NURSING NOTE
Jeanmarie Mclean's chief complaint for this visit includes:  Chief Complaint   Patient presents with    Skin Check     Hx of melasma on back. Itching has resolved. Still noticing hyperpigmentation . Areas of concern: abdomen and left thigh. No hx of skin cancer     PCP: Milton Iglesias    Referring Provider:  Gurpreet Parker MD  [address] [place] [id number]    There were no vitals taken for this visit.  Moderate Pain (4)        Allergies   Allergen Reactions    Risperidone Other (See Comments)     Serve numbness     Trimethoprim Hives    Effexor [Venlafaxine Hydrochloride] Other (See Comments)     Headache, Painful scrotum and drainage from the penis    Ambien [Zolpidem Tartrate] Nausea    Buspirone Nausea     Dizziness, headache    Celexa [Citalopram] Other (See Comments)     Headache    Duloxetine Other (See Comments)     Headache  headaches    Septra [Bactrim] Itching    Seroquel [Quetiapine] Other (See Comments)     Headache, N, V    Sulfa Antibiotics Itching    Sulfamethoxazole-Trimethoprim      hives    Tramadol Nausea     Nausea and headache    Ultram [Tramadol Hcl] Nausea and Vomiting    Venlafaxine     Zolpidem      headaches    Zolpidem Tartrate Other (See Comments)     headaches         Do you need any medication refills at today's visit?    Yes

## 2025-07-30 NOTE — PROGRESS NOTES
[facility] Dermatology Note    Encounter Date: Jul 30, 2025    Dermatology Problem List:  FBSE: 12/2/24  1. Melasma  - current tx: adapalene gel  2. Zoster  - current tx: valtrex 1000 mg- take 3 times a day  3.  Pruritus on the buttock  - current tx: triamcinolone ointment 0.1%  4. Tinea Versicolor  - current tx: ketoconazole shampoo 2%  5. Seborrheic keratoses on the neck  6. Idiopathic guttate hypomelanosis    ______________________________________    Impression/Plan:  1. Melasma, significantly improved  - adapalene gel-apply to face at bedtime (will get over the counter)    2. Pruritus, back, not active today.  - triamcinolone ointment 0.1%-apply to rash in affected area PRN     3. Tinea versicolor, not active today, with residual dyspigmentation  - continue ketoconazole shampoo 2% as wash at least once weekly. Refilled today.     4. Dermatofibroma, L thigh, Seborrheic keratosis, L abdomen  - Reassurance provided      Follow-up in 1 year.       Staff Involved:  Staff/Scribe  Scribe Disclosure:   I, Payal Allen, am serving as a scribe to document services personally performed by Gurpreet Parker MD based on data collection and the provider's statements to me.     Provider Disclosure:   The documentation recorded by the scribe accurately reflects the services I personally performed and the decisions made by me.    Gurpreet Parker MD   of Dermatology  Department of Dermatology  [facility]        CC:   Chief Complaint   Patient presents with    Skin Check     Hx of melasma on back. Itching has resolved. Still noticing hyperpigmentation . Areas of concern: abdomen and left thigh. No hx of skin cancer       History of Present Illness:  Mr. Jeanmarie Mclean is a 65 year old male who presents as a return patient.    Today, patient reports moles on the abdomen and L thigh he would like checked. Reports back is not itching anymore. Still seeing some  discoloration on the back.     Labs:  N/a    Physical exam:  Vitals: There were no vitals taken for this visit.  GEN: This is a well developed, well-nourished male in no acute distress, in a pleasant mood.    SKIN: Krishnamurthy phototype V  - Focused examination of the face, back, abdomen and L thigh was performed.  - Hypo/hyperpigmented macules on the back.  - Stuck on brown papule on the L abdomen.  - There is a firm tan/flesh colored papule that dimples with lateral pressure on the L thigh.  - No other lesions of concern on areas examined.     Past Medical History:   Past Medical History:   Diagnosis Date    Alcoholism (H)     Arthritis     Asperger's syndrome     Dr. Owens, [facility]. Last visit 2006/2007    GERD (gastroesophageal reflux disease) 1999    EGD 2003 [place]    History of hypercholesterolemia     Hypertension     Kidney stones     Malignant hyperthermia due to anesthesia     Melasma     forehead, has received desonide from dermatologist    MMT (medial meniscus tear) 10/01    LT    Myalgia and myositis 4/5/2016    mid thorax, left subscapularis, latisimus dorsi Bilateral along iliac crest     Posttraumatic stress disorder     per pt    Prostate cancer (H)     Recurrent genital herpes 1982    Rib fracture 1985    L 6th    Skull fracture (H) 1985    frequent vertigo    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    ultrasound     Past Surgical History:   Procedure Laterality Date    COLONOSCOPY      COLONOSCOPY N/A 2/22/2021    Procedure: Colonoscopy, Flexible, With Lesion Removal Using Snare;  Surgeon: Garrick Villavicencio MD;  Location: [facility] OR    COLONOSCOPY WITH CO2 INSUFFLATION N/A 2/22/2021    Procedure: COLONOSCOPY, WITH CO2 INSUFFLATION;  Surgeon: Garrick Villavicencio MD;  Location: [facility] OR    CYSTOSCOPY  10/98    for renal stones    EXCISE MASS LOWER EXTREMITY Right 3/25/2024    Procedure: Excision right thigh mass;  Surgeon: Pedro Tyler MD;  Location: [facility] OR     KNEE SCOPE,MED/LAT  MENISECTOMY  11    Left, medial ([place])    HERNIA REPAIR, INGUINAL RT/LT      [facility]    [facility] HEP B VAC ADULT 3 DOSE IM      received all 3 vaccines    [facility] REMV PROSTATE,RETROPUB,RADICAL  10/13/04    Dr. Muñoz ([place])       Social History:   reports that he quit smoking about 27 years ago. His smoking use included cigarettes. He started smoking about 37 years ago. He has a 5 pack-year smoking history. He has been exposed to tobacco smoke. He has never used smokeless tobacco. He reports current alcohol use. He reports current drug use. Drug: Marijuana.    Family History:  Family History   Problem Relation Age of Onset    Psychotic Disorder Son         autism    Prostate Cancer Father         40s    Cancer Father     Cancer Maternal Grandmother         lung    Glaucoma Maternal Grandmother     Eye Disorder Maternal Grandmother         glaucoma    Diabetes Mother          45    Blood Disease Sister         sickle trait    Hypertension Sister     Blood Disease Paternal Uncle         sickle    Blood Disease Paternal Aunt         sickle    Alzheimer Disease Paternal Grandfather         70s    Macular Degeneration Paternal Grandfather     Cerebrovascular Disease No family hx of     Thyroid Disease No family hx of     Myocardial Infarction No family hx of     C.A.D. No family hx of        Medications:  Current Outpatient Medications   Medication Sig Dispense Refill    acetaminophen (TYLENOL) 500 MG tablet Take 500-1,000 mg by mouth every 6 hours as needed for mild pain 1000 mg per day      amLODIPine (NORVASC) 2.5 MG tablet Take 1 tablet (2.5 mg) by mouth daily 90 tablet 3    Blood Pressure KIT Monitor blood pressure as needed. 1 kit 1    Cholecalciferol (VITAMIN D) 1000 UNIT capsule Take 1 capsule by mouth 2 times daily      ketoconazole (NIZORAL) 2 % external shampoo Apply topically daily. As body wash 120 mL 11    meclizine (ANTIVERT) 25 MG tablet Take 25 mg by mouth.      medical cannabis  (Patient's own supply) See Admin Instructions. (The purpose of this order is to document that the patient reports taking medical cannabis.  This is not a prescription, and is not used to certify that the patient has a qualifying medical condition.)      Nerve Stimulator (TENS THERAPY PAIN RELIEF) GEORGIE       omeprazole (PRILOSEC) 20 MG DR capsule Take 1 capsule (20 mg) by mouth 2 times daily. 180 capsule 1    traZODone (DESYREL) 50 MG tablet Take by mouth.      triamcinolone (KENALOG) 0.1 % external ointment Apply topically 2 times daily. 80 g 3    valACYclovir (VALTREX) 1000 mg tablet Take 1 tablet (1,000 mg) by mouth 3 times daily for 7 days. (Patient not taking: Reported on 7/30/2025) 21 tablet 0     Allergies   Allergen Reactions    Risperidone Other (See Comments)     Serve numbness     Trimethoprim Hives    Effexor [Venlafaxine Hydrochloride] Other (See Comments)     Headache, Painful scrotum and drainage from the penis    Ambien [Zolpidem Tartrate] Nausea    Buspirone Nausea     Dizziness, headache    Celexa [Citalopram] Other (See Comments)     Headache    Duloxetine Other (See Comments)     Headache  headaches    Septra [Bactrim] Itching    Seroquel [Quetiapine] Other (See Comments)     Headache, N, V    Sulfa Antibiotics Itching    Sulfamethoxazole-Trimethoprim      hives    Tramadol Nausea     Nausea and headache    Ultram [Tramadol Hcl] Nausea and Vomiting    Venlafaxine     Zolpidem      headaches    Zolpidem Tartrate Other (See Comments)     headaches

## 2025-07-30 NOTE — LETTER
7/30/2025      Jeanmarie Mclean  [address]  [place] [address]      Dear Colleague,    Thank you for referring your patient, Jeanmarie Mclean, to the [facility]. Please see a copy of my visit note below.    [facility] Dermatology Note    Encounter Date: Jul 30, 2025    Dermatology Problem List:  FBSE: 12/2/24  1. Melasma  - current tx: adapalene gel  2. Zoster  - current tx: valtrex 1000 mg- take 3 times a day  3.  Pruritus on the buttock  - current tx: triamcinolone ointment 0.1%  4. Tinea Versicolor  - current tx: ketoconazole shampoo 2%  5. Seborrheic keratoses on the neck  6. Idiopathic guttate hypomelanosis    ______________________________________    Impression/Plan:  1. Melasma, significantly improved  - adapalene gel-apply to face at bedtime (will get over the counter)    2. Pruritus, back, not active today.  - triamcinolone ointment 0.1%-apply to rash in affected area PRN     3. Tinea versicolor, not active today, with residual dyspigmentation  - continue ketoconazole shampoo 2% as wash at least once weekly. Refilled today.     4. Dermatofibroma, L thigh, Seborrheic keratosis, L abdomen  - Reassurance provided      Follow-up in 1 year.       Staff Involved:  Staff/Scribe  Scribe Disclosure:   I, Payal Allen, am serving as a scribe to document services personally performed by Gurpreet Parker MD based on data collection and the provider's statements to me.     Provider Disclosure:   The documentation recorded by the scribe accurately reflects the services I personally performed and the decisions made by me.    Gurpreet Parker MD   of Dermatology  Department of Dermatology  [facility] [employer] [facility]        CC:   Chief Complaint   Patient presents with     Skin Check     Hx of melasma on back. Itching has resolved. Still noticing hyperpigmentation . Areas of concern: abdomen and left thigh. No hx of skin cancer        History of Present Illness:  Mr. Jeanmarie Mclean is a 65 year old male who presents as a return patient.    Today, patient reports moles on the abdomen and L thigh he would like checked. Reports back is not itching anymore. Still seeing some discoloration on the back.     Labs:  N/a    Physical exam:  Vitals: There were no vitals taken for this visit.  GEN: This is a well developed, well-nourished male in no acute distress, in a pleasant mood.    SKIN: Krishnamurthy phototype V  - Focused examination of the face, back, abdomen and L thigh was performed.  - Hypo/hyperpigmented macules on the back.  - Stuck on brown papule on the L abdomen.  - There is a firm tan/flesh colored papule that dimples with lateral pressure on the L thigh.  - No other lesions of concern on areas examined.     Past Medical History:   Past Medical History:   Diagnosis Date     Alcoholism (H)      Arthritis      Asperger's syndrome     Dr. Owens, [facility]. Last visit 2006/2007     GERD (gastroesophageal reflux disease) 1999    EGD 2003 [place]     History of hypercholesterolemia      Hypertension      Kidney stones      Malignant hyperthermia due to anesthesia      Melasma     forehead, has received desonide from dermatologist     MMT (medial meniscus tear) 10/01    LT     Myalgia and myositis 4/5/2016    mid thorax, left subscapularis, latisimus dorsi Bilateral along iliac crest      Posttraumatic stress disorder     per pt     Prostate cancer (H)      Recurrent genital herpes 1982     Rib fracture 1985    L 6th     Skull fracture (H) 1985    frequent vertigo     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    ultrasound     Past Surgical History:   Procedure Laterality Date     COLONOSCOPY       COLONOSCOPY N/A 2/22/2021    Procedure: Colonoscopy, Flexible, With Lesion Removal Using Snare;  Surgeon: Garrick Villavicencio MD;  Location: [facility] OR     COLONOSCOPY WITH CO2 INSUFFLATION N/A 2/22/2021    Procedure: COLONOSCOPY, WITH CO2 INSUFFLATION;  Surgeon:  Garrick Villavicencio MD;  Location: [facility] OR     CYSTOSCOPY  10/98    for renal stones     EXCISE MASS LOWER EXTREMITY Right 3/25/2024    Procedure: Excision right thigh mass;  Surgeon: Pedro Tyler MD;  Location: [facility] OR     HC KNEE SCOPE,MED/LAT MENISECTOMY  11    Left, medial ([place])     HERNIA REPAIR, INGUINAL RT/LT      [facility]     [facility] HEP B VAC ADULT 3 DOSE IM      received all 3 vaccines     [facility] REMV PROSTATE,RETROPUB,RADICAL  10/13/04    Dr. Muñoz ([place])       Social History:   reports that he quit smoking about 27 years ago. His smoking use included cigarettes. He started smoking about 37 years ago. He has a 5 pack-year smoking history. He has been exposed to tobacco smoke. He has never used smokeless tobacco. He reports current alcohol use. He reports current drug use. Drug: Marijuana.    Family History:  Family History   Problem Relation Age of Onset     Psychotic Disorder Son         autism     Prostate Cancer Father         40s     Cancer Father      Cancer Maternal Grandmother         lung     Glaucoma Maternal Grandmother      Eye Disorder Maternal Grandmother         glaucoma     Diabetes Mother          45     Blood Disease Sister         sickle trait     Hypertension Sister      Blood Disease Paternal Uncle         sickle     Blood Disease Paternal Aunt         sickle     Alzheimer Disease Paternal Grandfather         70s     Macular Degeneration Paternal Grandfather      Cerebrovascular Disease No family hx of      Thyroid Disease No family hx of      Myocardial Infarction No family hx of      C.A.D. No family hx of        Medications:  Current Outpatient Medications   Medication Sig Dispense Refill     acetaminophen (TYLENOL) 500 MG tablet Take 500-1,000 mg by mouth every 6 hours as needed for mild pain 1000 mg per day       amLODIPine (NORVASC) 2.5 MG tablet Take 1 tablet (2.5 mg) by mouth daily 90 tablet 3     Blood Pressure KIT Monitor blood pressure as  needed. 1 kit 1     Cholecalciferol (VITAMIN D) 1000 UNIT capsule Take 1 capsule by mouth 2 times daily       ketoconazole (NIZORAL) 2 % external shampoo Apply topically daily. As body wash 120 mL 11     meclizine (ANTIVERT) 25 MG tablet Take 25 mg by mouth.       medical cannabis (Patient's own supply) See Admin Instructions. (The purpose of this order is to document that the patient reports taking medical cannabis.  This is not a prescription, and is not used to certify that the patient has a qualifying medical condition.)       Nerve Stimulator (TENS THERAPY PAIN RELIEF) GEORGIE        omeprazole (PRILOSEC) 20 MG DR capsule Take 1 capsule (20 mg) by mouth 2 times daily. 180 capsule 1     traZODone (DESYREL) 50 MG tablet Take by mouth.       triamcinolone (KENALOG) 0.1 % external ointment Apply topically 2 times daily. 80 g 3     valACYclovir (VALTREX) 1000 mg tablet Take 1 tablet (1,000 mg) by mouth 3 times daily for 7 days. (Patient not taking: Reported on 7/30/2025) 21 tablet 0     Allergies   Allergen Reactions     Risperidone Other (See Comments)     Serve numbness      Trimethoprim Hives     Effexor [Venlafaxine Hydrochloride] Other (See Comments)     Headache, Painful scrotum and drainage from the penis     Ambien [Zolpidem Tartrate] Nausea     Buspirone Nausea     Dizziness, headache     Celexa [Citalopram] Other (See Comments)     Headache     Duloxetine Other (See Comments)     Headache  headaches     Septra [Bactrim] Itching     Seroquel [Quetiapine] Other (See Comments)     Headache, N, V     Sulfa Antibiotics Itching     Sulfamethoxazole-Trimethoprim      hives     Tramadol Nausea     Nausea and headache     Ultram [Tramadol Hcl] Nausea and Vomiting     Venlafaxine      Zolpidem      headaches     Zolpidem Tartrate Other (See Comments)     headaches                 Again, thank you for allowing me to participate in the care of your patient.        Sincerely,        Gurpreet Parker MD    Electronically  signed

## 2025-08-27 ENCOUNTER — ANCILLARY PROCEDURE (OUTPATIENT)
Dept: ULTRASOUND IMAGING | Facility: CLINIC | Age: 65
End: 2025-08-27
Attending: INTERNAL MEDICINE
Payer: COMMERCIAL

## 2025-08-27 ENCOUNTER — LAB (OUTPATIENT)
Dept: LAB | Facility: CLINIC | Age: 65
End: 2025-08-27
Payer: COMMERCIAL

## 2025-08-27 DIAGNOSIS — E04.2 MULTIPLE THYROID NODULES: ICD-10-CM

## 2025-08-27 LAB — TSH SERPL DL<=0.005 MIU/L-ACNC: 0.61 UIU/ML (ref 0.3–4.2)

## 2025-08-27 PROCEDURE — 76536 US EXAM OF HEAD AND NECK: CPT | Performed by: STUDENT IN AN ORGANIZED HEALTH CARE EDUCATION/TRAINING PROGRAM

## 2025-08-30 ENCOUNTER — RESULTS FOLLOW-UP (OUTPATIENT)
Dept: ENDOCRINOLOGY | Facility: CLINIC | Age: 65
End: 2025-08-30
Payer: COMMERCIAL

## 2025-09-02 ENCOUNTER — VIRTUAL VISIT (OUTPATIENT)
Dept: ENDOCRINOLOGY | Facility: CLINIC | Age: 65
End: 2025-09-02
Payer: COMMERCIAL

## 2025-09-02 DIAGNOSIS — E04.2 MULTIPLE THYROID NODULES: Primary | ICD-10-CM

## 2025-09-02 DIAGNOSIS — E66.3 OVERWEIGHT (BMI 25.0-29.9): ICD-10-CM

## 2025-09-02 PROCEDURE — 1125F AMNT PAIN NOTED PAIN PRSNT: CPT | Mod: 95 | Performed by: INTERNAL MEDICINE

## 2025-09-02 PROCEDURE — 98006 SYNCH AUDIO-VIDEO EST MOD 30: CPT | Performed by: INTERNAL MEDICINE

## 2025-09-02 ASSESSMENT — PAIN SCALES - GENERAL: PAINLEVEL_OUTOF10: MODERATE PAIN (4)

## 2025-09-03 ENCOUNTER — PATIENT OUTREACH (OUTPATIENT)
Dept: CARE COORDINATION | Facility: CLINIC | Age: 65
End: 2025-09-03
Payer: COMMERCIAL

## 2025-09-03 ENCOUNTER — MYC MEDICAL ADVICE (OUTPATIENT)
Dept: FAMILY MEDICINE | Facility: CLINIC | Age: 65
End: 2025-09-03
Payer: COMMERCIAL

## (undated) DEVICE — TOURNIQUET CUFF 30" REPRO BLUE 60-7070-105

## (undated) DEVICE — CONTAINER SPECIMEN 4OZ STERILE 17099

## (undated) DEVICE — GOWN XLG DISP 9545

## (undated) DEVICE — ESU GROUND PAD UNIVERSAL W/O CORD

## (undated) DEVICE — STRAP KNEE/BODY 31143004

## (undated) DEVICE — ESU PENCIL W/SMOKE EVAC NEPTUNE STRYKER 0703-046-000

## (undated) DEVICE — DRAPE MAYO STAND 23X54 8337

## (undated) DEVICE — SOL NACL 0.9% IRRIG 1000ML BOTTLE 2F7124

## (undated) DEVICE — DRAPE U-DRAPE 1015NSD NON-STERILE

## (undated) DEVICE — DRAPE U SPLIT 74X120" 29440

## (undated) DEVICE — DRSG STERI STRIP 1/2X4" R1547

## (undated) DEVICE — SU MONOCRYL 4-0 PS-2 18" UND Y496G

## (undated) DEVICE — SU VICRYL 2-0 CT-2 27" UND J269H

## (undated) DEVICE — PREP CHLORAPREP 26ML TINTED HI-LITE ORANGE 930815

## (undated) DEVICE — LIGHT HANDLE X2

## (undated) DEVICE — DRSG TELFA 3X8" 1238

## (undated) DEVICE — PREP CHLORAPREP 26ML TINTED ORANGE  260815

## (undated) DEVICE — DRAIN ROUND W/RESERV KIT JACKSON PRATT 10FR 400ML SU130-402D

## (undated) DEVICE — DECANTER TRANSFER DEVICE 2008S

## (undated) DEVICE — Device

## (undated) DEVICE — TRAY PREP DRY SKIN SCRUB 067

## (undated) DEVICE — SOL WATER IRRIG 1000ML BOTTLE 07139-09

## (undated) DEVICE — SU VICRYL 0 CT-1 27" J340H

## (undated) DEVICE — LINEN ORTHO PACK 5446

## (undated) DEVICE — DRAPE IOBAN INCISE 23X17" 6650EZ

## (undated) DEVICE — DRSG PRIMAPORE 03 1/8X6" 66000318

## (undated) DEVICE — PACK LOWER EXTREMITY RIVERSIDE SOP32LEFSX

## (undated) DEVICE — DRAPE STOCKINETTE IMPERVIOUS 12" 1587

## (undated) DEVICE — GLOVE BIOGEL PI MICRO SZ 7.0 48570

## (undated) DEVICE — PREP POVIDONE-IODINE 7.5% SCRUB 4OZ BOTTLE MDS093945

## (undated) DEVICE — SUCTION MANIFOLD NEPTUNE 2 SYS 4 PORT 0702-020-000

## (undated) RX ORDER — PROPOFOL 10 MG/ML
INJECTION, EMULSION INTRAVENOUS
Status: DISPENSED
Start: 2022-03-29

## (undated) RX ORDER — OXYCODONE HYDROCHLORIDE 5 MG/1
TABLET ORAL
Status: DISPENSED
Start: 2024-03-25

## (undated) RX ORDER — FENTANYL CITRATE 50 UG/ML
INJECTION, SOLUTION INTRAMUSCULAR; INTRAVENOUS
Status: DISPENSED
Start: 2021-02-22

## (undated) RX ORDER — FENTANYL CITRATE 50 UG/ML
INJECTION, SOLUTION INTRAMUSCULAR; INTRAVENOUS
Status: DISPENSED
Start: 2024-03-25

## (undated) RX ORDER — LIDOCAINE HYDROCHLORIDE 20 MG/ML
INJECTION, SOLUTION INFILTRATION; PERINEURAL
Status: DISPENSED
Start: 2022-03-29

## (undated) RX ORDER — SIMETHICONE 40MG/0.6ML
SUSPENSION, DROPS(FINAL DOSAGE FORM)(ML) ORAL
Status: DISPENSED
Start: 2021-02-22

## (undated) RX ORDER — LIDOCAINE HYDROCHLORIDE 10 MG/ML
INJECTION, SOLUTION INFILTRATION; PERINEURAL
Status: DISPENSED
Start: 2024-02-19

## (undated) RX ORDER — BUPIVACAINE HYDROCHLORIDE 2.5 MG/ML
INJECTION, SOLUTION INFILTRATION; PERINEURAL
Status: DISPENSED
Start: 2024-03-25

## (undated) RX ORDER — REGADENOSON 0.08 MG/ML
INJECTION, SOLUTION INTRAVENOUS
Status: DISPENSED
Start: 2024-06-03

## (undated) RX ORDER — CEFAZOLIN SODIUM/WATER 2 G/20 ML
SYRINGE (ML) INTRAVENOUS
Status: DISPENSED
Start: 2024-03-25

## (undated) RX ORDER — ACETAMINOPHEN 325 MG/1
TABLET ORAL
Status: DISPENSED
Start: 2024-03-25

## (undated) RX ORDER — AMINOPHYLLINE 25 MG/ML
INJECTION, SOLUTION INTRAVENOUS
Status: DISPENSED
Start: 2024-06-03